# Patient Record
Sex: FEMALE | Race: WHITE | NOT HISPANIC OR LATINO | Employment: OTHER | URBAN - METROPOLITAN AREA
[De-identification: names, ages, dates, MRNs, and addresses within clinical notes are randomized per-mention and may not be internally consistent; named-entity substitution may affect disease eponyms.]

---

## 2017-05-12 ENCOUNTER — HOSPITAL ENCOUNTER (OUTPATIENT)
Dept: LAB | Facility: MEDICAL CENTER | Age: 77
End: 2017-05-12
Attending: NURSE PRACTITIONER
Payer: MEDICARE

## 2017-05-12 LAB
25(OH)D3 SERPL-MCNC: 48 NG/ML (ref 30–100)
ALBUMIN SERPL BCP-MCNC: 4.1 G/DL (ref 3.2–4.9)
ALBUMIN/GLOB SERPL: 1.2 G/DL
ALP SERPL-CCNC: 57 U/L (ref 30–99)
ALT SERPL-CCNC: 12 U/L (ref 2–50)
ANION GAP SERPL CALC-SCNC: 10 MMOL/L (ref 0–11.9)
AST SERPL-CCNC: 19 U/L (ref 12–45)
BILIRUB SERPL-MCNC: 0.6 MG/DL (ref 0.1–1.5)
BUN SERPL-MCNC: 28 MG/DL (ref 8–22)
CALCIUM SERPL-MCNC: 9.6 MG/DL (ref 8.5–10.5)
CHLORIDE SERPL-SCNC: 101 MMOL/L (ref 96–112)
CHOLEST SERPL-MCNC: 134 MG/DL (ref 100–199)
CO2 SERPL-SCNC: 28 MMOL/L (ref 20–33)
CREAT SERPL-MCNC: 1.29 MG/DL (ref 0.5–1.4)
CREAT UR-MCNC: 63.3 MG/DL
EST. AVERAGE GLUCOSE BLD GHB EST-MCNC: 146 MG/DL
GFR SERPL CREATININE-BSD FRML MDRD: 40 ML/MIN/1.73 M 2
GLOBULIN SER CALC-MCNC: 3.4 G/DL (ref 1.9–3.5)
GLUCOSE SERPL-MCNC: 107 MG/DL (ref 65–99)
HBA1C MFR BLD: 6.7 % (ref 0–5.6)
HDLC SERPL-MCNC: 62 MG/DL
LDLC SERPL CALC-MCNC: 45 MG/DL
MICROALBUMIN UR-MCNC: <0.7 MG/DL
MICROALBUMIN/CREAT UR: NORMAL MG/G (ref 0–30)
POTASSIUM SERPL-SCNC: 3.7 MMOL/L (ref 3.6–5.5)
PROT SERPL-MCNC: 7.5 G/DL (ref 6–8.2)
SODIUM SERPL-SCNC: 139 MMOL/L (ref 135–145)
T3FREE SERPL-MCNC: 3.19 PG/ML (ref 2.4–4.2)
T4 FREE SERPL-MCNC: 1.63 NG/DL (ref 0.53–1.43)
TRIGL SERPL-MCNC: 136 MG/DL (ref 0–149)
TSH SERPL DL<=0.005 MIU/L-ACNC: 1.76 UIU/ML (ref 0.3–3.7)

## 2017-05-12 PROCEDURE — 36415 COLL VENOUS BLD VENIPUNCTURE: CPT

## 2017-05-12 PROCEDURE — 82043 UR ALBUMIN QUANTITATIVE: CPT

## 2017-05-12 PROCEDURE — 84481 FREE ASSAY (FT-3): CPT

## 2017-05-12 PROCEDURE — 82570 ASSAY OF URINE CREATININE: CPT

## 2017-05-12 PROCEDURE — 80061 LIPID PANEL: CPT

## 2017-05-12 PROCEDURE — 82306 VITAMIN D 25 HYDROXY: CPT

## 2017-05-12 PROCEDURE — 84439 ASSAY OF FREE THYROXINE: CPT

## 2017-05-12 PROCEDURE — 84443 ASSAY THYROID STIM HORMONE: CPT

## 2017-05-12 PROCEDURE — 83036 HEMOGLOBIN GLYCOSYLATED A1C: CPT

## 2017-05-12 PROCEDURE — 80053 COMPREHEN METABOLIC PANEL: CPT

## 2017-06-02 ENCOUNTER — HOSPITAL ENCOUNTER (INPATIENT)
Facility: MEDICAL CENTER | Age: 77
LOS: 4 days | DRG: 871 | End: 2017-06-07
Attending: EMERGENCY MEDICINE | Admitting: INTERNAL MEDICINE
Payer: MEDICARE

## 2017-06-02 ENCOUNTER — OFFICE VISIT (OUTPATIENT)
Dept: URGENT CARE | Facility: PHYSICIAN GROUP | Age: 77
End: 2017-06-02
Payer: MEDICARE

## 2017-06-02 VITALS
HEART RATE: 110 BPM | SYSTOLIC BLOOD PRESSURE: 156 MMHG | BODY MASS INDEX: 37.77 KG/M2 | OXYGEN SATURATION: 81 % | DIASTOLIC BLOOD PRESSURE: 64 MMHG | TEMPERATURE: 101.1 F | WEIGHT: 235 LBS | HEIGHT: 66 IN | RESPIRATION RATE: 30 BRPM

## 2017-06-02 DIAGNOSIS — R35.0 URINARY FREQUENCY: ICD-10-CM

## 2017-06-02 DIAGNOSIS — N39.0 ACUTE UTI: ICD-10-CM

## 2017-06-02 DIAGNOSIS — J44.1 COPD EXACERBATION (HCC): ICD-10-CM

## 2017-06-02 DIAGNOSIS — A41.9 SEPSIS, DUE TO UNSPECIFIED ORGANISM: ICD-10-CM

## 2017-06-02 DIAGNOSIS — J44.1 ACUTE EXACERBATION OF CHRONIC OBSTRUCTIVE PULMONARY DISEASE (COPD) (HCC): ICD-10-CM

## 2017-06-02 DIAGNOSIS — J96.01 ACUTE HYPOXEMIC RESPIRATORY FAILURE (HCC): ICD-10-CM

## 2017-06-02 DIAGNOSIS — R05.8 PRODUCTIVE COUGH: ICD-10-CM

## 2017-06-02 LAB
APPEARANCE UR: NORMAL
BILIRUB UR STRIP-MCNC: NEGATIVE MG/DL
COLOR UR AUTO: YELLOW
GLUCOSE UR STRIP.AUTO-MCNC: NEGATIVE MG/DL
KETONES UR STRIP.AUTO-MCNC: NEGATIVE MG/DL
LEUKOCYTE ESTERASE UR QL STRIP.AUTO: NORMAL
NITRITE UR QL STRIP.AUTO: POSITIVE
PH UR STRIP.AUTO: 5 [PH] (ref 5–8)
PROT UR QL STRIP: NORMAL MG/DL
RBC UR QL AUTO: NORMAL
SP GR UR STRIP.AUTO: 1.02
UROBILINOGEN UR STRIP-MCNC: NEGATIVE MG/DL

## 2017-06-02 PROCEDURE — 94760 N-INVAS EAR/PLS OXIMETRY 1: CPT

## 2017-06-02 PROCEDURE — 3017F COLORECTAL CA SCREEN DOC REV: CPT | Mod: 8P | Performed by: PHYSICIAN ASSISTANT

## 2017-06-02 PROCEDURE — 99285 EMERGENCY DEPT VISIT HI MDM: CPT

## 2017-06-02 PROCEDURE — 4040F PNEUMOC VAC/ADMIN/RCVD: CPT | Mod: 8P | Performed by: PHYSICIAN ASSISTANT

## 2017-06-02 PROCEDURE — 1036F TOBACCO NON-USER: CPT | Performed by: PHYSICIAN ASSISTANT

## 2017-06-02 PROCEDURE — 81002 URINALYSIS NONAUTO W/O SCOPE: CPT | Performed by: PHYSICIAN ASSISTANT

## 2017-06-02 PROCEDURE — G8432 DEP SCR NOT DOC, RNG: HCPCS | Performed by: PHYSICIAN ASSISTANT

## 2017-06-02 PROCEDURE — 87040 BLOOD CULTURE FOR BACTERIA: CPT

## 2017-06-02 PROCEDURE — G8417 CALC BMI ABV UP PARAM F/U: HCPCS | Performed by: PHYSICIAN ASSISTANT

## 2017-06-02 PROCEDURE — 94640 AIRWAY INHALATION TREATMENT: CPT

## 2017-06-02 PROCEDURE — 93005 ELECTROCARDIOGRAM TRACING: CPT | Performed by: EMERGENCY MEDICINE

## 2017-06-02 PROCEDURE — 1101F PT FALLS ASSESS-DOCD LE1/YR: CPT | Mod: 8P | Performed by: PHYSICIAN ASSISTANT

## 2017-06-02 PROCEDURE — 700101 HCHG RX REV CODE 250: Performed by: EMERGENCY MEDICINE

## 2017-06-02 PROCEDURE — 99215 OFFICE O/P EST HI 40 MIN: CPT | Performed by: PHYSICIAN ASSISTANT

## 2017-06-02 RX ORDER — SODIUM CHLORIDE 9 MG/ML
1000 INJECTION, SOLUTION INTRAVENOUS ONCE
Status: COMPLETED | OUTPATIENT
Start: 2017-06-03 | End: 2017-06-03

## 2017-06-02 RX ORDER — METHYLPREDNISOLONE SODIUM SUCCINATE 125 MG/2ML
125 INJECTION, POWDER, LYOPHILIZED, FOR SOLUTION INTRAMUSCULAR; INTRAVENOUS ONCE
Status: COMPLETED | OUTPATIENT
Start: 2017-06-03 | End: 2017-06-03

## 2017-06-02 RX ORDER — IPRATROPIUM BROMIDE AND ALBUTEROL SULFATE 2.5; .5 MG/3ML; MG/3ML
6 SOLUTION RESPIRATORY (INHALATION)
Status: COMPLETED | OUTPATIENT
Start: 2017-06-03 | End: 2017-06-02

## 2017-06-02 RX ADMIN — IPRATROPIUM BROMIDE AND ALBUTEROL SULFATE 6 ML: .5; 3 SOLUTION RESPIRATORY (INHALATION) at 23:41

## 2017-06-02 ASSESSMENT — ENCOUNTER SYMPTOMS
VOMITING: 0
DIARRHEA: 0
SPUTUM PRODUCTION: 1
DIZZINESS: 0
COUGH: 1
NAUSEA: 0
NAUSEA: 0
SORE THROAT: 0
FEVER: 1
HEADACHES: 0
ABDOMINAL PAIN: 0
SHORTNESS OF BREATH: 0
FEVER: 1
VOMITING: 0
SPUTUM PRODUCTION: 1
ABDOMINAL PAIN: 0
CHILLS: 1
MUSCULOSKELETAL NEGATIVE: 1
COUGH: 1
SHORTNESS OF BREATH: 1

## 2017-06-02 ASSESSMENT — PAIN SCALES - GENERAL: PAINLEVEL_OUTOF10: 0

## 2017-06-02 ASSESSMENT — COPD QUESTIONNAIRES
DURING THE PAST 4 WEEKS HOW MUCH DID YOU FEEL SHORT OF BREATH: NONE/LITTLE OF THE TIME
HAVE YOU SMOKED AT LEAST 100 CIGARETTES IN YOUR ENTIRE LIFE: YES
DO YOU EVER COUGH UP ANY MUCUS OR PHLEGM?: NO/ONLY WITH OCCASIONAL COLDS OR INFECTIONS
COPD SCREENING SCORE: 5
COPD: 1

## 2017-06-02 ASSESSMENT — LIFESTYLE VARIABLES: EVER_SMOKED: YES

## 2017-06-02 NOTE — IP AVS SNAPSHOT
6/7/2017    Camille Rodriguez  [address]  [place] [address]    Dear Camille:    [facility], [facility] wants to ensure your discharge home is safe and you or your loved ones have had all of your questions answered regarding your care after you leave the hospital.    Below is a list of resources and contact information should you have any questions regarding your hospital stay, follow-up instructions, or active medical symptoms.    Questions or Concerns Regarding… Contact   Medical Questions Related to Your Discharge  (7 days a week, 8am-5pm) Contact a Nurse Care Coordinator   [phone]   Medical Questions Not Related to Your Discharge  (24 hours a day / 7 days a week)  Contact the Nurse Health Line   [phone]    Medications or Discharge Instructions Refer to your discharge packet   or contact your [facility] Primary Care Provider   [phone]   Follow-up Appointment(s) Schedule your appointment via [employer]   or contact Scheduling [phone]   Billing Review your statement via [employer]  or contact Billing [phone]   Medical Records Review your records via [employer]   or contact Medical Records [phone]     You may receive a telephone call within two days of discharge. This call is to make certain you understand your discharge instructions and have the opportunity to have any questions answered. You can also easily access your medical information, test results and upcoming appointments via the [employer] free online health management tool. You can learn more and sign up at [employer]. For assistance setting up your [employer] account, please call [phone].    Once again, we want to ensure your discharge home is safe and that you have a clear understanding of any next steps in your care. If you have any questions or concerns, please do not hesitate to contact us, we are here for you. Thank you for choosing [facility] for your healthcare needs.    Sincerely,    Your [facility] Healthcare Team

## 2017-06-02 NOTE — IP AVS SNAPSHOT
[employer] Access Code: Activation code not generated  Current [employer] Status: Active    [employer]  A secure, online tool to manage your health information     [employer]’s [employer]® is a secure, online tool that connects you to your personalized health information from the privacy of your home -- day or night - making it very easy for you to manage your healthcare. Once the activation process is completed, you can even access your medical information using the [employer] wale, which is available for free in the Apple Wale store or Google Play store.     [employer] provides the following levels of access (as shown below):   My Chart Features   [facility] Primary Care Doctor [facility]  Specialists [facility]  Urgent  Care Non-[facility]  Primary Care  Doctor   Email your healthcare team securely and privately 24/7 X X X X   Manage appointments: schedule your next appointment; view details of past/upcoming appointments X      Request prescription refills. X      View recent personal medical records, including lab and immunizations X X X X   View health record, including health history, allergies, medications X X X X   Read reports about your outpatient visits, procedures, consult and ER notes X X X X   See your discharge summary, which is a recap of your hospital and/or ER visit that includes your diagnosis, lab results, and care plan. X X       How to register for [employer]:  1. Go to  [email or web address].  2. Click on the Sign Up Now box, which takes you to the New Member Sign Up page. You will need to provide the following information:  a. Enter your [employer] Access Code exactly as it appears at the top of this page. (You will not need to use this code after you’ve completed the sign-up process. If you do not sign up before the expiration date, you must request a new code.)   b. Enter your date of birth.   c. Enter your home email address.   d. Click Submit, and follow the next screen’s instructions.  3. Create a [employer] ID. This will  be your [employer] login ID and cannot be changed, so think of one that is secure and easy to remember.  4. Create a [employer] password. You can change your password at any time.  5. Enter your Password Reset Question and Answer. This can be used at a later time if you forget your password.   6. Enter your e-mail address. This allows you to receive e-mail notifications when new information is available in [employer].  7. Click Sign Up. You can now view your health information.    For assistance activating your [employer] account, call [phone]

## 2017-06-02 NOTE — IP AVS SNAPSHOT
" <p align=\"LEFT\"><IMG SRC=\"[email or web address]\" alt=\"Image\" WIDTH=\"50%\" HEIGHT=\"200\" BORDER=\"\"></p>                   Name:Camille Rodriguez  Medical Record Number:[id number]  CSN: [id number]    YOB: 1940   Age: 76 y.o.  Sex: female  HT:1.626 m (5' 4\") WT: 114.3 kg (251 lb 15.8 oz)          Admit Date: 6/2/2017     Discharge Date:   Today's Date: 6/7/2017  Attending Doctor:  Rico Staley M.D.                  Allergies:  Zithromax          Follow-up Information     1. Follow up with Mirella Mistry M.D.. Go on 6/12/2017.    Specialty:  Family Medicine    Why:  Please arrive at 10:00am for your appointment. Thank you    Contact information    [address]  [facility] [address]  [phone]           Medication List      Take these Medications        Instructions    ADVAIR DISKUS 250-50 MCG/DOSE Aepb   Generic drug:  fluticasone-salmeterol    Inhale 1 Puff by mouth every 12 hours.   Dose:  1 Puff       albuterol 108 (90 BASE) MCG/ACT Aers inhalation aerosol    Inhale 2 Puffs by mouth every four hours as needed for Shortness of Breath.   Dose:  2 Puff       aspirin 81 MG tablet    Take 81 mg by mouth every day.   Dose:  81 mg       atenolol 50 MG Tabs   Commonly known as:  TENORMIN   Notes to Patient:  Take Blood pressure and heart rate prior to dose    Take 50 mg by mouth every day.   Dose:  50 mg       CALCIUM 1200 PO    Take  by mouth.       cyanocobalamin 500 MCG Tabs   Commonly known as:  VITAMIN B-12    Take 1,000 mcg by mouth every day.   Dose:  1000 mcg       fish oil 1000 MG Caps capsule    Take 1,000 mg by mouth every day.   Dose:  1000 mg       hydrochlorothiazide 25 MG Tabs   Commonly known as:  HYDRODIURIL    Take 25 mg by mouth every day.   Dose:  25 mg       ipratropium-albuterol 0.5-2.5 (3) MG/3ML nebulizer solution   Commonly known as:  DUONEB    3 mL by Nebulization route every four hours as needed for Shortness of Breath.   Dose:  3 mL       levofloxacin 500 MG tablet   "   Commonly known as:  LEVAQUIN    Take 1 Tab by mouth every day.   Dose:  500 mg       lisinopril 20 MG Tabs   What changed:  how much to take   Commonly known as:  PRINIVIL    Take 1 Tab by mouth every day.   Dose:  20 mg       metformin  MG Tb24   Commonly known as:  GLUCOPHAGE XR    Take 1 Tab by mouth every day.   Dose:  500 mg       MULTIVITAMIN PO    Take  by mouth.       predniSONE 10 MG Tabs   Commonly known as:  DELTASONE    Take 1 Tab by mouth every day for 12 days. Take  40 mg for 3 days then 30 mg for 3 days then 20 mg for 3 days then 10 mg for 3 days then stop.   Dose:  10 mg       promethazine-codeine 6.25-10 MG/5ML Syrp   What changed:  Another medication with the same name was removed. Continue taking this medication, and follow the directions you see here.   Commonly known as:  PHENERGAN-CODEINE    Take 5 mL by mouth 4 times a day as needed for Cough.   Dose:  5 mL       simvastatin 10 MG Tabs   Commonly known as:  ZOCOR    Take 10 mg by mouth every evening.   Dose:  10 mg       SPIRIVA HANDIHALER INH    Inhale  by mouth.       vitamin D 2000 UNIT Tabs    Take  by mouth.

## 2017-06-02 NOTE — MR AVS SNAPSHOT
"        Camille Rodriguez   2017 6:45 PM   Office Visit   MRN: [id number]    Department:  [facility]   Dept Phone:  [phone]    Description:  Female : 1940   Provider:  Ruthie Monson PA-C           Reason for Visit     Cough cough, fever, chills, crackling [id number]    UTI burning sensation and [id number]      Allergies as of 2017     No Known Allergies      Vital Signs     Blood Pressure Pulse Temperature Respirations Height Weight    156/64 mmHg 110 38.4 °C (101.1 °F) 30 1.676 m (5' 5.98\") 106.595 kg (235 lb)    Body Mass Index Oxygen Saturation Smoking Status             37.95 kg/m2 81% Former Smoker         Basic Information     Date Of Birth Sex Race Ethnicity Preferred Language    1940 Female White Non- English      Health Maintenance        Date Due Completion Dates    IMM DTaP/Tdap/Td Vaccine (1 - Tdap) 1960 ---    PAP SMEAR 1962 ---    COLONOSCOPY 1991 ---    IMM ZOSTER VACCINE 2001 ---    IMM PNEUMOCOCCAL 65+ (ADULT) LOW/MEDIUM RISK SERIES (1 of 2 - PCV13) 2006 ---    MAMMOGRAM 11/3/2016 11/3/2015, 5/15/2013, 2010, 2010, 2009, 2009, 2007, 2007    BONE DENSITY 2019, 2010            Current Immunizations     No immunizations on file.      Below and/or attached are the medications your provider expects you to take. Review all of your home medications and newly ordered medications with your provider and/or pharmacist. Follow medication instructions as directed by your provider and/or pharmacist. Please keep your medication list with you and share with your provider. Update the information when medications are discontinued, doses are changed, or new medications (including over-the-counter products) are added; and carry medication information at all times in the event of emergency situations     Allergies:  No Known Allergies          Medications  Valid as of: 2017 -  7:27 PM   " Generic Name Brand Name Tablet Size Instructions for use    Aspirin (Tab) aspirin 81 MG Take 81 mg by mouth every day.        Atenolol (Tab) TENORMIN 50 MG Take 50 mg by mouth every day.        Azithromycin (Tab) ZITHROMAX 250 MG Take  by mouth every day. 2 tabs by mouth day 1, 1 tab by mouth days 2-5        Calcium Carbonate-Vit D-Min   Take  by mouth.        Cholecalciferol (Tab) vitamin D 2000 UNIT Take  by mouth.        Doxycycline Hyclate (Tab) VIBRAMYCIN 100 MG Take 1 Tab by mouth 2 times a day.        Fluticasone-Salmeterol (AEROSOL POWDER, BREATH ACTIVATED) ADVAIR 250-50 MCG/DOSE Inhale 1 Puff by mouth every 12 hours.        Lisinopril-Hydrochlorothiazide (Tab) PRINZIDE, ZESTORETIC 20-25 MG Take 1 Tab by mouth every day.        MetFORMIN HCl (TABLET SR 24 HR) GLUCOPHAGE  MG Take 500 mg by mouth every day.        Multiple Vitamins-Minerals   Take  by mouth.        Naproxen Sodium   Take  by mouth.        Promethazine-Codeine (Syrup) PHENERGAN-CODEINE 6.25-10 MG/5ML Take 5 mL by mouth 4 times a day as needed for Cough.        Promethazine-Codeine (Syrup) PHENERGAN-CODEINE 6.25-10 MG/5ML Take 5 mL by mouth 4 times a day as needed for Cough.        Simvastatin (Tab) ZOCOR 10 MG Take 10 mg by mouth every evening.        Tiotropium Bromide Monohydrate   Inhale  by mouth.        .                 Medicines prescribed today were sent to:     [facility] #[id number] - [place] - [address]    [address] [place] [address]    Phone: [phone] Fax: [phone]    Open 24 Hours?: No      Medication refill instructions:       If your prescription bottle indicates you have medication refills left, it is not necessary to call your provider’s office. Please contact your pharmacy and they will refill your medication.    If your prescription bottle indicates you do not have any refills left, you may request refills at any time through one of the following ways: The online [employer] system (except Urgent Care), by  calling your provider’s office, or by asking your pharmacy to contact your provider’s office with a refill request. Medication refills are processed only during regular business hours and may not be available until the next business day. Your provider may request additional information or to have a follow-up visit with you prior to refilling your medication.   *Please Note: Medication refills are assigned a new Rx number when refilled electronically. Your pharmacy may indicate that no refills were authorized even though a new prescription for the same medication is available at the pharmacy. Please request the medicine by name with the pharmacy before contacting your provider for a refill.           [employer] Access Code: Activation code not generated  Current [employer] Status: Active

## 2017-06-02 NOTE — IP AVS SNAPSHOT
" Home Care Instructions                                                                                                                  Name:Camille Rodriguez  Medical Record Number:[id number]  CSN: [id number]    YOB: 1940   Age: 76 y.o.  Sex: female  HT:1.626 m (5' 4\") WT: 114.3 kg (251 lb 15.8 oz)          Admit Date: 6/2/2017     Discharge Date:   Today's Date: 6/7/2017  Attending Doctor:  Rico Staley M.D.                  Allergies:  Zithromax            Discharge Instructions       Discharge Instructions    Discharged to home by car with relative. Discharged via wheelchair, hospital escort: Yes.  Special equipment needed: Not Applicable    Be sure to schedule a follow-up appointment with your primary care doctor or any specialists as instructed.     Discharge Plan:   Diet Plan: Discussed  Activity Level: Discussed  Confirmed Follow up Appointment: Appointment Scheduled  Confirmed Symptoms Management: Discussed  Medication Reconciliation Updated: Yes  Influenza Vaccine Indication: Patient Refuses    I understand that a diet low in cholesterol, fat, and sodium is recommended for good health. Unless I have been given specific instructions below for another diet, I accept this instruction as my diet prescription.   Other diet: cardiac diabetic    Special Instructions: None    · Is patient discharged on Warfarin / Coumadin?   No     · Is patient Post Blood Transfusion?  No    Depression / Suicide Risk    As you are discharged from this [facility] facility, it is important to learn how to keep safe from harming yourself.    Recognize the warning signs:  · Abrupt changes in personality, positive or negative- including increase in energy   · Giving away possessions  · Change in eating patterns- significant weight changes-  positive or negative  · Change in sleeping patterns- unable to sleep or sleeping all the time   · Unwillingness or inability to communicate  · Depression  · Unusual sadness, discouragement " and loneliness  · Talk of wanting to die  · Neglect of personal appearance   · Rebelliousness- reckless behavior  · Withdrawal from people/activities they love  · Confusion- inability to concentrate     If you or a loved one observes any of these behaviors or has concerns about self-harm, here's what you can do:  · Talk about it- your feelings and reasons for harming yourself  · Remove any means that you might use to hurt yourself (examples: pills, rope, extension cords, firearm)  · Get professional help from the community (Mental Health, Substance Abuse, psychological counseling)  · Do not be alone:Call your Safe Contact- someone whom you trust who will be there for you.  · Call your local CRISIS HOTLINE 150-9338 or 610-150-0915  · Call your local Children's Mobile Crisis Response Team [place] (797) 860-4945 or www.Lekiosque.fr  · Call the toll free National Suicide Prevention Hotlines   · National Suicide Prevention Lifeline 137-913-PBKF (3234)  · National Hope Line Network 800-SUICIDE (985-5376)  Sepsis, Adult  Sepsis is a serious infection of your blood or tissues that affects your whole body. The infection that causes sepsis may be bacterial, viral, fungal, or parasitic. Sepsis may be life threatening. Sepsis can cause your blood pressure to drop. This may result in shock. Shock causes your central nervous system and your organs to stop working correctly.   RISK FACTORS  Sepsis can happen in anyone, but it is more likely to happen in people who have weakened immune systems.  SIGNS AND SYMPTOMS   Symptoms of sepsis can include:  Fever or low body temperature (hypothermia).  Rapid breathing (hyperventilation).  Chills.  Rapid heartbeat (tachycardia).  Confusion or light-headedness.  Trouble breathing.  Urinating much less than usual.  Cool, clammy skin or red, flushed skin.  Other problems with the heart, kidneys, or brain.  DIAGNOSIS   Your health care provider will likely do tests to look for an  infection, to see if the infection has spread to your blood, and to see how serious your condition is. Tests can include:  Blood tests, including cultures of your blood.  Cultures of other fluids from your body, such as:  Urine.  Pus from wounds.  Mucus coughed up from your lungs.  Urine tests other than cultures.  X-ray exams or other imaging tests.  TREATMENT   Treatment will begin with elimination of the source of infection. If your sepsis is likely caused by a bacterial or fungal infection, you will be given antibiotic or antifungal medicines.  You may also receive:  Oxygen.  Fluids through an IV tube.  Medicines to increase your blood pressure.  A machine to clean your blood (dialysis) if your kidneys fail.  A machine to help you breathe if your lungs fail.  SEEK IMMEDIATE MEDICAL CARE IF:  You get an infection or develop any of the signs and symptoms of sepsis after surgery or a hospitalization.     This information is not intended to replace advice given to you by your health care provider. Make sure you discuss any questions you have with your health care provider.     Document Released: 09/15/2004 Document Revised: 05/03/2016 Document Reviewed: 08/25/2014  [employer] Patient Education ©2016 [employer]      Follow-up Information     1. Follow up with Mirella Mistry M.D.. Go on 6/12/2017.    Specialty:  Family Medicine    Why:  Please arrive at 10:00am for your appointment. Thank you    Contact information    [address]  [place] [address]  [phone]           Discharge Medication Instructions:    Below are the medications your physician expects you to take upon discharge:    Review all your home medications and newly ordered medications with your doctor and/or pharmacist. Follow medication instructions as directed by your doctor and/or pharmacist.    Please keep your medication list with you and share with your physician.               Medication List      START taking these  medications        Instructions    Morning Afternoon Evening Bedtime    albuterol 108 (90 BASE) MCG/ACT Aers inhalation aerosol        Inhale 2 Puffs by mouth every four hours as needed for Shortness of Breath.   Dose:  2 Puff                        ipratropium-albuterol 0.5-2.5 (3) MG/3ML nebulizer solution   Last time this was given:  3 mL on 6/6/2017  6:40 AM   Commonly known as:  DUONEB        3 mL by Nebulization route every four hours as needed for Shortness of Breath.   Dose:  3 mL                        levofloxacin 500 MG tablet   Commonly known as:  LEVAQUIN   Next Dose Due:  Tomorrow morning        Take 1 Tab by mouth every day.   Dose:  500 mg                        predniSONE 10 MG Tabs   Last time this was given:  50 mg on 6/7/2017  8:34 AM   Commonly known as:  DELTASONE   Next Dose Due:  Tomorrow morning        Take 1 Tab by mouth every day for 12 days. Take  40 mg for 3 days then 30 mg for 3 days then 20 mg for 3 days then 10 mg for 3 days then stop.   Dose:  10 mg                          CHANGE how you take these medications        Instructions    Morning Afternoon Evening Bedtime    lisinopril 20 MG Tabs   What changed:  how much to take   Last time this was given:  20 mg on 6/7/2017  8:34 AM   Commonly known as:  PRINIVIL   Next Dose Due:  Tomorrow morning        Take 1 Tab by mouth every day.   Dose:  20 mg                        promethazine-codeine 6.25-10 MG/5ML Syrp   What changed:  Another medication with the same name was removed. Continue taking this medication, and follow the directions you see here.   Commonly known as:  PHENERGAN-CODEINE        Take 5 mL by mouth 4 times a day as needed for Cough.   Dose:  5 mL                          CONTINUE taking these medications        Instructions    Morning Afternoon Evening Bedtime    ADVAIR DISKUS 250-50 MCG/DOSE Aepb   Generic drug:  fluticasone-salmeterol   Next Dose Due:  Tomorrow morning        Inhale 1 Puff by mouth every 12 hours.      Dose:  1 Puff                        aspirin 81 MG tablet   Next Dose Due:  Tomorrow morning        Take 81 mg by mouth every day.   Dose:  81 mg                        atenolol 50 MG Tabs   Commonly known as:  TENORMIN   Next Dose Due:  Tomorrow morning   Notes to Patient:  Take Blood pressure and heart rate prior to dose        Take 50 mg by mouth every day.   Dose:  50 mg                        CALCIUM 1200 PO   Next Dose Due:  Tomorrow morning        Take  by mouth.                        cyanocobalamin 500 MCG Tabs   Commonly known as:  VITAMIN B-12   Next Dose Due:  Tomorrow morning        Take 1,000 mcg by mouth every day.   Dose:  1000 mcg                        fish oil 1000 MG Caps capsule   Next Dose Due:  Tomorrow morning        Take 1,000 mg by mouth every day.   Dose:  1000 mg                        hydrochlorothiazide 25 MG Tabs   Commonly known as:  HYDRODIURIL   Next Dose Due:  Tomorrow morning        Take 25 mg by mouth every day.   Dose:  25 mg                        metformin  MG Tb24   Commonly known as:  GLUCOPHAGE XR   Next Dose Due:  Tomorrow morning        Take 1 Tab by mouth every day.   Dose:  500 mg                        MULTIVITAMIN PO   Next Dose Due:  Tomorrow morning        Take  by mouth.                        simvastatin 10 MG Tabs   Last time this was given:  10 mg on 6/6/2017  9:12 PM   Commonly known as:  ZOCOR   Next Dose Due:  This evening        Take 10 mg by mouth every evening.   Dose:  10 mg                        SPIRIVA HANDIHALER INH   Last time this was given:  1 Cap on 6/7/2017 10:41 AM   Next Dose Due:  Tomorrow morning        Inhale  by mouth.                        vitamin D 2000 UNIT Tabs   Next Dose Due:  Tomorrow morning        Take  by mouth.                          STOP taking these medications     ALEVE PO   Notes to Patient:  STOP               azithromycin 250 MG Tabs   Commonly known as:  ZITHROMAX   Notes to Patient:  STOP                doxycycline 100 MG Tabs   Commonly known as:  VIBRAMYCIN   Notes to Patient:  STOP                    Where to Get Your Medications      Information about where to get these medications is not yet available     ! Ask your nurse or doctor about these medications    - albuterol 108 (90 BASE) MCG/ACT Aers inhalation aerosol  - ipratropium-albuterol 0.5-2.5 (3) MG/3ML nebulizer solution  - levofloxacin 500 MG tablet  - lisinopril 20 MG Tabs  - metformin  MG Tb24  - predniSONE 10 MG Tabs            Orders for after discharge     DME Nebulizer    Complete by:  As directed    Small volume nebulizer and supplies.       DME O2 New Set Up    Complete by:  As directed        REFERRAL TO HOME HEALTH    Complete by:  As directed    Home health will create and establish a plan of care             Instructions           Diet / Nutrition:    Follow any diet instructions given to you by your doctor or the dietician, including how much salt (sodium) you are allowed each day.    If you are overweight, talk to your doctor about a weight reduction plan.    Activity:    Remain physically active following your doctor's instructions about exercise and activity.    Rest often.     Any time you become even a little tired or short of breath, SIT DOWN and rest.    Worsening Symptoms:    Report any of the following signs and symptoms to the doctor's office immediately:    *Pain of jaw, arm, or neck  *Chest pain not relieved by medication                               *Dizziness or loss of consciousness  *Difficulty breathing even when at rest   *More tired than usual                                       *Bleeding drainage or swelling of surgical site  *Swelling of feet, ankles, legs or stomach                 *Fever (>100ºF)  *Pink or blood tinged sputum  *Weight gain (3lbs/day or 5lbs /week)           *Shock from internal defibrillator (if applicable)  *Palpitations or irregular heartbeats                *Cool and/or numb  extremities    Stroke Awareness    Common Risk Factors for Stroke include:    Age  Atrial Fibrillation  Carotid Artery Stenosis  Diabetes Mellitus  Excessive alcohol consumption  High blood pressure  Overweight   Physical inactivity  Smoking    Warning signs and symptoms of a stroke include:    *Sudden numbness or weakness of the face, arm or leg (especially on one side of the body).  *Sudden confusion, trouble speaking or understanding.  *Sudden trouble seeing in one or both eyes.  *Sudden trouble walking, dizziness, loss of balance or coordination.Sudden severe headache with no known cause.    It is very important to get treatment quickly when a stroke occurs. If you experience any of the above warning signs, call 911 immediately.                   Disclaimer         Quit Smoking / Tobacco Use:    I understand the use of any tobacco products increases my chance of suffering from future heart disease or stroke and could cause other illnesses which may shorten my life. Quitting the use of tobacco products is the single most important thing I can do to improve my health. For further information on smoking / tobacco cessation call a Toll Free Quit Line at 1-825.977.8079 (*National Cancer [place]) or 1-666.560.3776 (American Lung Association) or you can access the web based program at www.lungusa.org.    [place] Tobacco Users Help Line:  (404) 344-5849       Toll Free: 1-366.928.2400  Quit Tobacco Program [facility] ([phone]    Crisis Hotline:    [place] Crisis Hotline:  6-390-ZLOXMEF or 1-168.247.7384    [place] Crisis Hotline:    1-241.556.6926 or 426-949-9864    Discharge Survey:   Thank you for choosing [facility]. We hope we did everything we could to make your hospital stay a pleasant one. You may be receiving a phone survey and we would appreciate your time and participation in answering the questions. Your input is very valuable to us in our efforts to improve our service to our  patients and their families.        My signature on this form indicates that:    1. I have reviewed and understand the above information.  2. My questions regarding this information have been answered to my satisfaction.  3. I have formulated a plan with my discharge nurse to obtain my prescribed medications for home.                  Disclaimer         __________________________________                     __________       ________                       Patient Signature                                                 Date                    Time

## 2017-06-03 ENCOUNTER — RESOLUTE PROFESSIONAL BILLING HOSPITAL PROF FEE (OUTPATIENT)
Dept: HOSPITALIST | Facility: MEDICAL CENTER | Age: 77
End: 2017-06-03
Payer: MEDICARE

## 2017-06-03 ENCOUNTER — APPOINTMENT (OUTPATIENT)
Dept: RADIOLOGY | Facility: MEDICAL CENTER | Age: 77
DRG: 871 | End: 2017-06-03
Attending: EMERGENCY MEDICINE
Payer: MEDICARE

## 2017-06-03 PROBLEM — I44.7 LEFT BUNDLE BRANCH BLOCK: Status: ACTIVE | Noted: 2017-06-03

## 2017-06-03 PROBLEM — J96.01 ACUTE HYPOXEMIC RESPIRATORY FAILURE (HCC): Status: ACTIVE | Noted: 2017-06-03

## 2017-06-03 PROBLEM — R65.20 SEVERE SEPSIS(995.92): Status: RESOLVED | Noted: 2017-06-03 | Resolved: 2017-06-03

## 2017-06-03 PROBLEM — R65.20 SEVERE SEPSIS(995.92): Status: ACTIVE | Noted: 2017-06-03

## 2017-06-03 PROBLEM — I10 HTN (HYPERTENSION): Status: ACTIVE | Noted: 2017-06-03

## 2017-06-03 PROBLEM — E11.9 DM2 (DIABETES MELLITUS, TYPE 2) (HCC): Status: ACTIVE | Noted: 2017-06-03

## 2017-06-03 PROBLEM — A41.9 SEVERE SEPSIS(995.92): Status: RESOLVED | Noted: 2017-06-03 | Resolved: 2017-06-03

## 2017-06-03 PROBLEM — N18.30 CKD (CHRONIC KIDNEY DISEASE), STAGE III: Status: ACTIVE | Noted: 2017-06-03

## 2017-06-03 PROBLEM — D64.9 ANEMIA: Status: ACTIVE | Noted: 2017-06-03

## 2017-06-03 PROBLEM — A41.9 SEVERE SEPSIS(995.92): Status: ACTIVE | Noted: 2017-06-03

## 2017-06-03 PROBLEM — J44.1 COPD EXACERBATION (HCC): Status: ACTIVE | Noted: 2017-06-03

## 2017-06-03 PROBLEM — N39.0 UTI (URINARY TRACT INFECTION): Status: ACTIVE | Noted: 2017-06-03

## 2017-06-03 LAB
ALBUMIN SERPL BCP-MCNC: 4 G/DL (ref 3.2–4.9)
ALBUMIN/GLOB SERPL: 1.2 G/DL
ALP SERPL-CCNC: 54 U/L (ref 30–99)
ALT SERPL-CCNC: 11 U/L (ref 2–50)
ANION GAP SERPL CALC-SCNC: 12 MMOL/L (ref 0–11.9)
ANISOCYTOSIS BLD QL SMEAR: ABNORMAL
APPEARANCE UR: ABNORMAL
APTT PPP: 22.7 SEC (ref 24.7–36)
AST SERPL-CCNC: 21 U/L (ref 12–45)
BACTERIA #/AREA URNS HPF: ABNORMAL /HPF
BASOPHILS # BLD AUTO: 0 % (ref 0–1.8)
BASOPHILS # BLD: 0 K/UL (ref 0–0.12)
BILIRUB SERPL-MCNC: 0.6 MG/DL (ref 0.1–1.5)
BILIRUB UR QL STRIP.AUTO: NEGATIVE
BNP SERPL-MCNC: 67 PG/ML (ref 0–100)
BUN SERPL-MCNC: 28 MG/DL (ref 8–22)
CALCIUM SERPL-MCNC: 9.4 MG/DL (ref 8.5–10.5)
CHLORIDE SERPL-SCNC: 100 MMOL/L (ref 96–112)
CO2 SERPL-SCNC: 23 MMOL/L (ref 20–33)
COLOR UR: ABNORMAL
CREAT SERPL-MCNC: 1.23 MG/DL (ref 0.5–1.4)
CULTURE IF INDICATED INDCX: YES UA CULTURE
EKG IMPRESSION: NORMAL
EKG IMPRESSION: NORMAL
EOSINOPHIL # BLD AUTO: 0 K/UL (ref 0–0.51)
EOSINOPHIL NFR BLD: 0 % (ref 0–6.9)
EPI CELLS #/AREA URNS HPF: ABNORMAL /HPF
ERYTHROCYTE [DISTWIDTH] IN BLOOD BY AUTOMATED COUNT: 50.5 FL (ref 35.9–50)
FLUAV H1 2009 PAND RNA SPEC QL NAA+PROBE: NOT DETECTED
FLUAV RNA SPEC QL NAA+PROBE: NEGATIVE
FLUAV+FLUBV AG SPEC QL IA: NORMAL
FLUBV RNA SPEC QL NAA+PROBE: NEGATIVE
GFR SERPL CREATININE-BSD FRML MDRD: 43 ML/MIN/1.73 M 2
GLOBULIN SER CALC-MCNC: 3.4 G/DL (ref 1.9–3.5)
GLUCOSE BLD-MCNC: 220 MG/DL (ref 65–99)
GLUCOSE BLD-MCNC: 223 MG/DL (ref 65–99)
GLUCOSE BLD-MCNC: 245 MG/DL (ref 65–99)
GLUCOSE BLD-MCNC: 372 MG/DL (ref 65–99)
GLUCOSE SERPL-MCNC: 149 MG/DL (ref 65–99)
GLUCOSE UR STRIP.AUTO-MCNC: ABNORMAL MG/DL
HCT VFR BLD AUTO: 28.2 % (ref 37–47)
HGB BLD-MCNC: 8.9 G/DL (ref 12–16)
INR PPP: 1 (ref 0.87–1.13)
KETONES UR STRIP.AUTO-MCNC: NEGATIVE MG/DL
LACTATE BLD-SCNC: 2 MMOL/L (ref 0.5–2)
LACTATE BLD-SCNC: 2.2 MMOL/L (ref 0.5–2)
LEUKOCYTE ESTERASE UR QL STRIP.AUTO: ABNORMAL
LV EJECT FRACT  99904: 60
LV EJECT FRACT MOD 2C 99903: 47.99
LV EJECT FRACT MOD 4C 99902: 57.01
LV EJECT FRACT MOD BP 99901: 53.21
LYMPHOCYTES # BLD AUTO: 4.8 K/UL (ref 1–4.8)
LYMPHOCYTES NFR BLD: 17.5 % (ref 22–41)
MAGNESIUM SERPL-MCNC: 1.2 MG/DL (ref 1.5–2.5)
MAGNESIUM SERPL-MCNC: 1.5 MG/DL (ref 1.5–2.5)
MANUAL DIFF BLD: NORMAL
MCH RBC QN AUTO: 27.5 PG (ref 27–33)
MCHC RBC AUTO-ENTMCNC: 31.6 G/DL (ref 33.6–35)
MCV RBC AUTO: 87 FL (ref 81.4–97.8)
MICRO URNS: ABNORMAL
MICROCYTES BLD QL SMEAR: ABNORMAL
MONOCYTES # BLD AUTO: 0.96 K/UL (ref 0–0.85)
MONOCYTES NFR BLD AUTO: 3.5 % (ref 0–13.4)
MORPHOLOGY BLD-IMP: NORMAL
MUCOUS THREADS #/AREA URNS HPF: ABNORMAL /HPF
NEUTROPHILS # BLD AUTO: 21.4 K/UL (ref 2–7.15)
NEUTROPHILS NFR BLD: 78.1 % (ref 44–72)
NITRITE UR QL STRIP.AUTO: POSITIVE
NRBC # BLD AUTO: 0 K/UL
NRBC BLD AUTO-RTO: 0 /100 WBC
PH UR STRIP.AUTO: 5.5 [PH]
PHOSPHATE SERPL-MCNC: 2.9 MG/DL (ref 2.5–4.5)
PHOSPHATE SERPL-MCNC: 3 MG/DL (ref 2.5–4.5)
PLATELET # BLD AUTO: 327 K/UL (ref 164–446)
PLATELET BLD QL SMEAR: NORMAL
PMV BLD AUTO: 9.3 FL (ref 9–12.9)
POTASSIUM SERPL-SCNC: 4.4 MMOL/L (ref 3.6–5.5)
PROCALCITONIN SERPL-MCNC: 0.23 NG/ML
PROMYELOCYTES NFR BLD MANUAL: 0.9 %
PROT SERPL-MCNC: 7.4 G/DL (ref 6–8.2)
PROT UR QL STRIP: NEGATIVE MG/DL
PROTHROMBIN TIME: 13.5 SEC (ref 12–14.6)
RBC # BLD AUTO: 3.24 M/UL (ref 4.2–5.4)
RBC # URNS HPF: ABNORMAL /HPF
RBC BLD AUTO: PRESENT
RBC UR QL AUTO: ABNORMAL
SIGNIFICANT IND 70042: NORMAL
SITE SITE: NORMAL
SODIUM SERPL-SCNC: 135 MMOL/L (ref 135–145)
SOURCE SOURCE: NORMAL
SP GR UR STRIP.AUTO: 1.01
TROPONIN I SERPL-MCNC: <0.01 NG/ML (ref 0–0.04)
TROPONIN I SERPL-MCNC: <0.01 NG/ML (ref 0–0.04)
WBC # BLD AUTO: 27.4 K/UL (ref 4.8–10.8)
WBC #/AREA URNS HPF: >150 /HPF

## 2017-06-03 PROCEDURE — 85027 COMPLETE CBC AUTOMATED: CPT

## 2017-06-03 PROCEDURE — 87400 INFLUENZA A/B EACH AG IA: CPT

## 2017-06-03 PROCEDURE — 87503 INFLUENZA DNA AMP PROB ADDL: CPT

## 2017-06-03 PROCEDURE — 700105 HCHG RX REV CODE 258: Performed by: INTERNAL MEDICINE

## 2017-06-03 PROCEDURE — 87086 URINE CULTURE/COLONY COUNT: CPT

## 2017-06-03 PROCEDURE — 304562 HCHG STAT O2 MASK/CANNULA

## 2017-06-03 PROCEDURE — 82962 GLUCOSE BLOOD TEST: CPT | Mod: 91

## 2017-06-03 PROCEDURE — 96365 THER/PROPH/DIAG IV INF INIT: CPT

## 2017-06-03 PROCEDURE — 84100 ASSAY OF PHOSPHORUS: CPT

## 2017-06-03 PROCEDURE — 93306 TTE W/DOPPLER COMPLETE: CPT | Mod: 26 | Performed by: INTERNAL MEDICINE

## 2017-06-03 PROCEDURE — 81001 URINALYSIS AUTO W/SCOPE: CPT

## 2017-06-03 PROCEDURE — 71250 CT THORAX DX C-: CPT

## 2017-06-03 PROCEDURE — 700111 HCHG RX REV CODE 636 W/ 250 OVERRIDE (IP): Performed by: EMERGENCY MEDICINE

## 2017-06-03 PROCEDURE — 83605 ASSAY OF LACTIC ACID: CPT | Mod: 91

## 2017-06-03 PROCEDURE — 85610 PROTHROMBIN TIME: CPT

## 2017-06-03 PROCEDURE — 71010 DX-CHEST-PORTABLE (1 VIEW): CPT

## 2017-06-03 PROCEDURE — 700111 HCHG RX REV CODE 636 W/ 250 OVERRIDE (IP): Performed by: INTERNAL MEDICINE

## 2017-06-03 PROCEDURE — 80053 COMPREHEN METABOLIC PANEL: CPT

## 2017-06-03 PROCEDURE — 96375 TX/PRO/DX INJ NEW DRUG ADDON: CPT

## 2017-06-03 PROCEDURE — 93005 ELECTROCARDIOGRAM TRACING: CPT | Performed by: INTERNAL MEDICINE

## 2017-06-03 PROCEDURE — 700105 HCHG RX REV CODE 258: Performed by: EMERGENCY MEDICINE

## 2017-06-03 PROCEDURE — 85007 BL SMEAR W/DIFF WBC COUNT: CPT

## 2017-06-03 PROCEDURE — 87040 BLOOD CULTURE FOR BACTERIA: CPT

## 2017-06-03 PROCEDURE — 700102 HCHG RX REV CODE 250 W/ 637 OVERRIDE(OP): Performed by: INTERNAL MEDICINE

## 2017-06-03 PROCEDURE — 700101 HCHG RX REV CODE 250: Performed by: EMERGENCY MEDICINE

## 2017-06-03 PROCEDURE — 94640 AIRWAY INHALATION TREATMENT: CPT

## 2017-06-03 PROCEDURE — 84484 ASSAY OF TROPONIN QUANT: CPT | Mod: 91

## 2017-06-03 PROCEDURE — 83880 ASSAY OF NATRIURETIC PEPTIDE: CPT

## 2017-06-03 PROCEDURE — 94760 N-INVAS EAR/PLS OXIMETRY 1: CPT

## 2017-06-03 PROCEDURE — 770020 HCHG ROOM/CARE - TELE (206)

## 2017-06-03 PROCEDURE — A9270 NON-COVERED ITEM OR SERVICE: HCPCS | Performed by: INTERNAL MEDICINE

## 2017-06-03 PROCEDURE — 87502 INFLUENZA DNA AMP PROBE: CPT

## 2017-06-03 PROCEDURE — 74176 CT ABD & PELVIS W/O CONTRAST: CPT

## 2017-06-03 PROCEDURE — 93306 TTE W/DOPPLER COMPLETE: CPT

## 2017-06-03 PROCEDURE — 700101 HCHG RX REV CODE 250: Performed by: INTERNAL MEDICINE

## 2017-06-03 PROCEDURE — 93010 ELECTROCARDIOGRAM REPORT: CPT | Performed by: INTERNAL MEDICINE

## 2017-06-03 PROCEDURE — 96361 HYDRATE IV INFUSION ADD-ON: CPT

## 2017-06-03 PROCEDURE — 99223 1ST HOSP IP/OBS HIGH 75: CPT | Performed by: INTERNAL MEDICINE

## 2017-06-03 PROCEDURE — 83735 ASSAY OF MAGNESIUM: CPT

## 2017-06-03 PROCEDURE — 85730 THROMBOPLASTIN TIME PARTIAL: CPT

## 2017-06-03 PROCEDURE — 84145 PROCALCITONIN (PCT): CPT

## 2017-06-03 RX ORDER — PREDNISONE 50 MG/1
50 TABLET ORAL DAILY
Status: DISCONTINUED | OUTPATIENT
Start: 2017-06-03 | End: 2017-06-07 | Stop reason: HOSPADM

## 2017-06-03 RX ORDER — SODIUM CHLORIDE 9 MG/ML
1000 INJECTION, SOLUTION INTRAVENOUS ONCE
Status: COMPLETED | OUTPATIENT
Start: 2017-06-03 | End: 2017-06-03

## 2017-06-03 RX ORDER — AMOXICILLIN 250 MG
2 CAPSULE ORAL 2 TIMES DAILY
Status: DISCONTINUED | OUTPATIENT
Start: 2017-06-03 | End: 2017-06-07 | Stop reason: HOSPADM

## 2017-06-03 RX ORDER — DOXYCYCLINE 100 MG/1
100 TABLET ORAL EVERY 12 HOURS
Status: DISCONTINUED | OUTPATIENT
Start: 2017-06-03 | End: 2017-06-07 | Stop reason: HOSPADM

## 2017-06-03 RX ORDER — SIMVASTATIN 10 MG
10 TABLET ORAL NIGHTLY
Status: DISCONTINUED | OUTPATIENT
Start: 2017-06-03 | End: 2017-06-07 | Stop reason: HOSPADM

## 2017-06-03 RX ORDER — TIOTROPIUM BROMIDE 18 UG/1
1 CAPSULE ORAL; RESPIRATORY (INHALATION)
Status: DISCONTINUED | OUTPATIENT
Start: 2017-06-03 | End: 2017-06-06

## 2017-06-03 RX ORDER — BUDESONIDE AND FORMOTEROL FUMARATE DIHYDRATE 160; 4.5 UG/1; UG/1
2 AEROSOL RESPIRATORY (INHALATION) EVERY 12 HOURS
Status: DISCONTINUED | OUTPATIENT
Start: 2017-06-03 | End: 2017-06-03

## 2017-06-03 RX ORDER — ONDANSETRON 2 MG/ML
4 INJECTION INTRAMUSCULAR; INTRAVENOUS EVERY 4 HOURS PRN
Status: DISCONTINUED | OUTPATIENT
Start: 2017-06-03 | End: 2017-06-03

## 2017-06-03 RX ORDER — MAGNESIUM SULFATE HEPTAHYDRATE 40 MG/ML
4 INJECTION, SOLUTION INTRAVENOUS ONCE
Status: COMPLETED | OUTPATIENT
Start: 2017-06-03 | End: 2017-06-03

## 2017-06-03 RX ORDER — SODIUM CHLORIDE 9 MG/ML
INJECTION, SOLUTION INTRAVENOUS CONTINUOUS
Status: DISCONTINUED | OUTPATIENT
Start: 2017-06-03 | End: 2017-06-03

## 2017-06-03 RX ORDER — BUDESONIDE AND FORMOTEROL FUMARATE DIHYDRATE 80; 4.5 UG/1; UG/1
2 AEROSOL RESPIRATORY (INHALATION)
Status: DISCONTINUED | OUTPATIENT
Start: 2017-06-03 | End: 2017-06-03

## 2017-06-03 RX ORDER — ONDANSETRON 4 MG/1
4 TABLET, ORALLY DISINTEGRATING ORAL EVERY 4 HOURS PRN
Status: DISCONTINUED | OUTPATIENT
Start: 2017-06-03 | End: 2017-06-03

## 2017-06-03 RX ORDER — BUDESONIDE AND FORMOTEROL FUMARATE DIHYDRATE 160; 4.5 UG/1; UG/1
2 AEROSOL RESPIRATORY (INHALATION)
Status: DISCONTINUED | OUTPATIENT
Start: 2017-06-03 | End: 2017-06-06

## 2017-06-03 RX ORDER — DEXTROSE MONOHYDRATE 25 G/50ML
25 INJECTION, SOLUTION INTRAVENOUS
Status: DISCONTINUED | OUTPATIENT
Start: 2017-06-03 | End: 2017-06-07 | Stop reason: HOSPADM

## 2017-06-03 RX ORDER — AZITHROMYCIN 500 MG/1
500 INJECTION, POWDER, LYOPHILIZED, FOR SOLUTION INTRAVENOUS ONCE
Status: DISCONTINUED | OUTPATIENT
Start: 2017-06-03 | End: 2017-06-03

## 2017-06-03 RX ORDER — ACETAMINOPHEN 325 MG/1
650 TABLET ORAL EVERY 6 HOURS PRN
Status: DISCONTINUED | OUTPATIENT
Start: 2017-06-03 | End: 2017-06-07 | Stop reason: HOSPADM

## 2017-06-03 RX ORDER — HEPARIN SODIUM 5000 [USP'U]/ML
5000 INJECTION, SOLUTION INTRAVENOUS; SUBCUTANEOUS EVERY 8 HOURS
Status: DISCONTINUED | OUTPATIENT
Start: 2017-06-03 | End: 2017-06-07 | Stop reason: HOSPADM

## 2017-06-03 RX ORDER — BISACODYL 10 MG
10 SUPPOSITORY, RECTAL RECTAL
Status: DISCONTINUED | OUTPATIENT
Start: 2017-06-03 | End: 2017-06-07 | Stop reason: HOSPADM

## 2017-06-03 RX ORDER — SODIUM CHLORIDE 9 MG/ML
1200 INJECTION, SOLUTION INTRAVENOUS ONCE
Status: COMPLETED | OUTPATIENT
Start: 2017-06-03 | End: 2017-06-03

## 2017-06-03 RX ORDER — IPRATROPIUM BROMIDE AND ALBUTEROL SULFATE 2.5; .5 MG/3ML; MG/3ML
3 SOLUTION RESPIRATORY (INHALATION)
Status: DISCONTINUED | OUTPATIENT
Start: 2017-06-03 | End: 2017-06-04

## 2017-06-03 RX ORDER — SODIUM CHLORIDE 9 MG/ML
1000 INJECTION, SOLUTION INTRAVENOUS
Status: DISCONTINUED | OUTPATIENT
Start: 2017-06-03 | End: 2017-06-07 | Stop reason: HOSPADM

## 2017-06-03 RX ORDER — AMPICILLIN AND SULBACTAM 2; 1 G/1; G/1
3 INJECTION, POWDER, FOR SOLUTION INTRAMUSCULAR; INTRAVENOUS ONCE
Status: DISCONTINUED | OUTPATIENT
Start: 2017-06-03 | End: 2017-06-03

## 2017-06-03 RX ORDER — LABETALOL HYDROCHLORIDE 5 MG/ML
10 INJECTION, SOLUTION INTRAVENOUS EVERY 4 HOURS PRN
Status: DISCONTINUED | OUTPATIENT
Start: 2017-06-03 | End: 2017-06-07 | Stop reason: HOSPADM

## 2017-06-03 RX ORDER — POLYETHYLENE GLYCOL 3350 17 G/17G
1 POWDER, FOR SOLUTION ORAL
Status: DISCONTINUED | OUTPATIENT
Start: 2017-06-03 | End: 2017-06-07 | Stop reason: HOSPADM

## 2017-06-03 RX ADMIN — SODIUM CHLORIDE 1200 ML: 9 INJECTION, SOLUTION INTRAVENOUS at 05:17

## 2017-06-03 RX ADMIN — INSULIN LISPRO 2 UNITS: 100 INJECTION, SOLUTION INTRAVENOUS; SUBCUTANEOUS at 06:22

## 2017-06-03 RX ADMIN — ASPIRIN 81 MG: 81 TABLET ORAL at 09:04

## 2017-06-03 RX ADMIN — MAGNESIUM SULFATE HEPTAHYDRATE 2 G: 500 INJECTION, SOLUTION INTRAMUSCULAR; INTRAVENOUS at 12:07

## 2017-06-03 RX ADMIN — DOCUSATE SODIUM AND SENNOSIDES 2 TABLET: 8.6; 5 TABLET, FILM COATED ORAL at 09:05

## 2017-06-03 RX ADMIN — SIMVASTATIN 10 MG: 10 TABLET, FILM COATED ORAL at 05:54

## 2017-06-03 RX ADMIN — CEFTRIAXONE SODIUM 2 G: 2 INJECTION, POWDER, FOR SOLUTION INTRAMUSCULAR; INTRAVENOUS at 02:53

## 2017-06-03 RX ADMIN — SODIUM CHLORIDE 1000 ML: 9 INJECTION, SOLUTION INTRAVENOUS at 00:18

## 2017-06-03 RX ADMIN — DOXYCYCLINE 100 MG: 100 TABLET ORAL at 20:52

## 2017-06-03 RX ADMIN — SIMVASTATIN 10 MG: 10 TABLET, FILM COATED ORAL at 20:52

## 2017-06-03 RX ADMIN — INSULIN LISPRO 5 UNITS: 100 INJECTION, SOLUTION INTRAVENOUS; SUBCUTANEOUS at 17:51

## 2017-06-03 RX ADMIN — METHYLPREDNISOLONE SODIUM SUCCINATE 125 MG: 125 INJECTION, POWDER, FOR SOLUTION INTRAMUSCULAR; INTRAVENOUS at 01:03

## 2017-06-03 RX ADMIN — HEPARIN SODIUM 5000 UNITS: 5000 INJECTION, SOLUTION INTRAVENOUS; SUBCUTANEOUS at 16:18

## 2017-06-03 RX ADMIN — IPRATROPIUM BROMIDE AND ALBUTEROL SULFATE 3 ML: .5; 3 SOLUTION RESPIRATORY (INHALATION) at 06:40

## 2017-06-03 RX ADMIN — SODIUM CHLORIDE 1000 ML: 9 INJECTION, SOLUTION INTRAVENOUS at 02:00

## 2017-06-03 RX ADMIN — IPRATROPIUM BROMIDE AND ALBUTEROL SULFATE 3 ML: .5; 3 SOLUTION RESPIRATORY (INHALATION) at 15:52

## 2017-06-03 RX ADMIN — ACETAMINOPHEN 650 MG: 325 TABLET, FILM COATED ORAL at 05:54

## 2017-06-03 RX ADMIN — SODIUM CHLORIDE: 9 INJECTION, SOLUTION INTRAVENOUS at 05:55

## 2017-06-03 RX ADMIN — HEPARIN SODIUM 5000 UNITS: 5000 INJECTION, SOLUTION INTRAVENOUS; SUBCUTANEOUS at 05:54

## 2017-06-03 RX ADMIN — DOXYCYCLINE 100 MG: 100 TABLET ORAL at 09:04

## 2017-06-03 RX ADMIN — INSULIN LISPRO 2 UNITS: 100 INJECTION, SOLUTION INTRAVENOUS; SUBCUTANEOUS at 20:58

## 2017-06-03 RX ADMIN — HEPARIN SODIUM 5000 UNITS: 5000 INJECTION, SOLUTION INTRAVENOUS; SUBCUTANEOUS at 20:52

## 2017-06-03 RX ADMIN — ALBUTEROL SULFATE 5 MG: 2.5 SOLUTION RESPIRATORY (INHALATION) at 01:43

## 2017-06-03 RX ADMIN — IPRATROPIUM BROMIDE AND ALBUTEROL SULFATE 3 ML: .5; 3 SOLUTION RESPIRATORY (INHALATION) at 22:25

## 2017-06-03 RX ADMIN — MAGNESIUM SULFATE HEPTAHYDRATE 4 G: 40 INJECTION, SOLUTION INTRAVENOUS at 05:56

## 2017-06-03 RX ADMIN — IPRATROPIUM BROMIDE AND ALBUTEROL SULFATE 3 ML: .5; 3 SOLUTION RESPIRATORY (INHALATION) at 18:38

## 2017-06-03 RX ADMIN — INSULIN LISPRO 2 UNITS: 100 INJECTION, SOLUTION INTRAVENOUS; SUBCUTANEOUS at 12:58

## 2017-06-03 RX ADMIN — PREDNISONE 50 MG: 50 TABLET ORAL at 09:05

## 2017-06-03 ASSESSMENT — PAIN SCALES - GENERAL
PAINLEVEL_OUTOF10: 0
PAINLEVEL_OUTOF10: 0

## 2017-06-03 ASSESSMENT — COPD QUESTIONNAIRES
COPD SCREENING SCORE: 5
HAVE YOU SMOKED AT LEAST 100 CIGARETTES IN YOUR ENTIRE LIFE: YES
DO YOU EVER COUGH UP ANY MUCUS OR PHLEGM?: NO/ONLY WITH OCCASIONAL COLDS OR INFECTIONS
DURING THE PAST 4 WEEKS HOW MUCH DID YOU FEEL SHORT OF BREATH: NONE/LITTLE OF THE TIME

## 2017-06-03 ASSESSMENT — ENCOUNTER SYMPTOMS
MYALGIAS: 0
COUGH: 1
HEADACHES: 0
VOMITING: 0
DEPRESSION: 0
BLURRED VISION: 0
SHORTNESS OF BREATH: 0
FEVER: 0
HEARTBURN: 0
DIZZINESS: 0

## 2017-06-03 ASSESSMENT — COGNITIVE AND FUNCTIONAL STATUS - GENERAL
DAILY ACTIVITIY SCORE: 24
SUGGESTED CMS G CODE MODIFIER MOBILITY: CH
SUGGESTED CMS G CODE MODIFIER DAILY ACTIVITY: CH
MOBILITY SCORE: 24

## 2017-06-03 ASSESSMENT — LIFESTYLE VARIABLES
EVER_SMOKED: YES
ALCOHOL_USE: NO
DO YOU DRINK ALCOHOL: NO

## 2017-06-03 NOTE — H&P
PRIMARY CARE PHYSICIAN:  Mirella Mistry MD    CHIEF COMPLAINT:  Shortness of breath, cough and dysuria.    HISTORY OF PRESENTING ILLNESS:  This is a pleasant 76-year-old female who   presents to the emergency department for further evaluation of above symptoms.    Patient reports that since yesterday, she has been having a crackly feeling   in her lungs.  She thinks that there is something that needs to be coughed up.    Otherwise, she at baseline does have on and off chronic cough.  She reports   that she has indeed tried to cough up, but nothing comes up.  She is unable to   tell me if this is an acute cough or chronic.  The daughter thinks it is   acute where that the patient believes here and there has a chronic cough, but   she is more concerned about this crackly feeling in her lungs.  Otherwise, at   baseline, the patient does not use any oxygen.  This morning at her home, she   was noticed to be febrile by her daughter, they documented the fever to be   100.7 degrees Fahrenheit.  In addition, patient has been noticing worsening   dysuria and urgency.  Last night, the patient had profound urinary frequency.    She denies any hematuria, otherwise.  She denies any incontinence.  She was   taken to the urgent care in [place] with these symptoms.  There, she was also   noted to be hypoxemic.  Otherwise, the patient denies any headache, she denies   any nausea or vomiting, she denies any chest pain, she denies any abdominal   pain, diarrhea, constipation, blood in bowel movements or melena, she denies   any lower extremity swelling, palpitations, orthopnea, or paroxysmal nocturnal   dyspnea.  Her only complaint has been shortness of breath, which has improved   after initiation of oxygen here in the emergency room.    HOME MEDICATIONS:  1.  Promethazine/codeine as needed for cough.  2.  Doxycycline 100 mg twice a day.  3.  Advair Diskus twice a day.  4.  Spiriva daily.  5.  Atenolol 50 mg daily.  6.   Lisinopril/hydrochlorothiazide daily.  7.  Metformin  mg daily.  8.  Statin 10 mg daily.  9.  Naproxen sodium.  10.  Calcium carbonate/vitamin D.  11.  Multivitamin.  12.  Aspirin 81 mg daily.  13.  Azithromycin.    PAST MEDICAL HISTORY:  1.  Hyperlipidemia.  2.  Diabetes mellitus type 2.  3.  Chronic obstructive pulmonary disease.  4.  Hypertension.    FAMILY HISTORY:  Father with history of heart attack.  Mother with history of   heart problems.    SOCIAL HISTORY:  Patient is an ex-smoker.  Denies use of alcohol or drugs of   abuse.  She is currently living with her daughter.    ALLERGIES:  PATIENT HAS LISTED ALLERGIES TO ZITHROMAX CAUSING SEVERE DIARRHEA.    REVIEW OF SYSTEMS:  Detailed review of system was reviewed with the patient   and negative other than as listed in the history of presenting illness and   past medical history.    PHYSICAL EXAMINATION:  VITAL SIGNS:  On presentation, temperature of 36.7 degrees Celsius, pulse of   85, respiratory rate of 18, blood pressure of 149/85, weight of 109.5 kg, and   oxygen saturation of 95% on room air.  GENERAL:  Patient is alert and oriented x4, in no acute distress.  HEAD, EYES, AND ENT:  Head is normocephalic.  Extraocular movements are   intact.  Bilateral pupils are equal, round, and reactive to light and   accommodation.  Minimal conjunctival pallor is seen.  No scleral icterus is   seen.  NECK:  No jugular venous distention.  CARDIOVASCULAR:  Patient is noted to have a regular rate and rhythm.  S1 plus   S2.  Slight tachycardia.  No murmurs, rubs, or gallops.  RESPIRATORY:  Patient is noted to have right-sided basal crackles, which are   minimal at most.  Otherwise, she is noted to have bilateral expiratory   wheezing in mid and lower lung fields.  Most pronounced in the lower lung   fields.  ABDOMEN:  Soft, nontender, nondistended.  Bowel sounds positive and normal in   frequency.  GENITOURINARY:  Not examined.  MUSCULOSKELETAL:  No joint swelling or  tenderness on examination.  SKIN:  No rashes, erythema, or wounds.  NEUROLOGICAL:  Cranial nerves without any gross deficit.  Motor strength of   5/5 in bilateral upper and lower extremities.  No gross sensory deficits.  EXTREMITIES:  Pulses 2+ in bilateral lower extremities, +1 edema in bilateral   lower extremities.  No cyanosis.    LABORATORY STUDIES:  White blood cell count of 27.4, hemoglobin of 8.9,   platelet count of 327.  Sodium of 135, potassium of 4.4, BUN of 28, creatinine   of 1.23.  Liver function tests within normal limits.  Lactic acid of 2.2.    INR of 1.  Urinalysis obtained at [facility] is concerning for   urinary tract infection.    IMAGING STUDIES:  1.  Chest x-ray obtained today reveals no acute cardiopulmonary process.  2.  CT of the chest without contrast reveals emphysema, mild atelectasis,   aortic and coronary artery atherosclerotic plaque.  3.  CT renal protocol obtained reveals no acute abnormality.  4.  EKG obtained today and personally reviewed by me reveals the patient to be   in sinus tachycardia with a DC interval of 110 and QTC of 524.  This EKG is   concerning for underlying left bundle-branch block.  Patient does not have any   chest pain.  There are no previous EKGs to compare to.  T-wave inversions are   seen in lead V1 and lead aVF, questionable T-waves in lead II.    ASSESSMENT AND PLAN:  1.  Severe sepsis.  Patient's presentation is consistent with severe sepsis   given underlying SIRS, which include leukocytosis, tachycardia, tachypnea and   documented fevers prior to presentation.  Source of sepsis is secondary to   underlying urinary tract infection.  Patient meets criteria for severe sepsis   given lactic acid levels greater than 2.2 and underlying acute hypoxemic   respiratory failure, which is in past related to underlying sepsis.  Blood   cultures have been obtained.  Urinalysis and urine cultures have been sent   out.  At this point in time, patient will be  initiated on intravenous   ceftriaxone, which should be deescalated as clinically appropriate.  Patient   has received a 30 mL/kg intravenous crystalloid bolus in the emergency room.    Every 4-hour vital signs will be obtained and lactic acid levels will be   trended during patient's hospital course.  2.  Urinary tract infection evident from the urine dip obtained at [facility].  Urinalysis is still pending.  Urine cultures have been sent out.    Patient at this point has been initiated on ceftriaxone, which should be   deescalated as clinically appropriate.  3.  Acute hypoxemic respiratory failure related to underlying severe sepsis,   underlying chronic obstructive pulmonary disease exacerbation.  Appropriate   management for both has been initiated.  4.  Acute chronic obstructive pulmonary disease exacerbation.  Patient has   been initiated on aggressive bronchodilator regimen.  In addition, oral   doxycycline therapy has been initiated.  Also, patient will be initiated on   oral prednisone therapy.  Recommend outpatient pulmonary function testing and   pulmonology followup.  5.  Left bundle-branch block without any accompanying chest pain.  Patient   will be admitted to the telemetry unit.  There is no prior EKG available for   comparison.  We will check a troponin level once and if this is negative, no   further evaluations will be obtained, recommend outpatient cardiology   evaluation.  Given this patient's underlying edema, acute hypoxemic   respiratory failure, it would not be reasonable to proceed with an   echocardiogram during her hospital stay, which has been requested by me.  6.  Anemia, which is new in comparison to blood workup done in the September of 2016.  At this point in time given this patient's acute infectious issues,   evaluation obtained will be not accurate.  Recommend outpatient followup with   the primary care physician and having a detailed anemia evaluation.  No   evidence  of acute blood loss is evident.  7.  Chronic kidney disease, stage II.  Recommend ongoing monitoring of renal   function, avoiding nephrotoxins and dosing medications renally.  8.  Hypertension, withholding patient's baseline antihypertensive therapy   given presentation with sepsis, this should be reinitiated by the [facility]   hospitalist as clinically appropriate.  9.  Diabetes mellitus type 2, withholding oral hypoglycemic regimen,   initiating the patient on sliding scale insulin therapy, titrate to achieve   normal glycemic control.  10.  Hyperlipidemia.  Continue baseline regimen of statins.  11.  Preventive prophylaxis.  DVT preventive prophylaxis with sequential   compression device and subcutaneous heparin has been ordered.  Stress ulcer   prophylaxis not indicated.  Sliding scale insulin has been initiated given the   use of steroids.  12.  Code status.  This was personally discussed by me in detail with the   patient.  Patient does not want any form of cardiac resuscitation in the event   of cardiac arrest or any intubation even for a short period of time.    Patient's code status is do not resuscitate and do not intubate.  Patient   verbalized understanding of her code status.  This has been updated in the   electronic medical record system to reflect patient's wishes.       ____________________________________     MD MAL STEWARD / MYLA    DD:  06/03/2017 04:12:14  DT:  06/03/2017 04:50:27    D#:  [id number]  Job#:  [id number]

## 2017-06-03 NOTE — PROGRESS NOTES
Pt admitted from ER via cart.  Assumed pt care, assessment complete.  Oriented to room/controls, needs met at this time, bed alarm armed, belongings and call light in reach treaded socks on, bed in low position.   POC discussed, assisted to BR, urine sample obtained, yellow malodorous, meds as ordered, adm assessment completed ,new iv initiated after iv infiltrated. Pleasant, RT updated of wheezing, will vs for treatment as ordered, O2 at 2 lpm

## 2017-06-03 NOTE — ED NOTES
Attempted to call report to [id number] @ [phone].  ORQUIDEA Osborne currently with another pt.  Pt being transported, RN made aware.  RN given extension to call back when available for report.

## 2017-06-03 NOTE — PROGRESS NOTES
I examined the patient 6/3/2017 4:51 AM  Vital Signs:/50 mmHg  Pulse 97  Temp(Src) 36.7 °C (98 °F)  Resp 24  Wt 109.5 kg (241 lb 6.5 oz)  SpO2 91%  Cardiac examination significant for Tachycardia  Pulmonary examination significant for RLL crackles. Wheezing.   Capillary refill is brisk  Peripheral Pulse is 2+   Skin is normal

## 2017-06-03 NOTE — PROGRESS NOTES
"Subjective:      Camille Rodriguez is a 76 y.o. female who presents with Cough and UTI    Patient is accompanied by her daughter.         Cough  This is a new problem. The current episode started yesterday. The problem has been gradually worsening. The problem occurs every few minutes. The cough is productive of sputum. Associated symptoms include chills and a fever. Pertinent negatives include no chest pain, headaches or shortness of breath. She has tried nothing for the symptoms. Her past medical history is significant for COPD. There is no history of asthma or pneumonia.   UTI  Associated symptoms include chills, coughing and a fever. Pertinent negatives include no abdominal pain, chest pain, headaches, nausea or vomiting.     Patient presents to urgent care reporting SOB and productive cough starting yesterday. Today she started having a fever, highest measured 100.7 F at home. PMH includes COPD, she is currently taking advair and spiriva daily. She does not use supplemental oxygen at home and states she usually sats in the low 90s at rest. She is a former smoker, quit 10 years ago. No history of pneumonia. She is UTD on pneumococcal vaccinations. No history of DVT or PE. No estrogen use, recent travel, or lower extremity pain or swelling.     Patient is also complaining of \"urinary dribbling\" that is usually an indication of a UTI. She denies history of frequent UTIs or pyelonephritis. Denies hematuria or flank pain.     Review of Systems   Constitutional: Positive for fever and chills.   Respiratory: Positive for cough and sputum production. Negative for shortness of breath.    Cardiovascular: Negative for chest pain.   Gastrointestinal: Negative for nausea, vomiting, abdominal pain and diarrhea.   Genitourinary: Negative.    Musculoskeletal: Negative.    Neurological: Negative for dizziness and headaches.          Objective:     /64 mmHg  Pulse 110  Temp(Src) 38.4 °C (101.1 °F)  Resp 30  Ht 1.676 m (5' " "5.98\")  Wt 106.595 kg (235 lb)  BMI 37.95 kg/m2  SpO2 81%     Physical Exam   Constitutional: She is oriented to person, place, and time. She appears well-developed and well-nourished. No distress.   Febrile   HENT:   Head: Normocephalic and atraumatic.   Right Ear: Hearing, tympanic membrane, external ear and ear canal normal.   Left Ear: Hearing, tympanic membrane, external ear and ear canal normal.   Nose: Nose normal.   Mouth/Throat: Oropharynx is clear and moist. No oropharyngeal exudate.   Eyes: Conjunctivae are normal. Pupils are equal, round, and reactive to light. Right eye exhibits no discharge. Left eye exhibits no discharge.   Neck: Normal range of motion.   Cardiovascular: Normal rate, regular rhythm and normal heart sounds.  Exam reveals no friction rub.    No murmur heard.  Pulmonary/Chest: Effort normal. She has wheezes. She has rales.   Tachypneic. Expiratory crackles bilaterally.     Abdominal: Soft. Bowel sounds are normal. She exhibits no distension. There is no tenderness. There is no guarding.   No CVAT bilaterally.   Musculoskeletal: Normal range of motion.   Neurological: She is alert and oriented to person, place, and time.   Skin: Skin is warm. She is diaphoretic.   Psychiatric: She has a normal mood and affect. Her behavior is normal.   Nursing note and vitals reviewed.         PMH:  has a past medical history of Clotting disorder (CMS-HCC); Diabetes; and Chronic airway obstruction, not elsewhere classified. She also has no past medical history of Breast cancer (CMS-HCC).  MEDS:   Current outpatient prescriptions:   •  fluticasone-salmeterol (ADVAIR DISKUS) 250-50 MCG/DOSE AEPB, Inhale 1 Puff by mouth every 12 hours., Disp: , Rfl:   •  Tiotropium Bromide Monohydrate (SPIRIVA HANDIHALER INH), Inhale  by mouth., Disp: , Rfl:   •  atenolol (TENORMIN) 50 MG TABS, Take 50 mg by mouth every day., Disp: , Rfl:   •  lisinopril-hydrochlorothiazide (PRINZIDE, ZESTORETIC) 20-25 MG per tablet, Take " 1 Tab by mouth every day., Disp: , Rfl:   •  metformin SR (GLUCOPHAGE XR) 500 MG TB24, Take 500 mg by mouth every day., Disp: , Rfl:   •  simvastatin (ZOCOR) 10 MG TABS, Take 10 mg by mouth every evening., Disp: , Rfl:   •  Calcium Carbonate-Vit D-Min (CALCIUM 1200 PO), Take  by mouth., Disp: , Rfl:   •  Cholecalciferol (VITAMIN D) 2000 UNIT TABS, Take  by mouth., Disp: , Rfl:   •  Multiple Vitamin (MULTIVITAMIN PO), Take  by mouth., Disp: , Rfl:   •  aspirin 81 MG tablet, Take 81 mg by mouth every day., Disp: , Rfl:   •  promethazine-codeine (PHENERGAN-CODEINE) 6.25-10 MG/5ML SYRP, Take 5 mL by mouth 4 times a day as needed for Cough., Disp: 120 mL, Rfl: 0  •  doxycycline (VIBRAMYCIN) 100 MG TABS, Take 1 Tab by mouth 2 times a day., Disp: 20 Tab, Rfl: 0  •  promethazine-codeine (PHENERGAN-CODEINE) 6.25-10 MG/5ML SYRP, Take 5 mL by mouth 4 times a day as needed for Cough., Disp: 120 mL, Rfl: 0  •  Naproxen Sodium (ALEVE PO), Take  by mouth., Disp: , Rfl:   •  azithromycin (ZITHROMAX) 250 MG TABS, Take  by mouth every day. 2 tabs by mouth day 1, 1 tab by mouth days 2-5, Disp: 1 Each, Rfl: 0  ALLERGIES: No Known Allergies  SURGHX: History reviewed. No pertinent past surgical history.  SOCHX:  reports that she has quit smoking. She has never used smokeless tobacco. She reports that she does not drink alcohol or use illicit drugs.  FH: family history is not on file.     POCT Urinalysis:  Component Results      Component Value Ref Range & Units Status     POC Color Yellow Negative Final     POC Appearance Cloudy Negative Final     POC Leukocyte Esterase Moderate Negative Final     POC Nitrites Positive Negative Final     POC Urobiligen Negative Negative (0.2) mg/dL Final     POC Protein Trace Negative mg/dL Final     POC Urine PH 5.0 5.0 - 8.0 Final     POC Blood Small Negative Final     POC Specific Gravity 1.020 <1.005 - >1.030 Final     POC Ketones Negative Negative mg/dL Final     POC Biliruben Negative Negative  mg/dL Final     POC Glucose Negative Negative mg/dL Final         Last Resulted Time     Fri Jun 2, 2017  7:35 PM         Assessment/Plan:     1. Productive cough      2. Urinary frequency  - POCT Urinalysis --> + leuks, +blood    Patient's pulse ox was 81% upon arrival to urgent care. Saturation dropped to 76% while walking back to exam room. Patient was placed on 2 L oxygen in the clinic, and pulse ox serafin to 90-91%, but quickly dropped back down to low 80's after removal. She is febrile and tachypneic with productive cough. Advised patient I would like her to be seen in the ED for further evaluation and possible inpatient care. Her daughter agrees with this plan and convinces her to go to the hospital tonight. Called [facility] ED and spoke to Dr. Martínez about her case, he graciously accepted transfer and will be awaiting her arrival. Advised patient and her daughter they will still need to undergo triage process based on severity of case. They state understanding and leave urgent care at 7:30 pm without complication. Her daughter will transfer her via private vehicle.

## 2017-06-03 NOTE — PROGRESS NOTES
Hospital Medicine Progress Note, Adult, Complex               Author: Jossue Guzman Date & Time created: 6/3/2017  10:36 AM     Interval History:  Feels better but still has urgency/frequency of urination; breathing improved near baseline although requires O@ 4 L now (not on home O2, baseline AMBROSE 1/2 block).  Mild cough/ no chest pain.    Review of Systems:  Review of Systems   Constitutional: Negative for fever.   Eyes: Negative for blurred vision.   Respiratory: Positive for cough. Negative for shortness of breath.    Cardiovascular: Negative for chest pain.   Gastrointestinal: Negative for heartburn and vomiting.   Genitourinary: Positive for urgency and frequency.   Musculoskeletal: Negative for myalgias.   Skin: Negative for rash.   Neurological: Negative for dizziness and headaches.   Psychiatric/Behavioral: Negative for depression.       Physical Exam:  Physical Exam   Constitutional: She is oriented to person, place, and time. No distress.   HENT:   Head: Normocephalic.   Eyes: EOM are normal.   Neck: Neck supple.   Cardiovascular: Normal rate and regular rhythm.    Pulmonary/Chest: Effort normal and breath sounds normal.   Abdominal: Soft. Bowel sounds are normal. She exhibits no distension. There is no tenderness.   Musculoskeletal:   Trace edema bilat   Neurological: She is alert and oriented to person, place, and time.   Skin: Skin is warm.   Psychiatric: Her behavior is normal.       Labs:        Invalid input(s): [id number]  Recent Labs      06/03/17 0019 06/03/17   0400   TROPONINI  <0.01  <0.01   BNPBTYPENAT  67   --      Recent Labs      06/03/17   0015  06/03/17 0019 06/03/17   0400   SODIUM   --   135   --    POTASSIUM   --   4.4   --    CHLORIDE   --   100   --    CO2   --   23   --    BUN   --   28*   --    CREATININE   --   1.23   --    MAGNESIUM  1.5   --   1.2*   PHOSPHORUS  3.0   --   2.9   CALCIUM   --   9.4   --      Recent Labs      06/03/17 0019   ALTSGPT  11   ASTSGOT  21    ALKPHOSPHAT  54   TBILIRUBIN  0.6   GLUCOSE  149*     Recent Labs      17   0015  17   0019   RBC   --   3.24*   HEMOGLOBIN   --   8.9*   HEMATOCRIT   --   28.2*   PLATELETCT   --   327   PROTHROMBTM  13.5   --    APTT  22.7*   --    INR  1.00   --      Recent Labs      17   0019   WBC  27.4*   NEUTSPOLYS  78.10*   LYMPHOCYTES  17.50*   MONOCYTES  3.50   EOSINOPHILS  0.00   BASOPHILS  0.00   ASTSGOT  21   ALTSGPT  11   ALKPHOSPHAT  54   TBILIRUBIN  0.6           Hemodynamics:  Temp (24hrs), Av.4 °C (97.6 °F), Min:36.2 °C (97.2 °F), Max:36.7 °C (98 °F)  Temperature: 36.2 °C (97.2 °F)  Pulse  Av.5  Min: 85  Max: 110   Blood Pressure : 137/71 mmHg     Respiratory:    Respiration: 20, Pulse Oximetry: 90 %, O2 Daily Delivery Respiratory : Silicone Nasal Cannula     Given By:: Mouthpiece, Work Of Breathing / Effort: Mild  RUL Breath Sounds: Clear, RML Breath Sounds: Diminished, RLL Breath Sounds: Diminished, CHASTITY Breath Sounds: Clear, LLL Breath Sounds: Diminished  Fluids:    Intake/Output Summary (Last 24 hours) at 17 1036  Last data filed at 17 0700   Gross per 24 hour   Intake      0 ml   Output    200 ml   Net   -200 ml     Weight: 114.5 kg (252 lb 6.8 oz)  GI/Nutrition:  Orders Placed This Encounter   Procedures   • Diet Order     Standing Status: Standing      Number of Occurrences: 1      Standing Expiration Date:      Order Specific Question:  Diet:     Answer:  Diabetic [3]     Order Specific Question:  Diet:     Answer:  Cardiac [6]     Order Specific Question:  Diet:     Answer:  2 Gram Sodium [7]     Medical Decision Making, by Problem:  Active Hospital Problems    Diagnosis   • UTI (urinary tract infection) [N39.0]: await cx results; cont empiric abx; clinically improving   • COPD exacerbation (CMS-HCC) [J44.1] better with rx; wean O2 as tolerated   • Acute hypoxemic respiratory failure (CMS-HCC) [J96.01] as above   • Left bundle branch block [I44.7] no CP/acitve  cardiac sxs; ASA daily for now   • Anemia [D64.9] monitor H/H   • CKD (chronic kidney disease), stage III [N18.3] on IVF; f/u creat/bun   • HTN (hypertension) [I10] stable; cont rx   • DM2 (diabetes mellitus, type 2) (CMS-HCC) [E11.9] stable; cont rx       Core Measures

## 2017-06-03 NOTE — PROGRESS NOTES
Received care and report of pt, pt assisted to bathroom on 4L/NC with extension tubing, to void, 150ml clr, odorous, yellow urine noted. Pt AXOX4, VS-T-36.2, P-109 SR-ST, R-20 lung sounds dim in bases bi-lat, SaO2-93% on 4L/NC, BP-137/71, pt assisted back to bed, pt ate less than 50% of meal, call bell in reach, will continue to monitor.

## 2017-06-03 NOTE — ED NOTES
Pt ambulatory to triage c/o SOB/ productive cough x 2 days. Sent from urgent care for hypoxia, RA sat in low 80's. Pt sating 93% on 2L NC. Pt speaking in full sentences. Diagnosed with UTI today. Educated on triage process, encouraged to inform staff of any changes. Pt to senior lounge with family.

## 2017-06-03 NOTE — RESPIRATORY CARE
COPD EDUCATION by COPD CLINICAL EDUCATOR  6/3/2017  at  8:20 AM by Marbella Barron     Patient interviewed by COPD education team.  Patient unable to participate in full program.  Short intervention has been conducted.  A comprehensive packet including information about COPD, treatments, and smoking cessation given.

## 2017-06-03 NOTE — ED PROVIDER NOTES
"ED Provider Note    Scribed for Lane June M.D. by Cal Florez. 6/2/2017, 11:36 PM.    Primary care provider: Mirella Mistry M.D.  Means of arrival: Walk In  History obtained from: Patient  History limited by: None     CHIEF COMPLAINT  Chief Complaint   Patient presents with   • Shortness of Breath   • Cough     HPI  Camille Rodriguez is a 76 y.o. female who presents to the Emergency Department due to shortness of breath. Patient presented to [facility] today secondary to an onset of shortness of breath and wheezing yesterday. Her shortness of breath has worsened since the onset. She complains of productive cough associated with her shortness of breath. The patient complains of \"uncomfortableness\" during urination and urinary frequency in the last two days. She had an onset of fever today, that she measured to be as high as 101 degrees. Patient does not require home O2, but uses Spiriva and Advair inhaler.   The patient was diagnosed with UTI at Urgent Care, where she was hypoxic with O2 sats in the low 80's, and was instructed to come to this ED for further evaluation. Patient is a former smoker, quit date was 10 years ago. The patient has a past medical history of Diabetes. The patient denies any sore throat, ear pain, nausea, vomiting, diarrhea, abdominal pain, chest pain.     REVIEW OF SYSTEMS  Review of Systems   Constitutional: Positive for fever.   HENT: Negative for ear pain and sore throat.    Respiratory: Positive for cough, sputum production and shortness of breath.    Cardiovascular: Negative for chest pain.   Gastrointestinal: Negative for nausea, vomiting and abdominal pain.   All other systems reviewed and are negative.    C.    PAST MEDICAL HISTORY   has a past medical history of Clotting disorder (CMS-HCC); Diabetes; and Chronic airway obstruction, not elsewhere classified.    SURGICAL HISTORY  patient denies any surgical history    SOCIAL HISTORY  Social History   Substance Use Topics "   • Smoking status: Former Smoker   • Smokeless tobacco: Never Used      Comment: Quit 2007   • Alcohol Use: No      History   Drug Use No     FAMILY HISTORY  No history pertinent to complaint.     CURRENT MEDICATIONS  Home Medications     **Home medications have not yet been reviewed for this encounter**        ALLERGIES  Allergies   Allergen Reactions   • Zithromax [Azithromycin] Diarrhea     Pt states severe diarrhea       PHYSICAL EXAM  VITAL SIGNS: /85 mmHg  Pulse 90  Temp(Src) 36.7 °C (98 °F)  Resp 20  Wt 109.5 kg (241 lb 6.5 oz)  SpO2 93%    Constitutional: Well developed, Well nourished, no distress.   HENT: Normocephalic, Atraumatic  Eyes: Conjunctiva normal, No discharge.   Cardiovascular: tachycardic heart rate, Normal rhythm, No murmurs, equal pulses.   Pulmonary: rales in right mid lung and right base with occasional expiratory wheezes bilaterally. No respiratory distress. No rhonchi.  Chest: No chest wall tenderness or deformity.   Abdomen:Soft, No tenderness, No masses, no rebound, no guarding.   Back: No CVA tenderness.   Musculoskeletal: No major deformities noted, No tenderness.   Skin: Warm, Dry, No erythema, No rash.   Neurologic: Alert & oriented x 3, Normal motor function,  No focal deficits noted.   Psychiatric: Affect normal, Judgment normal, Mood normal.     LABS  Results for orders placed or performed during the hospital encounter of 06/02/17   CBC w/ Differential   Result Value Ref Range    WBC 27.4 (H) 4.8 - 10.8 K/uL    RBC 3.24 (L) 4.20 - 5.40 M/uL    Hemoglobin 8.9 (L) 12.0 - 16.0 g/dL    Hematocrit 28.2 (L) 37.0 - 47.0 %    MCV 87.0 81.4 - 97.8 fL    MCH 27.5 27.0 - 33.0 pg    MCHC 31.6 (L) 33.6 - 35.0 g/dL    RDW 50.5 (H) 35.9 - 50.0 fL    Platelet Count 327 164 - 446 K/uL    MPV 9.3 9.0 - 12.9 fL    Nucleated RBC 0.00 /100 WBC    NRBC (Absolute) 0.00 K/uL    Neutrophils-Polys 78.10 (H) 44.00 - 72.00 %    Lymphocytes 17.50 (L) 22.00 - 41.00 %    Monocytes 3.50 0.00 - 13.40  %    Eosinophils 0.00 0.00 - 6.90 %    Basophils 0.00 0.00 - 1.80 %    Neutrophils (Absolute) 21.40 (H) 2.00 - 7.15 K/uL    Lymphs (Absolute) 4.80 1.00 - 4.80 K/uL    Monos (Absolute) 0.96 (H) 0.00 - 0.85 K/uL    Eos (Absolute) 0.00 0.00 - 0.51 K/uL    Baso (Absolute) 0.00 0.00 - 0.12 K/uL    Anisocytosis 2+     Microcytosis 2+    Complete Metabolic Panel (CMP)   Result Value Ref Range    Sodium 135 135 - 145 mmol/L    Potassium 4.4 3.6 - 5.5 mmol/L    Chloride 100 96 - 112 mmol/L    Co2 23 20 - 33 mmol/L    Anion Gap 12.0 (H) 0.0 - 11.9    Glucose 149 (H) 65 - 99 mg/dL    Bun 28 (H) 8 - 22 mg/dL    Creatinine 1.23 0.50 - 1.40 mg/dL    Calcium 9.4 8.5 - 10.5 mg/dL    AST(SGOT) 21 12 - 45 U/L    ALT(SGPT) 11 2 - 50 U/L    Alkaline Phosphatase 54 30 - 99 U/L    Total Bilirubin 0.6 0.1 - 1.5 mg/dL    Albumin 4.0 3.2 - 4.9 g/dL    Total Protein 7.4 6.0 - 8.2 g/dL    Globulin 3.4 1.9 - 3.5 g/dL    A-G Ratio 1.2 g/dL   Troponin STAT   Result Value Ref Range    Troponin I <0.01 0.00 - 0.04 ng/mL   LACTIC ACID   Result Value Ref Range    Lactic Acid 2.2 (H) 0.5 - 2.0 mmol/L   Influenza Rapid   Result Value Ref Range    Significant Indicator NEG     Source RESP     Site RESPIRATORY     Rapid Influenza A-B       Negative for Influenza A and Influenza B antigens.  Infection due to influenza A or B cannot be ruled out  since the antigen present in the specimen may be below the  detection limit of the test. Culture confirmation of  negative samples is recommended.     Influenza By PCR, A/B/H1N1   Result Value Ref Range    Influenza virus A RNA Negative Negative    Influenza virus B RNA Negative Negative    Influenza A 2009, H1N1, PCR Not Detected Negative   Btype Natriuretic Peptide   Result Value Ref Range    B Natriuretic Peptide 67 0 - 100 pg/mL   ESTIMATED GFR   Result Value Ref Range    GFR If  51 (A) >60 mL/min/1.73 m 2    GFR If Non  43 (A) >60 mL/min/1.73 m 2   DIFFERENTIAL MANUAL   Result  Value Ref Range    Progranulocytes 0.90 %    Manual Diff Status PERFORMED    PERIPHERAL SMEAR REVIEW   Result Value Ref Range    Peripheral Smear Review see below    PLATELET ESTIMATE   Result Value Ref Range    Plt Estimation Normal    MORPHOLOGY   Result Value Ref Range    RBC Morphology Present    MAGNESIUM   Result Value Ref Range    Magnesium 1.5 1.5 - 2.5 mg/dL   PHOSPHORUS   Result Value Ref Range    Phosphorus 3.0 2.5 - 4.5 mg/dL   PROTHROMBIN TIME   Result Value Ref Range    PT 13.5 12.0 - 14.6 sec    INR 1.00 0.87 - 1.13   APTT   Result Value Ref Range    APTT 22.7 (L) 24.7 - 36.0 sec   PROCALCITONIN   Result Value Ref Range    Procalictonin 0.23 <0.25 ng/mL   LACTIC ACID   Result Value Ref Range    Lactic Acid 2.0 0.5 - 2.0 mmol/L   MAGNESIUM   Result Value Ref Range    Magnesium 1.2 (L) 1.5 - 2.5 mg/dL   PHOSPHORUS   Result Value Ref Range    Phosphorus 2.9 2.5 - 4.5 mg/dL   EKG (NOW)   Result Value Ref Range    Report       [facility] Emergency Dept.    Test Date:  2017  Pt Name:    LETICIA SINGH                 Department: ER  MRN:        [id number]                      Room:       [facility]  Gender:     F                            Technician: [id number]  :        [id number]                   Requested By:LAMAR FREGOSO  Order #:    [id number]                    Reading MD:    Measurements  Intervals                                Axis  Rate:       105                          P:          69  NM:         110                          QRS:        35  QRSD:       154                          T:          205  QT:         396  QTc:        524    Interpretive Statements  SINUS TACHYCARDIA  LEFT BUNDLE BRANCH BLOCK  BASELINE WANDER IN LEAD(S) V4  No previous ECG available for comparison        All labs reviewed by me.    EKG  12 Lead EKG interpreted by me shows a sinus tachycardia at a rate of 105. Left Bundle Branch block. Grandin normal. No ST elevations. No T wave inversions. NM interval  110.  QTc 524. Final impression: Sinus tachycardia.    RADIOLOGY  CT-CHEST (THORAX) W/O   Final Result      1.  Emphysema      2.  Mild atelectasis      3.  Aortic and coronary artery atherosclerotic plaque               CT-RENAL COLIC EVALUATION(A/P W/O)   Final Result      No acute abnormality within the abdomen or pelvis      DX-CHEST-PORTABLE (1 VIEW)   Final Result      No acute cardiopulmonary abnormality identified.      ECHOCARDIOGRAM COMP W/O CONT    (Results Pending)     The radiologist's interpretation of all radiological studies have been reviewed by me.    COURSE & MEDICAL DECISION MAKING  Pertinent Labs & Imaging studies reviewed. (See chart for details)    11:36 PM - Patient seen and examined at bedside. Patient will be treated with SoluMedrol 125 mg IV, Duoneb nebulizer. Patient resuscitated with IV fluids for respiratory. Ordered Chest X Ray, lactic acid, influenza PCR, BNP, CBC, CMP, troponin STAT, blood culture, EKG to evaluate her symptoms. The differential diagnoses include but are not limited to: dehydration, CHF, COPD exacerbation, pneumonia      1:32 AM ED lab results reviewed. Ordered treatment with Zithromax 500 mg IV, Proventil nebulizer, Unasyn 3 g IV.     1:41 AM Spoke with Dr. Bustillos, Hospitalist, about the patient's condition. Dr. Bustillos was updated of patient's lab results he is agreeable to admit patient.      1:51 AM Recheck: Patient re-evaluated at beside. Patient resting comfortably. Shortness of breath improved after breathing treatments. Patient was updated of her lab and radiology results, as well as EKG showing LBBB. Patient was advised of her likely admission to hospital under Dr. Bustillos.     Medical Decision Making: At this point, I think the patient most likely has bronchitis and COPD exacerbation as well as urinary tract infection. Patient does meet sepsis criteria and therefore was given sepsis antibiotics. Because the patient is hypoxic likely secondary to COPD exacerbation  patient was given multiple breathing treatments and will be admitted.   Disposition:  Patient will be admitted to the hospital under the care of Dr. Bustillos (Hospitalist) in guarded condition.      FINAL IMPRESSION  1. Sepsis, due to unspecified organism (CMS-[facility])    2. Acute exacerbation of chronic obstructive pulmonary disease (COPD) (CMS-[facility])    3. Acute UTI          Cal PARKER (Scribe), am scribing for, and in the presence of, Lane June M.D.    Electronically signed by: Cal Florez (Scribe), 6/2/2017    Lane PARKER M.D. personally performed the services described in this documentation, as scribed by Cal Florez in my presence, and it is both accurate and complete.    The note accurately reflects work and decisions made by me.  Lane Jnue  6/3/2017  5:04 AM

## 2017-06-04 LAB
ALBUMIN SERPL BCP-MCNC: 3.6 G/DL (ref 3.2–4.9)
ALBUMIN/GLOB SERPL: 1.1 G/DL
ALP SERPL-CCNC: 53 U/L (ref 30–99)
ALT SERPL-CCNC: 11 U/L (ref 2–50)
ANION GAP SERPL CALC-SCNC: 9 MMOL/L (ref 0–11.9)
AST SERPL-CCNC: 16 U/L (ref 12–45)
BASOPHILS # BLD AUTO: 0.1 % (ref 0–1.8)
BASOPHILS # BLD: 0.02 K/UL (ref 0–0.12)
BILIRUB SERPL-MCNC: 0.4 MG/DL (ref 0.1–1.5)
BUN SERPL-MCNC: 24 MG/DL (ref 8–22)
CALCIUM SERPL-MCNC: 8.8 MG/DL (ref 8.5–10.5)
CHLORIDE SERPL-SCNC: 103 MMOL/L (ref 96–112)
CO2 SERPL-SCNC: 24 MMOL/L (ref 20–33)
CREAT SERPL-MCNC: 1.01 MG/DL (ref 0.5–1.4)
EOSINOPHIL # BLD AUTO: 0 K/UL (ref 0–0.51)
EOSINOPHIL NFR BLD: 0 % (ref 0–6.9)
ERYTHROCYTE [DISTWIDTH] IN BLOOD BY AUTOMATED COUNT: 51.8 FL (ref 35.9–50)
GFR SERPL CREATININE-BSD FRML MDRD: 53 ML/MIN/1.73 M 2
GLOBULIN SER CALC-MCNC: 3.3 G/DL (ref 1.9–3.5)
GLUCOSE BLD-MCNC: 171 MG/DL (ref 65–99)
GLUCOSE BLD-MCNC: 188 MG/DL (ref 65–99)
GLUCOSE BLD-MCNC: 197 MG/DL (ref 65–99)
GLUCOSE BLD-MCNC: 242 MG/DL (ref 65–99)
GLUCOSE SERPL-MCNC: 179 MG/DL (ref 65–99)
HCT VFR BLD AUTO: 39.6 % (ref 37–47)
HGB BLD-MCNC: 12.6 G/DL (ref 12–16)
IMM GRANULOCYTES # BLD AUTO: 0.13 K/UL (ref 0–0.11)
IMM GRANULOCYTES NFR BLD AUTO: 0.7 % (ref 0–0.9)
LYMPHOCYTES # BLD AUTO: 0.65 K/UL (ref 1–4.8)
LYMPHOCYTES NFR BLD: 3.6 % (ref 22–41)
MAGNESIUM SERPL-MCNC: 2.6 MG/DL (ref 1.5–2.5)
MCH RBC QN AUTO: 27.4 PG (ref 27–33)
MCHC RBC AUTO-ENTMCNC: 31.5 G/DL (ref 33.6–35)
MCV RBC AUTO: 87 FL (ref 81.4–97.8)
MONOCYTES # BLD AUTO: 0.92 K/UL (ref 0–0.85)
MONOCYTES NFR BLD AUTO: 5.1 % (ref 0–13.4)
NEUTROPHILS # BLD AUTO: 16.28 K/UL (ref 2–7.15)
NEUTROPHILS NFR BLD: 90.5 % (ref 44–72)
NRBC # BLD AUTO: 0 K/UL
NRBC BLD AUTO-RTO: 0 /100 WBC
PHOSPHATE SERPL-MCNC: 3.3 MG/DL (ref 2.5–4.5)
PLATELET # BLD AUTO: 225 K/UL (ref 164–446)
PMV BLD AUTO: 9.3 FL (ref 9–12.9)
POTASSIUM SERPL-SCNC: 3.9 MMOL/L (ref 3.6–5.5)
PROT SERPL-MCNC: 6.9 G/DL (ref 6–8.2)
RBC # BLD AUTO: 4.6 M/UL (ref 4.2–5.4)
SODIUM SERPL-SCNC: 136 MMOL/L (ref 135–145)
WBC # BLD AUTO: 18 K/UL (ref 4.8–10.8)

## 2017-06-04 PROCEDURE — 82962 GLUCOSE BLOOD TEST: CPT | Mod: 91

## 2017-06-04 PROCEDURE — A9270 NON-COVERED ITEM OR SERVICE: HCPCS | Performed by: INTERNAL MEDICINE

## 2017-06-04 PROCEDURE — 99232 SBSQ HOSP IP/OBS MODERATE 35: CPT | Performed by: INTERNAL MEDICINE

## 2017-06-04 PROCEDURE — 94640 AIRWAY INHALATION TREATMENT: CPT

## 2017-06-04 PROCEDURE — 36415 COLL VENOUS BLD VENIPUNCTURE: CPT

## 2017-06-04 PROCEDURE — 80053 COMPREHEN METABOLIC PANEL: CPT

## 2017-06-04 PROCEDURE — 700101 HCHG RX REV CODE 250: Performed by: INTERNAL MEDICINE

## 2017-06-04 PROCEDURE — A9270 NON-COVERED ITEM OR SERVICE: HCPCS | Performed by: EMERGENCY MEDICINE

## 2017-06-04 PROCEDURE — 700111 HCHG RX REV CODE 636 W/ 250 OVERRIDE (IP): Performed by: INTERNAL MEDICINE

## 2017-06-04 PROCEDURE — 83735 ASSAY OF MAGNESIUM: CPT

## 2017-06-04 PROCEDURE — 700102 HCHG RX REV CODE 250 W/ 637 OVERRIDE(OP): Performed by: INTERNAL MEDICINE

## 2017-06-04 PROCEDURE — 700105 HCHG RX REV CODE 258: Performed by: INTERNAL MEDICINE

## 2017-06-04 PROCEDURE — 84100 ASSAY OF PHOSPHORUS: CPT

## 2017-06-04 PROCEDURE — 770020 HCHG ROOM/CARE - TELE (206)

## 2017-06-04 PROCEDURE — 85025 COMPLETE CBC W/AUTO DIFF WBC: CPT

## 2017-06-04 PROCEDURE — 700102 HCHG RX REV CODE 250 W/ 637 OVERRIDE(OP): Performed by: EMERGENCY MEDICINE

## 2017-06-04 PROCEDURE — 94760 N-INVAS EAR/PLS OXIMETRY 1: CPT

## 2017-06-04 RX ORDER — LISINOPRIL 20 MG/1
40 TABLET ORAL DAILY
Status: ON HOLD | COMMUNITY
End: 2017-06-07

## 2017-06-04 RX ORDER — IPRATROPIUM BROMIDE AND ALBUTEROL SULFATE 2.5; .5 MG/3ML; MG/3ML
3 SOLUTION RESPIRATORY (INHALATION)
Status: DISCONTINUED | OUTPATIENT
Start: 2017-06-04 | End: 2017-06-06

## 2017-06-04 RX ORDER — L. ACIDOPHILUS/L.BULGARICUS 100MM CELL
1 GRANULES IN PACKET (EA) ORAL
Status: DISCONTINUED | OUTPATIENT
Start: 2017-06-04 | End: 2017-06-07 | Stop reason: HOSPADM

## 2017-06-04 RX ORDER — NYSTATIN 100000 U/G
CREAM TOPICAL 2 TIMES DAILY
Status: DISCONTINUED | OUTPATIENT
Start: 2017-06-04 | End: 2017-06-07 | Stop reason: HOSPADM

## 2017-06-04 RX ORDER — HYDROCHLOROTHIAZIDE 25 MG/1
25 TABLET ORAL DAILY
COMMUNITY
End: 2019-02-11 | Stop reason: SDUPTHER

## 2017-06-04 RX ORDER — LISINOPRIL 20 MG/1
20 TABLET ORAL
Status: DISCONTINUED | OUTPATIENT
Start: 2017-06-04 | End: 2017-06-07 | Stop reason: HOSPADM

## 2017-06-04 RX ORDER — CHOLECALCIFEROL (VITAMIN D3) 125 MCG
1000 CAPSULE ORAL DAILY
COMMUNITY
End: 2017-06-08

## 2017-06-04 RX ORDER — CHLORAL HYDRATE 500 MG
1000 CAPSULE ORAL DAILY
COMMUNITY
End: 2022-01-01

## 2017-06-04 RX ADMIN — NYSTATIN: 100000 CREAM TOPICAL at 14:58

## 2017-06-04 RX ADMIN — HEPARIN SODIUM 5000 UNITS: 5000 INJECTION, SOLUTION INTRAVENOUS; SUBCUTANEOUS at 14:58

## 2017-06-04 RX ADMIN — IPRATROPIUM BROMIDE AND ALBUTEROL SULFATE 3 ML: .5; 3 SOLUTION RESPIRATORY (INHALATION) at 10:02

## 2017-06-04 RX ADMIN — TIOTROPIUM BROMIDE 1 CAPSULE: 18 CAPSULE ORAL; RESPIRATORY (INHALATION) at 07:10

## 2017-06-04 RX ADMIN — HEPARIN SODIUM 5000 UNITS: 5000 INJECTION, SOLUTION INTRAVENOUS; SUBCUTANEOUS at 05:52

## 2017-06-04 RX ADMIN — BUDESONIDE AND FORMOTEROL FUMARATE DIHYDRATE 2 PUFF: 160; 4.5 AEROSOL RESPIRATORY (INHALATION) at 20:04

## 2017-06-04 RX ADMIN — IPRATROPIUM BROMIDE AND ALBUTEROL SULFATE 3 ML: .5; 3 SOLUTION RESPIRATORY (INHALATION) at 15:46

## 2017-06-04 RX ADMIN — INSULIN LISPRO 1 UNITS: 100 INJECTION, SOLUTION INTRAVENOUS; SUBCUTANEOUS at 11:53

## 2017-06-04 RX ADMIN — IPRATROPIUM BROMIDE AND ALBUTEROL SULFATE 3 ML: .5; 3 SOLUTION RESPIRATORY (INHALATION) at 20:04

## 2017-06-04 RX ADMIN — ASPIRIN 81 MG: 81 TABLET ORAL at 07:56

## 2017-06-04 RX ADMIN — INSULIN LISPRO 1 UNITS: 100 INJECTION, SOLUTION INTRAVENOUS; SUBCUTANEOUS at 05:56

## 2017-06-04 RX ADMIN — NYSTATIN: 100000 CREAM TOPICAL at 20:57

## 2017-06-04 RX ADMIN — DOXYCYCLINE 100 MG: 100 TABLET ORAL at 07:55

## 2017-06-04 RX ADMIN — LACTOBACILLUS ACIDOPHILUS / LACTOBACILLUS BULGARICUS 1 PACKET: 100 MILLION CFU STRENGTH GRANULES at 21:13

## 2017-06-04 RX ADMIN — IPRATROPIUM BROMIDE AND ALBUTEROL SULFATE 3 ML: .5; 3 SOLUTION RESPIRATORY (INHALATION) at 07:06

## 2017-06-04 RX ADMIN — LISINOPRIL 20 MG: 20 TABLET ORAL at 16:17

## 2017-06-04 RX ADMIN — INSULIN LISPRO 2 UNITS: 100 INJECTION, SOLUTION INTRAVENOUS; SUBCUTANEOUS at 17:19

## 2017-06-04 RX ADMIN — HEPARIN SODIUM 5000 UNITS: 5000 INJECTION, SOLUTION INTRAVENOUS; SUBCUTANEOUS at 20:56

## 2017-06-04 RX ADMIN — PREDNISONE 50 MG: 50 TABLET ORAL at 07:55

## 2017-06-04 RX ADMIN — INSULIN LISPRO 1 UNITS: 100 INJECTION, SOLUTION INTRAVENOUS; SUBCUTANEOUS at 21:03

## 2017-06-04 RX ADMIN — SIMVASTATIN 10 MG: 10 TABLET, FILM COATED ORAL at 20:56

## 2017-06-04 RX ADMIN — CEFTRIAXONE SODIUM 2 G: 2 INJECTION, POWDER, FOR SOLUTION INTRAMUSCULAR; INTRAVENOUS at 07:58

## 2017-06-04 RX ADMIN — DOXYCYCLINE 100 MG: 100 TABLET ORAL at 20:56

## 2017-06-04 ASSESSMENT — COPD QUESTIONNAIRES
DURING THE PAST 4 WEEKS HOW MUCH DID YOU FEEL SHORT OF BREATH: SOME OF THE TIME
COPD SCREENING SCORE: 6
DO YOU EVER COUGH UP ANY MUCUS OR PHLEGM?: NO/ONLY WITH OCCASIONAL COLDS OR INFECTIONS
HAVE YOU SMOKED AT LEAST 100 CIGARETTES IN YOUR ENTIRE LIFE: YES

## 2017-06-04 ASSESSMENT — ENCOUNTER SYMPTOMS
BLURRED VISION: 0
HEADACHES: 0
HEARTBURN: 0
MYALGIAS: 0
FEVER: 0
DIZZINESS: 0
COUGH: 1

## 2017-06-04 ASSESSMENT — PAIN SCALES - GENERAL
PAINLEVEL_OUTOF10: 0

## 2017-06-04 NOTE — PROGRESS NOTES
Pt given I/S and baseline is 750. Encouraged to use 10/hr. O2 down to 4 liters/nasal cannula and POX 96%.

## 2017-06-04 NOTE — PROGRESS NOTES
2 RN skin check completed with RN Eddi    Ears red/blanching  Bruising Bilateral UE  Heels red/blanching, calloused

## 2017-06-04 NOTE — PROGRESS NOTES
Hospital Medicine Progress Note, Adult, Complex               Author: Jossue Guzman Date & Time created: 6/4/2017  2:02 PM     Interval History:  Feels better; breathing improved although not yet at baseline; no more urinary frequency/urgency;    Review of Systems:  Review of Systems   Constitutional: Negative for fever.   Eyes: Negative for blurred vision.   Respiratory: Positive for cough.    Cardiovascular: Negative for chest pain.   Gastrointestinal: Negative for heartburn.   Genitourinary: Negative for dysuria.   Musculoskeletal: Negative for myalgias.   Skin: Negative for rash.   Neurological: Negative for dizziness and headaches.       Physical Exam:  Physical Exam   Constitutional: She is oriented to person, place, and time. No distress.   HENT:   Head: Normocephalic.   Eyes: EOM are normal.   Neck: Neck supple.   Cardiovascular: Normal rate and regular rhythm.    Pulmonary/Chest: Effort normal and breath sounds normal.   Abdominal: Soft. Bowel sounds are normal. She exhibits no distension. There is no tenderness.   Musculoskeletal: She exhibits no edema.   Neurological: She is alert and oriented to person, place, and time.   Skin: Skin is warm.   Psychiatric: Her behavior is normal.       Labs:        Invalid input(s): [id number]  Recent Labs      06/03/17 0019 06/03/17   0400   TROPONINI  <0.01  <0.01   BNPBTYPENAT  67   --      Recent Labs      06/03/17 0015 06/03/17 0019 06/03/17   0400  06/04/17   0307   SODIUM   --   135   --   136   POTASSIUM   --   4.4   --   3.9   CHLORIDE   --   100   --   103   CO2   --   23   --   24   BUN   --   28*   --   24*   CREATININE   --   1.23   --   1.01   MAGNESIUM  1.5   --   1.2*  2.6*   PHOSPHORUS  3.0   --   2.9  3.3   CALCIUM   --   9.4   --   8.8     Recent Labs      06/03/17   0019  06/04/17   0307   ALTSGPT  11  11   ASTSGOT  21  16   ALKPHOSPHAT  54  53   TBILIRUBIN  0.6  0.4   GLUCOSE  149*  179*     Recent Labs      06/03/17 0015 06/03/17   0019   17   0307   RBC   --   3.24*  4.60   HEMOGLOBIN   --   8.9*  12.6   HEMATOCRIT   --   28.2*  39.6   PLATELETCT   --   327  225   PROTHROMBTM  13.5   --    --    APTT  22.7*   --    --    INR  1.00   --    --      Recent Labs      17   0019  17   0307   WBC  27.4*  18.0*   NEUTSPOLYS  78.10*  90.50*   LYMPHOCYTES  17.50*  3.60*   MONOCYTES  3.50  5.10   EOSINOPHILS  0.00  0.00   BASOPHILS  0.00  0.10   ASTSGOT  21  16   ALTSGPT  11  11   ALKPHOSPHAT  54  53   TBILIRUBIN  0.6  0.4           Hemodynamics:  Temp (24hrs), Av.5 °C (97.7 °F), Min:36.3 °C (97.4 °F), Max:36.7 °C (98.1 °F)  Temperature: 36.4 °C (97.5 °F)  Pulse  Av.7  Min: 85  Max: 110   Blood Pressure : 150/67 mmHg     Respiratory:    Respiration: 18, Pulse Oximetry: 94 %, O2 Daily Delivery Respiratory : Silicone Nasal Cannula     Given By:: Mouthpiece, #MDI/DPI Given: MDI/DPI x 1, Work Of Breathing / Effort: Mild  RUL Breath Sounds: Diminished, RML Breath Sounds: Diminished, RLL Breath Sounds: Diminished, CHASTITY Breath Sounds: Diminished, LLL Breath Sounds: Diminished  Fluids:    Intake/Output Summary (Last 24 hours) at 17 1402  Last data filed at 17 0400   Gross per 24 hour   Intake      0 ml   Output    600 ml   Net   -600 ml     Weight: 118.3 kg (260 lb 12.9 oz)  GI/Nutrition:  Orders Placed This Encounter   Procedures   • Diet Order     Standing Status: Standing      Number of Occurrences: 1      Standing Expiration Date:      Order Specific Question:  Diet:     Answer:  Diabetic [3]     Order Specific Question:  Diet:     Answer:  Cardiac [6]     Order Specific Question:  Diet:     Answer:  2 Gram Sodium [7]     Medical Decision Making, by Problem:  Active Hospital Problems    Diagnosis   • UTI (urinary tract infection) [N39.0]/sepsis; better with rx; await urine cx results   • COPD exacerbation (CMS-HCC) [J44.1] improving with current care; not on home o2 but still requires o2 here; wean if better   • Acute  hypoxemic respiratory failure (CMS-HCC) [J96.01]: treat as above; monitor clinically   • Left bundle branch block [I44.7]   • Anemia [D64.9] stable; f/u labs   • CKD (chronic kidney disease), stage III [N18.3] stable; f/u   • HTN (hypertension) [I10]fair control; cont rx   • DM2 (diabetes mellitus, type 2) (CMS-HCC) [E11.9] fair control; cont rx       Core Measures

## 2017-06-04 NOTE — PROGRESS NOTES
Monitor summary, HR-, SR-ST with occ PVC, L BBB, 16./.12/.42, call bell in reach will continue to monitor.

## 2017-06-04 NOTE — PROGRESS NOTES
Assumed care at 1915. Bedside report received from Day RN Anjum. Patient's chart and MAR reviewed. 12 hour chart check complete. Pt is awake in bed. Family at bedside. Patient was updated on plan of care for the night. Questions answered and concerns addressed.  Pt denies any additional needs at this time. White board updated. Call light, phone and personal belongings within reach. Bed alarm on and working appropriately. Vital signs stable

## 2017-06-04 NOTE — CARE PLAN
Problem: Venous Thromboembolism (VTW)/Deep Vein Thrombosis (DVT) Prevention:  Goal: Patient will participate in Venous Thrombosis (VTE)/Deep Vein Thrombosis (DVT)Prevention Measures  Outcome: PROGRESSING AS EXPECTED  Administered prescribed heparin SQ. Educated pt. On heparin medication.     Problem: Knowledge Deficit  Goal: Knowledge of disease process/condition, treatment plan, diagnostic tests, and medications will improve  Outcome: PROGRESSING AS EXPECTED  Pt educated regarding plan of care for the night, activity, diet, and medications. Verbalized understanding.

## 2017-06-05 ENCOUNTER — APPOINTMENT (OUTPATIENT)
Dept: RADIOLOGY | Facility: MEDICAL CENTER | Age: 77
DRG: 871 | End: 2017-06-05
Attending: INTERNAL MEDICINE
Payer: MEDICARE

## 2017-06-05 ENCOUNTER — PATIENT OUTREACH (OUTPATIENT)
Dept: HEALTH INFORMATION MANAGEMENT | Facility: OTHER | Age: 77
End: 2017-06-05

## 2017-06-05 LAB
BACTERIA UR CULT: NORMAL
DEPRECATED D DIMER PPP IA-ACNC: 505 NG/ML(D-DU)
GLUCOSE BLD-MCNC: 117 MG/DL (ref 65–99)
GLUCOSE BLD-MCNC: 187 MG/DL (ref 65–99)
GLUCOSE BLD-MCNC: 209 MG/DL (ref 65–99)
GLUCOSE BLD-MCNC: 245 MG/DL (ref 65–99)
SIGNIFICANT IND 70042: NORMAL
SITE SITE: NORMAL
SOURCE SOURCE: NORMAL

## 2017-06-05 PROCEDURE — 36415 COLL VENOUS BLD VENIPUNCTURE: CPT

## 2017-06-05 PROCEDURE — G8979 MOBILITY GOAL STATUS: HCPCS | Mod: CI

## 2017-06-05 PROCEDURE — A9567 TECHNETIUM TC-99M AEROSOL: HCPCS

## 2017-06-05 PROCEDURE — 82962 GLUCOSE BLOOD TEST: CPT | Mod: 91

## 2017-06-05 PROCEDURE — 94760 N-INVAS EAR/PLS OXIMETRY 1: CPT

## 2017-06-05 PROCEDURE — 85379 FIBRIN DEGRADATION QUANT: CPT

## 2017-06-05 PROCEDURE — 71010 DX-CHEST-LIMITED (1 VIEW): CPT

## 2017-06-05 PROCEDURE — 700102 HCHG RX REV CODE 250 W/ 637 OVERRIDE(OP): Performed by: INTERNAL MEDICINE

## 2017-06-05 PROCEDURE — 97161 PT EVAL LOW COMPLEX 20 MIN: CPT

## 2017-06-05 PROCEDURE — 700111 HCHG RX REV CODE 636 W/ 250 OVERRIDE (IP): Performed by: INTERNAL MEDICINE

## 2017-06-05 PROCEDURE — 770020 HCHG ROOM/CARE - TELE (206)

## 2017-06-05 PROCEDURE — 700105 HCHG RX REV CODE 258: Performed by: INTERNAL MEDICINE

## 2017-06-05 PROCEDURE — 700101 HCHG RX REV CODE 250: Performed by: INTERNAL MEDICINE

## 2017-06-05 PROCEDURE — G8980 MOBILITY D/C STATUS: HCPCS | Mod: CI

## 2017-06-05 PROCEDURE — 94640 AIRWAY INHALATION TREATMENT: CPT

## 2017-06-05 PROCEDURE — 99232 SBSQ HOSP IP/OBS MODERATE 35: CPT | Performed by: INTERNAL MEDICINE

## 2017-06-05 PROCEDURE — A9270 NON-COVERED ITEM OR SERVICE: HCPCS | Performed by: INTERNAL MEDICINE

## 2017-06-05 PROCEDURE — G8978 MOBILITY CURRENT STATUS: HCPCS | Mod: CI

## 2017-06-05 RX ORDER — ACETAMINOPHEN 500 MG
500 TABLET ORAL
Status: DISCONTINUED | OUTPATIENT
Start: 2017-06-05 | End: 2017-06-07 | Stop reason: HOSPADM

## 2017-06-05 RX ORDER — DIPHENHYDRAMINE HCL 25 MG
25 TABLET ORAL NIGHTLY PRN
Status: DISCONTINUED | OUTPATIENT
Start: 2017-06-05 | End: 2017-06-07 | Stop reason: HOSPADM

## 2017-06-05 RX ADMIN — NYSTATIN: 100000 CREAM TOPICAL at 21:41

## 2017-06-05 RX ADMIN — DOXYCYCLINE 100 MG: 100 TABLET ORAL at 07:43

## 2017-06-05 RX ADMIN — IPRATROPIUM BROMIDE AND ALBUTEROL SULFATE 3 ML: .5; 3 SOLUTION RESPIRATORY (INHALATION) at 06:27

## 2017-06-05 RX ADMIN — PREDNISONE 50 MG: 50 TABLET ORAL at 07:42

## 2017-06-05 RX ADMIN — ASPIRIN 81 MG: 81 TABLET ORAL at 07:43

## 2017-06-05 RX ADMIN — TIOTROPIUM BROMIDE 1 CAPSULE: 18 CAPSULE ORAL; RESPIRATORY (INHALATION) at 06:28

## 2017-06-05 RX ADMIN — BUDESONIDE AND FORMOTEROL FUMARATE DIHYDRATE 2 PUFF: 160; 4.5 AEROSOL RESPIRATORY (INHALATION) at 18:40

## 2017-06-05 RX ADMIN — DOXYCYCLINE 100 MG: 100 TABLET ORAL at 21:40

## 2017-06-05 RX ADMIN — IPRATROPIUM BROMIDE AND ALBUTEROL SULFATE 3 ML: .5; 3 SOLUTION RESPIRATORY (INHALATION) at 18:38

## 2017-06-05 RX ADMIN — INSULIN LISPRO 2 UNITS: 100 INJECTION, SOLUTION INTRAVENOUS; SUBCUTANEOUS at 21:46

## 2017-06-05 RX ADMIN — LACTOBACILLUS ACIDOPHILUS / LACTOBACILLUS BULGARICUS 1 PACKET: 100 MILLION CFU STRENGTH GRANULES at 12:24

## 2017-06-05 RX ADMIN — CEFTRIAXONE SODIUM 2 G: 2 INJECTION, POWDER, FOR SOLUTION INTRAMUSCULAR; INTRAVENOUS at 07:50

## 2017-06-05 RX ADMIN — DIPHENHYDRAMINE HCL 25 MG: 25 TABLET ORAL at 21:40

## 2017-06-05 RX ADMIN — HEPARIN SODIUM 5000 UNITS: 5000 INJECTION, SOLUTION INTRAVENOUS; SUBCUTANEOUS at 05:30

## 2017-06-05 RX ADMIN — ACETAMINOPHEN 500 MG: 500 TABLET ORAL at 21:40

## 2017-06-05 RX ADMIN — INSULIN LISPRO 2 UNITS: 100 INJECTION, SOLUTION INTRAVENOUS; SUBCUTANEOUS at 18:37

## 2017-06-05 RX ADMIN — HEPARIN SODIUM 5000 UNITS: 5000 INJECTION, SOLUTION INTRAVENOUS; SUBCUTANEOUS at 12:25

## 2017-06-05 RX ADMIN — BUDESONIDE AND FORMOTEROL FUMARATE DIHYDRATE 2 PUFF: 160; 4.5 AEROSOL RESPIRATORY (INHALATION) at 06:27

## 2017-06-05 RX ADMIN — HEPARIN SODIUM 5000 UNITS: 5000 INJECTION, SOLUTION INTRAVENOUS; SUBCUTANEOUS at 21:41

## 2017-06-05 RX ADMIN — IPRATROPIUM BROMIDE AND ALBUTEROL SULFATE 3 ML: .5; 3 SOLUTION RESPIRATORY (INHALATION) at 11:15

## 2017-06-05 RX ADMIN — INSULIN LISPRO 1 UNITS: 100 INJECTION, SOLUTION INTRAVENOUS; SUBCUTANEOUS at 12:22

## 2017-06-05 RX ADMIN — IPRATROPIUM BROMIDE AND ALBUTEROL SULFATE 3 ML: .5; 3 SOLUTION RESPIRATORY (INHALATION) at 14:50

## 2017-06-05 RX ADMIN — SIMVASTATIN 10 MG: 10 TABLET, FILM COATED ORAL at 21:40

## 2017-06-05 RX ADMIN — LISINOPRIL 20 MG: 20 TABLET ORAL at 07:43

## 2017-06-05 RX ADMIN — LACTOBACILLUS ACIDOPHILUS / LACTOBACILLUS BULGARICUS 1 PACKET: 100 MILLION CFU STRENGTH GRANULES at 17:30

## 2017-06-05 ASSESSMENT — ENCOUNTER SYMPTOMS
BLURRED VISION: 0
DEPRESSION: 0
FEVER: 0
MYALGIAS: 0
HEADACHES: 0
HEARTBURN: 0
DIZZINESS: 0
SHORTNESS OF BREATH: 1

## 2017-06-05 ASSESSMENT — GAIT ASSESSMENTS
GAIT LEVEL OF ASSIST: SUPERVISED
ASSISTIVE DEVICE: FRONT WHEEL WALKER
DISTANCE (FEET): 90

## 2017-06-05 ASSESSMENT — PAIN SCALES - GENERAL
PAINLEVEL_OUTOF10: 0
PAINLEVEL_OUTOF10: 0

## 2017-06-05 NOTE — THERAPY
"75 y/o female adm for  SOB dx: acute hypoxemic respiratory failusre, COPD exac, severe sepsis UTI, Left BBB, anemia and CKD stage 3. Pt instructed on ECT/pacing self and activities. Requires use of FWW for safe ambulation. Pt is alert and Ox3 and able to exhibit  safe pacing during ambulation with FWW. Pt  to follow thru with room ambulation- short distance with increased frequency to increase tolerance to activity. Pt and RN aware and amenable. No further acute PT services required at this time.    Physical Therapy Evaluation completed.   Bed Mobility:  Supine to Sit: Modified Independent  Transfers: Sit to Stand: Supervised  Gait: Level Of Assist: Supervised with Front-Wheel Walker       Plan of Care: Patient with no further skilled PT needs in the acute care setting at this time  Discharge Recommendations: Equipment: No Equipment Needed. Post-acute therapy Discharge to home with outpatient or home health for additional skilled therapy services.    See \"Rehab Therapy-Acute\" Patient Summary Report for complete documentation.     "

## 2017-06-05 NOTE — PROGRESS NOTES
Dr Guzman notivfied that t cannot have VQ scan without IV access. Obtained order to place EJ. ED charge nurse notified of need and she will try to have someone come start it.

## 2017-06-05 NOTE — PROGRESS NOTES
Hospital Medicine Progress Note, Adult, Complex               Author: Jossue Guzman Date & Time created: 6/5/2017  12:36 PM     Interval History:  Feels ok although breathing still not back to baseline; desat to 85% with activity on O2 (not previously on home o2); no sob at rest; no urinary sx's    Review of Systems:  Review of Systems   Constitutional: Negative for fever.   Eyes: Negative for blurred vision.   Respiratory: Positive for shortness of breath.    Cardiovascular: Negative for chest pain.   Gastrointestinal: Negative for heartburn.   Genitourinary: Negative for dysuria.   Musculoskeletal: Negative for myalgias.   Skin: Negative for rash.   Neurological: Negative for dizziness and headaches.   Psychiatric/Behavioral: Negative for depression.       Physical Exam:  Physical Exam   Constitutional: She is oriented to person, place, and time. No distress.   HENT:   Head: Normocephalic.   Eyes: EOM are normal.   Neck: Neck supple.   Cardiovascular: Regular rhythm.    Pulmonary/Chest:   Mild basilar crackles, no wheezes   Abdominal: Soft. Bowel sounds are normal. She exhibits no distension. There is no tenderness.   Musculoskeletal: She exhibits no edema.   Neurological: She is alert and oriented to person, place, and time.   Skin: Skin is warm.   Psychiatric: Her behavior is normal.       Labs:        Invalid input(s): [id number]  Recent Labs      06/03/17   0019  06/03/17   0400   TROPONINI  <0.01  <0.01   BNPBTYPENAT  67   --      Recent Labs      06/03/17   0015  06/03/17   0019  06/03/17   0400  06/04/17   0307   SODIUM   --   135   --   136   POTASSIUM   --   4.4   --   3.9   CHLORIDE   --   100   --   103   CO2   --   23   --   24   BUN   --   28*   --   24*   CREATININE   --   1.23   --   1.01   MAGNESIUM  1.5   --   1.2*  2.6*   PHOSPHORUS  3.0   --   2.9  3.3   CALCIUM   --   9.4   --   8.8     Recent Labs      06/03/17   0019  06/04/17   0307   ALTSGPT  11  11   ASTSGOT  21  16   ALKPHOSPHAT  54  53    TBILIRUBIN  0.6  0.4   GLUCOSE  149*  179*     Recent Labs      17   0015  17   0019  17   0307   RBC   --   3.24*  4.60   HEMOGLOBIN   --   8.9*  12.6   HEMATOCRIT   --   28.2*  39.6   PLATELETCT   --   327  225   PROTHROMBTM  13.5   --    --    APTT  22.7*   --    --    INR  1.00   --    --      Recent Labs      17   0019  17   0307   WBC  27.4*  18.0*   NEUTSPOLYS  78.10*  90.50*   LYMPHOCYTES  17.50*  3.60*   MONOCYTES  3.50  5.10   EOSINOPHILS  0.00  0.00   BASOPHILS  0.00  0.10   ASTSGOT  21  16   ALTSGPT  11  11   ALKPHOSPHAT  54  53   TBILIRUBIN  0.6  0.4           Hemodynamics:  Temp (24hrs), Av.6 °C (97.9 °F), Min:36.2 °C (97.1 °F), Max:36.9 °C (98.4 °F)  Temperature: 36.4 °C (97.5 °F)  Pulse  Av.1  Min: 85  Max: 121   Blood Pressure : 141/82 mmHg     Respiratory:    Respiration: 18, Pulse Oximetry: 91 %, O2 Daily Delivery Respiratory : Silicone Nasal Cannula     Given By:: Mouthpiece, #MDI/DPI Given: MDI/DPI x 2, Work Of Breathing / Effort: Mild  RUL Breath Sounds: Diminished, RML Breath Sounds: Diminished, RLL Breath Sounds: Diminished, CHASTITY Breath Sounds: Diminished, LLL Breath Sounds: Diminished  Fluids:  No intake or output data in the 24 hours ending 17 1236  Weight: 118.8 kg (261 lb 14.5 oz)  GI/Nutrition:  Orders Placed This Encounter   Procedures   • Diet Order     Standing Status: Standing      Number of Occurrences: 1      Standing Expiration Date:      Order Specific Question:  Diet:     Answer:  Diabetic [3]     Order Specific Question:  Diet:     Answer:  Cardiac [6]     Order Specific Question:  Diet:     Answer:  2 Gram Sodium [7]     Medical Decision Making, by Problem:  Active Hospital Problems    Diagnosis   • UTI (urinary tract infection) [N39.0] resolving on rx   • COPD exacerbation (CMS-HCC) [J44.1] on active rx but not improved enough; check D dimer/VQ to r/o PE; had recent chest CT without contrast which showed COPD changes   • Acute  hypoxemic respiratory failure (CMS-HCC) [J96.01] as above   • Left bundle branch block [I44.7] patient asx; cont ASA daily; outpatient card f/u   • Anemia [D64.9] stable; monitor   • CKD (chronic kidney disease), stage III [N18.3] stable; f/u labs   • HTN (hypertension) [I10] controlled; cont rx   • DM2 (diabetes mellitus, type 2) (CMS-HCC) [E11.9] adjust med for good control       Core Measures

## 2017-06-05 NOTE — PROGRESS NOTES
Attempted to ambulate pt with O2 at 4 liters nasal cannula. Pt only made it about 10 feet out of room before POX dropped to 85% -120s. Pt back to chair and slow to recover to 92%.

## 2017-06-05 NOTE — DIETARY
NUTRITION SERVICES: BMI - Pt with BMI >40 (=44.93). Weight loss counseling not appropriate in acute care setting. RECOMMEND - Referral to outpatient nutrition services for weight management after D/C.

## 2017-06-05 NOTE — CARE PLAN
Problem: Infection  Goal: Will remain free from infection  Outcome: PROGRESSING AS EXPECTED  Assessed for signs and symptoms of infection. Standard precautions implemented. Education provided to patient and visitors about hand hygiene.        Problem: Knowledge Deficit  Goal: Knowledge of disease process/condition, treatment plan, diagnostic tests, and medications will improve  Outcome: PROGRESSING AS EXPECTED  Pt educated regarding plan of care for the night, activity, diet, and medications. Verbalized understanding.

## 2017-06-05 NOTE — DISCHARGE PLANNING
Care Transition Team Assessment    Completed screening and pt and daughter believe pt will benefit from home health services post discharge. In addition, pt stated she experienced financial difficulties paying for her prescriptions while in the [facility]. Provided pt [place] [employer] prescription assistance application.     Information Source  Orientation : Oriented x 4  Information Given By: Patient  Informant's Name: Camille  Who is responsible for making decisions for patient? : Patient    Elopement Risk  Legal Hold: No  Ambulatory or Self Mobile in Wheelchair: Yes  Disoriented: No  Psychiatric Symptoms: None  History of Wandering: No  Elopement this Admit: No  Vocalizing Wanting to Leave: No  Displays Behaviors, Body Language Wanting to Leave: No-Not at Risk for Elopement  Elopement Risk: Not at Risk for Elopement    Interdisciplinary Discharge Planning  Does Admitting Nurse Feel This Could be a Complex Discharge?: No  Primary Care Physician: Fede  Lives with - Patient's Self Care Capacity: Adult Children, Child Less than 18 Years of Age (Daughter and grandchild)  Patient or legal guardian wants to designate a caregiver (see row info): No  Support Systems: Children  Housing / Facility: [address] (Ground level)  Do You Take your Prescribed Medications Regularly: Yes  Able to Return to Previous ADL's: Yes  Mobility Issues: Yes  Prior Services: None  Patient Expects to be Discharged to:: Home with home health  Assistance Needed: No  Durable Medical Equipment: Walker (as needed)    Discharge Preparedness  What is your plan after discharge?: Home health care  What are your discharge supports?: Child  Prior Functional Level: Drives Self, Independent with Activities of Daily Living, Independent with Medication Management  Difficulity with ADLs: Walking  Difficulty with ADLs Comment: Uses walker/cane as needed  Difficulity with IADLs: None    Functional Assesment  Prior Functional Level: Drives Self, Independent  with Activities of Daily Living, Independent with Medication Management    Finances  Financial Barriers to Discharge: No  Prescription Coverage: Yes ([facility] - Provided Senior Rx [facility] application)    Vision / Hearing Impairment  Vision Impairment : Yes  Right Eye Vision: Impaired, Other (Comments) (hx of cataract surgery )  Left Eye Vision: Impaired, Other (Comments) (hx of cataract surgery )  Hearing Impairment : No    Values / Beliefs / Concerns  Values / Beliefs Concerns : No    Domestic Abuse  Have you ever been the victim of abuse or violence?: No    Psychological Assessment  History of Substance Abuse: None    Discharge Risks or Barriers  Discharge risks or barriers?: No    Anticipated Discharge Information  Anticipated discharge disposition: Home  Discharge Address: [id number] [place] [address] [place] [address]  Discharge Contact Phone Number: [phone]

## 2017-06-05 NOTE — PROGRESS NOTES
Assumed care at 1915. Bedside report received from Day RN Bessy. Patient's chart and MAR reviewed. 12 hour chart check complete. Pt is awake in chair. Family at bedside. Patient was updated on plan of care for the night. Questions answered and concerns addressed.  Pt denies any additional needs at this time. White board updated. Call light, phone and personal belongings within reach. Vital signs stable

## 2017-06-06 LAB
ANION GAP SERPL CALC-SCNC: 8 MMOL/L (ref 0–11.9)
BASOPHILS # BLD AUTO: 0.3 % (ref 0–1.8)
BASOPHILS # BLD: 0.04 K/UL (ref 0–0.12)
BUN SERPL-MCNC: 27 MG/DL (ref 8–22)
CALCIUM SERPL-MCNC: 8.7 MG/DL (ref 8.5–10.5)
CHLORIDE SERPL-SCNC: 106 MMOL/L (ref 96–112)
CO2 SERPL-SCNC: 22 MMOL/L (ref 20–33)
CREAT SERPL-MCNC: 0.92 MG/DL (ref 0.5–1.4)
EOSINOPHIL # BLD AUTO: 0.01 K/UL (ref 0–0.51)
EOSINOPHIL NFR BLD: 0.1 % (ref 0–6.9)
ERYTHROCYTE [DISTWIDTH] IN BLOOD BY AUTOMATED COUNT: 54.2 FL (ref 35.9–50)
GFR SERPL CREATININE-BSD FRML MDRD: 59 ML/MIN/1.73 M 2
GLUCOSE BLD-MCNC: 107 MG/DL (ref 65–99)
GLUCOSE BLD-MCNC: 189 MG/DL (ref 65–99)
GLUCOSE BLD-MCNC: 195 MG/DL (ref 65–99)
GLUCOSE BLD-MCNC: 200 MG/DL (ref 65–99)
GLUCOSE SERPL-MCNC: 112 MG/DL (ref 65–99)
HCT VFR BLD AUTO: 42.8 % (ref 37–47)
HGB BLD-MCNC: 13.2 G/DL (ref 12–16)
IMM GRANULOCYTES # BLD AUTO: 0.15 K/UL (ref 0–0.11)
IMM GRANULOCYTES NFR BLD AUTO: 1.2 % (ref 0–0.9)
LYMPHOCYTES # BLD AUTO: 1.72 K/UL (ref 1–4.8)
LYMPHOCYTES NFR BLD: 13.6 % (ref 22–41)
MCH RBC QN AUTO: 28 PG (ref 27–33)
MCHC RBC AUTO-ENTMCNC: 30.8 G/DL (ref 33.6–35)
MCV RBC AUTO: 90.7 FL (ref 81.4–97.8)
MONOCYTES # BLD AUTO: 1.04 K/UL (ref 0–0.85)
MONOCYTES NFR BLD AUTO: 8.2 % (ref 0–13.4)
NEUTROPHILS # BLD AUTO: 9.73 K/UL (ref 2–7.15)
NEUTROPHILS NFR BLD: 76.6 % (ref 44–72)
NRBC # BLD AUTO: 0 K/UL
NRBC BLD AUTO-RTO: 0 /100 WBC
PLATELET # BLD AUTO: 217 K/UL (ref 164–446)
PMV BLD AUTO: 9 FL (ref 9–12.9)
POTASSIUM SERPL-SCNC: 4.8 MMOL/L (ref 3.6–5.5)
RBC # BLD AUTO: 4.72 M/UL (ref 4.2–5.4)
SODIUM SERPL-SCNC: 136 MMOL/L (ref 135–145)
WBC # BLD AUTO: 12.7 K/UL (ref 4.8–10.8)

## 2017-06-06 PROCEDURE — 700105 HCHG RX REV CODE 258: Performed by: INTERNAL MEDICINE

## 2017-06-06 PROCEDURE — 700111 HCHG RX REV CODE 636 W/ 250 OVERRIDE (IP): Performed by: INTERNAL MEDICINE

## 2017-06-06 PROCEDURE — 94760 N-INVAS EAR/PLS OXIMETRY 1: CPT

## 2017-06-06 PROCEDURE — 99232 SBSQ HOSP IP/OBS MODERATE 35: CPT | Performed by: INTERNAL MEDICINE

## 2017-06-06 PROCEDURE — 94640 AIRWAY INHALATION TREATMENT: CPT

## 2017-06-06 PROCEDURE — A9270 NON-COVERED ITEM OR SERVICE: HCPCS | Performed by: INTERNAL MEDICINE

## 2017-06-06 PROCEDURE — 770020 HCHG ROOM/CARE - TELE (206)

## 2017-06-06 PROCEDURE — 700101 HCHG RX REV CODE 250: Performed by: INTERNAL MEDICINE

## 2017-06-06 PROCEDURE — 80048 BASIC METABOLIC PNL TOTAL CA: CPT

## 2017-06-06 PROCEDURE — 82962 GLUCOSE BLOOD TEST: CPT

## 2017-06-06 PROCEDURE — 700102 HCHG RX REV CODE 250 W/ 637 OVERRIDE(OP): Performed by: INTERNAL MEDICINE

## 2017-06-06 PROCEDURE — 36415 COLL VENOUS BLD VENIPUNCTURE: CPT

## 2017-06-06 PROCEDURE — 85025 COMPLETE CBC W/AUTO DIFF WBC: CPT

## 2017-06-06 RX ORDER — BUDESONIDE AND FORMOTEROL FUMARATE DIHYDRATE 160; 4.5 UG/1; UG/1
2 AEROSOL RESPIRATORY (INHALATION) 2 TIMES DAILY
Status: DISCONTINUED | OUTPATIENT
Start: 2017-06-06 | End: 2017-06-07 | Stop reason: HOSPADM

## 2017-06-06 RX ORDER — ALBUTEROL SULFATE 90 UG/1
2 AEROSOL, METERED RESPIRATORY (INHALATION)
Status: DISCONTINUED | OUTPATIENT
Start: 2017-06-06 | End: 2017-06-07 | Stop reason: HOSPADM

## 2017-06-06 RX ORDER — TIOTROPIUM BROMIDE 18 UG/1
1 CAPSULE ORAL; RESPIRATORY (INHALATION) DAILY
Status: DISCONTINUED | OUTPATIENT
Start: 2017-06-07 | End: 2017-06-07 | Stop reason: HOSPADM

## 2017-06-06 RX ORDER — IPRATROPIUM BROMIDE AND ALBUTEROL SULFATE 2.5; .5 MG/3ML; MG/3ML
3 SOLUTION RESPIRATORY (INHALATION)
Status: DISCONTINUED | OUTPATIENT
Start: 2017-06-06 | End: 2017-06-07 | Stop reason: HOSPADM

## 2017-06-06 RX ADMIN — INSULIN LISPRO 1 UNITS: 100 INJECTION, SOLUTION INTRAVENOUS; SUBCUTANEOUS at 17:36

## 2017-06-06 RX ADMIN — BUDESONIDE AND FORMOTEROL FUMARATE DIHYDRATE 2 PUFF: 160; 4.5 AEROSOL RESPIRATORY (INHALATION) at 21:11

## 2017-06-06 RX ADMIN — LACTOBACILLUS ACIDOPHILUS / LACTOBACILLUS BULGARICUS 1 PACKET: 100 MILLION CFU STRENGTH GRANULES at 08:21

## 2017-06-06 RX ADMIN — HEPARIN SODIUM 5000 UNITS: 5000 INJECTION, SOLUTION INTRAVENOUS; SUBCUTANEOUS at 21:11

## 2017-06-06 RX ADMIN — DIPHENHYDRAMINE HCL 25 MG: 25 TABLET ORAL at 21:12

## 2017-06-06 RX ADMIN — HEPARIN SODIUM 5000 UNITS: 5000 INJECTION, SOLUTION INTRAVENOUS; SUBCUTANEOUS at 05:50

## 2017-06-06 RX ADMIN — LISINOPRIL 20 MG: 20 TABLET ORAL at 08:21

## 2017-06-06 RX ADMIN — ASPIRIN 81 MG: 81 TABLET ORAL at 08:21

## 2017-06-06 RX ADMIN — DOXYCYCLINE 100 MG: 100 TABLET ORAL at 21:12

## 2017-06-06 RX ADMIN — INSULIN LISPRO 1 UNITS: 100 INJECTION, SOLUTION INTRAVENOUS; SUBCUTANEOUS at 11:40

## 2017-06-06 RX ADMIN — CEFTRIAXONE SODIUM 2 G: 2 INJECTION, POWDER, FOR SOLUTION INTRAMUSCULAR; INTRAVENOUS at 08:39

## 2017-06-06 RX ADMIN — BUDESONIDE AND FORMOTEROL FUMARATE DIHYDRATE 2 PUFF: 160; 4.5 AEROSOL RESPIRATORY (INHALATION) at 06:41

## 2017-06-06 RX ADMIN — DOXYCYCLINE 100 MG: 100 TABLET ORAL at 08:21

## 2017-06-06 RX ADMIN — ACETAMINOPHEN 500 MG: 500 TABLET ORAL at 21:12

## 2017-06-06 RX ADMIN — SIMVASTATIN 10 MG: 10 TABLET, FILM COATED ORAL at 21:12

## 2017-06-06 RX ADMIN — TIOTROPIUM BROMIDE 1 CAPSULE: 18 CAPSULE ORAL; RESPIRATORY (INHALATION) at 06:41

## 2017-06-06 RX ADMIN — LACTOBACILLUS ACIDOPHILUS / LACTOBACILLUS BULGARICUS 1 PACKET: 100 MILLION CFU STRENGTH GRANULES at 17:34

## 2017-06-06 RX ADMIN — LABETALOL HYDROCHLORIDE 10 MG: 5 INJECTION, SOLUTION INTRAVENOUS at 23:54

## 2017-06-06 RX ADMIN — IPRATROPIUM BROMIDE AND ALBUTEROL SULFATE 3 ML: .5; 3 SOLUTION RESPIRATORY (INHALATION) at 06:40

## 2017-06-06 RX ADMIN — NYSTATIN: 100000 CREAM TOPICAL at 09:00

## 2017-06-06 RX ADMIN — LACTOBACILLUS ACIDOPHILUS / LACTOBACILLUS BULGARICUS 1 PACKET: 100 MILLION CFU STRENGTH GRANULES at 12:56

## 2017-06-06 RX ADMIN — HEPARIN SODIUM 5000 UNITS: 5000 INJECTION, SOLUTION INTRAVENOUS; SUBCUTANEOUS at 12:56

## 2017-06-06 RX ADMIN — PREDNISONE 50 MG: 50 TABLET ORAL at 08:21

## 2017-06-06 RX ADMIN — INSULIN LISPRO 1 UNITS: 100 INJECTION, SOLUTION INTRAVENOUS; SUBCUTANEOUS at 21:09

## 2017-06-06 ASSESSMENT — ENCOUNTER SYMPTOMS
HEADACHES: 0
CHILLS: 0
SHORTNESS OF BREATH: 1
BLURRED VISION: 0
DIZZINESS: 0
DEPRESSION: 0
HEARTBURN: 0
COUGH: 1
MYALGIAS: 0
FEVER: 0

## 2017-06-06 ASSESSMENT — PAIN SCALES - GENERAL
PAINLEVEL_OUTOF10: 0
PAINLEVEL_OUTOF10: 0

## 2017-06-06 NOTE — DISCHARGE PLANNING
Transitional Care Navigator:    Met with patient and her family at the bedside regarding choice for home health.  Pt states that she would like [facility] .  Choice form completed and faxed; SW aware.

## 2017-06-06 NOTE — PROGRESS NOTES
Hospital Medicine Progress Note, Adult, Complex               Author: Rico Staley  Date & Time created: 6/6/2017  3:09 PM     Interval History:  Pt seen and examined, afebrile, no nausea or vomiting, still SOB. No CP.     Review of Systems:  Review of Systems   Constitutional: Negative for fever and chills.   Eyes: Negative for blurred vision.   Respiratory: Positive for cough and shortness of breath.    Cardiovascular: Negative for chest pain.   Gastrointestinal: Negative for heartburn.   Genitourinary: Negative for dysuria.   Musculoskeletal: Negative for myalgias.   Skin: Negative for rash.   Neurological: Negative for dizziness and headaches.   Psychiatric/Behavioral: Negative for depression.       Physical Exam:  Physical Exam   Constitutional: She is oriented to person, place, and time. No distress.   HENT:   Head: Normocephalic.   Eyes: EOM are normal.   Neck: Neck supple.   Cardiovascular: Regular rhythm.    Pulmonary/Chest:   Mild basilar crackles, no wheezes   Abdominal: Soft. Bowel sounds are normal. She exhibits no distension. There is no tenderness.   Musculoskeletal: She exhibits no edema.   Neurological: She is alert and oriented to person, place, and time.   Skin: Skin is warm.   Psychiatric: Her behavior is normal.       Labs:        Invalid input(s): [id number]      Recent Labs      06/04/17 0307  06/06/17   0255   SODIUM  136  136   POTASSIUM  3.9  4.8   CHLORIDE  103  106   CO2  24  22   BUN  24*  27*   CREATININE  1.01  0.92   MAGNESIUM  2.6*   --    PHOSPHORUS  3.3   --    CALCIUM  8.8  8.7     Recent Labs      06/04/17   0307  06/06/17   0255   ALTSGPT  11   --    ASTSGOT  16   --    ALKPHOSPHAT  53   --    TBILIRUBIN  0.4   --    GLUCOSE  179*  112*     Recent Labs      06/04/17   0307  06/06/17   0321   RBC  4.60  4.72   HEMOGLOBIN  12.6  13.2   HEMATOCRIT  39.6  42.8   PLATELETCT  225  217     Recent Labs      06/04/17   0307  06/06/17   0321   WBC  18.0*  12.7*   NEUTSPOLYS  90.50*   76.60*   LYMPHOCYTES  3.60*  13.60*   MONOCYTES  5.10  8.20   EOSINOPHILS  0.00  0.10   BASOPHILS  0.10  0.30   ASTSGOT  16   --    ALTSGPT  11   --    ALKPHOSPHAT  53   --    TBILIRUBIN  0.4   --            Hemodynamics:  Temp (24hrs), Av.2 °C (97.2 °F), Min:35.8 °C (96.5 °F), Max:36.9 °C (98.5 °F)  Temperature: 36.9 °C (98.5 °F)  Pulse  Av.7  Min: 63  Max: 121   Blood Pressure : (!) 170/89 mmHg (RN notified)     Respiratory:    Respiration: 18, Pulse Oximetry: 91 %, O2 Daily Delivery Respiratory : Silicone Nasal Cannula     Given By:: Mouthpiece, #MDI/DPI Given: MDI/DPI x 2, Work Of Breathing / Effort: Mild  RUL Breath Sounds: Clear, RML Breath Sounds: Clear, RLL Breath Sounds: Clear;Diminished, CHASTITY Breath Sounds: Clear, LLL Breath Sounds: Clear;Diminished  Fluids:    Intake/Output Summary (Last 24 hours) at 17 1509  Last data filed at 17 2130   Gross per 24 hour   Intake    240 ml   Output      0 ml   Net    240 ml     Weight: 114.8 kg (253 lb 1.4 oz)  GI/Nutrition:  Orders Placed This Encounter   Procedures   • Diet Order     Standing Status: Standing      Number of Occurrences: 1      Standing Expiration Date:      Order Specific Question:  Diet:     Answer:  Diabetic [3]     Order Specific Question:  Diet:     Answer:  Cardiac [6]     Order Specific Question:  Diet:     Answer:  2 Gram Sodium [7]     Medical Decision Making, by Problem:  Active Hospital Problems    Diagnosis   • UTI (urinary tract infection) [N39.0]   -Cont ceftriaxone for a total of 5 days.    • COPD exacerbation (CMS-[facility]) [J44.1]   Cont RT protocol, and BD.   V/q scan low PE probability.    • Acute hypoxemic respiratory failure (CMS-[facility]) [J96.01]   -as above   • Left bundle branch block [I44.7]   -patient asx; cont ASA daily; outpatient card f/u   • Anemia [D64.9]   -H/H stable no s/s of bleeding    • CKD (chronic kidney disease), stage III [N18.3]   stable; f/u labs   • HTN (hypertension) [I10]   At goal  cont rx   •  DM2 (diabetes mellitus, type 2) (CMS-HCC) [E11.9]   -adjust med for good control       Labs reviewed, Medications reviewed and Radiology images reviewed  Murphy catheter: No Murphy            Antibiotics: Treating active infection/contamination beyond 24 hours perioperative coverage

## 2017-06-06 NOTE — DISCHARGE PLANNING
Received choice form from KATIE Shen at 1200. Notified YUNI Stewart that we will need order and F2F for HH before the referral can be sent.

## 2017-06-07 ENCOUNTER — HOME HEALTH ADMISSION (OUTPATIENT)
Dept: HOME HEALTH SERVICES | Facility: HOME HEALTHCARE | Age: 77
End: 2017-06-07
Payer: MEDICARE

## 2017-06-07 VITALS
DIASTOLIC BLOOD PRESSURE: 85 MMHG | SYSTOLIC BLOOD PRESSURE: 150 MMHG | BODY MASS INDEX: 43.02 KG/M2 | TEMPERATURE: 96.5 F | HEIGHT: 64 IN | HEART RATE: 94 BPM | WEIGHT: 251.99 LBS | OXYGEN SATURATION: 94 % | RESPIRATION RATE: 20 BRPM

## 2017-06-07 LAB
ANION GAP SERPL CALC-SCNC: 7 MMOL/L (ref 0–11.9)
BUN SERPL-MCNC: 32 MG/DL (ref 8–22)
CALCIUM SERPL-MCNC: 8.8 MG/DL (ref 8.5–10.5)
CHLORIDE SERPL-SCNC: 107 MMOL/L (ref 96–112)
CO2 SERPL-SCNC: 26 MMOL/L (ref 20–33)
CREAT SERPL-MCNC: 0.95 MG/DL (ref 0.5–1.4)
ERYTHROCYTE [DISTWIDTH] IN BLOOD BY AUTOMATED COUNT: 51.1 FL (ref 35.9–50)
GFR SERPL CREATININE-BSD FRML MDRD: 57 ML/MIN/1.73 M 2
GLUCOSE BLD-MCNC: 108 MG/DL (ref 65–99)
GLUCOSE BLD-MCNC: 240 MG/DL (ref 65–99)
GLUCOSE SERPL-MCNC: 121 MG/DL (ref 65–99)
HCT VFR BLD AUTO: 40.1 % (ref 37–47)
HGB BLD-MCNC: 12.6 G/DL (ref 12–16)
MCH RBC QN AUTO: 27.5 PG (ref 27–33)
MCHC RBC AUTO-ENTMCNC: 31.4 G/DL (ref 33.6–35)
MCV RBC AUTO: 87.6 FL (ref 81.4–97.8)
PLATELET # BLD AUTO: 230 K/UL (ref 164–446)
PMV BLD AUTO: 9 FL (ref 9–12.9)
POTASSIUM SERPL-SCNC: 4.1 MMOL/L (ref 3.6–5.5)
RBC # BLD AUTO: 4.58 M/UL (ref 4.2–5.4)
SODIUM SERPL-SCNC: 140 MMOL/L (ref 135–145)
WBC # BLD AUTO: 10.6 K/UL (ref 4.8–10.8)

## 2017-06-07 PROCEDURE — A9270 NON-COVERED ITEM OR SERVICE: HCPCS | Performed by: INTERNAL MEDICINE

## 2017-06-07 PROCEDURE — 82962 GLUCOSE BLOOD TEST: CPT

## 2017-06-07 PROCEDURE — 36415 COLL VENOUS BLD VENIPUNCTURE: CPT

## 2017-06-07 PROCEDURE — 700102 HCHG RX REV CODE 250 W/ 637 OVERRIDE(OP): Performed by: INTERNAL MEDICINE

## 2017-06-07 PROCEDURE — 99239 HOSP IP/OBS DSCHRG MGMT >30: CPT | Performed by: INTERNAL MEDICINE

## 2017-06-07 PROCEDURE — 85027 COMPLETE CBC AUTOMATED: CPT

## 2017-06-07 PROCEDURE — 700105 HCHG RX REV CODE 258: Performed by: INTERNAL MEDICINE

## 2017-06-07 PROCEDURE — 80048 BASIC METABOLIC PNL TOTAL CA: CPT

## 2017-06-07 PROCEDURE — 700111 HCHG RX REV CODE 636 W/ 250 OVERRIDE (IP): Performed by: INTERNAL MEDICINE

## 2017-06-07 RX ORDER — METFORMIN HYDROCHLORIDE 500 MG/1
500 TABLET, EXTENDED RELEASE ORAL DAILY
Qty: 30 TAB | Refills: 0 | Status: SHIPPED | OUTPATIENT
Start: 2017-06-07 | End: 2017-10-06

## 2017-06-07 RX ORDER — LEVOFLOXACIN 500 MG/1
500 TABLET, FILM COATED ORAL DAILY
Qty: 3 TAB | Refills: 0 | Status: SHIPPED | OUTPATIENT
Start: 2017-06-07 | End: 2017-10-06

## 2017-06-07 RX ORDER — ALBUTEROL SULFATE 90 UG/1
2 AEROSOL, METERED RESPIRATORY (INHALATION) EVERY 4 HOURS PRN
Qty: 8.5 G | Refills: 0 | Status: SHIPPED | OUTPATIENT
Start: 2017-06-07 | End: 2019-04-03 | Stop reason: SDUPTHER

## 2017-06-07 RX ORDER — METFORMIN HYDROCHLORIDE 500 MG/1
500 TABLET, EXTENDED RELEASE ORAL DAILY
Qty: 30 TAB | Refills: 0 | Status: SHIPPED | OUTPATIENT
Start: 2017-06-07 | End: 2017-06-07

## 2017-06-07 RX ORDER — PREDNISONE 10 MG/1
10 TABLET ORAL DAILY
Qty: 30 TAB | Refills: 0 | Status: SHIPPED | OUTPATIENT
Start: 2017-06-07 | End: 2017-06-19

## 2017-06-07 RX ORDER — IPRATROPIUM BROMIDE AND ALBUTEROL SULFATE 2.5; .5 MG/3ML; MG/3ML
3 SOLUTION RESPIRATORY (INHALATION) EVERY 4 HOURS PRN
Qty: 300 ML | Refills: 0 | Status: SHIPPED | OUTPATIENT
Start: 2017-06-07 | End: 2018-07-09 | Stop reason: SDUPTHER

## 2017-06-07 RX ORDER — LISINOPRIL 20 MG/1
20 TABLET ORAL DAILY
Qty: 30 TAB | Refills: 0 | Status: SHIPPED | OUTPATIENT
Start: 2017-06-07 | End: 2017-10-06

## 2017-06-07 RX ADMIN — PREDNISONE 50 MG: 50 TABLET ORAL at 08:34

## 2017-06-07 RX ADMIN — NYSTATIN: 100000 CREAM TOPICAL at 08:35

## 2017-06-07 RX ADMIN — BUDESONIDE AND FORMOTEROL FUMARATE DIHYDRATE 2 PUFF: 160; 4.5 AEROSOL RESPIRATORY (INHALATION) at 08:35

## 2017-06-07 RX ADMIN — HEPARIN SODIUM 5000 UNITS: 5000 INJECTION, SOLUTION INTRAVENOUS; SUBCUTANEOUS at 05:08

## 2017-06-07 RX ADMIN — LISINOPRIL 20 MG: 20 TABLET ORAL at 08:34

## 2017-06-07 RX ADMIN — TIOTROPIUM BROMIDE 1 CAPSULE: 18 CAPSULE ORAL; RESPIRATORY (INHALATION) at 10:41

## 2017-06-07 RX ADMIN — CEFTRIAXONE SODIUM 2 G: 2 INJECTION, POWDER, FOR SOLUTION INTRAMUSCULAR; INTRAVENOUS at 08:34

## 2017-06-07 RX ADMIN — LACTOBACILLUS ACIDOPHILUS / LACTOBACILLUS BULGARICUS 1 PACKET: 100 MILLION CFU STRENGTH GRANULES at 08:34

## 2017-06-07 RX ADMIN — DOXYCYCLINE 100 MG: 100 TABLET ORAL at 08:34

## 2017-06-07 RX ADMIN — LACTOBACILLUS ACIDOPHILUS / LACTOBACILLUS BULGARICUS 1 PACKET: 100 MILLION CFU STRENGTH GRANULES at 11:38

## 2017-06-07 RX ADMIN — INSULIN LISPRO 2 UNITS: 100 INJECTION, SOLUTION INTRAVENOUS; SUBCUTANEOUS at 11:40

## 2017-06-07 RX ADMIN — ASPIRIN 81 MG: 81 TABLET ORAL at 08:34

## 2017-06-07 ASSESSMENT — PAIN SCALES - GENERAL
PAINLEVEL_OUTOF10: 0
PAINLEVEL_OUTOF10: 0

## 2017-06-07 ASSESSMENT — LIFESTYLE VARIABLES: EVER_SMOKED: YES

## 2017-06-07 NOTE — DOCUMENTATION QUERY
DOCUMENTATION QUERY    PROVIDERS: Please select “Cosign w/ note”to reply to query.    To better represent the severity of illness and risk of mortality of your patient, please review the following information and exercise your independent professional judgment in responding to this query.     BMI 44.93 (>40)  is documented in the Dietary evaluation. Considering the clinical findings, risk factors, and treatment, can a diagnosis be provided to support this finding?    • Obese  • Morbidly obese  • Overweight  • Unable to determine  • Findings of no clinical significance  • Other explanation of clinical findings        The medical record reflects the following:   Clinical Findings  BMI 44.93;    Treatment  Dietary evaluation   Risk Factors  Diabetes   Location within medical record  Dietary evaluation     Thank you,   Reanna Saba RN   Clinical   Phone - [phone]

## 2017-06-07 NOTE — CARE PLAN
Problem: Communication  Goal: The ability to communicate needs accurately and effectively will improve  Outcome: PROGRESSING AS EXPECTED  POC discussed with pt. All medications explained. All questions answered.     Problem: Knowledge Deficit  Goal: Knowledge of disease process/condition, treatment plan, diagnostic tests, and medications will improve  Outcome: PROGRESSING AS EXPECTED  Pt educated regarding plan of care and medications. All questions answered.

## 2017-06-07 NOTE — FACE TO FACE
Face to Face Note  -  Durable Medical Equipment    Rico Staley M.D. - NPI: [id number]  I certify that this patient is under my care and that they have had a durable medical equipment(DME)face to face encounter by myself that meets the physician DME face-to-face encounter requirements with this patient on:    Date of encounter:   Patient:                    MRN:                       YOB: 2016  Camille Rodriguez  [id number]  1940     The encounter with the patient was in whole, or in part, for the following medical condition, which is the primary reason for durable medical equipment:  COPD    I certify that, based on my findings, the following durable medical equipment is medically necessary:  Oxygen for exertion     HOME O2 Saturation Measurements:(Values must be present for Home Oxygen orders)  Room air sat at rest: 91  Room air sat with amb: 85  With liters of O2: 2, O2 sat at rest with O2: 93  With Liters of O2: 2, O2 sat with amb with O2 : 92  Is the patient mobile?: Yes    My Clinical findings support the need for the above equipment due to:  Hypoxia    Supporting Symptoms: COPD      ------------------------------------------------------------------------------------------------------------------    Face to Face Supporting Documentation - Home Health    The encounter with this patient was in whole or in part the primary reason for home health admission.    Date of encounter:   Patient:                    MRN:                       YOB: 2016  Camille Rodriguez  [id number]  1940     Home health to see patient for:  Physical Therapy evaluation and treatment and Occupational therapy evaluation and treatment     Homebound evidenced status by:  Need the aid of supportive devices such as crutches, canes, wheelchairs or walkers. Leaving home must require a considerable and taxing effort. There must exist a normal inability to leave the home.    Community Physician to provide  follow up care: Mirella Mistry M.D.     Optional Interventions    Wound information & treatment:    Home Infusion Therapy orders:    Line/Drain/Airway:    I certify the face to face encounter for this home care referral meets the CMS requirements and the encounter/clinical assessment with the patient was, in whole, or in part, for the medical condition(s) listed above, which is the primary reason for home health care. Based on my clinical findings: the service(s) are medically necessary, support the need for home health care, and the homebound criteria are met.  I certify that this patient has had a face to face encounter by myself  Rico Staley M.D. - NPI: [id number]    *Debility, frailty and advanced age in the absence of an acute deterioration or exacerbation of a condition do not qualify a patient for home health.

## 2017-06-07 NOTE — PROGRESS NOTES
Discharge instructions given to patient at bedside, verbalizes understanding and states plans for follow-up with PCP as recommended. Individualized instruction and sepsis discharge information provided ,cardiac diabetic diet, new and home medication review, post-discharge activity level, and worsening of symptoms needing follow-up care. Telemetry monitor/IV cathlon removed. All belongings accounted for, all questions answered at this time.

## 2017-06-07 NOTE — DISCHARGE INSTRUCTIONS
Discharge Instructions    Discharged to home by car with relative. Discharged via wheelchair, hospital escort: Yes.  Special equipment needed: Not Applicable    Be sure to schedule a follow-up appointment with your primary care doctor or any specialists as instructed.     Discharge Plan:   Diet Plan: Discussed  Activity Level: Discussed  Confirmed Follow up Appointment: Appointment Scheduled  Confirmed Symptoms Management: Discussed  Medication Reconciliation Updated: Yes  Influenza Vaccine Indication: Patient Refuses    I understand that a diet low in cholesterol, fat, and sodium is recommended for good health. Unless I have been given specific instructions below for another diet, I accept this instruction as my diet prescription.   Other diet: cardiac diabetic    Special Instructions: None    · Is patient discharged on Warfarin / Coumadin?   No     · Is patient Post Blood Transfusion?  No    Depression / Suicide Risk    As you are discharged from this [facility] facility, it is important to learn how to keep safe from harming yourself.    Recognize the warning signs:  · Abrupt changes in personality, positive or negative- including increase in energy   · Giving away possessions  · Change in eating patterns- significant weight changes-  positive or negative  · Change in sleeping patterns- unable to sleep or sleeping all the time   · Unwillingness or inability to communicate  · Depression  · Unusual sadness, discouragement and loneliness  · Talk of wanting to die  · Neglect of personal appearance   · Rebelliousness- reckless behavior  · Withdrawal from people/activities they love  · Confusion- inability to concentrate     If you or a loved one observes any of these behaviors or has concerns about self-harm, here's what you can do:  · Talk about it- your feelings and reasons for harming yourself  · Remove any means that you might use to hurt yourself (examples: pills, rope, extension cords, firearm)  · Get  professional help from the community (Mental Health, Substance Abuse, psychological counseling)  · Do not be alone:Call your Safe Contact- someone whom you trust who will be there for you.  · Call your local CRISIS HOTLINE 868-4295 or 749-689-0327  · Call your local Children's Mobile Crisis Response Team [place] (955) 307-4371 or www.[employer]  · Call the toll free National Suicide Prevention Hotlines   · National Suicide Prevention Lifeline 865-067-AYTL (5247)  · [employer] Hope Line Network 800-SUICIDE (467-8072)  Sepsis, Adult  Sepsis is a serious infection of your blood or tissues that affects your whole body. The infection that causes sepsis may be bacterial, viral, fungal, or parasitic. Sepsis may be life threatening. Sepsis can cause your blood pressure to drop. This may result in shock. Shock causes your central nervous system and your organs to stop working correctly.   RISK FACTORS  Sepsis can happen in anyone, but it is more likely to happen in people who have weakened immune systems.  SIGNS AND SYMPTOMS   Symptoms of sepsis can include:  Fever or low body temperature (hypothermia).  Rapid breathing (hyperventilation).  Chills.  Rapid heartbeat (tachycardia).  Confusion or light-headedness.  Trouble breathing.  Urinating much less than usual.  Cool, clammy skin or red, flushed skin.  Other problems with the heart, kidneys, or brain.  DIAGNOSIS   Your health care provider will likely do tests to look for an infection, to see if the infection has spread to your blood, and to see how serious your condition is. Tests can include:  Blood tests, including cultures of your blood.  Cultures of other fluids from your body, such as:  Urine.  Pus from wounds.  Mucus coughed up from your lungs.  Urine tests other than cultures.  X-ray exams or other imaging tests.  TREATMENT   Treatment will begin with elimination of the source of infection. If your sepsis is likely caused by a bacterial or fungal  infection, you will be given antibiotic or antifungal medicines.  You may also receive:  Oxygen.  Fluids through an IV tube.  Medicines to increase your blood pressure.  A machine to clean your blood (dialysis) if your kidneys fail.  A machine to help you breathe if your lungs fail.  SEEK IMMEDIATE MEDICAL CARE IF:  You get an infection or develop any of the signs and symptoms of sepsis after surgery or a hospitalization.     This information is not intended to replace advice given to you by your health care provider. Make sure you discuss any questions you have with your health care provider.     Document Released: 09/15/2004 Document Revised: 05/03/2016 Document Reviewed: 08/25/2014  [employer] Patient Education ©2016 [employer]

## 2017-06-07 NOTE — DISCHARGE PLANNING
Received choice form from YUNI Stewart for DME at 1130. Received orders for HH. Referral sent to [facility] at 1137. REferral sent to [facility] at 1138.

## 2017-06-07 NOTE — DISCHARGE PLANNING
YUNI received order for Home O2, met with pt at bedside to discuss choice. Choice form completed for [facility] and faxed to [facility].  Per pt, she should also have a nebulizer. YUNI requested DME order for nebulizer from MD.

## 2017-06-08 ENCOUNTER — HOME CARE VISIT (OUTPATIENT)
Dept: HOME HEALTH SERVICES | Facility: HOME HEALTHCARE | Age: 77
End: 2017-06-08
Payer: MEDICARE

## 2017-06-08 ENCOUNTER — PATIENT OUTREACH (OUTPATIENT)
Dept: HEALTH INFORMATION MANAGEMENT | Facility: OTHER | Age: 77
End: 2017-06-08

## 2017-06-08 ENCOUNTER — HOME CARE VISIT (OUTPATIENT)
Dept: HOME HEALTH SERVICES | Facility: HOME HEALTHCARE | Age: 77
End: 2017-06-08

## 2017-06-08 VITALS
SYSTOLIC BLOOD PRESSURE: 140 MMHG | HEIGHT: 64 IN | BODY MASS INDEX: 42.85 KG/M2 | DIASTOLIC BLOOD PRESSURE: 78 MMHG | HEART RATE: 93 BPM | TEMPERATURE: 98.7 F | RESPIRATION RATE: 20 BRPM | WEIGHT: 251 LBS

## 2017-06-08 LAB
BACTERIA BLD CULT: NORMAL
BACTERIA BLD CULT: NORMAL
SIGNIFICANT IND 70042: NORMAL
SIGNIFICANT IND 70042: NORMAL
SITE SITE: NORMAL
SITE SITE: NORMAL
SOURCE SOURCE: NORMAL
SOURCE SOURCE: NORMAL

## 2017-06-08 PROCEDURE — G0162 HHC RN E&M PLAN SVS, 15 MIN: HCPCS

## 2017-06-08 PROCEDURE — 665001 SOC-HOME HEALTH

## 2017-06-08 SDOH — ECONOMIC STABILITY: HOUSING INSECURITY

## 2017-06-08 SDOH — ECONOMIC STABILITY: HOUSING INSECURITY
HOME SAFETY: E AS SOON AS POSSIBLE. PATIENT INSTRUCTED PATIENT/CAREGIVER TO MAKE ONE AS SOON AS POSSIBLE. PATIENT DOES NOT HAVE FLAMMABLE MATERIALS PRESENT IN THE HOME PRESENTING A FIRE HAZARD., INSTRUCTED PATIENT/CAREGIVER TO REMOVE THE FLAMMABLE MATERIALS AS SO

## 2017-06-08 SDOH — ECONOMIC STABILITY: HOUSING INSECURITY: UNSAFE COOKING RANGE AREA: 0

## 2017-06-08 SDOH — ECONOMIC STABILITY: HOUSING INSECURITY
HOME SAFETY: OXYGEN SAFETY RISK ASSESSMENT PERFORMED. PATIENT PT WAS GIVEN A NO SMOKING SIGN AND PROVIDED EDUCATION ABOUT WHY IT IS IMPORTANT TO PLACE ONE. PATIENT INSTRUCTED PATIENT/CAREGIVER TO GET ONE AS SOON AS POSSIBLE. INSTRUCTED PATIENT/CAREGIVER TO GET ON

## 2017-06-08 SDOH — ECONOMIC STABILITY: HOUSING INSECURITY: UNSAFE APPLIANCES: 0

## 2017-06-08 ASSESSMENT — ENCOUNTER SYMPTOMS
MENTAL STATUS CHANGE: 0
SHORTNESS OF BREATH: T

## 2017-06-08 ASSESSMENT — ACTIVITIES OF DAILY LIVING (ADL)
HOME_HEALTH_OASIS: 01
OASIS_M1830: 01

## 2017-06-08 ASSESSMENT — PATIENT HEALTH QUESTIONNAIRE - PHQ9
1. LITTLE INTEREST OR PLEASURE IN DOING THINGS: 00
2. FEELING DOWN, DEPRESSED, IRRITABLE, OR HOPELESS: 00

## 2017-06-10 ENCOUNTER — HOME CARE VISIT (OUTPATIENT)
Dept: HOME HEALTH SERVICES | Facility: HOME HEALTHCARE | Age: 77
End: 2017-06-10
Payer: MEDICARE

## 2017-06-10 PROCEDURE — G0496 LPN CARE TRAIN/EDU IN HH: HCPCS

## 2017-06-11 ENCOUNTER — HOME CARE VISIT (OUTPATIENT)
Dept: HOME HEALTH SERVICES | Facility: HOME HEALTHCARE | Age: 77
End: 2017-06-11
Payer: MEDICARE

## 2017-06-12 VITALS
TEMPERATURE: 98.1 F | RESPIRATION RATE: 16 BRPM | HEART RATE: 67 BPM | DIASTOLIC BLOOD PRESSURE: 70 MMHG | SYSTOLIC BLOOD PRESSURE: 148 MMHG

## 2017-06-12 SDOH — ECONOMIC STABILITY: HOUSING INSECURITY: UNSAFE APPLIANCES: 0

## 2017-06-12 SDOH — ECONOMIC STABILITY: HOUSING INSECURITY
HOME SAFETY: OXYGEN SAFETY RISK ASSESSMENT PERFORMED. PATIENT DOES HAVE A NO SMOKING SIGN POSTED IN THE HOME. PATIENT DOES HAVE A WORKING FIRE EXTINGUISHER PRESENT IN THE HOME. SMOKE ALARMS ARE PRESENT AND FUNCTIONAL ON EACH LEVEL OF THE HOME. PATIENT DOES HAVE A

## 2017-06-12 SDOH — ECONOMIC STABILITY: HOUSING INSECURITY: UNSAFE COOKING RANGE AREA: 0

## 2017-06-12 SDOH — ECONOMIC STABILITY: HOUSING INSECURITY
HOME SAFETY: FIRE ESCAPE PLAN DEVELOPED. PATIENT DOES HAVE FLAMMABLE MATERIALS PRESENT IN THE HOME PRESENTING A FIRE HAZARD. NO EVIDENCE FOUND OF SMOKING MATERIALS PRESENT IN THE HOME.

## 2017-06-12 ASSESSMENT — ENCOUNTER SYMPTOMS: RESPIRATORY SYMPTOMS COMMENTS: PT LUNGS CLEAR IN ALL FIELDS, NO ADVANTAGEOUS SOUND NOTED.

## 2017-06-13 ENCOUNTER — HOME CARE VISIT (OUTPATIENT)
Dept: HOME HEALTH SERVICES | Facility: HOME HEALTHCARE | Age: 77
End: 2017-06-13
Payer: MEDICARE

## 2017-06-13 VITALS
TEMPERATURE: 98.3 F | RESPIRATION RATE: 20 BRPM | HEART RATE: 68 BPM | DIASTOLIC BLOOD PRESSURE: 68 MMHG | SYSTOLIC BLOOD PRESSURE: 124 MMHG

## 2017-06-13 VITALS
SYSTOLIC BLOOD PRESSURE: 108 MMHG | HEART RATE: 60 BPM | TEMPERATURE: 96.8 F | RESPIRATION RATE: 18 BRPM | DIASTOLIC BLOOD PRESSURE: 66 MMHG

## 2017-06-13 PROCEDURE — G0299 HHS/HOSPICE OF RN EA 15 MIN: HCPCS

## 2017-06-13 PROCEDURE — G0151 HHCP-SERV OF PT,EA 15 MIN: HCPCS

## 2017-06-13 SDOH — ECONOMIC STABILITY: HOUSING INSECURITY: UNSAFE APPLIANCES: 0

## 2017-06-13 SDOH — ECONOMIC STABILITY: HOUSING INSECURITY
HOME SAFETY: HAS CHILD GATES SHE MUST REMOVE WHEN ENTERING/LEAVING ROOM AT NIGHT TIME.   EDUCATION RE O2 USE/SAFETY SINCE NEW TO O2

## 2017-06-13 SDOH — ECONOMIC STABILITY: HOUSING INSECURITY: UNSAFE COOKING RANGE AREA: 0

## 2017-06-13 ASSESSMENT — ACTIVITIES OF DAILY LIVING (ADL)
GROOMING_ASSISTANCE: 0
TOILETING_ASSISTANCE: 0
SHOPPING_ASSISTANCE: 6
MEAL_PREP_ASSISTANCE: 4
LAUNDRY_ASSISTANCE: 5
TRANSPORTATION_ASSISTANCE: 6
ORAL_CARE_ASSISTANCE: 0
HOUSEKEEPING_ASSISTANCE: 6
DRESSING_LB_ASSISTANCE: 0
DRESSING_UB_ASSISTANCE: 0
TELEPHONE_ASSISTANCE: 0
BATHING_ASSISTANCE: 4
EATING_ASSISTANCE: 0

## 2017-06-13 ASSESSMENT — ENCOUNTER SYMPTOMS
VOMITING: DENIES
NAUSEA: DENIES

## 2017-06-14 ASSESSMENT — ACTIVITIES OF DAILY LIVING (ADL): HOME_HEALTH_OASIS: 01

## 2017-06-15 ENCOUNTER — HOME CARE VISIT (OUTPATIENT)
Dept: HOME HEALTH SERVICES | Facility: HOME HEALTHCARE | Age: 77
End: 2017-06-15
Payer: MEDICARE

## 2017-06-15 ENCOUNTER — HOSPITAL ENCOUNTER (OUTPATIENT)
Facility: MEDICAL CENTER | Age: 77
End: 2017-06-15
Attending: FAMILY MEDICINE
Payer: MEDICARE

## 2017-06-15 VITALS
DIASTOLIC BLOOD PRESSURE: 68 MMHG | TEMPERATURE: 97.7 F | RESPIRATION RATE: 16 BRPM | SYSTOLIC BLOOD PRESSURE: 124 MMHG | HEART RATE: 68 BPM

## 2017-06-15 VITALS
SYSTOLIC BLOOD PRESSURE: 110 MMHG | WEIGHT: 251 LBS | HEART RATE: 64 BPM | DIASTOLIC BLOOD PRESSURE: 70 MMHG | RESPIRATION RATE: 20 BRPM | TEMPERATURE: 98 F | BODY MASS INDEX: 43.06 KG/M2

## 2017-06-15 LAB
ALBUMIN SERPL BCP-MCNC: 3.5 G/DL (ref 3.2–4.9)
ALBUMIN/GLOB SERPL: 1.2 G/DL
ALP SERPL-CCNC: 41 U/L (ref 30–99)
ALT SERPL-CCNC: 17 U/L (ref 2–50)
ANION GAP SERPL CALC-SCNC: 8 MMOL/L (ref 0–11.9)
APPEARANCE UR: CLEAR
AST SERPL-CCNC: 16 U/L (ref 12–45)
BACTERIA #/AREA URNS HPF: ABNORMAL /HPF
BASOPHILS # BLD AUTO: 0.2 % (ref 0–1.8)
BASOPHILS # BLD: 0.03 K/UL (ref 0–0.12)
BILIRUB SERPL-MCNC: 0.6 MG/DL (ref 0.1–1.5)
BILIRUB UR QL STRIP.AUTO: NEGATIVE
BUN SERPL-MCNC: 21 MG/DL (ref 8–22)
CALCIUM SERPL-MCNC: 9.5 MG/DL (ref 8.5–10.5)
CHLORIDE SERPL-SCNC: 96 MMOL/L (ref 96–112)
CO2 SERPL-SCNC: 35 MMOL/L (ref 20–33)
COLOR UR: COLORLESS
CREAT SERPL-MCNC: 1.08 MG/DL (ref 0.5–1.4)
CULTURE IF INDICATED INDCX: YES UA CULTURE
EOSINOPHIL # BLD AUTO: 0.08 K/UL (ref 0–0.51)
EOSINOPHIL NFR BLD: 0.6 % (ref 0–6.9)
EPI CELLS #/AREA URNS HPF: ABNORMAL /HPF
ERYTHROCYTE [DISTWIDTH] IN BLOOD BY AUTOMATED COUNT: 52.4 FL (ref 35.9–50)
EST. AVERAGE GLUCOSE BLD GHB EST-MCNC: 166 MG/DL
GFR SERPL CREATININE-BSD FRML MDRD: 49 ML/MIN/1.73 M 2
GLOBULIN SER CALC-MCNC: 2.9 G/DL (ref 1.9–3.5)
GLUCOSE SERPL-MCNC: 103 MG/DL (ref 65–99)
GLUCOSE UR STRIP.AUTO-MCNC: NEGATIVE MG/DL
HBA1C MFR BLD: 7.4 % (ref 0–5.6)
HCT VFR BLD AUTO: 41.9 % (ref 37–47)
HGB BLD-MCNC: 13.2 G/DL (ref 12–16)
HYALINE CASTS #/AREA URNS LPF: ABNORMAL /LPF
IMM GRANULOCYTES # BLD AUTO: 0.06 K/UL (ref 0–0.11)
IMM GRANULOCYTES NFR BLD AUTO: 0.5 % (ref 0–0.9)
KETONES UR STRIP.AUTO-MCNC: NEGATIVE MG/DL
LEUKOCYTE ESTERASE UR QL STRIP.AUTO: ABNORMAL
LYMPHOCYTES # BLD AUTO: 2.67 K/UL (ref 1–4.8)
LYMPHOCYTES NFR BLD: 21.4 % (ref 22–41)
MCH RBC QN AUTO: 27.8 PG (ref 27–33)
MCHC RBC AUTO-ENTMCNC: 31.5 G/DL (ref 33.6–35)
MCV RBC AUTO: 88.4 FL (ref 81.4–97.8)
MICRO URNS: ABNORMAL
MONOCYTES # BLD AUTO: 0.89 K/UL (ref 0–0.85)
MONOCYTES NFR BLD AUTO: 7.1 % (ref 0–13.4)
NEUTROPHILS # BLD AUTO: 8.74 K/UL (ref 2–7.15)
NEUTROPHILS NFR BLD: 70.2 % (ref 44–72)
NITRITE UR QL STRIP.AUTO: NEGATIVE
NRBC # BLD AUTO: 0 K/UL
NRBC BLD AUTO-RTO: 0 /100 WBC
PH UR STRIP.AUTO: 7 [PH]
PLATELET # BLD AUTO: 234 K/UL (ref 164–446)
PMV BLD AUTO: 9.6 FL (ref 9–12.9)
POTASSIUM SERPL-SCNC: 3.8 MMOL/L (ref 3.6–5.5)
PROT SERPL-MCNC: 6.4 G/DL (ref 6–8.2)
PROT UR QL STRIP: NEGATIVE MG/DL
RBC # BLD AUTO: 4.74 M/UL (ref 4.2–5.4)
RBC # URNS HPF: ABNORMAL /HPF
RBC UR QL AUTO: NEGATIVE
SODIUM SERPL-SCNC: 139 MMOL/L (ref 135–145)
SP GR UR STRIP.AUTO: 1.01
WBC # BLD AUTO: 12.5 K/UL (ref 4.8–10.8)
WBC #/AREA URNS HPF: ABNORMAL /HPF

## 2017-06-15 PROCEDURE — 81001 URINALYSIS AUTO W/SCOPE: CPT

## 2017-06-15 PROCEDURE — G0299 HHS/HOSPICE OF RN EA 15 MIN: HCPCS

## 2017-06-15 PROCEDURE — 85025 COMPLETE CBC W/AUTO DIFF WBC: CPT

## 2017-06-15 PROCEDURE — G0151 HHCP-SERV OF PT,EA 15 MIN: HCPCS

## 2017-06-15 PROCEDURE — 80053 COMPREHEN METABOLIC PANEL: CPT

## 2017-06-15 PROCEDURE — 87086 URINE CULTURE/COLONY COUNT: CPT

## 2017-06-15 PROCEDURE — 83036 HEMOGLOBIN GLYCOSYLATED A1C: CPT

## 2017-06-15 SDOH — ECONOMIC STABILITY: HOUSING INSECURITY: UNSAFE COOKING RANGE AREA: 0

## 2017-06-15 SDOH — ECONOMIC STABILITY: HOUSING INSECURITY: UNSAFE APPLIANCES: 0

## 2017-06-15 ASSESSMENT — ENCOUNTER SYMPTOMS
NAUSEA: DENIES
VOMITING: DENIES
MENTAL STATUS CHANGE: 0
SEVERE DYSPNEA: 1

## 2017-06-15 ASSESSMENT — ACTIVITIES OF DAILY LIVING (ADL)
GROOMING_ASSISTANCE: 0
EATING_ASSISTANCE: 0
BATHING_ASSISTANCE: 0
TOILETING_ASSISTANCE: 0
DRESSING_UB_ASSISTANCE: 0
DRESSING_LB_ASSISTANCE: 0
ORAL_CARE_ASSISTANCE: 0

## 2017-06-15 NOTE — DISCHARGE SUMMARY
Hospital Medicine Discharge Note     Admit Date:  6/2/2017       Discharge Date:   6/7/2017  LOS: 4 days     Primary Care Provider:    Mirella Mistry M.D.    Attending Physician:  Rico Staley     Discharge Diagnoses:   Active Problems:    UTI (urinary tract infection)    COPD exacerbation (CMS-HCC)    Acute hypoxemic respiratory failure (CMS-HCC)           Chronic Medical Problems:   Anemia    CKD (chronic kidney disease), stage III    HTN (hypertension)    DM2 (diabetes mellitus, type 2) (CMS-HCC)    Consultants:      None    Studies:  Wbc 10.6, Hemoglobin 12.6, Hematocrit 40.1, Platelet 230, Sodium 140, Potassium 4.1, Chloride 107, Bicarb 26, Glucose 121, BUN 32, Creatinine 0.95    Imaging/ Testing:      NM-LUNG VENT/PERF IMAGING   Final Result      Low probability for pulmonary embolus.      DX-CHEST-LIMITED (1 VIEW)   Final Result      No acute cardiopulmonary disease.      ECHOCARDIOGRAM COMP W/O CONT   Final Result      CT-CHEST (THORAX) W/O   Final Result      1.  Emphysema      2.  Mild atelectasis      3.  Aortic and coronary artery atherosclerotic plaque               CT-RENAL COLIC EVALUATION(A/P W/O)   Final Result      No acute abnormality within the abdomen or pelvis      DX-CHEST-PORTABLE (1 VIEW)   Final Result      No acute cardiopulmonary abnormality identified.            Procedures:        None    Hospital Summary (Brief Narrative):       For H and P on admission, please refer to full H and P dictated by Dr. Bustillos  In brief, Camille Rodriguez is a 76 y.o. female who was admitted 6/2/2017 after presenting to ER complaining of shortness of breath, cough and dysuria. Pt was found to have sepsis UTI and also acute respiratory failure with hypoxia and also COPD exacerbation. Pt was started on sepsis protocol, IV abx , also steroids, Inhalers and respiratory protocol. Her symptoms continued to improve, her sepsis resolved, her respiratory failure improved. Pt has remained hemodynamically stable. She will  discharged home today on steroid taper and also on Levaquin, also home O2 will be provided for pt.     Disposition:   Discharge home    Condition:  Stable    Activity:   As tolerated     Diet: Diabetic Diet    Discharge Medications:           Medication List      START taking these medications       Instructions    albuterol 108 (90 BASE) MCG/ACT Aers inhalation aerosol    Inhale 2 Puffs by mouth every four hours as needed for Shortness of Breath.   Dose:  2 Puff       ipratropium-albuterol 0.5-2.5 (3) MG/3ML nebulizer solution   Last time this was given:  3 mL on 6/6/2017  6:40 AM   Commonly known as:  DUONEB    3 mL by Nebulization route every four hours as needed for Shortness of Breath.   Dose:  3 mL       levofloxacin 500 MG tablet   Commonly known as:  LEVAQUIN   Next Dose Due:  Tomorrow morning    Take 1 Tab by mouth every day.   Dose:  500 mg       metformin  MG Tb24   Commonly known as:  GLUCOPHAGE XR    Take 1 Tab by mouth every day.   Dose:  500 mg       predniSONE 10 MG Tabs   Last time this was given:  50 mg on 6/7/2017  8:34 AM   Commonly known as:  DELTASONE   Next Dose Due:  Tomorrow morning    Take 1 Tab by mouth every day for 12 days. Take  40 mg for 3 days then 30 mg for 3 days then 20 mg for 3 days then 10 mg for 3 days then stop.   Dose:  10 mg         CHANGE how you take these medications       Instructions    lisinopril 20 MG Tabs   What changed:  how much to take   Last time this was given:  20 mg on 6/7/2017  8:34 AM   Commonly known as:  PRINIVIL   Next Dose Due:  Tomorrow morning    Take 1 Tab by mouth every day.   Dose:  20 mg       promethazine-codeine 6.25-10 MG/5ML Syrp   What changed:  Another medication with the same name was removed. Continue taking this medication, and follow the directions you see here.   Commonly known as:  PHENERGAN-CODEINE    Take 5 mL by mouth 4 times a day as needed for Cough.   Dose:  5 mL         CONTINUE taking these medications       Instructions     aspirin 81 MG tablet    Take 81 mg by mouth every day. needs to p/u at store   Dose:  81 mg       atenolol 50 MG Tabs   Commonly known as:  TENORMIN   Next Dose Due:  Tomorrow morning   Notes to Patient:  Take Blood pressure and heart rate prior to dose    Take 50 mg by mouth every day.   Dose:  50 mg       CALCIUM 1200 PO   Next Dose Due:  Tomorrow morning    Take  by mouth.       fish oil 1000 MG Caps capsule   Next Dose Due:  Tomorrow morning    Take 1,000 mg by mouth every day.   Dose:  1000 mg       hydrochlorothiazide 25 MG Tabs   Commonly known as:  HYDRODIURIL   Next Dose Due:  Tomorrow morning    Take 25 mg by mouth every day.   Dose:  25 mg       MULTIVITAMIN PO   Next Dose Due:  Tomorrow morning    Take  by mouth.       simvastatin 10 MG Tabs   Last time this was given:  10 mg on 6/6/2017  9:12 PM   Commonly known as:  ZOCOR   Next Dose Due:  This evening    Take 10 mg by mouth every evening.   Dose:  10 mg       SPIRIVA HANDIHALER INH   Last time this was given:  1 Cap on 6/7/2017 10:41 AM   Next Dose Due:  Tomorrow morning    Inhale  by mouth.       vitamin D 2000 UNIT Tabs    Take 5,000 Units by mouth every day at 6 PM.   Dose:  5000 Units         STOP taking these medications          ADVAIR DISKUS 250-50 MCG/DOSE Aepb   Generic drug:  fluticasone-salmeterol       ALEVE PO   Notes to Patient:  STOP       azithromycin 250 MG Tabs   Commonly known as:  ZITHROMAX   Notes to Patient:  STOP       doxycycline 100 MG Tabs   Commonly known as:  VIBRAMYCIN   Notes to Patient:  STOP               Follow up appointment details :      I encouraged her to call his PCP to confirm follow up after discharge.    Future Appointments  Date Time Provider Department Center   6/19/2017 7:00 AM [facility] None   6/20/2017 To Be Determined AYALA Martinez None   6/20/2017 11:00 AM ZENON Salazar None   6/20/2017 2:00 PM [facility] None   6/22/2017 11:00 AM Eva Truong [facility] None    6/23/2017 To Be Determined Erendira Orozco R.N. [facility] None   6/27/2017 To Be Determined Ebony Luna R.N. [facility] None   6/28/2017 To Be Determined Eva Truong P.T[facility] None   7/4/2017 To Be Determined Erendira Orozco R.N. [facility] None   7/5/2017 To Be Determined CHIDI SalazarT. [facility] None   7/11/2017 To Be Determined Erendira Orozco R.N. [facility] None   7/18/2017 To Be Determined Erendira Orozco, R.N. [facility] None   7/25/2017 To Be Determined Erendira Orozco R.N. [facility] None   8/1/2017 To Be Determined Erendira Orozco, R.N. [facility] None         Instructions:      The were given instructions to return to the ER if patient's condition worsens      Time Spent on Discharge:     Discharge instructions were discussed with the patient at bedside. Patient  expressed understanding and agreed to comply with all discharge instructions.    41 minutes were spent in the discharge planning and management of this  patient, including more than 50% of the time spent face to face in   Counseling.

## 2017-06-17 LAB
BACTERIA UR CULT: NORMAL
SIGNIFICANT IND 70042: NORMAL
SOURCE SOURCE: NORMAL

## 2017-06-19 ASSESSMENT — ENCOUNTER SYMPTOMS
HEMOPTYSIS: 0
WHEEZING: 1
RESPIRATORY SYMPTOMS COMMENTS: YES
DYSPNEA AT REST: 0
CHEST TIGHTNESS: 0
SHORTNESS OF BREATH: 1

## 2017-06-20 ENCOUNTER — HOME CARE VISIT (OUTPATIENT)
Dept: HOME HEALTH SERVICES | Facility: HOME HEALTHCARE | Age: 77
End: 2017-06-20
Payer: MEDICARE

## 2017-06-20 ENCOUNTER — OFFICE VISIT (OUTPATIENT)
Dept: PULMONOLOGY | Facility: HOSPICE | Age: 77
End: 2017-06-20
Payer: MEDICARE

## 2017-06-20 VITALS
HEART RATE: 72 BPM | TEMPERATURE: 97.7 F | DIASTOLIC BLOOD PRESSURE: 66 MMHG | RESPIRATION RATE: 18 BRPM | SYSTOLIC BLOOD PRESSURE: 122 MMHG

## 2017-06-20 VITALS
TEMPERATURE: 97.5 F | SYSTOLIC BLOOD PRESSURE: 112 MMHG | HEIGHT: 64 IN | RESPIRATION RATE: 16 BRPM | OXYGEN SATURATION: 97 % | BODY MASS INDEX: 41.83 KG/M2 | WEIGHT: 245 LBS | DIASTOLIC BLOOD PRESSURE: 70 MMHG | HEART RATE: 64 BPM

## 2017-06-20 DIAGNOSIS — J96.01 ACUTE HYPOXEMIC RESPIRATORY FAILURE (HCC): ICD-10-CM

## 2017-06-20 DIAGNOSIS — R09.02 HYPOXIA: ICD-10-CM

## 2017-06-20 DIAGNOSIS — J44.9 CHRONIC OBSTRUCTIVE PULMONARY DISEASE, UNSPECIFIED COPD TYPE (HCC): ICD-10-CM

## 2017-06-20 PROCEDURE — 99204 OFFICE O/P NEW MOD 45 MIN: CPT | Performed by: INTERNAL MEDICINE

## 2017-06-20 PROCEDURE — G0151 HHCP-SERV OF PT,EA 15 MIN: HCPCS

## 2017-06-20 RX ORDER — LEVOTHYROXINE SODIUM 0.12 MG/1
TABLET ORAL
COMMUNITY
Start: 2017-04-06 | End: 2019-03-06 | Stop reason: SDUPTHER

## 2017-06-20 RX ORDER — LISINOPRIL 20 MG/1
TABLET ORAL
COMMUNITY
Start: 2017-06-07 | End: 2017-10-06

## 2017-06-20 RX ORDER — LISINOPRIL 40 MG/1
TABLET ORAL
COMMUNITY
End: 2019-03-06

## 2017-06-20 RX ORDER — BLOOD-GLUCOSE METER
KIT MISCELLANEOUS
COMMUNITY
Start: 2017-06-12 | End: 2019-09-27

## 2017-06-20 RX ORDER — BLOOD-GLUCOSE METER
KIT MISCELLANEOUS
COMMUNITY
Start: 2017-06-12 | End: 2019-07-12

## 2017-06-20 ASSESSMENT — ENCOUNTER SYMPTOMS: MENTAL STATUS CHANGE: 0

## 2017-06-20 ASSESSMENT — PATIENT HEALTH QUESTIONNAIRE - PHQ9: CLINICAL INTERPRETATION OF PHQ2 SCORE: 0

## 2017-06-20 NOTE — PROGRESS NOTES
Camille Rodriguez is a 76 y.o. female here for COPD on oxygen with recent hospitalization. Patient was referred by her primary care doctor.    History of Present Illness:        This lady was hospitalized in early June 2017 with COPD exacerbation. Persistent hypoxemia resulted in a perfusion lung scan that showed COPD type pattern. 2 packs per day for 46 years has produced significant emphysema. She has not smoked for 10 years. She understands personally of breathing. Physical therapy worked with her at home, rapid desaturation on oxygen has occurred. I've asked her to boost her home oxygen to 4 L with activity and exercise. She has had flu vaccine, apparently got pneumonia shots from her primary care but we cannot verify that as yet. We are obtaining those records.    She currently needs oxygen 24 hours per day, a home oxygen test is pending and I have asked her to do this on oxygen as uncertain that she needs oxygen both nighttime and exercise with rapid desaturation documented. At the follow-up visit we will obtain PFTs, do a 6 minute walk test, assess her for pulmonary rehabilitation, try to obtain the prior pneumonia vaccine records to know if she needs anything else. She lives with her daughter who is very supportive, all of this is reviewed. Medically significant excessive weight contributes to supine hypoxemia, her BMI is 42, I explained that gentle exercise and portion control would be beneficial.    She will continue Spiriva, Watt, Ventolin, has not needed the DuoNeb nebulizer, and then follow-up here with PFTs walk test the pneumonia records and assess for pulmonary rehabilitation as well as review the overnight oximetry on oxygen.  Constitutional ROS: No unexpected change in weight, No unexplained fevers, sweats, or chills  Eyes: No change in vision or blurring or double vision  Mouth/Throat ROS: No sore throat, No recent change in voice or hoarseness  Pulmonary ROS: See present history for pertinent  positives  Cardiovascular ROS: No chest pain  Gastrointestinal ROS: No abdominal pain, No abdominal bloating or early satiety  Musculoskeletal/Extremities ROS: No clubbing, No cyanosis  Hematologic/Lymphatic ROS: No abnormal bleeding  Neurologic ROS: No weakness  Psychiatric ROS: No depression  Allergic/Immunologic: No rhinitis or urticaria     Current Outpatient Prescriptions   Medication Sig Dispense Refill   • levothyroxine (SYNTHROID) 125 MCG Tab      • Tiotropium Bromide Monohydrate (SPIRIVA RESPIMAT) 2.5 MCG/ACT Aero Soln Inhale 1 Puff by mouth every day at 6 PM.     • Fluticasone Furoate-Vilanterol (BREO ELLIPTA) 200-25 MCG/INH AEROSOL POWDER, BREATH ACTIVATED Inhale 1 Puff by mouth every day at 6 PM.     • Probiotic Product (TRUBIOTICS) Cap Take 1 Capsule by mouth every day at 6 PM.     • Cyanocobalamin (VITAMIN B12) 100 MCG Tab Take 1 Tab by mouth every day at 6 PM.     • acetaminophen (EQ ARTHRITIS PAIN) 650 MG CR tablet Take 650-1,300 mg by mouth every 6 hours as needed for Moderate Pain.     • albuterol 108 (90 BASE) MCG/ACT Aero Soln inhalation aerosol Inhale 2 Puffs by mouth every four hours as needed for Shortness of Breath. 8.5 g 0   • ipratropium-albuterol (DUONEB) 0.5-2.5 (3) MG/3ML nebulizer solution 3 mL by Nebulization route every four hours as needed for Shortness of Breath. 300 mL 0   • lisinopril (PRINIVIL) 20 MG Tab Take 1 Tab by mouth every day. 30 Tab 0   • metformin ER (GLUCOPHAGE XR) 500 MG TABLET SR 24 HR Take 1 Tab by mouth every day. 30 Tab 0   • hydrochlorothiazide (HYDRODIURIL) 25 MG Tab Take 25 mg by mouth every day.     • Omega-3 Fatty Acids (FISH OIL) 1000 MG Cap capsule Take 1,000 mg by mouth every day.     • atenolol (TENORMIN) 50 MG TABS Take 50 mg by mouth every day.     • simvastatin (ZOCOR) 10 MG TABS Take 10 mg by mouth every evening.     • Calcium Carbonate-Vit D-Min (CALCIUM 1200 PO) Take  by mouth.     • Cholecalciferol (VITAMIN D) 2000 UNIT TABS Take 5,000 Units by  "mouth every day at 6 PM.     • Multiple Vitamin (MULTIVITAMIN PO) Take  by mouth.     • aspirin 81 MG tablet Take 81 mg by mouth every day. needs to p/u at store     • Blood Glucose Monitoring Suppl (FREESTYLE LITE) Device      • FREESTYLE LITE strip      • lisinopril (PRINIVIL, ZESTRIL) 40 MG tablet      • lisinopril (PRINIVIL) 20 MG Tab      • nystatin (MYCOSTATIN) 200034 UNIT/GM Ointment Apply 100,000 Units to affected area(s) 2 times a day as needed (rash).     • non-formulary med Inhale 2 L by mouth Continuous. Oxygen 2L continuous via nasal cannula  Indications: COPD, SOB     • levofloxacin (LEVAQUIN) 500 MG tablet Take 1 Tab by mouth every day. (Patient not taking: Reported on 6/20/2017) 3 Tab 0   • promethazine-codeine (PHENERGAN-CODEINE) 6.25-10 MG/5ML SYRP Take 5 mL by mouth 4 times a day as needed for Cough. (Patient not taking: Reported on 6/8/2017) 120 mL 0   • Tiotropium Bromide Monohydrate (SPIRIVA HANDIHALER INH) Inhale  by mouth.       No current facility-administered medications for this visit.       Social History   Substance Use Topics   • Smoking status: Former Smoker     Quit date: 02/05/2007   • Smokeless tobacco: Never Used      Comment: Quit 2007   • Alcohol Use: No        Past Medical History   Diagnosis Date   • Clotting disorder (CMS-HCC)    • Diabetes    • Chronic airway obstruction, not elsewhere classified    • Chickenpox    • [place] measles    • Mumps        Past Surgical History   Procedure Laterality Date   • Pr remv 2nd cataract,corn-scler sectn         Allergies: Zithromax    Family History   Problem Relation Age of Onset   • Heart Attack Father        Physical Examination    Filed Vitals:    06/20/17 1339   Height: 1.626 m (5' 4.02\")   Weight: 111.131 kg (245 lb)   Weight % change since last entry.: 0 %   BP: 112/70   Pulse: 64   BMI (Calculated): 42.03   Resp: 16   Temp: 36.4 °C (97.5 °F)       General Appearance: alert, no distress  Skin: Skin color, texture, turgor normal. No " rashes or lesions.  Eyes: negative  Oropharynx: Lips, mucosa, and tongue normal. Teeth and gums normal. Oropharynx moist and without lesion  Lungs: positive findings: Quiet but clear  Heart: negative. RRR without murmur, gallop, or rubs.  No ectopy.  Abdomen: Abdomen soft, non-tender. BS normal. No masses,  No organomegaly  Extremities: Extremities normal. No deformities, edema, or skin discoloration  Peripheral Pulses: Normal  Neurologic: intact  On oxygen, conversant, no unusual cervical or supraclavicular adenopathy    II (soft palate, uvula, fauces visible)    Imaging: Chest x-ray done when hospitalized did not show acute process    PFTS: Ordered      Assessment and Plan  1. Chronic obstructive pulmonary disease, unspecified COPD type (CMS-HCC)  - AMB PULMONARY FUNCTION TEST/LAB; Future    2. Hypoxia  - AMB PULMONARY STRESS TESTING (6 MIN WALK); Future    3. Acute hypoxemic respiratory failure (CMS-HCC),hospitalized 6/17  - AMB PULMONARY STRESS TESTING (6 MIN WALK); Future    This lady was hospitalized in early June 2017 with COPD exacerbation. Persistent hypoxemia resulted in a perfusion lung scan that showed COPD type pattern. 2 packs per day for 46 years has produced significant emphysema. She has not smoked for 10 years. She understands personally of breathing. Physical therapy worked with her at home, rapid desaturation on oxygen has occurred. I've asked her to boost her home oxygen to 4 L with activity and exercise. She has had flu vaccine, apparently got pneumonia shots from her primary care but we cannot verify that as yet. We are obtaining those records.    She currently needs oxygen 24 hours per day, a home oxygen test is pending and I have asked her to do this on oxygen as uncertain that she needs oxygen both nighttime and exercise with rapid desaturation documented. At the follow-up visit we will obtain PFTs, do a 6 minute walk test, assess her for pulmonary rehabilitation, try to obtain the prior  pneumonia vaccine records to know if she needs anything else. She lives with her daughter who is very supportive, all of this is reviewed. Medically significant excessive weight contributes to supine hypoxemia, her BMI is 42, I explained that gentle exercise and portion control would be beneficial.    She will continue Spiriva, Watt, Ventolin, has not needed the DuoNeb nebulizer, and then follow-up here with PFTs walk test the pneumonia records and assess for pulmonary rehabilitation as well as review the overnight oximetry on oxygen.  Followup Return in about 6 weeks (around 8/1/2017) for With MD or APRN, with PFT, with 6 minute walk.     Addendum to dictation, we obtained the records from her primary care doctor, Pneumovax 23 was administered in April 2015, and Prevnar 13 was administered in 2016.      Answers for HPI/ROS submitted by the patient on 6/19/2017   What is the reason for your visit today?: respitory  Have to stop when walking or walk at a slow pace? : 10 years ago, yes  Do you cough first thing in the morning or at other times during the day?: yes  Do you have a cough on most days? If so, how long have you had this cough?: no  Bring up phlegm (mucus, sputum) in the morning or other times during the day?: no  If so, how long have you produced phlegm (Months, Years), and what is the usual color of your phlegm?: n/a  Do you cough up blood from your chest?: No  If so, how many times, and approximately how much?: n/a  Do you experience wheezing?: Yes  How often?: frequently  Do you experience any chest tightness?: No  Experience shortness of breath?: Yes  Experience shortness of breath at rest?: No  How far can you walk before becoming short of breath or need to rest? : 39 feet  Please list what causes you to become short of breath:: right now being active  Have you ever been hospitalized?: Yes  Reason, year, and hospital in which you were hospitalized:: respitory on 6/2/17 at [facility]  Have you ever needed  to be intubated or placed on a ventilator? : No  Have you ever had problems with anesthesia?: No  Have you experienced post-operative delirium?: No  Any complications with surgery?: No  What year did you receive your last Flu shot?: 2016  What year did you receive you last Pneumonia shot?: 2016  Have you had a TB skin test? If so, please list the year and result:: yes  year? positive  Have you had Allergy skin testing? If so, please list the year and result:: no  Please list all your occupations from your first job to your current job. 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

## 2017-06-20 NOTE — MR AVS SNAPSHOT
"        Camille Rodriguez   2017 2:00 PM   Office Visit   MRN: [id number]    Department:  [facility]   Dept Phone:  [phone]    Description:  Female : 1940   Provider:  Park Selby M.D.           Reason for Visit     New Patient COPD      Allergies as of 2017     Allergen Noted Reactions    Zithromax [Azithromycin] 2017   Diarrhea    Pt states severe diarrhea      You were diagnosed with     Chronic obstructive pulmonary disease, unspecified COPD type (CMS-HCC)   [2287788]       Hypoxia   [504307]       Acute hypoxemic respiratory failure (CMS-HCC)   [0618635]         Vital Signs     Blood Pressure Pulse Temperature Respirations Height Weight    112/70 mmHg 64 36.4 °C (97.5 °F) 16 1.626 m (5' 4.02\") 111.131 kg (245 lb)    Body Mass Index Oxygen Saturation Smoking Status             42.03 kg/m2 97% Former Smoker         Basic Information     Date Of Birth Sex Race Ethnicity Preferred Language    1940 Female White Non- English      Your appointments     2017 11:00 AM   PT-HH-HOME VISIT with ZENON Salazar [facility] (--)    [address]  [place] [address]   [phone]            2017 To Be Determined   SN-HH-HOME VISIT with AYALA Martinez [facility] (--)    [address]  [place] [address]   [phone]            2017 To Be Determined   SN-HH-HOME VISIT with AYALA [facility] (--)    [address]  [place] [address]   [phone]            2017 To Be Determined   PT-HH-HOME VISIT with [facility] (--)    [address]  [place] [address]   [phone]            2017 To Be Determined   SN-HH-HOME VISIT with AYALA [facility] (--)    [address]  [place] [address]   [phone]            2017 To Be Determined   ST-RD-ADAZXMBTEU DISCHARGE with Eva Truong P.T.   [facility] (--)    [address]  [place] [address]   [phone]            Jul 11, 2017 To Be Determined   SN-HH-HOME VISIT with Erendira Orozco R.N.   [facility] (--)    [address]  [place] [address]   [phone]            Jul 18, 2017 To Be Determined   SN-HH-HOME VISIT with [place] [facility] (--)    [address]  [place] [address]   [phone]            Jul 25, 2017 To Be Determined   SN-HH-HOME VISIT with Erendira Orozco R.N.   [facility] (--)    [address]  [place] [address]   [phone]            Aug 01, 2017 To Be Determined   SN-HH-HOME VISIT with Erendira Orozco R.N.   [facility] (--)    [address]  [place] [address]   [phone]            Aug 04, 2017  2:30 PM   Pulmonary Function Test with PFT-RM2   [facility] (--)    [address]  [place] [address]   [phone]            Aug 04, 2017  3:00 PM   Pulmonary Function Test with PFT-RM2   [facility] (--)    [address]  [place] [address]   [phone]            Aug 04, 2017  4:00 PM   Established Patient Pul with JEET Mendoza   [facility] (--)    [address]  [place] [address]   [phone]              Problem List              ICD-10-CM Priority Class Noted - Resolved    Left bundle branch block I44.7   6/3/2017 - Present    UTI (urinary tract infection) N39.0 High  6/3/2017 - Present    Anemia D64.9   6/3/2017 - Present    CKD (chronic kidney disease), stage III N18.3   6/3/2017 - Present    COPD exacerbation (CMS-HCC) J44.1 High  6/3/2017 - Present    Acute hypoxemic respiratory failure (CMS-HCC) J96.01 High  6/3/2017 - Present    HTN (hypertension) I10   6/3/2017 - Present    DM2 (diabetes mellitus, type 2) (CMS-HCC) E11.9   6/3/2017 - Present    Hypoxia R09.02   6/20/2017 - Present    Chronic obstructive pulmonary disease (CMS-HCC) J44.9   6/20/2017 - Present         Health Maintenance        Date Due Completion Dates    DIABETES MONOFILAMENT / LE EXAM 6/11/1941 ---    RETINAL SCREENING 12/11/1959 ---    IMM DTaP/Tdap/Td Vaccine (1 - Tdap) 12/11/1960 ---    PAP SMEAR 12/11/1962 ---    COLONOSCOPY 12/11/1991 ---    IMM ZOSTER VACCINE 12/11/2001 ---    IMM PNEUMOCOCCAL 65+ (ADULT) LOW/MEDIUM RISK SERIES (1 of 2 - PCV13) 12/11/2006 ---    MAMMOGRAM 11/3/2016 11/3/2015, 5/15/2013, 4/21/2010, 4/21/2010, 4/2/2009, 4/2/2009, 9/25/2007, 9/25/2007    A1C SCREENING 12/15/2017 6/15/2017, 5/12/2017, 10/21/2016, 4/8/2016, 3/27/2015, 8/22/2014    FASTING LIPID PROFILE 5/12/2018 5/12/2017, 10/21/2016, 4/8/2016, 9/18/2015, 8/22/2014    URINE ACR / MICROALBUMIN 5/12/2018 5/12/2017, 10/21/2016, 4/8/2016, 9/18/2015, 3/27/2015, 8/22/2014    SERUM CREATININE 6/15/2018 6/15/2017, 6/7/2017, 6/6/2017, 6/4/2017, 6/3/2017, 5/12/2017, 10/21/2016, 4/8/2016, 9/18/2015, 3/27/2015, 8/22/2014    BONE DENSITY 9/12/2019 9/12/2014, 4/21/2010            Current Immunizations     Influenza TIV (IM) 10/27/2016      Below and/or attached are the medications your provider expects you to take. Review all of your home medications and newly ordered medications with your provider and/or pharmacist. Follow medication instructions as directed by your provider and/or pharmacist. Please keep your medication list with you and share with your provider. Update the information when medications are discontinued, doses are changed, or new medications (including over-the-counter products) are added; and carry medication information at all times in the event of emergency situations     Allergies:  ZITHROMAX - Diarrhea               Medications  Valid as of: June 20, 2017 -  2:31 PM    Generic Name Brand Name Tablet Size Instructions for use    Acetaminophen (Tab CR) TYLENOL 650 MG Take 650-1,300 mg by mouth every 6 hours as needed for Moderate Pain.        Albuterol Sulfate (Aero Soln) albuterol 108 (90 BASE) MCG/ACT Inhale 2 Puffs  by mouth every four hours as needed for Shortness of Breath.        Aspirin (Tab) aspirin 81 MG Take 81 mg by mouth every day. needs to p/u at store        Atenolol (Tab) TENORMIN 50 MG Take 50 mg by mouth every day.        Blood Glucose Monitoring Suppl (Device) FREESTYLE LITE          Calcium Carbonate-Vit D-Min   Take  by mouth.        Cholecalciferol (Tab) vitamin D 2000 UNIT Take 5,000 Units by mouth every day at 6 PM.        Cyanocobalamin (Tab) Vitamin B12 100 MCG Take 1 Tab by mouth every day at 6 PM.        Fluticasone Furoate-Vilanterol (AEROSOL POWDER, BREATH ACTIVATED) Fluticasone Furoate-Vilanterol 200-25 MCG/INH Inhale 1 Puff by mouth every day at 6 PM.        Glucose Blood (Strip) FREESTYLE LITE          HydroCHLOROthiazide (Tab) HYDRODIURIL 25 MG Take 25 mg by mouth every day.        Ipratropium-Albuterol (Solution) DUONEB 0.5-2.5 (3) MG/3ML 3 mL by Nebulization route every four hours as needed for Shortness of Breath.        LevoFLOXacin (Tab) LEVAQUIN 500 MG Take 1 Tab by mouth every day.        Levothyroxine Sodium (Tab) SYNTHROID 125 MCG         Lisinopril (Tab) PRINIVIL 20 MG Take 1 Tab by mouth every day.        Lisinopril (Tab) PRINIVIL, ZESTRIL 40 MG         Lisinopril (Tab) PRINIVIL 20 MG         MetFORMIN HCl (TABLET SR 24 HR) GLUCOPHAGE  MG Take 1 Tab by mouth every day.        Multiple Vitamins-Minerals   Take  by mouth.        non-formulary med non-formulary med  Inhale 2 L by mouth Continuous. Oxygen 2L continuous via nasal cannula  Indications: COPD, SOB        Nystatin (Ointment) MYCOSTATIN 834345 UNIT/GM Apply 100,000 Units to affected area(s) 2 times a day as needed (rash).        Omega-3 Fatty Acids (Cap) fish oil 1000 MG Take 1,000 mg by mouth every day.        Probiotic Product (Cap) TRUBIOTICS  Take 1 Capsule by mouth every day at 6 PM.        Promethazine-Codeine (Syrup) PHENERGAN-CODEINE 6.25-10 MG/5ML Take 5 mL by mouth 4 times a day as needed for Cough.         Simvastatin (Tab) ZOCOR 10 MG Take 10 mg by mouth every evening.        Tiotropium Bromide Monohydrate   Inhale  by mouth.        Tiotropium Bromide Monohydrate (Aero Soln) Tiotropium Bromide Monohydrate 2.5 MCG/ACT Inhale 1 Puff by mouth every day at 6 PM.        .                 Medicines prescribed today were sent to:     [facility] #[id number] - [place] - [address]    [address] [place] [address]    Phone: [phone] Fax: [phone]    Open 24 Hours?: No      Medication refill instructions:       If your prescription bottle indicates you have medication refills left, it is not necessary to call your provider’s office. Please contact your pharmacy and they will refill your medication.    If your prescription bottle indicates you do not have any refills left, you may request refills at any time through one of the following ways: The online [employer] system (except Urgent Care), by calling your provider’s office, or by asking your pharmacy to contact your provider’s office with a refill request. Medication refills are processed only during regular business hours and may not be available until the next business day. Your provider may request additional information or to have a follow-up visit with you prior to refilling your medication.   *Please Note: Medication refills are assigned a new Rx number when refilled electronically. Your pharmacy may indicate that no refills were authorized even though a new prescription for the same medication is available at the pharmacy. Please request the medicine by name with the pharmacy before contacting your provider for a refill.        Your To Do List     Future Labs/Procedures Complete By Expires    AMB PULMONARY FUNCTION TEST/LAB  As directed 6/20/2018    AMB PULMONARY STRESS TESTING (6 MIN WALK)  As directed 6/20/2018      Instructions    This lady was hospitalized in early June 2017 with COPD exacerbation. Persistent hypoxemia resulted in a perfusion lung scan that  showed COPD type pattern. 2 packs per day for 46 years has produced significant emphysema. She has not smoked for 10 years. She understands personally of breathing. Physical therapy worked with her at home, rapid desaturation on oxygen has occurred. I've asked her to boost her home oxygen to 4 L with activity and exercise. She has had flu vaccine, apparently got pneumonia shots from her primary care but we cannot verify that as yet. We are obtaining those records.    She currently needs oxygen 24 hours per day, a home oxygen test is pending and I have asked her to do this on oxygen as uncertain that she needs oxygen both nighttime and exercise with rapid desaturation documented. At the follow-up visit we will obtain PFTs, do a 6 minute walk test, assess her for pulmonary rehabilitation, try to obtain the prior pneumonia vaccine records to know if she needs anything else. She lives with her daughter who is very supportive, all of this is reviewed. Medically significant excessive weight contributes to supine hypoxemia, her BMI is 42, I explained that gentle exercise and portion control would be beneficial.    She will continue Spiriva, Watt, Ventolin, has not needed the DuoNeb nebulizer, and then follow-up here with PFTs walk test the pneumonia records and assess for pulmonary rehabilitation as well as review the overnight oximetry on oxygen.          [employer] Access Code: Activation code not generated  Current [employer] Status: Active

## 2017-06-20 NOTE — PATIENT INSTRUCTIONS
This lady was hospitalized in early June 2017 with COPD exacerbation. Persistent hypoxemia resulted in a perfusion lung scan that showed COPD type pattern. 2 packs per day for 46 years has produced significant emphysema. She has not smoked for 10 years. She understands personally of breathing. Physical therapy worked with her at home, rapid desaturation on oxygen has occurred. I've asked her to boost her home oxygen to 4 L with activity and exercise. She has had flu vaccine, apparently got pneumonia shots from her primary care but we cannot verify that as yet. We are obtaining those records.    She currently needs oxygen 24 hours per day, a home oxygen test is pending and I have asked her to do this on oxygen as uncertain that she needs oxygen both nighttime and exercise with rapid desaturation documented. At the follow-up visit we will obtain PFTs, do a 6 minute walk test, assess her for pulmonary rehabilitation, try to obtain the prior pneumonia vaccine records to know if she needs anything else. She lives with her daughter who is very supportive, all of this is reviewed. Medically significant excessive weight contributes to supine hypoxemia, her BMI is 42, I explained that gentle exercise and portion control would be beneficial.    She will continue Spiriva, Watt, Ventolin, has not needed the DuoNeb nebulizer, and then follow-up here with PFTs walk test the pneumonia records and assess for pulmonary rehabilitation as well as review the overnight oximetry on oxygen.    Addendum to dictation, we obtained the records from her primary care doctor, Pneumovax 23 was administered in April 2015, and Prevnar 13 was administered in 2016.

## 2017-06-22 ENCOUNTER — HOME CARE VISIT (OUTPATIENT)
Dept: HOME HEALTH SERVICES | Facility: HOME HEALTHCARE | Age: 77
End: 2017-06-22
Payer: MEDICARE

## 2017-06-22 VITALS
SYSTOLIC BLOOD PRESSURE: 100 MMHG | DIASTOLIC BLOOD PRESSURE: 60 MMHG | HEART RATE: 60 BPM | TEMPERATURE: 98.7 F | RESPIRATION RATE: 16 BRPM

## 2017-06-22 PROCEDURE — G0151 HHCP-SERV OF PT,EA 15 MIN: HCPCS

## 2017-06-22 ASSESSMENT — ENCOUNTER SYMPTOMS: SEVERE DYSPNEA: 1

## 2017-06-23 ENCOUNTER — HOME CARE VISIT (OUTPATIENT)
Dept: HOME HEALTH SERVICES | Facility: HOME HEALTHCARE | Age: 77
End: 2017-06-23
Payer: MEDICARE

## 2017-06-26 ENCOUNTER — TELEPHONE (OUTPATIENT)
Dept: PULMONOLOGY | Facility: HOSPICE | Age: 77
End: 2017-06-26

## 2017-06-26 DIAGNOSIS — R06.00 DYSPNEA, UNSPECIFIED TYPE: ICD-10-CM

## 2017-06-27 ENCOUNTER — HOME CARE VISIT (OUTPATIENT)
Dept: HOME HEALTH SERVICES | Facility: HOME HEALTHCARE | Age: 77
End: 2017-06-27
Payer: MEDICARE

## 2017-06-27 VITALS
TEMPERATURE: 98.5 F | HEART RATE: 68 BPM | RESPIRATION RATE: 18 BRPM | DIASTOLIC BLOOD PRESSURE: 50 MMHG | SYSTOLIC BLOOD PRESSURE: 110 MMHG

## 2017-06-27 VITALS
BODY MASS INDEX: 42.21 KG/M2 | RESPIRATION RATE: 18 BRPM | WEIGHT: 246 LBS | TEMPERATURE: 98.4 F | HEART RATE: 64 BPM | SYSTOLIC BLOOD PRESSURE: 134 MMHG | DIASTOLIC BLOOD PRESSURE: 62 MMHG

## 2017-06-27 PROCEDURE — G0151 HHCP-SERV OF PT,EA 15 MIN: HCPCS

## 2017-06-27 PROCEDURE — G0162 HHC RN E&M PLAN SVS, 15 MIN: HCPCS

## 2017-06-27 SDOH — ECONOMIC STABILITY: HOUSING INSECURITY: UNSAFE COOKING RANGE AREA: 0

## 2017-06-27 SDOH — ECONOMIC STABILITY: HOUSING INSECURITY: UNSAFE APPLIANCES: 0

## 2017-06-27 ASSESSMENT — ACTIVITIES OF DAILY LIVING (ADL)
BATHING_ASSISTANCE: 0
DRESSING_LB_ASSISTANCE: 0
HOUSEKEEPING_ASSISTANCE: 0
GROOMING_ASSISTANCE: 0
EATING_ASSISTANCE: 0
DRESSING_UB_ASSISTANCE: 0
SHOPPING_ASSISTANCE: 6
LAUNDRY_ASSISTANCE: 0
TELEPHONE_ASSISTANCE: 0
MEAL_PREP_ASSISTANCE: 0
TRANSPORTATION_ASSISTANCE: 6
ORAL_CARE_ASSISTANCE: 0
TOILETING_ASSISTANCE: 0

## 2017-06-27 ASSESSMENT — ENCOUNTER SYMPTOMS
SEVERE DYSPNEA: 1
VOMITING: DENIES
NAUSEA: DENIES

## 2017-06-28 ENCOUNTER — TELEPHONE (OUTPATIENT)
Dept: PULMONOLOGY | Facility: HOSPICE | Age: 77
End: 2017-06-28

## 2017-06-28 NOTE — TELEPHONE ENCOUNTER
Pts daughter called regarding her mother. She is currently in the donut hole and unable to afford meds/ can we give samples

## 2017-07-05 ENCOUNTER — HOME CARE VISIT (OUTPATIENT)
Dept: HOME HEALTH SERVICES | Facility: HOME HEALTHCARE | Age: 77
End: 2017-07-05
Payer: MEDICARE

## 2017-07-05 VITALS
RESPIRATION RATE: 18 BRPM | DIASTOLIC BLOOD PRESSURE: 70 MMHG | HEART RATE: 73 BPM | SYSTOLIC BLOOD PRESSURE: 122 MMHG | TEMPERATURE: 97.3 F

## 2017-07-05 PROCEDURE — G0162 HHC RN E&M PLAN SVS, 15 MIN: HCPCS

## 2017-07-05 SDOH — ECONOMIC STABILITY: HOUSING INSECURITY: UNSAFE APPLIANCES: 0

## 2017-07-05 SDOH — ECONOMIC STABILITY: HOUSING INSECURITY: UNSAFE COOKING RANGE AREA: 0

## 2017-07-05 ASSESSMENT — ACTIVITIES OF DAILY LIVING (ADL)
TELEPHONE_ASSISTANCE: 6
BATHING_ASSISTANCE: 0
MEAL_PREP_ASSISTANCE: 6
HOUSEKEEPING_ASSISTANCE: 6
LAUNDRY_ASSISTANCE: 6
TOILETING_ASSISTANCE: 0
ORAL_CARE_ASSISTANCE: 0
TRANSPORTATION_ASSISTANCE: 6
GROOMING_ASSISTANCE: 0
SHOPPING_ASSISTANCE: 6
DRESSING_LB_ASSISTANCE: 0
EATING_ASSISTANCE: 0
DRESSING_UB_ASSISTANCE: 0

## 2017-07-05 ASSESSMENT — ENCOUNTER SYMPTOMS
NAUSEA: DENIES
VOMITING: DENIES
RESPIRATORY SYMPTOMS COMMENTS: RESPIRATIONS ARE EVEN AND UNLABORED WITH NO SOB NOTED
SEVERE DYSPNEA: 1

## 2017-07-06 ENCOUNTER — HOME CARE VISIT (OUTPATIENT)
Dept: HOME HEALTH SERVICES | Facility: HOME HEALTHCARE | Age: 77
End: 2017-07-06
Payer: MEDICARE

## 2017-07-06 VITALS
SYSTOLIC BLOOD PRESSURE: 124 MMHG | HEART RATE: 70 BPM | RESPIRATION RATE: 16 BRPM | DIASTOLIC BLOOD PRESSURE: 50 MMHG | TEMPERATURE: 97.4 F

## 2017-07-06 PROCEDURE — G0151 HHCP-SERV OF PT,EA 15 MIN: HCPCS

## 2017-07-08 ENCOUNTER — PATIENT OUTREACH (OUTPATIENT)
Dept: HEALTH INFORMATION MANAGEMENT | Facility: OTHER | Age: 77
End: 2017-07-08

## 2017-07-08 NOTE — PROGRESS NOTES
Camille Rodriguez admitted on 6/2/2017 to [facility] and discharged home on 6/7/2017 with a diagnosis of Sepsis, acute exacerbation of COPD and UTI. The patient as discharged to follow up with Dr. Mirella Mistry, Family Medicine, on 6/12/2017. The Patient kept the appointment. The patient was also discharged with nebulizer equipment and O2 through [facility] through [facility].  Home Health has continued to visit patient once a week. Camille picked up all medications the day after discharge and has transportation assistance in place.   • Dr. Mirella Mistry, Family Medicine, 6/12/2017 at 10:00am - Appointment Kept  • [facility], frequency once a week - Kept

## 2017-07-11 ENCOUNTER — HOME CARE VISIT (OUTPATIENT)
Dept: HOME HEALTH SERVICES | Facility: HOME HEALTHCARE | Age: 77
End: 2017-07-11
Payer: MEDICARE

## 2017-07-11 VITALS
SYSTOLIC BLOOD PRESSURE: 121 MMHG | RESPIRATION RATE: 18 BRPM | DIASTOLIC BLOOD PRESSURE: 85 MMHG | TEMPERATURE: 98.5 F | HEART RATE: 76 BPM

## 2017-07-11 PROCEDURE — G0299 HHS/HOSPICE OF RN EA 15 MIN: HCPCS

## 2017-07-11 SDOH — ECONOMIC STABILITY: HOUSING INSECURITY: UNSAFE APPLIANCES: 0

## 2017-07-11 SDOH — ECONOMIC STABILITY: HOUSING INSECURITY: UNSAFE COOKING RANGE AREA: 0

## 2017-07-11 ASSESSMENT — ACTIVITIES OF DAILY LIVING (ADL)
DRESSING_LB_ASSISTANCE: 0
GROOMING_ASSISTANCE: 0
TOILETING_ASSISTANCE: 0
MEAL_PREP_ASSISTANCE: 4
SHOPPING_ASSISTANCE: 6
TELEPHONE_ASSISTANCE: 0
LAUNDRY_ASSISTANCE: 4
HOUSEKEEPING_ASSISTANCE: 4
TRANSPORTATION_ASSISTANCE: 6
BATHING_ASSISTANCE: 0
EATING_ASSISTANCE: 0
DRESSING_UB_ASSISTANCE: 0
ORAL_CARE_ASSISTANCE: 0

## 2017-07-11 ASSESSMENT — ENCOUNTER SYMPTOMS
NAUSEA: DENIES
VOMITING: DENIES

## 2017-07-15 ENCOUNTER — HOME CARE VISIT (OUTPATIENT)
Dept: HOME HEALTH SERVICES | Facility: HOME HEALTHCARE | Age: 77
End: 2017-07-15
Payer: MEDICARE

## 2017-07-18 ENCOUNTER — HOME CARE VISIT (OUTPATIENT)
Dept: HOME HEALTH SERVICES | Facility: HOME HEALTHCARE | Age: 77
End: 2017-07-18
Payer: MEDICARE

## 2017-07-18 ENCOUNTER — TELEPHONE (OUTPATIENT)
Dept: PULMONOLOGY | Facility: HOSPICE | Age: 77
End: 2017-07-18

## 2017-07-18 VITALS
HEART RATE: 78 BPM | SYSTOLIC BLOOD PRESSURE: 151 MMHG | TEMPERATURE: 206.4 F | RESPIRATION RATE: 18 BRPM | DIASTOLIC BLOOD PRESSURE: 78 MMHG

## 2017-07-18 DIAGNOSIS — J44.9 CHRONIC OBSTRUCTIVE PULMONARY DISEASE, UNSPECIFIED COPD TYPE (HCC): ICD-10-CM

## 2017-07-18 PROCEDURE — G0299 HHS/HOSPICE OF RN EA 15 MIN: HCPCS

## 2017-07-18 SDOH — ECONOMIC STABILITY: HOUSING INSECURITY
HOME SAFETY: ON EACH LEVEL OF THE HOME. PATIENT DOES HAVE A FIRE ESCAPE PLAN DEVELOPED. PATIENT DOES HAVE FLAMMABLE MATERIALS PRESENT IN THE HOME PRESENTING A FIRE HAZARD. NO EVIDENCE FOUND OF SMOKING MATERIALS PRESENT IN THE HOME.

## 2017-07-18 SDOH — ECONOMIC STABILITY: HOUSING INSECURITY
HOME SAFETY: OXYGEN SAFETY RISK ASSESSMENT PERFORMED. PATIENT PT WAS GIVEN A NO SMOKING SIGN AND PROVIDED EDUCATION ABOUT WHY IT IS IMPORTANT TO PLACE ONE. PATIENT DOES HAVE A WORKING FIRE EXTINGUISHER PRESENT IN THE HOME. SMOKE ALARMS ARE PRESENT AND FUNCTIONAL

## 2017-07-18 SDOH — ECONOMIC STABILITY: HOUSING INSECURITY: UNSAFE COOKING RANGE AREA: 0

## 2017-07-18 SDOH — ECONOMIC STABILITY: HOUSING INSECURITY: UNSAFE APPLIANCES: 0

## 2017-07-18 ASSESSMENT — ACTIVITIES OF DAILY LIVING (ADL)
EATING_ASSISTANCE: 0
HOUSEKEEPING_ASSISTANCE: 4
GROOMING_ASSISTANCE: 0
MEAL_PREP_ASSISTANCE: 4
TELEPHONE_ASSISTANCE: 0
DRESSING_UB_ASSISTANCE: 0
TOILETING_ASSISTANCE: 0
TRANSPORTATION_ASSISTANCE: 6
SHOPPING_ASSISTANCE: 6
ORAL_CARE_ASSISTANCE: 0
DRESSING_LB_ASSISTANCE: 0
LAUNDRY_ASSISTANCE: 4
BATHING_ASSISTANCE: 4

## 2017-07-18 ASSESSMENT — SOCIAL DETERMINANTS OF HEALTH (SDOH): IS CONCERNED ABOUT INCOME: N

## 2017-07-18 ASSESSMENT — ENCOUNTER SYMPTOMS
NAUSEA: DENIES
VOMITING: DENIES

## 2017-07-21 ENCOUNTER — OFFICE VISIT (OUTPATIENT)
Dept: CARDIOLOGY | Facility: MEDICAL CENTER | Age: 77
End: 2017-07-21
Payer: MEDICARE

## 2017-07-21 VITALS
HEART RATE: 72 BPM | DIASTOLIC BLOOD PRESSURE: 74 MMHG | HEIGHT: 64 IN | BODY MASS INDEX: 42 KG/M2 | SYSTOLIC BLOOD PRESSURE: 132 MMHG | OXYGEN SATURATION: 94 % | WEIGHT: 246 LBS

## 2017-07-21 DIAGNOSIS — J44.9 CHRONIC OBSTRUCTIVE PULMONARY DISEASE, UNSPECIFIED COPD TYPE (HCC): ICD-10-CM

## 2017-07-21 DIAGNOSIS — J44.1 COPD EXACERBATION (HCC): ICD-10-CM

## 2017-07-21 DIAGNOSIS — I44.7 LEFT BUNDLE BRANCH BLOCK: Primary | ICD-10-CM

## 2017-07-21 DIAGNOSIS — I10 ESSENTIAL HYPERTENSION: ICD-10-CM

## 2017-07-21 PROCEDURE — 99204 OFFICE O/P NEW MOD 45 MIN: CPT | Performed by: INTERNAL MEDICINE

## 2017-07-21 NOTE — PROGRESS NOTES
Arrhythmia Clinic Note (New patient)     DOS: 7/21/2017    Referring physician: Mirella Mistry    Chief complaint/Reason for consult: LBBB    HPI:  Patient is a 76 WF with history of recent admission for UTI found to have COPD exacerbation, chronic hypoxia, and LBBB of unknown duration. She was referred to me for LBBB. Echo was done in the hospital showing no significant abnormalities. She denies chest pain or any sx of anginal equivalents. She does have some dyspnea she appropriately attributes to her COPD from 45+ pack years of smoking.    ROS (+ highlighted in red):  Constitutional: Fevers/chills/fatigue/weightloss  HEENT: Blurry vision/eye pain/sore throat/hearing loss  Respiratory: Shortness of breath/cough  Cardiovascular: Chest pain/palpitations/edema/orthopnea/syncope  GI: Nausea/vomitting/diarrhea  MSK: Arthralgias/myagias/muscle weakness  Skin: Rash/sores  Neurological: Numbness/tremors/vertigo  Endocrine: Excessive thirst/polyuria/cold intolerance/heat intolerance  Psych: Depression/anxiety    Past Medical History   Diagnosis Date   • Clotting disorder (CMS-HCC)    • Diabetes    • Chronic airway obstruction, not elsewhere classified    • Chickenpox    • Israeli measles    • Mumps        Past Surgical History   Procedure Laterality Date   • Pr remv 2nd cataract,corn-scler sectn         Social History     Social History   • Marital Status:      Spouse Name: N/A   • Number of Children: N/A   • Years of Education: N/A     Occupational History   • Not on file.     Social History Main Topics   • Smoking status: Former Smoker     Quit date: 02/05/2007   • Smokeless tobacco: Never Used      Comment: Quit 2007   • Alcohol Use: No   • Drug Use: No   • Sexual Activity: No     Other Topics Concern   • Not on file     Social History Narrative       Family History   Problem Relation Age of Onset   • Heart Attack Father        Allergies   Allergen Reactions   • Zithromax [Azithromycin] Diarrhea     Pt states severe  diarrhea       Current Outpatient Prescriptions   Medication Sig Dispense Refill   • sitagliptan-metformin (JANUMET)  MG per tablet Take 1 Tab by mouth 2 times a day, with meals.     • Blood Glucose Monitoring Suppl (FREESTYLE LITE) Device      • FREESTYLE LITE strip      • levothyroxine (SYNTHROID) 125 MCG Tab      • Tiotropium Bromide Monohydrate (SPIRIVA RESPIMAT) 2.5 MCG/ACT Aero Soln Inhale 1 Puff by mouth every day at 6 PM.     • Fluticasone Furoate-Vilanterol (BREO ELLIPTA) 200-25 MCG/INH AEROSOL POWDER, BREATH ACTIVATED Inhale 1 Puff by mouth every day at 6 PM.     • Probiotic Product (TRUBIOTICS) Cap Take 1 Capsule by mouth every day at 6 PM.     • acetaminophen (EQ ARTHRITIS PAIN) 650 MG CR tablet Take 650-1,300 mg by mouth every 6 hours as needed for Moderate Pain.     • nystatin (MYCOSTATIN) 359213 UNIT/GM Ointment Apply 100,000 Units to affected area(s) 2 times a day as needed (rash).     • non-formulary med Inhale 2 L by mouth Continuous. Oxygen 2L continuous via nasal cannula  Indications: COPD, SOB     • albuterol 108 (90 BASE) MCG/ACT Aero Soln inhalation aerosol Inhale 2 Puffs by mouth every four hours as needed for Shortness of Breath. 8.5 g 0   • ipratropium-albuterol (DUONEB) 0.5-2.5 (3) MG/3ML nebulizer solution 3 mL by Nebulization route every four hours as needed for Shortness of Breath. (Patient taking differently: 3 mL by Nebulization route 1 time daily as needed. Indications: Spasm of Lung Air Passages) 300 mL 0   • lisinopril (PRINIVIL) 20 MG Tab Take 1 Tab by mouth every day. 30 Tab 0   • hydrochlorothiazide (HYDRODIURIL) 25 MG Tab Take 25 mg by mouth every day.     • Omega-3 Fatty Acids (FISH OIL) 1000 MG Cap capsule Take 1,000 mg by mouth every day.     • atenolol (TENORMIN) 50 MG TABS Take 50 mg by mouth every day.     • simvastatin (ZOCOR) 10 MG TABS Take 10 mg by mouth every evening.     • Calcium Carbonate-Vit D-Min (CALCIUM 1200 PO) Take  by mouth.     • Cholecalciferol  "(VITAMIN D) 2000 UNIT TABS Take 5,000 Units by mouth every day at 6 PM.     • Multiple Vitamin (MULTIVITAMIN PO) Take  by mouth.     • aspirin 81 MG tablet Take 81 mg by mouth every day. needs to p/u at store     • sitagliptan-metformin (JANUMET)  MG per tablet Take 1 Tab by mouth 2 times a day, with meals. Indications: Type 2 Diabetes     • lisinopril (PRINIVIL, ZESTRIL) 40 MG tablet      • lisinopril (PRINIVIL) 20 MG Tab      • Cyanocobalamin (VITAMIN B12) 100 MCG Tab Take 1 Tab by mouth every day at 6 PM.     • levofloxacin (LEVAQUIN) 500 MG tablet Take 1 Tab by mouth every day. (Patient not taking: Reported on 6/20/2017) 3 Tab 0   • metformin ER (GLUCOPHAGE XR) 500 MG TABLET SR 24 HR Take 1 Tab by mouth every day. 30 Tab 0   • promethazine-codeine (PHENERGAN-CODEINE) 6.25-10 MG/5ML SYRP Take 5 mL by mouth 4 times a day as needed for Cough. (Patient not taking: Reported on 6/8/2017) 120 mL 0   • Tiotropium Bromide Monohydrate (SPIRIVA HANDIHALER INH) Inhale  by mouth.       No current facility-administered medications for this visit.       Physical Exam:  Filed Vitals:    07/21/17 0926   BP: 132/74   Pulse: 72   Height: 1.626 m (5' 4.02\")   Weight: 111.585 kg (246 lb)   SpO2: 94%     General appearance: NAD, conversant   Eyes: anicteric sclerae, moist conjunctivae; no lid-lag; PERRLA  HENT: Atraumatic; oropharynx clear with moist mucous membranes and no mucosal ulcerations; normal hard and soft palate  Neck: Trachea midline; FROM, supple, no thyromegaly or lymphadenopathy  Lungs: CTA, with normal respiratory effort and no intercostal retractions  CV: RRR, no MRGs, no JVD   Abdomen: Soft, non-tender; no masses or HSM  Extremities: No peripheral edema or extremity lymphadenopathy  Skin: Normal temperature, turgor and texture; no rash, ulcers or subcutaneous nodules  Psych: Appropriate affect, alert and oriented to person, place and time    Data:  Labs reviewed    Prior echo/stress reviewed:  Normal " Echo    EKG interpreted by me:  Sinus LBBB    Impression/Plan:  1. Left bundle branch block     2. COPD exacerbation (CMS-[facility])     3. Chronic obstructive pulmonary disease, unspecified COPD type (CMS-[facility])     4. Essential hypertension       -She has asymptomatic LBBB  -Normal echo with no evidence of structural heart disease  -I reassured her that this was not concerning and in the absence of symptoms of obstructive CAD merits no further w/u  -She should be on a daily ASA and statin anyways given her underlying diabetes  -She can f/u PRN    All Kinney MD

## 2017-07-21 NOTE — MR AVS SNAPSHOT
"Camille Rodriguez   2017 9:40 AM   Office Visit   MRN: [id number]    Department:  [facility]   Dept Phone:  [phone]    Description:  Female : 1940   Provider:  All Kinney M.D.           Reason for Visit     New Patient           Allergies as of 2017     Allergen Noted Reactions    Zithromax [Azithromycin] 2017   Diarrhea    Pt states severe diarrhea      Vital Signs     Blood Pressure Pulse Height Weight Body Mass Index Oxygen Saturation    132/74 mmHg 72 1.626 m (5' 4.02\") 111.585 kg (246 lb) 42.21 kg/m2 94%    Smoking Status                   Former Smoker           Basic Information     Date Of Birth Sex Race Ethnicity Preferred Language    1940 Female White Non- English      Your appointments     2017 To Be Determined   SN-HH-OASIS DISCHARGE with Erendira Orozco R.N.   [facility] (--)    [facility]  [place] [address]   [phone]            Aug 16, 2017  8:30 AM   Pulmonary Function Test with SPIROMETRY/6MW   [facility] (--)    [address]  [address]  [place] [address]   [phone]            Aug 16, 2017  9:00 AM   Pulmonary Function Test with PFT-RM3   [facility] (--)    [address]  [address]  [place] [address]   [phone]            Aug 16, 2017 10:00 AM   Established Patient Pul with JEET Mendoza   [facility] (--)    [address]  [address]  [place] [address]   [phone]              Problem List              ICD-10-CM Priority Class Noted - Resolved    Left bundle branch block I44.7   6/3/2017 - Present    UTI (urinary tract infection) N39.0 High  6/3/2017 - Present    Anemia D64.9   6/3/2017 - Present    CKD (chronic kidney disease), stage III N18.3   6/3/2017 - Present    COPD exacerbation (CMS-HCC) J44.1 High  6/3/2017 - Present    Acute hypoxemic respiratory failure (CMS-[facility]) J96.01 High  6/3/2017 - Present    HTN " (hypertension) I10   6/3/2017 - Present    DM2 (diabetes mellitus, type 2) (CMS-HCC) E11.9   6/3/2017 - Present    Hypoxia R09.02   6/20/2017 - Present    Chronic obstructive pulmonary disease (CMS-HCC) J44.9   6/20/2017 - Present      Health Maintenance        Date Due Completion Dates    DIABETES MONOFILAMENT / LE EXAM 6/11/1942 ---    RETINAL SCREENING 12/11/1959 ---    IMM DTaP/Tdap/Td Vaccine (1 - Tdap) 12/11/1960 ---    PAP SMEAR 12/11/1962 ---    COLONOSCOPY 12/11/1991 ---    MAMMOGRAM 11/3/2016 11/3/2015, 5/15/2013, 4/21/2010, 4/21/2010, 4/2/2009, 4/2/2009, 9/25/2007, 9/25/2007    IMM PNEUMOCOCCAL 65+ (ADULT) LOW/MEDIUM RISK SERIES (2 of 2 - PPSV23) 4/19/2017 4/19/2016    IMM INFLUENZA (1) 9/1/2017 10/27/2016    A1C SCREENING 12/15/2017 6/15/2017, 5/12/2017, 10/21/2016, 4/8/2016, 3/27/2015, 8/22/2014    FASTING LIPID PROFILE 5/12/2018 5/12/2017, 10/21/2016, 4/8/2016, 9/18/2015, 8/22/2014    URINE ACR / MICROALBUMIN 5/12/2018 5/12/2017, 10/21/2016, 4/8/2016, 9/18/2015, 3/27/2015, 8/22/2014    SERUM CREATININE 6/15/2018 6/15/2017, 6/7/2017, 6/6/2017, 6/4/2017, 6/3/2017, 5/12/2017, 10/21/2016, 4/8/2016, 9/18/2015, 3/27/2015, 8/22/2014    BONE DENSITY 9/12/2019 9/12/2014, 4/21/2010            Current Immunizations     13-VALENT PCV PREVNAR 4/19/2016    Influenza TIV (IM) 10/27/2016    Pneumococcal Vaccine (PCV7) Historical Data 4/7/2015    SHINGLES VACCINE 4/1/2015      Below and/or attached are the medications your provider expects you to take. Review all of your home medications and newly ordered medications with your provider and/or pharmacist. Follow medication instructions as directed by your provider and/or pharmacist. Please keep your medication list with you and share with your provider. Update the information when medications are discontinued, doses are changed, or new medications (including over-the-counter products) are added; and carry medication information at all times in the event of emergency  situations     Allergies:  ZITHROMAX - Diarrhea               Medications  Valid as of: July 21, 2017 -  9:53 AM    Generic Name Brand Name Tablet Size Instructions for use    Acetaminophen (Tab CR) TYLENOL 650 MG Take 650-1,300 mg by mouth every 6 hours as needed for Moderate Pain.        Albuterol Sulfate (Aero Soln) albuterol 108 (90 BASE) MCG/ACT Inhale 2 Puffs by mouth every four hours as needed for Shortness of Breath.        Aspirin (Tab) aspirin 81 MG Take 81 mg by mouth every day. needs to p/u at store        Atenolol (Tab) TENORMIN 50 MG Take 50 mg by mouth every day.        Blood Glucose Monitoring Suppl (Device) FREESTYLE LITE          Calcium Carbonate-Vit D-Min   Take  by mouth.        Cholecalciferol (Tab) vitamin D 2000 UNIT Take 5,000 Units by mouth every day at 6 PM.        Cyanocobalamin (Tab) Vitamin B12 100 MCG Take 1 Tab by mouth every day at 6 PM.        Fluticasone Furoate-Vilanterol (AEROSOL POWDER, BREATH ACTIVATED) Fluticasone Furoate-Vilanterol 200-25 MCG/INH Inhale 1 Puff by mouth every day at 6 PM.        Glucose Blood (Strip) FREESTYLE LITE          HydroCHLOROthiazide (Tab) HYDRODIURIL 25 MG Take 25 mg by mouth every day.        Ipratropium-Albuterol (Solution) DUONEB 0.5-2.5 (3) MG/3ML 3 mL by Nebulization route every four hours as needed for Shortness of Breath.        LevoFLOXacin (Tab) LEVAQUIN 500 MG Take 1 Tab by mouth every day.        Levothyroxine Sodium (Tab) SYNTHROID 125 MCG         Lisinopril (Tab) PRINIVIL 20 MG Take 1 Tab by mouth every day.        Lisinopril (Tab) PRINIVIL, ZESTRIL 40 MG         Lisinopril (Tab) PRINIVIL 20 MG         MetFORMIN HCl (TABLET SR 24 HR) GLUCOPHAGE  MG Take 1 Tab by mouth every day.        Multiple Vitamins-Minerals   Take  by mouth.        non-formulary med non-formulary med  Inhale 2 L by mouth Continuous. Oxygen 2L continuous via nasal cannula  Indications: COPD, SOB        Nystatin (Ointment) MYCOSTATIN 299581 UNIT/GM Apply  100,000 Units to affected area(s) 2 times a day as needed (rash).        Omega-3 Fatty Acids (Cap) fish oil 1000 MG Take 1,000 mg by mouth every day.        Probiotic Product (Cap) TRUBIOTICS  Take 1 Capsule by mouth every day at 6 PM.        Promethazine-Codeine (Syrup) PHENERGAN-CODEINE 6.25-10 MG/5ML Take 5 mL by mouth 4 times a day as needed for Cough.        Simvastatin (Tab) ZOCOR 10 MG Take 10 mg by mouth every evening.        SITagliptin-MetFORMIN HCl (Tab) JANUMET  MG Take 1 Tab by mouth 2 times a day, with meals.        SITagliptin-MetFORMIN HCl (Tab) JANUMET  MG Take 1 Tab by mouth 2 times a day, with meals. Indications: Type 2 Diabetes        Tiotropium Bromide Monohydrate   Inhale  by mouth.        Tiotropium Bromide Monohydrate (Aero Soln) Tiotropium Bromide Monohydrate 2.5 MCG/ACT Inhale 1 Puff by mouth every day at 6 PM.        .                 Medicines prescribed today were sent to:     [id number] - [place] - [address] [address]    Phone: [phone] Fax: [phone]    Open 24 Hours?: No      Medication refill instructions:       If your prescription bottle indicates you have medication refills left, it is not necessary to call your provider’s office. Please contact your pharmacy and they will refill your medication.    If your prescription bottle indicates you do not have any refills left, you may request refills at any time through one of the following ways: The online [employer] system (except Urgent Care), by calling your provider’s office, or by asking your pharmacy to contact your provider’s office with a refill request. Medication refills are processed only during regular business hours and may not be available until the next business day. Your provider may request additional information or to have a follow-up visit with you prior to refilling your medication.   *Please Note: Medication refills are assigned a new Rx number when refilled  electronically. Your pharmacy may indicate that no refills were authorized even though a new prescription for the same medication is available at the pharmacy. Please request the medicine by name with the pharmacy before contacting your provider for a refill.           [employer] Access Code: Activation code not generated  Current [employer] Status: Active

## 2017-07-25 ENCOUNTER — HOME CARE VISIT (OUTPATIENT)
Dept: HOME HEALTH SERVICES | Facility: HOME HEALTHCARE | Age: 77
End: 2017-07-25
Payer: MEDICARE

## 2017-07-25 VITALS
RESPIRATION RATE: 16 BRPM | SYSTOLIC BLOOD PRESSURE: 149 MMHG | HEART RATE: 75 BPM | DIASTOLIC BLOOD PRESSURE: 76 MMHG | TEMPERATURE: 97.5 F

## 2017-07-25 PROCEDURE — G0162 HHC RN E&M PLAN SVS, 15 MIN: HCPCS

## 2017-07-25 SDOH — ECONOMIC STABILITY: HOUSING INSECURITY: UNSAFE COOKING RANGE AREA: 0

## 2017-07-25 SDOH — ECONOMIC STABILITY: HOUSING INSECURITY: UNSAFE APPLIANCES: 0

## 2017-07-25 ASSESSMENT — ACTIVITIES OF DAILY LIVING (ADL)
OASIS_M1830: 00
HOME_HEALTH_OASIS: 00
HOME_HEALTH_OASIS: 01

## 2017-08-04 ENCOUNTER — PATIENT OUTREACH (OUTPATIENT)
Dept: HEALTH INFORMATION MANAGEMENT | Facility: OTHER | Age: 77
End: 2017-08-04

## 2017-08-04 NOTE — PROGRESS NOTES
Left VM message for patient requesting call back to discuss care coordination program. Patient was referred by [facility] upon their discharge.

## 2017-08-10 ENCOUNTER — PATIENT OUTREACH (OUTPATIENT)
Dept: HEALTH INFORMATION MANAGEMENT | Facility: OTHER | Age: 77
End: 2017-08-10

## 2017-08-10 NOTE — PROGRESS NOTES
Outreach call to introduce myself and the CC program. Patient was referred by [facility] upon patient's discharge from [facility].  Left message requesting call back.

## 2017-08-14 ENCOUNTER — PATIENT OUTREACH (OUTPATIENT)
Dept: HEALTH INFORMATION MANAGEMENT | Facility: OTHER | Age: 77
End: 2017-08-14

## 2017-08-14 NOTE — PROGRESS NOTES
Outreach call to patient upon referral from [facility]. This is the third outreach call. I left a message requesting call back. If no response by end of this week will send an information letter and stop phone calls to patient.

## 2017-08-16 ENCOUNTER — NON-PROVIDER VISIT (OUTPATIENT)
Dept: PULMONOLOGY | Facility: HOSPICE | Age: 77
End: 2017-08-16
Payer: MEDICARE

## 2017-08-16 ENCOUNTER — PATIENT OUTREACH (OUTPATIENT)
Dept: HEALTH INFORMATION MANAGEMENT | Facility: OTHER | Age: 77
End: 2017-08-16

## 2017-08-16 ENCOUNTER — OFFICE VISIT (OUTPATIENT)
Dept: PULMONOLOGY | Facility: HOSPICE | Age: 77
End: 2017-08-16
Payer: MEDICARE

## 2017-08-16 ENCOUNTER — NON-PROVIDER VISIT (OUTPATIENT)
Dept: PULMONOLOGY | Facility: HOSPICE | Age: 77
End: 2017-08-16
Attending: INTERNAL MEDICINE
Payer: MEDICARE

## 2017-08-16 VITALS
TEMPERATURE: 98.2 F | DIASTOLIC BLOOD PRESSURE: 74 MMHG | HEART RATE: 67 BPM | OXYGEN SATURATION: 95 % | SYSTOLIC BLOOD PRESSURE: 132 MMHG | HEIGHT: 64 IN

## 2017-08-16 DIAGNOSIS — J44.9 CHRONIC OBSTRUCTIVE PULMONARY DISEASE, UNSPECIFIED COPD TYPE (HCC): ICD-10-CM

## 2017-08-16 DIAGNOSIS — R06.00 DYSPNEA, UNSPECIFIED TYPE: ICD-10-CM

## 2017-08-16 PROCEDURE — 94620 AMB PULMONARY STRESS TESTING (6 MIN WALK): CPT | Performed by: INTERNAL MEDICINE

## 2017-08-16 PROCEDURE — 94729 DIFFUSING CAPACITY: CPT | Performed by: INTERNAL MEDICINE

## 2017-08-16 PROCEDURE — 99213 OFFICE O/P EST LOW 20 MIN: CPT | Performed by: NURSE PRACTITIONER

## 2017-08-16 PROCEDURE — 94726 PLETHYSMOGRAPHY LUNG VOLUMES: CPT | Performed by: INTERNAL MEDICINE

## 2017-08-16 PROCEDURE — 94060 EVALUATION OF WHEEZING: CPT | Performed by: INTERNAL MEDICINE

## 2017-08-16 ASSESSMENT — PULMONARY FUNCTION TESTS
FEV1: 1.51
FVC_PREDICTED: 2.79
FEV1_PREDICTED: 2.09
FEV1: 1.46
FEV1/FVC_PERCENT_PREDICTED: 101
FVC_PERCENT_PREDICTED: 71
FEV1/FVC_PERCENT_PREDICTED: 75
FEV1_PERCENT_PREDICTED: 69
FEV1/FVC_PERCENT_PREDICTED: 99
FVC: 2.01
FEV1_PERCENT_CHANGE: 3
FEV1_PERCENT_CHANGE: 2
FVC_PERCENT_PREDICTED: 70
FEV1/FVC_PERCENT_CHANGE: 150
FVC: 1.97
FEV1_PERCENT_PREDICTED: 72
FEV1/FVC: 75.12
FEV1/FVC: 74

## 2017-08-16 ASSESSMENT — 6 MINUTE WALK TEST (6MWT)
SITTING BLOOD PRESSURE: 132/74
BLOOD PRESSURE: LEFT ARM

## 2017-08-16 NOTE — PROGRESS NOTES
"Chief Complaint   Patient presents with   • COPD     6Min Walk/ PFT         HPI:  This is a 76 y.o. female with a history of chronic obstructive pulmonary disease.  Pulmonary function tests from 8/16/17 indicate FEV1 1.46 L, 69% predicted, FEV1 4/FVC 74%, FEF 25-75% 64% predicted, and DLCO 57% predicted. The patient was supposed to perform a 6 minute walk but was unable to do so. The patient is compliant with oxygen 3 L/m. currently she cannot reach her entire home with her concentrator as she lives in a 2 story home. She will require a portable oxygen concentrator. Patient is also on Breo 200/25 µg one inhalation daily, Spiriva 2.5 µg 2 inhalations daily, and Ventolin and DuoNeb as needed. Patient reports shortness of breath with exertion. She denies fevers, chills, sweats, and hemoptysis. We have discussed the importance of keeping oxygen saturations between 90-94 percent. We have discussed a great deal about COPD and hypoxemia and the pathophysiology and complications associated with both.     Past Medical History   Diagnosis Date   • Clotting disorder (CMS-HCC)    • Diabetes    • Chronic airway obstruction, not elsewhere classified    • Chickenpox    • Greek measles    • Mumps        Past Surgical History   Procedure Laterality Date   • Pr remv 2nd cataract,corn-scler sectn         Social History   Substance Use Topics   • Smoking status: Former Smoker     Quit date: 02/05/2007   • Smokeless tobacco: Never Used      Comment: Quit 2007   • Alcohol Use: No       ROS:   Constitutional: Denies fevers, chills, sweats, fatigue, and weight loss.  Eyes: Denies glasses.  Ears/nose/mouth/throat: Denies injury.  Cardiovascular: Denies chest pain, tightness.  Respiratory: See history of present illness.  GI: Denies heartburn, difficulty swallowing, nausea, and vomiting.  Neurological: Denies frequent headaches, dizziness, weakness.    Vitals:  Filed Vitals:    08/16/17 0913   Height: 1.626 m (5' 4.02\")   BP: 132/74 "   Pulse: 67   Temp: 36.8 °C (98.2 °F)   O2 sat % on O2: 95 %   O2 Flow Rate (L/min): 3       Allergies:  Zithromax    Medications:  Current Outpatient Prescriptions   Medication Sig Dispense Refill   • sitagliptan-metformin (JANUMET)  MG per tablet Take 1 Tab by mouth 2 times a day, with meals.     • sitagliptan-metformin (JANUMET)  MG per tablet Take 1 Tab by mouth 2 times a day, with meals. Indications: Type 2 Diabetes     • Blood Glucose Monitoring Suppl (FREESTYLE LITE) Device      • FREESTYLE LITE strip      • levothyroxine (SYNTHROID) 125 MCG Tab      • lisinopril (PRINIVIL, ZESTRIL) 40 MG tablet      • lisinopril (PRINIVIL) 20 MG Tab      • Tiotropium Bromide Monohydrate (SPIRIVA RESPIMAT) 2.5 MCG/ACT Aero Soln Inhale 1 Puff by mouth every day at 6 PM.     • Fluticasone Furoate-Vilanterol (BREO ELLIPTA) 200-25 MCG/INH AEROSOL POWDER, BREATH ACTIVATED Inhale 1 Puff by mouth every day at 6 PM.     • Probiotic Product (TRUBIOTICS) Cap Take 1 Capsule by mouth every day at 6 PM.     • Cyanocobalamin (VITAMIN B12) 100 MCG Tab Take 1 Tab by mouth every day at 6 PM.     • acetaminophen (EQ ARTHRITIS PAIN) 650 MG CR tablet Take 650-1,300 mg by mouth every 6 hours as needed for Moderate Pain.     • nystatin (MYCOSTATIN) 835245 UNIT/GM Ointment Apply 100,000 Units to affected area(s) 2 times a day as needed (rash).     • non-formulary med Inhale 2 L by mouth Continuous. Oxygen 2L continuous via nasal cannula  Indications: COPD, SOB     • albuterol 108 (90 BASE) MCG/ACT Aero Soln inhalation aerosol Inhale 2 Puffs by mouth every four hours as needed for Shortness of Breath. 8.5 g 0   • ipratropium-albuterol (DUONEB) 0.5-2.5 (3) MG/3ML nebulizer solution 3 mL by Nebulization route every four hours as needed for Shortness of Breath. (Patient taking differently: 3 mL by Nebulization route 1 time daily as needed. Indications: Spasm of Lung Air Passages) 300 mL 0   • lisinopril (PRINIVIL) 20 MG Tab Take 1 Tab by  mouth every day. 30 Tab 0   • levofloxacin (LEVAQUIN) 500 MG tablet Take 1 Tab by mouth every day. (Patient not taking: Reported on 6/20/2017) 3 Tab 0   • metformin ER (GLUCOPHAGE XR) 500 MG TABLET SR 24 HR Take 1 Tab by mouth every day. 30 Tab 0   • hydrochlorothiazide (HYDRODIURIL) 25 MG Tab Take 25 mg by mouth every day.     • Omega-3 Fatty Acids (FISH OIL) 1000 MG Cap capsule Take 1,000 mg by mouth every day.     • promethazine-codeine (PHENERGAN-CODEINE) 6.25-10 MG/5ML SYRP Take 5 mL by mouth 4 times a day as needed for Cough. (Patient not taking: Reported on 6/8/2017) 120 mL 0   • Tiotropium Bromide Monohydrate (SPIRIVA HANDIHALER INH) Inhale  by mouth.     • atenolol (TENORMIN) 50 MG TABS Take 50 mg by mouth every day.     • simvastatin (ZOCOR) 10 MG TABS Take 10 mg by mouth every evening.     • Calcium Carbonate-Vit D-Min (CALCIUM 1200 PO) Take  by mouth.     • Cholecalciferol (VITAMIN D) 2000 UNIT TABS Take 5,000 Units by mouth every day at 6 PM.     • Multiple Vitamin (MULTIVITAMIN PO) Take  by mouth.     • aspirin 81 MG tablet Take 81 mg by mouth every day. needs to p/u at store       No current facility-administered medications for this visit.       PHYSICAL EXAM:  Appearance: Well-developed, well-nourished, no acute distress.  Eyes. PERRL.  Hearing: Grossly intact.  Oropharynx: Tongue normal, posterior pharynx without erythema or exudate.  Respiratory effort: No intercostal retractions or use of accessory muscles.  Lung auscultation: No crackles, wheezing.  Heart auscultation: No murmur, gallop, or rub. Regular rate and rhythm.  Extremities: No cyanosis or edema.  Gait and Station: Normal  Orientation: Oriented to time, place, and person.    Assessment:  1. Chronic obstructive pulmonary disease, unspecified COPD type (CMS-HCC)  DME OTHER    REFERRAL TO [facility] IMPROVEMENT PROGRAMS (HIP) Services Requested:: Pulmonary Rehab; Reason for Visit:: COPD/Emphysema         Plan:  1. Continue Breo 200/25  µg one inhalation daily, Spiriva 2.5 µg 2 inhalations daily, and Ventolin and DuoNeb as needed.  2. Continue oxygen at 2-3 L/m. Keep oxygen saturation 90-94%.  3. Order for portable oxygen concentrator to preferred home care. Patient cannot reach her entire home with her home concentrator as it is now. She has a longer than 50 foot tubing.  4. Pulmonary rehabilitation.  5. Up-to-date on pneumococcal 23 and Prevnar 13. Recommend influenza vaccine this fall.  6. Patient will be due for lung cancer screening CT June 2018. This will need to be ordered.    Return in about 3 months (around 11/16/2017) for With PORTER Enriquez.

## 2017-08-16 NOTE — PATIENT INSTRUCTIONS
8/16/217        Camille Beasley  [address] [place] [address]          My name is Felipe Matthew and I am a Nurse Care Coordinator with the [facility] Care Coordination team, Population Health.       I have been trying to reach you by telephone to offer enrollment in a program provided by [facility] that is a benefit of your insurance, which could be of help to patients who have chronic medical conditions.    If you would like additional information regarding these programs that may assist you with achieving your health care goals, managing your health care challenges or connecting with community resources, please contact [facility] Care Coordination team  at [phone].          Sincerely,    Felipe Matthew, RN Care Coordinator

## 2017-08-16 NOTE — PROCEDURES
Good patient effort & cooperation.  The results of this test meet the ATS/ERS standards for acceptability and repeatability.  A bronchodilator of Ventolin HFA- 2puffs via spacer were administered.  The DLCO was uncorrected for Hgb.    The FVC is 2.01 L or 71%, FEV1 is 1.51 L or 72%, FEV1/FVC 75%. TLC 93%. Increased RV. DLCO 57%. No significant bronchodilator response.    Interpretation:  PFTs are consistent with stage II COPD.  Moderate diffusion impairment consistent with emphysema.  Lack of bronchodilator response does not preclude using inhalers when clinically indicated.

## 2017-08-16 NOTE — Clinical Note
August 16, 2017         Camille Rodriguez  [address] [place] [address]        Dear Camille:      My name is Chris Matthew and I am a Registered nurse with the [facility] Care Coordination team, Population Health.    I have been trying to reach you by telephone to offer enrollment in a program provided by [facility] that is a benefit of your insurance, which could be of help to patients who have chronic medical conditions.    If you would like additional information regarding these programs that may assist you with achieving your health care goals, managing your health care challenges or connecting with community resources, please contact the [facility] Care Coordination Team at [phone].    Sincerely,      Chris Matthew R.N.    Electronically Signed

## 2017-08-16 NOTE — MR AVS SNAPSHOT
Camille Rodriguez   2017 8:30 AM   Non-Provider Visit   MRN: [id number]    Department:  [facility]   Dept Phone:  [phone]    Description:  Female : 1940   Provider:  Venkat Carrington M.D.           Reason for Visit     Shortness of Breath           Allergies as of 2017     Allergen Noted Reactions    Zithromax [Azithromycin] 2017   Diarrhea    Pt states severe diarrhea      You were diagnosed with     Dyspnea, unspecified type   [[id number]]         Vital Signs     Smoking Status                   Former Smoker           Basic Information     Date Of Birth Sex Race Ethnicity Preferred Language    1940 Female White Non- English      Your appointments     Aug 16, 2017  9:00 AM   Pulmonary Function Test with PFT-RM2   [facility] (--)    [address]  [address]  [place] [address] [phone]            Aug 16, 2017 10:00 AM   Established Patient Pul with JEET Mendoza   [facility] (--)    [address]  [address]  [place] [address] [phone]              Problem List              ICD-10-CM Priority Class Noted - Resolved    Left bundle branch block I44.7   6/3/2017 - Present    UTI (urinary tract infection) N39.0 High  6/3/2017 - Present    Anemia D64.9   6/3/2017 - Present    CKD (chronic kidney disease), stage III N18.3   6/3/2017 - Present    COPD exacerbation (CMS-HCC) J44.1 High  6/3/2017 - Present    Acute hypoxemic respiratory failure (CMS-HCC) J96.01 High  6/3/2017 - Present    HTN (hypertension) I10   6/3/2017 - Present    DM2 (diabetes mellitus, type 2) (CMS-HCC) E11.9   6/3/2017 - Present    Hypoxia R09.02   2017 - Present    Chronic obstructive pulmonary disease (CMS-HCC) J44.9   2017 - Present      Health Maintenance        Date Due Completion Dates    DIABETES MONOFILAMENT / LE EXAM 1941 ---    RETINAL SCREENING 1959 ---    IMM DTaP/Tdap/Td Vaccine (1 - Tdap)  12/11/1960 ---    PAP SMEAR 12/11/1962 ---    COLONOSCOPY 12/11/1991 ---    MAMMOGRAM 11/3/2016 11/3/2015, 5/15/2013, 4/21/2010, 4/21/2010, 4/2/2009, 4/2/2009, 9/25/2007, 9/25/2007    IMM PNEUMOCOCCAL 65+ (ADULT) LOW/MEDIUM RISK SERIES (2 of 2 - PPSV23) 4/19/2017 4/19/2016    IMM INFLUENZA (1) 9/1/2017 10/27/2016    A1C SCREENING 12/15/2017 6/15/2017, 5/12/2017, 10/21/2016, 4/8/2016, 3/27/2015, 8/22/2014    FASTING LIPID PROFILE 5/12/2018 5/12/2017, 10/21/2016, 4/8/2016, 9/18/2015, 8/22/2014    URINE ACR / MICROALBUMIN 5/12/2018 5/12/2017, 10/21/2016, 4/8/2016, 9/18/2015, 3/27/2015, 8/22/2014    SERUM CREATININE 6/15/2018 6/15/2017, 6/7/2017, 6/6/2017, 6/4/2017, 6/3/2017, 5/12/2017, 10/21/2016, 4/8/2016, 9/18/2015, 3/27/2015, 8/22/2014    BONE DENSITY 9/12/2019 9/12/2014, 4/21/2010            Current Immunizations     13-VALENT PCV PREVNAR 4/19/2016    Influenza TIV (IM) 10/27/2016    Pneumococcal Vaccine (PCV7) Historical Data 4/7/2015    SHINGLES VACCINE 4/1/2015      Below and/or attached are the medications your provider expects you to take. Review all of your home medications and newly ordered medications with your provider and/or pharmacist. Follow medication instructions as directed by your provider and/or pharmacist. Please keep your medication list with you and share with your provider. Update the information when medications are discontinued, doses are changed, or new medications (including over-the-counter products) are added; and carry medication information at all times in the event of emergency situations     Allergies:  ZITHROMAX - Diarrhea               Medications  Valid as of: August 16, 2017 -  8:55 AM    Generic Name Brand Name Tablet Size Instructions for use    Acetaminophen (Tab CR) TYLENOL 650 MG Take 650-1,300 mg by mouth every 6 hours as needed for Moderate Pain.        Albuterol Sulfate (Aero Soln) albuterol 108 (90 BASE) MCG/ACT Inhale 2 Puffs by mouth every four hours as needed for  Shortness of Breath.        Aspirin (Tab) aspirin 81 MG Take 81 mg by mouth every day. needs to p/u at store        Atenolol (Tab) TENORMIN 50 MG Take 50 mg by mouth every day.        Blood Glucose Monitoring Suppl (Device) FREESTYLE LITE          Calcium Carbonate-Vit D-Min   Take  by mouth.        Cholecalciferol (Tab) vitamin D 2000 UNIT Take 5,000 Units by mouth every day at 6 PM.        Cyanocobalamin (Tab) Vitamin B12 100 MCG Take 1 Tab by mouth every day at 6 PM.        Fluticasone Furoate-Vilanterol (AEROSOL POWDER, BREATH ACTIVATED) Fluticasone Furoate-Vilanterol 200-25 MCG/INH Inhale 1 Puff by mouth every day at 6 PM.        Glucose Blood (Strip) FREESTYLE LITE          HydroCHLOROthiazide (Tab) HYDRODIURIL 25 MG Take 25 mg by mouth every day.        Ipratropium-Albuterol (Solution) DUONEB 0.5-2.5 (3) MG/3ML 3 mL by Nebulization route every four hours as needed for Shortness of Breath.        LevoFLOXacin (Tab) LEVAQUIN 500 MG Take 1 Tab by mouth every day.        Levothyroxine Sodium (Tab) SYNTHROID 125 MCG         Lisinopril (Tab) PRINIVIL 20 MG Take 1 Tab by mouth every day.        Lisinopril (Tab) PRINIVIL, ZESTRIL 40 MG         Lisinopril (Tab) PRINIVIL 20 MG         MetFORMIN HCl (TABLET SR 24 HR) GLUCOPHAGE  MG Take 1 Tab by mouth every day.        Multiple Vitamins-Minerals   Take  by mouth.        non-formulary med non-formulary med  Inhale 2 L by mouth Continuous. Oxygen 2L continuous via nasal cannula  Indications: COPD, SOB        Nystatin (Ointment) MYCOSTATIN 056896 UNIT/GM Apply 100,000 Units to affected area(s) 2 times a day as needed (rash).        Omega-3 Fatty Acids (Cap) fish oil 1000 MG Take 1,000 mg by mouth every day.        Probiotic Product (Cap) TRUBIOTICS  Take 1 Capsule by mouth every day at 6 PM.        Promethazine-Codeine (Syrup) PHENERGAN-CODEINE 6.25-10 MG/5ML Take 5 mL by mouth 4 times a day as needed for Cough.        Simvastatin (Tab) ZOCOR 10 MG Take 10 mg by  mouth every evening.        SITagliptin-MetFORMIN HCl (Tab) JANUMET  MG Take 1 Tab by mouth 2 times a day, with meals.        SITagliptin-MetFORMIN HCl (Tab) JANUMET  MG Take 1 Tab by mouth 2 times a day, with meals. Indications: Type 2 Diabetes        Tiotropium Bromide Monohydrate   Inhale  by mouth.        Tiotropium Bromide Monohydrate (Aero Soln) Tiotropium Bromide Monohydrate 2.5 MCG/ACT Inhale 1 Puff by mouth every day at 6 PM.        .                 Medicines prescribed today were sent to:     [id number] - [place] - [address] [place] [address]    Phone: [phone] Fax: [phone]    Open 24 Hours?: No      Medication refill instructions:       If your prescription bottle indicates you have medication refills left, it is not necessary to call your provider’s office. Please contact your pharmacy and they will refill your medication.    If your prescription bottle indicates you do not have any refills left, you may request refills at any time through one of the following ways: The online [employer] system (except Urgent Care), by calling your provider’s office, or by asking your pharmacy to contact your provider’s office with a refill request. Medication refills are processed only during regular business hours and may not be available until the next business day. Your provider may request additional information or to have a follow-up visit with you prior to refilling your medication.   *Please Note: Medication refills are assigned a new Rx number when refilled electronically. Your pharmacy may indicate that no refills were authorized even though a new prescription for the same medication is available at the pharmacy. Please request the medicine by name with the pharmacy before contacting your provider for a refill.           [employer] Access Code: Activation code not generated  Current [employer] Status: Active

## 2017-08-16 NOTE — MR AVS SNAPSHOT
Camille Rodriguez   2017 9:00 AM   Non-Provider Visit   MRN: [id number]    Department:  [facility]   Dept Phone:  [phone]    Description:  Female : 1940   Provider:  Bindu Naik M.D.           Reason for Visit     COPD           Allergies as of 2017     Allergen Noted Reactions    Zithromax [Azithromycin] 2017   Diarrhea    Pt states severe diarrhea      You were diagnosed with     Chronic obstructive pulmonary disease, unspecified COPD type (CMS-HCC)   [9136122]         Vital Signs     Smoking Status                   Former Smoker           Basic Information     Date Of Birth Sex Race Ethnicity Preferred Language    1940 Female White Non- English      Your appointments     Aug 16, 2017 10:00 AM   Established Patient Pul with JEET Mendoza   [facility] (--)    [address]  [address]  [facility] [id number] [phone]              Problem List              ICD-10-CM Priority Class Noted - Resolved    Left bundle branch block I44.7   6/3/2017 - Present    UTI (urinary tract infection) N39.0 High  6/3/2017 - Present    Anemia D64.9   6/3/2017 - Present    CKD (chronic kidney disease), stage III N18.3   6/3/2017 - Present    COPD exacerbation (CMS-HCC) J44.1 High  6/3/2017 - Present    Acute hypoxemic respiratory failure (CMS-HCC) J96.01 High  6/3/2017 - Present    HTN (hypertension) I10   6/3/2017 - Present    DM2 (diabetes mellitus, type 2) (CMS-HCC) E11.9   6/3/2017 - Present    Hypoxia R09.02   2017 - Present    Chronic obstructive pulmonary disease (CMS-HCC) J44.9   2017 - Present      Health Maintenance        Date Due Completion Dates    DIABETES MONOFILAMENT / LE EXAM 1942 ---    RETINAL SCREENING 1959 ---    IMM DTaP/Tdap/Td Vaccine (1 - Tdap) 1960 ---    PAP SMEAR 1962 ---    COLONOSCOPY 1991 ---    MAMMOGRAM 11/3/2016 11/3/2015, 5/15/2013, 2010, 2010, 2009,  4/2/2009, 9/25/2007, 9/25/2007    IMM PNEUMOCOCCAL 65+ (ADULT) LOW/MEDIUM RISK SERIES (2 of 2 - PPSV23) 4/19/2017 4/19/2016    IMM INFLUENZA (1) 9/1/2017 10/27/2016    A1C SCREENING 12/15/2017 6/15/2017, 5/12/2017, 10/21/2016, 4/8/2016, 3/27/2015, 8/22/2014    FASTING LIPID PROFILE 5/12/2018 5/12/2017, 10/21/2016, 4/8/2016, 9/18/2015, 8/22/2014    URINE ACR / MICROALBUMIN 5/12/2018 5/12/2017, 10/21/2016, 4/8/2016, 9/18/2015, 3/27/2015, 8/22/2014    SERUM CREATININE 6/15/2018 6/15/2017, 6/7/2017, 6/6/2017, 6/4/2017, 6/3/2017, 5/12/2017, 10/21/2016, 4/8/2016, 9/18/2015, 3/27/2015, 8/22/2014    BONE DENSITY 9/12/2019 9/12/2014, 4/21/2010            Current Immunizations     13-VALENT PCV PREVNAR 4/19/2016    Influenza TIV (IM) 10/27/2016    Pneumococcal Vaccine (PCV7) Historical Data 4/7/2015    SHINGLES VACCINE 4/1/2015      Below and/or attached are the medications your provider expects you to take. Review all of your home medications and newly ordered medications with your provider and/or pharmacist. Follow medication instructions as directed by your provider and/or pharmacist. Please keep your medication list with you and share with your provider. Update the information when medications are discontinued, doses are changed, or new medications (including over-the-counter products) are added; and carry medication information at all times in the event of emergency situations     Allergies:  ZITHROMAX - Diarrhea               Medications  Valid as of: August 16, 2017 -  9:50 AM    Generic Name Brand Name Tablet Size Instructions for use    Acetaminophen (Tab CR) TYLENOL 650 MG Take 650-1,300 mg by mouth every 6 hours as needed for Moderate Pain.        Albuterol Sulfate (Aero Soln) albuterol 108 (90 BASE) MCG/ACT Inhale 2 Puffs by mouth every four hours as needed for Shortness of Breath.        Aspirin (Tab) aspirin 81 MG Take 81 mg by mouth every day. needs to p/u at store        Atenolol (Tab) TENORMIN 50 MG Take 50  mg by mouth every day.        Blood Glucose Monitoring Suppl (Device) FREESTYLE LITE          Calcium Carbonate-Vit D-Min   Take  by mouth.        Cholecalciferol (Tab) vitamin D 2000 UNIT Take 5,000 Units by mouth every day at 6 PM.        Cyanocobalamin (Tab) Vitamin B12 100 MCG Take 1 Tab by mouth every day at 6 PM.        Fluticasone Furoate-Vilanterol (AEROSOL POWDER, BREATH ACTIVATED) Fluticasone Furoate-Vilanterol 200-25 MCG/INH Inhale 1 Puff by mouth every day at 6 PM.        Glucose Blood (Strip) FREESTYLE LITE          HydroCHLOROthiazide (Tab) HYDRODIURIL 25 MG Take 25 mg by mouth every day.        Ipratropium-Albuterol (Solution) DUONEB 0.5-2.5 (3) MG/3ML 3 mL by Nebulization route every four hours as needed for Shortness of Breath.        LevoFLOXacin (Tab) LEVAQUIN 500 MG Take 1 Tab by mouth every day.        Levothyroxine Sodium (Tab) SYNTHROID 125 MCG         Lisinopril (Tab) PRINIVIL 20 MG Take 1 Tab by mouth every day.        Lisinopril (Tab) PRINIVIL, ZESTRIL 40 MG         Lisinopril (Tab) PRINIVIL 20 MG         MetFORMIN HCl (TABLET SR 24 HR) GLUCOPHAGE  MG Take 1 Tab by mouth every day.        Multiple Vitamins-Minerals   Take  by mouth.        non-formulary med non-formulary med  Inhale 2 L by mouth Continuous. Oxygen 2L continuous via nasal cannula  Indications: COPD, SOB        Nystatin (Ointment) MYCOSTATIN 870591 UNIT/GM Apply 100,000 Units to affected area(s) 2 times a day as needed (rash).        Omega-3 Fatty Acids (Cap) fish oil 1000 MG Take 1,000 mg by mouth every day.        Probiotic Product (Cap) TRUBIOTICS  Take 1 Capsule by mouth every day at 6 PM.        Promethazine-Codeine (Syrup) PHENERGAN-CODEINE 6.25-10 MG/5ML Take 5 mL by mouth 4 times a day as needed for Cough.        Simvastatin (Tab) ZOCOR 10 MG Take 10 mg by mouth every evening.        SITagliptin-MetFORMIN HCl (Tab) JANUMET  MG Take 1 Tab by mouth 2 times a day, with meals.        SITagliptin-MetFORMIN HCl  (Tab) JANUMET  MG Take 1 Tab by mouth 2 times a day, with meals. Indications: Type 2 Diabetes        Tiotropium Bromide Monohydrate   Inhale  by mouth.        Tiotropium Bromide Monohydrate (Aero Soln) Tiotropium Bromide Monohydrate 2.5 MCG/ACT Inhale 1 Puff by mouth every day at 6 PM.        .                 Medicines prescribed today were sent to:     [facility] #[id number] - [place] - [address] [address]    Phone: [phone] Fax: [phone]    Open 24 Hours?: No      Medication refill instructions:       If your prescription bottle indicates you have medication refills left, it is not necessary to call your provider’s office. Please contact your pharmacy and they will refill your medication.    If your prescription bottle indicates you do not have any refills left, you may request refills at any time through one of the following ways: The online [email or web address] system (except Urgent Care), by calling your provider’s office, or by asking your pharmacy to contact your provider’s office with a refill request. Medication refills are processed only during regular business hours and may not be available until the next business day. Your provider may request additional information or to have a follow-up visit with you prior to refilling your medication.   *Please Note: Medication refills are assigned a new Rx number when refilled electronically. Your pharmacy may indicate that no refills were authorized even though a new prescription for the same medication is available at the pharmacy. Please request the medicine by name with the pharmacy before contacting your provider for a refill.           [email or web address] Access Code: Activation code not generated  Current [email or web address] Status: Active

## 2017-08-16 NOTE — MR AVS SNAPSHOT
"        Camille Rodriguez   2017 10:00 AM   Office Visit   MRN: [id number]    Department:  [facility]   Dept Phone:  [phone]    Description:  Female : 1940   Provider:  JEET Mendoza           Reason for Visit     COPD 6Min Walk/ PFT      Allergies as of 2017     Allergen Noted Reactions    Zithromax [Azithromycin] 2017   Diarrhea    Pt states severe diarrhea      You were diagnosed with     Chronic obstructive pulmonary disease, unspecified COPD type (CMS-HCC)   [5831740]         Vital Signs     Blood Pressure Pulse Temperature Height Oxygen Saturation Smoking Status    132/74 mmHg 67 36.8 °C (98.2 °F) 1.626 m (5' 4.02\") 95% Former Smoker      Basic Information     Date Of Birth Sex Race Ethnicity Preferred Language    1940 Female White Non- English      Your appointments     2017  9:40 AM   Established Patient Pul with JEET Mendoza   [facility] (--)    [facility] [address] [place] [address] [address]  [facility] [id number] [phone]              Problem List              ICD-10-CM Priority Class Noted - Resolved    Left bundle branch block I44.7   6/3/2017 - Present    UTI (urinary tract infection) N39.0 High  6/3/2017 - Present    Anemia D64.9   6/3/2017 - Present    CKD (chronic kidney disease), stage III N18.3   6/3/2017 - Present    COPD exacerbation (CMS-HCC) J44.1 High  6/3/2017 - Present    Acute hypoxemic respiratory failure (CMS-HCC) J96.01 High  6/3/2017 - Present    HTN (hypertension) I10   6/3/2017 - Present    DM2 (diabetes mellitus, type 2) (CMS-HCC) E11.9   6/3/2017 - Present    Hypoxia R09.02   2017 - Present    Chronic obstructive pulmonary disease (CMS-HCC) J44.9   2017 - Present      Health Maintenance        Date Due Completion Dates    DIABETES MONOFILAMENT / LE EXAM 1942 ---    RETINAL SCREENING 1959 ---    IMM DTaP/Tdap/Td Vaccine (1 - Tdap) 1960 ---    PAP SMEAR 1962 ---   " COLONOSCOPY 12/11/1991 ---    MAMMOGRAM 11/3/2016 11/3/2015, 5/15/2013, 4/21/2010, 4/21/2010, 4/2/2009, 4/2/2009, 9/25/2007, 9/25/2007    IMM PNEUMOCOCCAL 65+ (ADULT) LOW/MEDIUM RISK SERIES (2 of 2 - PPSV23) 4/19/2017 4/19/2016    IMM INFLUENZA (1) 9/1/2017 10/27/2016    A1C SCREENING 12/15/2017 6/15/2017, 5/12/2017, 10/21/2016, 4/8/2016, 3/27/2015, 8/22/2014    FASTING LIPID PROFILE 5/12/2018 5/12/2017, 10/21/2016, 4/8/2016, 9/18/2015, 8/22/2014    URINE ACR / MICROALBUMIN 5/12/2018 5/12/2017, 10/21/2016, 4/8/2016, 9/18/2015, 3/27/2015, 8/22/2014    SERUM CREATININE 6/15/2018 6/15/2017, 6/7/2017, 6/6/2017, 6/4/2017, 6/3/2017, 5/12/2017, 10/21/2016, 4/8/2016, 9/18/2015, 3/27/2015, 8/22/2014    BONE DENSITY 9/12/2019 9/12/2014, 4/21/2010            Current Immunizations     13-VALENT PCV PREVNAR 4/19/2016    Influenza TIV (IM) 10/27/2016    Pneumococcal Vaccine (PCV7) Historical Data 4/7/2015    SHINGLES VACCINE 4/1/2015      Below and/or attached are the medications your provider expects you to take. Review all of your home medications and newly ordered medications with your provider and/or pharmacist. Follow medication instructions as directed by your provider and/or pharmacist. Please keep your medication list with you and share with your provider. Update the information when medications are discontinued, doses are changed, or new medications (including over-the-counter products) are added; and carry medication information at all times in the event of emergency situations     Allergies:  ZITHROMAX - Diarrhea               Medications  Valid as of: August 16, 2017 - 10:37 AM    Generic Name Brand Name Tablet Size Instructions for use    Acetaminophen (Tab CR) TYLENOL 650 MG Take 650-1,300 mg by mouth every 6 hours as needed for Moderate Pain.        Albuterol Sulfate (Aero Soln) albuterol 108 (90 BASE) MCG/ACT Inhale 2 Puffs by mouth every four hours as needed for Shortness of Breath.        Aspirin (Tab) aspirin  81 MG Take 81 mg by mouth every day. needs to p/u at store        Atenolol (Tab) TENORMIN 50 MG Take 50 mg by mouth every day.        Blood Glucose Monitoring Suppl (Device) FREESTYLE LITE          Calcium Carbonate-Vit D-Min   Take  by mouth.        Cholecalciferol (Tab) vitamin D 2000 UNIT Take 5,000 Units by mouth every day at 6 PM.        Cyanocobalamin (Tab) Vitamin B12 100 MCG Take 1 Tab by mouth every day at 6 PM.        Fluticasone Furoate-Vilanterol (AEROSOL POWDER, BREATH ACTIVATED) Fluticasone Furoate-Vilanterol 200-25 MCG/INH Inhale 1 Puff by mouth every day at 6 PM.        Glucose Blood (Strip) FREESTYLE LITE          HydroCHLOROthiazide (Tab) HYDRODIURIL 25 MG Take 25 mg by mouth every day.        Ipratropium-Albuterol (Solution) DUONEB 0.5-2.5 (3) MG/3ML 3 mL by Nebulization route every four hours as needed for Shortness of Breath.        LevoFLOXacin (Tab) LEVAQUIN 500 MG Take 1 Tab by mouth every day.        Levothyroxine Sodium (Tab) SYNTHROID 125 MCG         Lisinopril (Tab) PRINIVIL 20 MG Take 1 Tab by mouth every day.        Lisinopril (Tab) PRINIVIL, ZESTRIL 40 MG         Lisinopril (Tab) PRINIVIL 20 MG         MetFORMIN HCl (TABLET SR 24 HR) GLUCOPHAGE  MG Take 1 Tab by mouth every day.        Multiple Vitamins-Minerals   Take  by mouth.        non-formulary med non-formulary med  Inhale 2 L by mouth Continuous. Oxygen 2L continuous via nasal cannula  Indications: COPD, SOB        Nystatin (Ointment) MYCOSTATIN 895497 UNIT/GM Apply 100,000 Units to affected area(s) 2 times a day as needed (rash).        Omega-3 Fatty Acids (Cap) fish oil 1000 MG Take 1,000 mg by mouth every day.        Probiotic Product (Cap) TRUBIOTICS  Take 1 Capsule by mouth every day at 6 PM.        Promethazine-Codeine (Syrup) PHENERGAN-CODEINE 6.25-10 MG/5ML Take 5 mL by mouth 4 times a day as needed for Cough.        Simvastatin (Tab) ZOCOR 10 MG Take 10 mg by mouth every evening.        SITagliptin-MetFORMIN HCl  (Tab) JANUMET  MG Take 1 Tab by mouth 2 times a day, with meals.        SITagliptin-MetFORMIN HCl (Tab) JANUMET  MG Take 1 Tab by mouth 2 times a day, with meals. Indications: Type 2 Diabetes        Tiotropium Bromide Monohydrate   Inhale  by mouth.        Tiotropium Bromide Monohydrate (Aero Soln) Tiotropium Bromide Monohydrate 2.5 MCG/ACT Inhale 1 Puff by mouth every day at 6 PM.        .                 Medicines prescribed today were sent to:     [facility] #[id number] - [place] - [address] [place] [address]    Phone: [phone] Fax: [phone]    Open 24 Hours?: No      Medication refill instructions:       If your prescription bottle indicates you have medication refills left, it is not necessary to call your provider’s office. Please contact your pharmacy and they will refill your medication.    If your prescription bottle indicates you do not have any refills left, you may request refills at any time through one of the following ways: The online [employer] system (except Urgent Care), by calling your provider’s office, or by asking your pharmacy to contact your provider’s office with a refill request. Medication refills are processed only during regular business hours and may not be available until the next business day. Your provider may request additional information or to have a follow-up visit with you prior to refilling your medication.   *Please Note: Medication refills are assigned a new Rx number when refilled electronically. Your pharmacy may indicate that no refills were authorized even though a new prescription for the same medication is available at the pharmacy. Please request the medicine by name with the pharmacy before contacting your provider for a refill.        Referral     A referral request has been sent to our patient care coordination department. Please allow 3-5 business days for us to process this request and contact you either by phone or mail. If you  do not hear from us by the 5th business day, please call us at [phone].        Instructions    1. Continue Breo 200/25 µg one inhalation daily, Spiriva 2.5 µg 2 inhalations daily, and Ventolin and DuoNeb as needed.  2. Continue oxygen at 2-3 L/m. Keep oxygen saturation 90-94%.  3. Order for portable oxygen concentrator to preferred home care.  4. Pulmonary rehabilitation.  5. Up-to-date on pneumococcal 23 and Prevnar 13. Recommend influenza vaccine this fall.               [employer] Access Code: Activation code not generated  Current [employer] Status: Active

## 2017-08-16 NOTE — PROGRESS NOTES
Left voice mail messages x3 on patient's phone requesting call back to discuss Care Coordination program.   Letter sent today offering the number to call for Care Coordination as there has been no response to my messages.

## 2017-08-16 NOTE — PATIENT INSTRUCTIONS
1. Continue Breo 200/25 µg one inhalation daily, Spiriva 2.5 µg 2 inhalations daily, and Ventolin and DuoNeb as needed.  2. Continue oxygen at 2-3 L/m. Keep oxygen saturation 90-94%.  3. Order for portable oxygen concentrator to preferred home care.  4. Pulmonary rehabilitation.  5. Up-to-date on pneumococcal 23 and Prevnar 13. Recommend influenza vaccine this fall.

## 2017-09-08 ENCOUNTER — HOSPITAL ENCOUNTER (OUTPATIENT)
Dept: LAB | Facility: MEDICAL CENTER | Age: 77
End: 2017-09-08
Attending: FAMILY MEDICINE
Payer: MEDICARE

## 2017-09-08 LAB
ALBUMIN SERPL BCP-MCNC: 4 G/DL (ref 3.2–4.9)
ALBUMIN/GLOB SERPL: 1.3 G/DL
ALP SERPL-CCNC: 57 U/L (ref 30–99)
ALT SERPL-CCNC: 15 U/L (ref 2–50)
ANION GAP SERPL CALC-SCNC: 10 MMOL/L (ref 0–11.9)
AST SERPL-CCNC: 24 U/L (ref 12–45)
BASOPHILS # BLD AUTO: 0.8 % (ref 0–1.8)
BASOPHILS # BLD: 0.07 K/UL (ref 0–0.12)
BILIRUB SERPL-MCNC: 0.4 MG/DL (ref 0.1–1.5)
BUN SERPL-MCNC: 27 MG/DL (ref 8–22)
CALCIUM SERPL-MCNC: 10 MG/DL (ref 8.5–10.5)
CHLORIDE SERPL-SCNC: 99 MMOL/L (ref 96–112)
CO2 SERPL-SCNC: 26 MMOL/L (ref 20–33)
COMMENT 1642: NORMAL
CREAT SERPL-MCNC: 0.96 MG/DL (ref 0.5–1.4)
EOSINOPHIL # BLD AUTO: 0.21 K/UL (ref 0–0.51)
EOSINOPHIL NFR BLD: 2.3 % (ref 0–6.9)
ERYTHROCYTE [DISTWIDTH] IN BLOOD BY AUTOMATED COUNT: 50.8 FL (ref 35.9–50)
GFR SERPL CREATININE-BSD FRML MDRD: 57 ML/MIN/1.73 M 2
GLOBULIN SER CALC-MCNC: 3.2 G/DL (ref 1.9–3.5)
GLUCOSE SERPL-MCNC: 131 MG/DL (ref 65–99)
HCT VFR BLD AUTO: 40.3 % (ref 37–47)
HGB BLD-MCNC: 12.4 G/DL (ref 12–16)
IMM GRANULOCYTES # BLD AUTO: 0.02 K/UL (ref 0–0.11)
IMM GRANULOCYTES NFR BLD AUTO: 0.2 % (ref 0–0.9)
LYMPHOCYTES # BLD AUTO: 2.25 K/UL (ref 1–4.8)
LYMPHOCYTES NFR BLD: 24.5 % (ref 22–41)
MCH RBC QN AUTO: 28.2 PG (ref 27–33)
MCHC RBC AUTO-ENTMCNC: 30.8 G/DL (ref 33.6–35)
MCV RBC AUTO: 91.8 FL (ref 81.4–97.8)
MONOCYTES # BLD AUTO: 0.57 K/UL (ref 0–0.85)
MONOCYTES NFR BLD AUTO: 6.2 % (ref 0–13.4)
MORPHOLOGY BLD-IMP: NORMAL
NEUTROPHILS # BLD AUTO: 6.06 K/UL (ref 2–7.15)
NEUTROPHILS NFR BLD: 66 % (ref 44–72)
NRBC # BLD AUTO: 0 K/UL
NRBC BLD AUTO-RTO: 0 /100 WBC
PLATELET # BLD AUTO: 248 K/UL (ref 164–446)
PMV BLD AUTO: 9.9 FL (ref 9–12.9)
POTASSIUM SERPL-SCNC: 3.8 MMOL/L (ref 3.6–5.5)
PROT SERPL-MCNC: 7.2 G/DL (ref 6–8.2)
RBC # BLD AUTO: 4.39 M/UL (ref 4.2–5.4)
SODIUM SERPL-SCNC: 135 MMOL/L (ref 135–145)
WBC # BLD AUTO: 9.2 K/UL (ref 4.8–10.8)

## 2017-09-08 PROCEDURE — 36415 COLL VENOUS BLD VENIPUNCTURE: CPT

## 2017-09-08 PROCEDURE — 85025 COMPLETE CBC W/AUTO DIFF WBC: CPT

## 2017-09-08 PROCEDURE — 80053 COMPREHEN METABOLIC PANEL: CPT

## 2017-10-06 ENCOUNTER — APPOINTMENT (OUTPATIENT)
Dept: RADIOLOGY | Facility: IMAGING CENTER | Age: 77
End: 2017-10-06
Attending: PHYSICIAN ASSISTANT
Payer: MEDICARE

## 2017-10-06 ENCOUNTER — OFFICE VISIT (OUTPATIENT)
Dept: URGENT CARE | Facility: PHYSICIAN GROUP | Age: 77
End: 2017-10-06
Payer: MEDICARE

## 2017-10-06 VITALS
WEIGHT: 256 LBS | SYSTOLIC BLOOD PRESSURE: 140 MMHG | OXYGEN SATURATION: 93 % | DIASTOLIC BLOOD PRESSURE: 88 MMHG | HEIGHT: 64 IN | BODY MASS INDEX: 43.71 KG/M2 | TEMPERATURE: 97.8 F | RESPIRATION RATE: 18 BRPM | HEART RATE: 92 BPM

## 2017-10-06 DIAGNOSIS — J42 CHRONIC BRONCHITIS, UNSPECIFIED CHRONIC BRONCHITIS TYPE (HCC): ICD-10-CM

## 2017-10-06 DIAGNOSIS — J44.9 CHRONIC OBSTRUCTIVE PULMONARY DISEASE, UNSPECIFIED COPD TYPE (HCC): ICD-10-CM

## 2017-10-06 DIAGNOSIS — J44.1 COPD EXACERBATION (HCC): ICD-10-CM

## 2017-10-06 PROCEDURE — 99214 OFFICE O/P EST MOD 30 MIN: CPT | Performed by: PHYSICIAN ASSISTANT

## 2017-10-06 PROCEDURE — 71020 DX-CHEST-2 VIEWS: CPT | Mod: TC | Performed by: PHYSICIAN ASSISTANT

## 2017-10-06 RX ORDER — DOXYCYCLINE HYCLATE 100 MG
100 TABLET ORAL 2 TIMES DAILY
Qty: 10 TAB | Refills: 0 | Status: SHIPPED | OUTPATIENT
Start: 2017-10-06 | End: 2018-10-05

## 2017-10-06 RX ORDER — METHYLPREDNISOLONE 4 MG/1
TABLET ORAL
Qty: 21 TAB | Refills: 0 | Status: SHIPPED | OUTPATIENT
Start: 2017-10-06 | End: 2018-07-28

## 2017-10-06 ASSESSMENT — PAIN SCALES - GENERAL: PAINLEVEL: NO PAIN

## 2017-10-06 NOTE — PROGRESS NOTES
Chief Complaint   Patient presents with   • Cough     x 1 day / congestion       HISTORY OF PRESENT ILLNESS: Patient is a 76 y.o. female who presents today for 1 day of chest congestion/cough.  She has felt that it is a wet cough but is not really getting anything up.  She has slight increased SOB from baseline.    Patient has hx of COPD and is on 3 L of oxygen and this is her baseline. She has not had to increase this since yesterday.   She had DuoNeb breathing treatment this morning with minimal relief.  Daughter felt she was wheezing a bit.   No fevers.  Patient denies chest pain or painful breathing.   No leg swelling.      Patient Active Problem List    Diagnosis Date Noted   • UTI (urinary tract infection) 06/03/2017     Priority: High   • COPD exacerbation (CMS-HCC) 06/03/2017     Priority: High   • Acute hypoxemic respiratory failure (CMS-HCC) 06/03/2017     Priority: High   • Hypoxia 06/20/2017   • Chronic obstructive pulmonary disease (CMS-HCC) 06/20/2017   • Left bundle branch block 06/03/2017   • Anemia 06/03/2017   • CKD (chronic kidney disease), stage III 06/03/2017   • HTN (hypertension) 06/03/2017   • DM2 (diabetes mellitus, type 2) (CMS-HCC) 06/03/2017       Allergies:Zithromax [azithromycin]    Current Outpatient Prescriptions Ordered in [facility]   Medication Sig Dispense Refill   • sitagliptan-metformin (JANUMET)  MG per tablet Take 1 Tab by mouth 2 times a day, with meals.     • Blood Glucose Monitoring Suppl (FREESTYLE LITE) Device      • FREESTYLE LITE strip      • levothyroxine (SYNTHROID) 125 MCG Tab      • Tiotropium Bromide Monohydrate (SPIRIVA RESPIMAT) 2.5 MCG/ACT Aero Soln Inhale 1 Puff by mouth every day at 6 PM.     • Fluticasone Furoate-Vilanterol (BREO ELLIPTA) 200-25 MCG/INH AEROSOL POWDER, BREATH ACTIVATED Inhale 1 Puff by mouth every day at 6 PM.     • Probiotic Product (TRUBIOTICS) Cap Take 1 Capsule by mouth every day at 6 PM.     • Cyanocobalamin (VITAMIN B12) 100 MCG Tab  Take 1 Tab by mouth every day at 6 PM.     • acetaminophen (EQ ARTHRITIS PAIN) 650 MG CR tablet Take 650-1,300 mg by mouth every 6 hours as needed for Moderate Pain.     • non-formulary med Inhale 2 L by mouth Continuous. Oxygen 2L continuous via nasal cannula  Indications: COPD, SOB     • albuterol 108 (90 BASE) MCG/ACT Aero Soln inhalation aerosol Inhale 2 Puffs by mouth every four hours as needed for Shortness of Breath. 8.5 g 0   • ipratropium-albuterol (DUONEB) 0.5-2.5 (3) MG/3ML nebulizer solution 3 mL by Nebulization route every four hours as needed for Shortness of Breath. (Patient taking differently: 3 mL by Nebulization route 1 time daily as needed. Indications: Spasm of Lung Air Passages) 300 mL 0   • hydrochlorothiazide (HYDRODIURIL) 25 MG Tab Take 25 mg by mouth every day.     • Omega-3 Fatty Acids (FISH OIL) 1000 MG Cap capsule Take 1,000 mg by mouth every day.     • atenolol (TENORMIN) 50 MG TABS Take 50 mg by mouth every day.     • simvastatin (ZOCOR) 10 MG TABS Take 10 mg by mouth every evening.     • Calcium Carbonate-Vit D-Min (CALCIUM 1200 PO) Take  by mouth.     • Cholecalciferol (VITAMIN D) 2000 UNIT TABS Take 5,000 Units by mouth every day at 6 PM.     • Multiple Vitamin (MULTIVITAMIN PO) Take  by mouth.     • aspirin 81 MG tablet Take 81 mg by mouth every day. needs to p/u at store     • sitagliptan-metformin (JANUMET)  MG per tablet Take 1 Tab by mouth 2 times a day, with meals. Indications: Type 2 Diabetes     • lisinopril (PRINIVIL, ZESTRIL) 40 MG tablet      • lisinopril (PRINIVIL) 20 MG Tab      • nystatin (MYCOSTATIN) 961608 UNIT/GM Ointment Apply 100,000 Units to affected area(s) 2 times a day as needed (rash).     • lisinopril (PRINIVIL) 20 MG Tab Take 1 Tab by mouth every day. 30 Tab 0   • levofloxacin (LEVAQUIN) 500 MG tablet Take 1 Tab by mouth every day. 3 Tab 0   • metformin ER (GLUCOPHAGE XR) 500 MG TABLET SR 24 HR Take 1 Tab by mouth every day. 30 Tab 0   • Tiotropium  "Bromide Monohydrate (SPIRIVA HANDIHALER INH) Inhale  by mouth.       No current Epic-ordered facility-administered medications on file.        Past Medical History:   Diagnosis Date   • Chickenpox    • Chronic airway obstruction, not elsewhere classified    • Clotting disorder (CMS-HCC)    • Diabetes    • [place] measles    • Mumps        Social History   Substance Use Topics   • Smoking status: Former Smoker     Quit date: 2007   • Smokeless tobacco: Never Used      Comment: Quit    • Alcohol use No       Family Status   Relation Status   • Father    • Mother    • Sister Alive     Family History   Problem Relation Age of Onset   • Heart Attack Father        ROS:  Review of Systems   Constitutional: Negative for fever, chills, weight loss and malaise/fatigue.   HENT: Negative for ear pain, nosebleeds, congestion, sore throat and neck pain.    Eyes: Negative for blurred vision.   Respiratory: SEE HPI  Cardiovascular: Negative for chest pain, palpitations, orthopnea and leg swelling.   Gastrointestinal: Negative for heartburn, nausea, vomiting and abdominal pain.   All other systems reviewed and are negative.       Exam:  Blood pressure 140/88, pulse 92, temperature 36.6 °C (97.8 °F), resp. rate 18, height 1.626 m (5' 4\"), weight 116.1 kg (256 lb), SpO2 93 %.  General:  Well nourished, well developed female in NAD  Eyes: PERRLA, EOM within normal limits, no conjunctival injection, no scleral icterus, visual fields and acuity grossly intact.  Ears: Normal shape and symmetry, no tenderness, no discharge. External canals are without any significant edema or erythema. Tympanic membranes are without any inflammation, no effusion. Gross auditory acuity is intact  Nose: Symmetrical, sinuses without tenderness, no discharge.   Mouth: reasonable hygiene, no erythema exudates or tonsillar enlargement.  Neck: no masses, range of motion within normal limits, no tracheal deviation. No " lymphadenopathy  Pulmonary: Normal respiratory effort, few faint expiratory wheezes bilaterally, faint bilateral crackles  Cardiovascular: regular rate and rhythm without murmurs, rubs, or gallops.  Skin: No visible rashes or lesion. Warm, pink, dry.   Extremities: no clubbing, cyanosis, or edema.  Neuro: A&O x 3. Speech normal/clear.  Normal gait.     RAD    FINDINGS:  The heart is normal in size.  No pulmonary infiltrates or consolidations are noted.  No pleural effusions are appreciated.     Impression       No active disease.   Reading Provider Reading Date   Can Núñez M.D. Oct 6, 2017         Assessment/Plan:  1. COPD exacerbation (CMS-HCC)  DX-CHEST-2 VIEWS   2. Chronic obstructive pulmonary disease, unspecified COPD type (CMS-HCC)  doxycycline (VIBRAMYCIN) 100 MG Tab    MethylPREDNISolone (MEDROL DOSEPAK) 4 MG Tablet Therapy Pack       -RAD negative as above.   -tx coverage as above due to severe COPD hx  -humidifier/steamy showers recommended for lung soothing.  Rest and fluids emphasized.   -pulm and PCP follow up  -strict ER precautions discussed in detail with family and patient who express understanding.      Supportive care, differential diagnoses, and indications for immediate follow-up discussed with patient.   Pathogenesis of diagnosis discussed including typical length and natural progression.   Instructed to return to clinic or nearest emergency department for any change in condition, further concerns, or worsening of symptoms.  Patient states understanding of the plan of care and discharge instructions.      Mayte Monsalve P.A.-C.

## 2017-11-03 ENCOUNTER — HOSPITAL ENCOUNTER (OUTPATIENT)
Facility: MEDICAL CENTER | Age: 77
End: 2017-11-03
Attending: PHYSICIAN ASSISTANT
Payer: MEDICARE

## 2017-11-03 ENCOUNTER — OFFICE VISIT (OUTPATIENT)
Dept: URGENT CARE | Facility: CLINIC | Age: 77
End: 2017-11-03
Payer: MEDICARE

## 2017-11-03 VITALS
BODY MASS INDEX: 44.56 KG/M2 | SYSTOLIC BLOOD PRESSURE: 142 MMHG | HEART RATE: 88 BPM | TEMPERATURE: 97 F | OXYGEN SATURATION: 92 % | WEIGHT: 261 LBS | RESPIRATION RATE: 20 BRPM | HEIGHT: 64 IN | DIASTOLIC BLOOD PRESSURE: 88 MMHG

## 2017-11-03 DIAGNOSIS — N30.00 ACUTE CYSTITIS WITHOUT HEMATURIA: ICD-10-CM

## 2017-11-03 LAB
APPEARANCE UR: NORMAL
BILIRUB UR STRIP-MCNC: NORMAL MG/DL
COLOR UR AUTO: YELLOW
GLUCOSE UR STRIP.AUTO-MCNC: NORMAL MG/DL
KETONES UR STRIP.AUTO-MCNC: NORMAL MG/DL
LEUKOCYTE ESTERASE UR QL STRIP.AUTO: NORMAL
NITRITE UR QL STRIP.AUTO: NORMAL
PH UR STRIP.AUTO: 6 [PH] (ref 5–8)
PROT UR QL STRIP: NORMAL MG/DL
RBC UR QL AUTO: NORMAL
SP GR UR STRIP.AUTO: 1.01
UROBILINOGEN UR STRIP-MCNC: NORMAL MG/DL

## 2017-11-03 PROCEDURE — 99214 OFFICE O/P EST MOD 30 MIN: CPT | Performed by: PHYSICIAN ASSISTANT

## 2017-11-03 PROCEDURE — 87077 CULTURE AEROBIC IDENTIFY: CPT

## 2017-11-03 PROCEDURE — 87186 SC STD MICRODIL/AGAR DIL: CPT

## 2017-11-03 PROCEDURE — 81002 URINALYSIS NONAUTO W/O SCOPE: CPT | Performed by: PHYSICIAN ASSISTANT

## 2017-11-03 PROCEDURE — 87086 URINE CULTURE/COLONY COUNT: CPT

## 2017-11-03 RX ORDER — SULFAMETHOXAZOLE AND TRIMETHOPRIM 800; 160 MG/1; MG/1
1 TABLET ORAL EVERY 12 HOURS
Qty: 10 TAB | Refills: 0 | Status: SHIPPED | OUTPATIENT
Start: 2017-11-03 | End: 2017-11-08

## 2017-11-03 ASSESSMENT — ENCOUNTER SYMPTOMS
FEVER: 0
BACK PAIN: 0
SHORTNESS OF BREATH: 0
CHILLS: 1
FOCAL WEAKNESS: 0
ABDOMINAL PAIN: 0
FLANK PAIN: 0
COUGH: 0
DIZZINESS: 0
PALPITATIONS: 0
NAUSEA: 0
VOMITING: 0
SENSORY CHANGE: 0
TINGLING: 0

## 2017-11-04 DIAGNOSIS — N30.00 ACUTE CYSTITIS WITHOUT HEMATURIA: ICD-10-CM

## 2017-11-04 NOTE — PATIENT INSTRUCTIONS
Urinary Tract Infection  A urinary tract infection (UTI) can occur any place along the urinary tract. The tract includes the kidneys, ureters, bladder, and urethra. A type of germ called bacteria often causes a UTI. UTIs are often helped with antibiotic medicine.   HOME CARE   · If given, take antibiotics as told by your doctor. Finish them even if you start to feel better.  · Drink enough fluids to keep your pee (urine) clear or pale yellow.  · Avoid tea, drinks with caffeine, and bubbly (carbonated) drinks.  · Pee often. Avoid holding your pee in for a long time.  · Pee before and after having sex (intercourse).  · Wipe from front to back after you poop (bowel movement) if you are a woman. Use each tissue only once.  GET HELP RIGHT AWAY IF:   · You have back pain.  · You have lower belly (abdominal) pain.  · You have chills.  · You feel sick to your stomach (nauseous).  · You throw up (vomit).  · Your burning or discomfort with peeing does not go away.  · You have a fever.  · Your symptoms are not better in 3 days.  MAKE SURE YOU:   · Understand these instructions.  · Will watch your condition.  · Will get help right away if you are not doing well or get worse.     This information is not intended to replace advice given to you by your health care provider. Make sure you discuss any questions you have with your health care provider.     Document Released: 06/05/2009 Document Revised: 01/08/2016 Document Reviewed: 07/18/2013  [employer] Interactive Patient Education ©2016 [employer]

## 2017-11-04 NOTE — PROGRESS NOTES
"Subjective:      Camille Rodriguez is a 76 y.o. female who presents with Dysuria (since yesterday)            Dysuria    This is a new problem. The current episode started yesterday. The problem occurs intermittently. The problem has been unchanged. The quality of the pain is described as shooting. The pain is mild. There has been no fever. History of pyelonephritis: history of urosepsis. Associated symptoms include chills, frequency and urgency. Pertinent negatives include no flank pain, hematuria, nausea or vomiting. She has tried increased fluids for the symptoms. The treatment provided mild relief. There is no history of kidney stones. recent hx of urosepsis     Past Medical History:   Diagnosis Date   • Chickenpox    • Chronic airway obstruction, not elsewhere classified    • Clotting disorder (CMS-HCC)    • Diabetes    • Citizen of [place] measles    • Mumps      Past Surgical History:   Procedure Laterality Date   • PB REMV 2ND CATARACT,CORN-SCLER SECTN         Family History   Problem Relation Age of Onset   • Heart Attack Father        Allergies   Allergen Reactions   • Zithromax [Azithromycin] Diarrhea     Pt states severe diarrhea       Medications, Allergies, and current problem list reviewed today in Epic      Review of Systems   Constitutional: Positive for chills and malaise/fatigue. Negative for fever.   Respiratory: Negative for cough and shortness of breath.    Cardiovascular: Negative for chest pain, palpitations and leg swelling.   Gastrointestinal: Negative for abdominal pain, nausea and vomiting.   Genitourinary: Positive for dysuria, frequency and urgency. Negative for flank pain and hematuria.   Musculoskeletal: Negative for back pain.   Neurological: Negative for dizziness, tingling, sensory change and focal weakness.     All other systems reviewed and are negative.        Objective:     /88   Pulse 88   Temp 36.1 °C (97 °F)   Resp 20   Ht 1.626 m (5' 4\")   Wt 118.4 kg (261 lb)   SpO2 92%   " BMI 44.80 kg/m²      Physical Exam   Constitutional: She is oriented to person, place, and time. She appears well-developed and well-nourished. No distress.   Eyes: Conjunctivae are normal. No scleral icterus.   Cardiovascular: Normal rate, regular rhythm and normal heart sounds.  Exam reveals no gallop and no friction rub.    No murmur heard.  Pulmonary/Chest: Effort normal. No respiratory distress.   Abdominal: Soft. Bowel sounds are normal. She exhibits no distension and no mass. There is no tenderness. There is no rigidity, no rebound, no guarding and no CVA tenderness.   Neurological: She is alert and oriented to person, place, and time. No cranial nerve deficit.   Skin: Skin is warm and dry. No rash noted.   Psychiatric: She has a normal mood and affect. Her behavior is normal. Judgment and thought content normal.         UA: positive for leuks and blood     Assessment/Plan:     1. Acute cystitis without hematuria    - POCT Urinalysis  - Urine Culture; Future  - sulfamethoxazole-trimethoprim (BACTRIM DS) 800-160 MG tablet; Take 1 Tab by mouth every 12 hours for 5 days.  Dispense: 10 Tab; Refill: 0    Push fluids.  Will culture urine and change tx plan accordingly.     Differential diagnoses, Supportive care, and indications for immediate follow-up discussed with patient.   Instructed to return to clinic or nearest emergency department for any change in condition, further concerns, or worsening of symptoms.    The patient demonstrated a good understanding and agreed with the treatment plan.    Mayte Branch P.A.-C.

## 2017-11-06 LAB
BACTERIA UR CULT: ABNORMAL
BACTERIA UR CULT: ABNORMAL
SIGNIFICANT IND 70042: ABNORMAL
SOURCE SOURCE: ABNORMAL

## 2017-11-23 ENCOUNTER — HOSPITAL ENCOUNTER (OUTPATIENT)
Facility: MEDICAL CENTER | Age: 77
End: 2017-11-23
Attending: NURSE PRACTITIONER
Payer: MEDICARE

## 2017-11-23 ENCOUNTER — OFFICE VISIT (OUTPATIENT)
Dept: URGENT CARE | Facility: PHYSICIAN GROUP | Age: 77
End: 2017-11-23
Payer: MEDICARE

## 2017-11-23 VITALS
WEIGHT: 255 LBS | TEMPERATURE: 98.7 F | RESPIRATION RATE: 18 BRPM | HEART RATE: 88 BPM | DIASTOLIC BLOOD PRESSURE: 82 MMHG | OXYGEN SATURATION: 94 % | HEIGHT: 64 IN | BODY MASS INDEX: 43.54 KG/M2 | SYSTOLIC BLOOD PRESSURE: 138 MMHG

## 2017-11-23 DIAGNOSIS — R30.0 DYSURIA: ICD-10-CM

## 2017-11-23 DIAGNOSIS — Z87.440 HISTORY OF RECURRENT UTI (URINARY TRACT INFECTION): ICD-10-CM

## 2017-11-23 LAB
APPEARANCE UR: NORMAL
BILIRUB UR STRIP-MCNC: NORMAL MG/DL
COLOR UR AUTO: YELLOW
GLUCOSE UR STRIP.AUTO-MCNC: NORMAL MG/DL
KETONES UR STRIP.AUTO-MCNC: NORMAL MG/DL
LEUKOCYTE ESTERASE UR QL STRIP.AUTO: NORMAL
NITRITE UR QL STRIP.AUTO: NORMAL
PH UR STRIP.AUTO: 6 [PH] (ref 5–8)
PROT UR QL STRIP: 100 MG/DL
RBC UR QL AUTO: NORMAL
SP GR UR STRIP.AUTO: 1.02
UROBILINOGEN UR STRIP-MCNC: NORMAL MG/DL

## 2017-11-23 PROCEDURE — 87086 URINE CULTURE/COLONY COUNT: CPT

## 2017-11-23 PROCEDURE — 87077 CULTURE AEROBIC IDENTIFY: CPT

## 2017-11-23 PROCEDURE — 99214 OFFICE O/P EST MOD 30 MIN: CPT | Performed by: NURSE PRACTITIONER

## 2017-11-23 PROCEDURE — 87186 SC STD MICRODIL/AGAR DIL: CPT

## 2017-11-23 PROCEDURE — 81002 URINALYSIS NONAUTO W/O SCOPE: CPT | Performed by: NURSE PRACTITIONER

## 2017-11-23 RX ORDER — SULFAMETHOXAZOLE AND TRIMETHOPRIM 800; 160 MG/1; MG/1
1 TABLET ORAL 2 TIMES DAILY
Qty: 4 TAB | Refills: 0 | Status: SHIPPED | OUTPATIENT
Start: 2017-11-23 | End: 2017-11-25

## 2017-11-23 RX ORDER — SULFAMETHOXAZOLE AND TRIMETHOPRIM 800; 160 MG/1; MG/1
1 TABLET ORAL EVERY 12 HOURS
Qty: 6 TAB | Refills: 0 | Status: SHIPPED | OUTPATIENT
Start: 2017-11-23 | End: 2017-11-23 | Stop reason: SDUPTHER

## 2017-11-23 ASSESSMENT — ENCOUNTER SYMPTOMS
FEVER: 0
SHORTNESS OF BREATH: 0
FLANK PAIN: 0
MYALGIAS: 0
CHILLS: 0
VOMITING: 0
DIZZINESS: 0
NAUSEA: 0
SORE THROAT: 0
EYE PAIN: 0

## 2017-11-23 NOTE — PATIENT INSTRUCTIONS
uti    Urinary Tract Infection  Urinary tract infections (UTIs) can develop anywhere along your urinary tract. Your urinary tract is your body's drainage system for removing wastes and extra water. Your urinary tract includes two kidneys, two ureters, a bladder, and a urethra. Your kidneys are a pair of bean-shaped organs. Each kidney is about the size of your fist. They are located below your ribs, one on each side of your spine.  CAUSES  Infections are caused by microbes, which are microscopic organisms, including fungi, viruses, and bacteria. These organisms are so small that they can only be seen through a microscope. Bacteria are the microbes that most commonly cause UTIs.  SYMPTOMS   Symptoms of UTIs may vary by age and gender of the patient and by the location of the infection. Symptoms in young women typically include a frequent and intense urge to urinate and a painful, burning feeling in the bladder or urethra during urination. Older women and men are more likely to be tired, shaky, and weak and have muscle aches and abdominal pain. A fever may mean the infection is in your kidneys. Other symptoms of a kidney infection include pain in your back or sides below the ribs, nausea, and vomiting.  DIAGNOSIS  To diagnose a UTI, your caregiver will ask you about your symptoms. Your caregiver also will ask to provide a urine sample. The urine sample will be tested for bacteria and white blood cells. White blood cells are made by your body to help fight infection.  TREATMENT   Typically, UTIs can be treated with medication. Because most UTIs are caused by a bacterial infection, they usually can be treated with the use of antibiotics. The choice of antibiotic and length of treatment depend on your symptoms and the type of bacteria causing your infection.  HOME CARE INSTRUCTIONS  · If you were prescribed antibiotics, take them exactly as your caregiver instructs you. Finish the medication even if you feel better after  you have only taken some of the medication.  · Drink enough water and fluids to keep your urine clear or pale yellow.  · Avoid caffeine, tea, and carbonated beverages. They tend to irritate your bladder.  · Empty your bladder often. Avoid holding urine for long periods of time.  · Empty your bladder before and after sexual intercourse.  · After a bowel movement, women should cleanse from front to back. Use each tissue only once.  SEEK MEDICAL CARE IF:   · You have back pain.  · You develop a fever.  · Your symptoms do not begin to resolve within 3 days.  SEEK IMMEDIATE MEDICAL CARE IF:   · You have severe back pain or lower abdominal pain.  · You develop chills.  · You have nausea or vomiting.  · You have continued burning or discomfort with urination.  MAKE SURE YOU:   · Understand these instructions.  · Will watch your condition.  · Will get help right away if you are not doing well or get worse.     This information is not intended to replace advice given to you by your health care provider. Make sure you discuss any questions you have with your health care provider.     Document Released: 09/27/2006 Document Revised: 01/08/2016 Document Reviewed: 01/25/2013  [employer] Interactive Patient Education ©2016 [employer]

## 2017-11-23 NOTE — PROGRESS NOTES
"Subjective:   Camille Araujo a 76 y.o. female who presents for Dysuria (Dx with UTI earlier this month, again has urge and frequency )        Dysuria    This is a new problem. The current episode started yesterday. The problem occurs every urination. The problem has been unchanged. The quality of the pain is described as aching and burning. The pain is at a severity of 4/10. The pain is moderate. There has been no fever. She is not sexually active. There is no history of pyelonephritis. Associated symptoms include frequency, hesitancy and urgency. Pertinent negatives include no chills, discharge, flank pain, hematuria, nausea, possible pregnancy or vomiting. Treatments tried: azo. The treatment provided mild relief. Her past medical history is significant for recurrent UTIs.     Review of Systems   Constitutional: Negative for chills and fever.   HENT: Negative for sore throat.    Eyes: Negative for pain.   Respiratory: Negative for shortness of breath.    Cardiovascular: Negative for chest pain.   Gastrointestinal: Negative for nausea and vomiting.   Genitourinary: Positive for dysuria, frequency, hesitancy and urgency. Negative for flank pain and hematuria.   Musculoskeletal: Negative for myalgias.   Skin: Negative for rash.   Neurological: Negative for dizziness.     Allergies   Allergen Reactions   • Zithromax [Azithromycin] Diarrhea     Pt states severe diarrhea      Objective:   /82   Pulse 88   Temp 37.1 °C (98.7 °F)   Resp 18   Ht 1.626 m (5' 4\")   Wt 115.7 kg (255 lb)   SpO2 94%   BMI 43.77 kg/m²   Physical Exam   Constitutional: She is oriented to person, place, and time. She appears well-developed and well-nourished. No distress.   HENT:   Head: Normocephalic and atraumatic.   Eyes: Conjunctivae and EOM are normal. Pupils are equal, round, and reactive to light.   Cardiovascular: Normal rate and regular rhythm.    No murmur heard.  Pulmonary/Chest: Effort normal and breath sounds normal. No " respiratory distress.   Abdominal: Soft. She exhibits no distension. There is no tenderness. There is no CVA tenderness.   Neurological: She is alert and oriented to person, place, and time. She has normal reflexes. No sensory deficit.   Skin: Skin is warm and dry.   Psychiatric: She has a normal mood and affect.         Assessment/Plan:   Assessment    1. Dysuria  - POCT Urinalysis  - Urine Culture; Future  - sulfamethoxazole-trimethoprim (BACTRIM DS) 800-160 MG tablet; Take 1 Tab by mouth every 12 hours for 3 days.  Dispense: 6 Tab; Refill: 0    2. History of recurrent UTI (urinary tract infection)  - POCT Urinalysis  - Urine Culture; Future  - sulfamethoxazole-trimethoprim (BACTRIM DS) 800-160 MG tablet; Take 1 Tab by mouth every 12 hours for 3 days.  Dispense: 6 Tab; Refill: 0     Patient in for possible UTI. Urinalysis indicating UTI we'll send for culture. Patient was treated one month ago for UTI with culture showing Escherichia coli. Patient educated to ensure she is wiping properly. Patient states she wears at depends for incontinence. Patient advised to change her pad or frequently to reduce exposure to possible infection.    Patient given precautionary s/sx that mandate immediate follow up and evaluation in the ED. Advised of risks of not doing so.    DDX, Supportive care, and indications for immediate follow-up discussed with patient.    Instructed to return to clinic or nearest emergency department if we are not available for any change in condition, further concerns, or worsening of symptoms.    The patient demonstrated a good understanding and agreed with the treatment plan.

## 2017-11-24 DIAGNOSIS — R30.0 DYSURIA: ICD-10-CM

## 2017-11-24 DIAGNOSIS — Z87.440 HISTORY OF RECURRENT UTI (URINARY TRACT INFECTION): ICD-10-CM

## 2017-11-26 LAB
BACTERIA UR CULT: ABNORMAL
SIGNIFICANT IND 70042: ABNORMAL
SOURCE SOURCE: ABNORMAL

## 2017-12-01 ENCOUNTER — OFFICE VISIT (OUTPATIENT)
Dept: PULMONOLOGY | Facility: HOSPICE | Age: 77
End: 2017-12-01
Payer: MEDICARE

## 2017-12-01 VITALS
RESPIRATION RATE: 16 BRPM | HEIGHT: 64 IN | HEART RATE: 82 BPM | SYSTOLIC BLOOD PRESSURE: 102 MMHG | DIASTOLIC BLOOD PRESSURE: 70 MMHG | TEMPERATURE: 97.7 F | BODY MASS INDEX: 43.54 KG/M2 | OXYGEN SATURATION: 95 % | WEIGHT: 255 LBS

## 2017-12-01 DIAGNOSIS — J44.1 COPD EXACERBATION (HCC): ICD-10-CM

## 2017-12-01 PROCEDURE — 99213 OFFICE O/P EST LOW 20 MIN: CPT | Performed by: NURSE PRACTITIONER

## 2017-12-01 RX ORDER — BUDESONIDE AND FORMOTEROL FUMARATE DIHYDRATE 160; 4.5 UG/1; UG/1
2 AEROSOL RESPIRATORY (INHALATION) 2 TIMES DAILY
COMMUNITY
End: 2018-11-27

## 2017-12-01 NOTE — PATIENT INSTRUCTIONS
1. Continue Breo 200/25 µg one inhalation daily, Spiriva 2.5 µg 2 inhalations daily, and Ventolin and DuoNeb as needed.  2. CT scan June 2018, ordered, for lung cancer screening.  3. Patient is up-to-date on influenza and pneumonia vaccines.  4. Continue oxygen at 3 L/m.  5. Pulmonary rehabilitation.

## 2017-12-01 NOTE — PROGRESS NOTES
"Chief Complaint   Patient presents with   • COPD         HPI:  This is a 76 y.o. female with a history of chronic obstructive pulmonary disease. Pulmonary function tests from 8/16/17 indicate FEV1 1.46 L, 69% predicted, FEV1 4/FVC 74%, FEF 25-75% 64% predicted, and DLCO 57% predicted. Patient isCompliant with Symbicort 160/4.5 µg 2 inhalations twice daily or Breo 200/25 µg one inhalation daily, Spiriva 2.5 µg 2 inhalations daily, and Ventolin and DuoNeb as needed.The patient is compliant with oxygen 3 L/m. the patient is been obtaining samples of Symbicort and Spiriva from her primary care. I have explained that we would be happy to provide samples if they are available as well. In general she is doing okay. She gets winded with exertion. She denies fevers, chills, sweats, hemoptysis, and wheezing. She never heard anything about pulmonary rehabilitation so I will reorder this.    CT dated 6/3/17 indicates signs of emphysema, mild atelectasis, and CAD. The patient will be due for colon cancer screening June 2018.    Past Medical History:   Diagnosis Date   • Chickenpox    • Chronic airway obstruction, not elsewhere classified    • Clotting disorder (CMS-HCC)    • Diabetes    • Yor[place] measles    • Mumps        Past Surgical History:   Procedure Laterality Date   • PB REMV 2ND CATARACT,CORN-SCLER SECTN         Social History   Substance Use Topics   • Smoking status: Former Smoker     Quit date: 2/5/2007   • Smokeless tobacco: Never Used      Comment: Quit 2007   • Alcohol use No       ROS:   Constitutional: Denies fevers, chills, sweats, fatigue, and weight loss.  Eyes: Denies glasses.  Ears/nose/mouth/throat: Denies injury.  Cardiovascular: Denies chest pain, tightness.  Respiratory: See history of present illness.  GI: Denies heartburn, difficulty swallowing, nausea, and vomiting.  Neurological: Denies frequent headaches, dizziness, weakness.    Vitals:  Vitals:    12/01/17 0808   Height: 1.626 m (5' 4\")   Weight: " 115.7 kg (255 lb)   Weight % change since last entry.: 0 %   BP: 102/70   Pulse: 82   BMI (Calculated): 43.77   Resp: 16   Temp: 36.5 °C (97.7 °F)   O2 sat % on O2: 95 %   O2 Flow Rate (L/min): 3       Allergies:  Zithromax [azithromycin]    Medications:  Current Outpatient Prescriptions   Medication Sig Dispense Refill   • budesonide-formoterol (SYMBICORT) 160-4.5 MCG/ACT Aerosol Inhale 2 Puffs by mouth 2 Times a Day.     • Tiotropium Bromide Monohydrate (SPIRIVA RESPIMAT) 2.5 MCG/ACT Aero Soln Inhale 1 Puff by mouth every day at 6 PM.     • albuterol 108 (90 BASE) MCG/ACT Aero Soln inhalation aerosol Inhale 2 Puffs by mouth every four hours as needed for Shortness of Breath. 8.5 g 0   • doxycycline (VIBRAMYCIN) 100 MG Tab Take 1 Tab by mouth 2 times a day. 10 Tab 0   • MethylPREDNISolone (MEDROL DOSEPAK) 4 MG Tablet Therapy Pack As directed on the packaging label. 21 Tab 0   • sitagliptan-metformin (JANUMET)  MG per tablet Take 1 Tab by mouth 2 times a day, with meals.     • Blood Glucose Monitoring Suppl (FREESTYLE LITE) Device      • FREESTYLE LITE strip      • levothyroxine (SYNTHROID) 125 MCG Tab      • lisinopril (PRINIVIL, ZESTRIL) 40 MG tablet      • Fluticasone Furoate-Vilanterol (BREO ELLIPTA) 200-25 MCG/INH AEROSOL POWDER, BREATH ACTIVATED Inhale 1 Puff by mouth every day at 6 PM.     • Probiotic Product (TRUBIOTICS) Cap Take 1 Capsule by mouth every day at 6 PM.     • Cyanocobalamin (VITAMIN B12) 100 MCG Tab Take 1 Tab by mouth every day at 6 PM.     • acetaminophen (EQ ARTHRITIS PAIN) 650 MG CR tablet Take 650-1,300 mg by mouth every 6 hours as needed for Moderate Pain.     • non-formulary med Inhale 2 L by mouth Continuous. Oxygen 2L continuous via nasal cannula  Indications: COPD, SOB     • ipratropium-albuterol (DUONEB) 0.5-2.5 (3) MG/3ML nebulizer solution 3 mL by Nebulization route every four hours as needed for Shortness of Breath. (Patient taking differently: 3 mL by Nebulization route 1  time daily as needed. Indications: Spasm of Lung Air Passages) 300 mL 0   • hydrochlorothiazide (HYDRODIURIL) 25 MG Tab Take 25 mg by mouth every day.     • Omega-3 Fatty Acids (FISH OIL) 1000 MG Cap capsule Take 1,000 mg by mouth every day.     • atenolol (TENORMIN) 50 MG TABS Take 50 mg by mouth every day.     • simvastatin (ZOCOR) 10 MG TABS Take 10 mg by mouth every evening.     • Calcium Carbonate-Vit D-Min (CALCIUM 1200 PO) Take  by mouth.     • Cholecalciferol (VITAMIN D) 2000 UNIT TABS Take 5,000 Units by mouth every day at 6 PM.     • Multiple Vitamin (MULTIVITAMIN PO) Take  by mouth.     • aspirin 81 MG tablet Take 81 mg by mouth every day. needs to p/u at store       No current facility-administered medications for this visit.        PHYSICAL EXAM:  Appearance: Well-developed, well-nourished, no acute distress.  Eyes. PERRL.  Hearing: Grossly intact.  Oropharynx: Tongue normal, posterior pharynx without erythema or exudate.  Respiratory effort: No intercostal retractions or use of accessory muscles.  Lung auscultation: No crackles, wheezing.  Heart auscultation: No murmur, gallop, or rub. Regular rate and rhythm.  Extremities: No cyanosis or edema.  Gait and Station: Normal  Orientation: Oriented to time, place, and person.    Assessment:  1. COPD exacerbation (CMS-HCC)  CT-CHEST (THORAX) W/O    REFERRAL TO [facility] (HIP) Services Requested: Pulmonary Rehab; Reason for Visit: COPD/Emphysema         Plan:  1. Continue Symbicort 160/4.5 µg 2 inhalations twice daily or Breo 200/25 µg one inhalation daily, Spiriva 2.5 µg 2 inhalations daily, and Ventolin and DuoNeb as needed.  2. CT scan June 2018, ordered, for lung cancer screening. This will be her last lung cancer screening CT.  3. Patient is up-to-date on influenza and pneumonia vaccines.  4. Continue oxygen at 3 L/m.  5. Pulmonary rehabilitation.    Return in about 1 month (around 1/1/2018) for After CT/MRI.

## 2018-01-12 ENCOUNTER — HOSPITAL ENCOUNTER (OUTPATIENT)
Dept: LAB | Facility: MEDICAL CENTER | Age: 78
End: 2018-01-12
Attending: NURSE PRACTITIONER
Payer: MEDICARE

## 2018-01-12 LAB
25(OH)D3 SERPL-MCNC: 35 NG/ML (ref 30–100)
ALBUMIN SERPL BCP-MCNC: 4 G/DL (ref 3.2–4.9)
ALBUMIN/GLOB SERPL: 1.3 G/DL
ALP SERPL-CCNC: 57 U/L (ref 30–99)
ALT SERPL-CCNC: 15 U/L (ref 2–50)
ANION GAP SERPL CALC-SCNC: 10 MMOL/L (ref 0–11.9)
AST SERPL-CCNC: 20 U/L (ref 12–45)
BILIRUB SERPL-MCNC: 0.5 MG/DL (ref 0.1–1.5)
BUN SERPL-MCNC: 22 MG/DL (ref 8–22)
CALCIUM SERPL-MCNC: 9.8 MG/DL (ref 8.5–10.5)
CHLORIDE SERPL-SCNC: 98 MMOL/L (ref 96–112)
CHOLEST SERPL-MCNC: 136 MG/DL (ref 100–199)
CO2 SERPL-SCNC: 29 MMOL/L (ref 20–33)
CREAT SERPL-MCNC: 1.04 MG/DL (ref 0.5–1.4)
CREAT UR-MCNC: 124.4 MG/DL
EST. AVERAGE GLUCOSE BLD GHB EST-MCNC: 174 MG/DL
GLOBULIN SER CALC-MCNC: 3.1 G/DL (ref 1.9–3.5)
GLUCOSE SERPL-MCNC: 178 MG/DL (ref 65–99)
HBA1C MFR BLD: 7.7 % (ref 0–5.6)
HDLC SERPL-MCNC: 77 MG/DL
LDLC SERPL CALC-MCNC: 36 MG/DL
MICROALBUMIN UR-MCNC: 0.9 MG/DL
MICROALBUMIN/CREAT UR: 7 MG/G (ref 0–30)
POTASSIUM SERPL-SCNC: 3.4 MMOL/L (ref 3.6–5.5)
PROT SERPL-MCNC: 7.1 G/DL (ref 6–8.2)
SODIUM SERPL-SCNC: 137 MMOL/L (ref 135–145)
T3FREE SERPL-MCNC: 3.41 PG/ML (ref 2.4–4.2)
T4 FREE SERPL-MCNC: 1.48 NG/DL (ref 0.53–1.43)
TRIGL SERPL-MCNC: 115 MG/DL (ref 0–149)
TSH SERPL DL<=0.005 MIU/L-ACNC: 2.84 UIU/ML (ref 0.38–5.33)

## 2018-01-12 PROCEDURE — 82306 VITAMIN D 25 HYDROXY: CPT

## 2018-01-12 PROCEDURE — 80061 LIPID PANEL: CPT

## 2018-01-12 PROCEDURE — 84439 ASSAY OF FREE THYROXINE: CPT

## 2018-01-12 PROCEDURE — 83036 HEMOGLOBIN GLYCOSYLATED A1C: CPT

## 2018-01-12 PROCEDURE — 82043 UR ALBUMIN QUANTITATIVE: CPT

## 2018-01-12 PROCEDURE — 36415 COLL VENOUS BLD VENIPUNCTURE: CPT

## 2018-01-12 PROCEDURE — 82570 ASSAY OF URINE CREATININE: CPT

## 2018-01-12 PROCEDURE — 84481 FREE ASSAY (FT-3): CPT

## 2018-01-12 PROCEDURE — 84443 ASSAY THYROID STIM HORMONE: CPT

## 2018-01-12 PROCEDURE — 80053 COMPREHEN METABOLIC PANEL: CPT

## 2018-02-04 ENCOUNTER — OFFICE VISIT (OUTPATIENT)
Dept: URGENT CARE | Facility: PHYSICIAN GROUP | Age: 78
End: 2018-02-04
Payer: MEDICARE

## 2018-02-04 ENCOUNTER — HOSPITAL ENCOUNTER (OUTPATIENT)
Facility: MEDICAL CENTER | Age: 78
End: 2018-02-04
Attending: NURSE PRACTITIONER
Payer: MEDICARE

## 2018-02-04 VITALS
RESPIRATION RATE: 20 BRPM | WEIGHT: 255 LBS | HEIGHT: 64 IN | HEART RATE: 76 BPM | BODY MASS INDEX: 43.54 KG/M2 | OXYGEN SATURATION: 90 % | TEMPERATURE: 98 F | DIASTOLIC BLOOD PRESSURE: 90 MMHG | SYSTOLIC BLOOD PRESSURE: 130 MMHG

## 2018-02-04 DIAGNOSIS — R35.0 URINARY FREQUENCY: ICD-10-CM

## 2018-02-04 DIAGNOSIS — R39.15 URINARY URGENCY: ICD-10-CM

## 2018-02-04 DIAGNOSIS — R31.9 URINARY TRACT INFECTION WITH HEMATURIA, SITE UNSPECIFIED: ICD-10-CM

## 2018-02-04 DIAGNOSIS — N39.0 URINARY TRACT INFECTION WITH HEMATURIA, SITE UNSPECIFIED: ICD-10-CM

## 2018-02-04 LAB
APPEARANCE UR: ABNORMAL
BILIRUB UR STRIP-MCNC: NEGATIVE MG/DL
COLOR UR AUTO: YELLOW
GLUCOSE UR STRIP.AUTO-MCNC: NEGATIVE MG/DL
KETONES UR STRIP.AUTO-MCNC: NEGATIVE MG/DL
LEUKOCYTE ESTERASE UR QL STRIP.AUTO: ABNORMAL
NITRITE UR QL STRIP.AUTO: POSITIVE
PH UR STRIP.AUTO: 6 [PH] (ref 5–8)
PROT UR QL STRIP: ABNORMAL MG/DL
RBC UR QL AUTO: ABNORMAL
SP GR UR STRIP.AUTO: 1
UROBILINOGEN UR STRIP-MCNC: NEGATIVE MG/DL

## 2018-02-04 PROCEDURE — 81002 URINALYSIS NONAUTO W/O SCOPE: CPT | Performed by: NURSE PRACTITIONER

## 2018-02-04 PROCEDURE — 99214 OFFICE O/P EST MOD 30 MIN: CPT | Performed by: NURSE PRACTITIONER

## 2018-02-04 PROCEDURE — 99000 SPECIMEN HANDLING OFFICE-LAB: CPT | Performed by: NURSE PRACTITIONER

## 2018-02-04 PROCEDURE — 87086 URINE CULTURE/COLONY COUNT: CPT

## 2018-02-04 PROCEDURE — 87186 SC STD MICRODIL/AGAR DIL: CPT

## 2018-02-04 PROCEDURE — 87077 CULTURE AEROBIC IDENTIFY: CPT

## 2018-02-04 RX ORDER — LISINOPRIL 20 MG/1
TABLET ORAL
COMMUNITY
Start: 2018-01-24 | End: 2019-03-06 | Stop reason: SDUPTHER

## 2018-02-04 RX ORDER — CEFUROXIME AXETIL 250 MG/1
250 TABLET ORAL 2 TIMES DAILY
Qty: 14 TAB | Refills: 0 | Status: SHIPPED | OUTPATIENT
Start: 2018-02-04 | End: 2018-02-11

## 2018-02-04 ASSESSMENT — ENCOUNTER SYMPTOMS
SHORTNESS OF BREATH: 0
WEAKNESS: 0
CONSTIPATION: 0
ORTHOPNEA: 0
BACK PAIN: 0
FEVER: 0
PALPITATIONS: 0
ABDOMINAL PAIN: 0
NAUSEA: 0
DIZZINESS: 0
DIARRHEA: 0
CHILLS: 0
VOMITING: 0
MYALGIAS: 0
HEADACHES: 0
FLANK PAIN: 0

## 2018-02-04 NOTE — PROGRESS NOTES
Subjective:      Camille Rodriguez is a 77 y.o. female who presents with Urinary Frequency (x5 days. Pt complains of urinary frequency, tingling sensation during urination, fatigue.)            HPI  Camille is here for urinary frequency, urgency x 1 week. Daughter present. H/o diabetes mellitus. Last UTI 3 months ago. Blood sugar this mornin. Denies dysuria, fever, n/v, abdominal pain, back pain. Experiences incontinence and wears a urine pad.    PMH:  has a past medical history of Chickenpox; Chronic airway obstruction, not elsewhere classified; Clotting disorder (CMS-HCC); Diabetes; [place] measles; and Mumps. She also has no past medical history of Breast cancer (CMS-HCC).  MEDS:   Current Outpatient Prescriptions:   •  lisinopril (PRINIVIL) 20 MG Tab, , Disp: , Rfl:   •  cefUROXime (CEFTIN) 250 MG Tab, Take 1 Tab by mouth 2 times a day for 7 days., Disp: 14 Tab, Rfl: 0  •  budesonide-formoterol (SYMBICORT) 160-4.5 MCG/ACT Aerosol, Inhale 2 Puffs by mouth 2 Times a Day., Disp: , Rfl:   •  sitagliptan-metformin (JANUMET)  MG per tablet, Take 1 Tab by mouth 2 times a day, with meals., Disp: , Rfl:   •  levothyroxine (SYNTHROID) 125 MCG Tab, , Disp: , Rfl:   •  Tiotropium Bromide Monohydrate (SPIRIVA RESPIMAT) 2.5 MCG/ACT Aero Soln, Inhale 1 Puff by mouth every day at 6 PM., Disp: , Rfl:   •  Fluticasone Furoate-Vilanterol (BREO ELLIPTA) 200-25 MCG/INH AEROSOL POWDER, BREATH ACTIVATED, Inhale 1 Puff by mouth every day at 6 PM., Disp: , Rfl:   •  Probiotic Product (TRUBIOTICS) Cap, Take 1 Capsule by mouth every day at 6 PM., Disp: , Rfl:   •  Cyanocobalamin (VITAMIN B12) 100 MCG Tab, Take 1 Tab by mouth every day at 6 PM., Disp: , Rfl:   •  acetaminophen (EQ ARTHRITIS PAIN) 650 MG CR tablet, Take 650-1,300 mg by mouth every 6 hours as needed for Moderate Pain., Disp: , Rfl:   •  albuterol 108 (90 BASE) MCG/ACT Aero Soln inhalation aerosol, Inhale 2 Puffs by mouth every four hours as needed for Shortness of  Breath., Disp: 8.5 g, Rfl: 0  •  ipratropium-albuterol (DUONEB) 0.5-2.5 (3) MG/3ML nebulizer solution, 3 mL by Nebulization route every four hours as needed for Shortness of Breath. (Patient taking differently: 3 mL by Nebulization route 1 time daily as needed. Indications: Spasm of Lung Air Passages), Disp: 300 mL, Rfl: 0  •  hydrochlorothiazide (HYDRODIURIL) 25 MG Tab, Take 25 mg by mouth every day., Disp: , Rfl:   •  Omega-3 Fatty Acids (FISH OIL) 1000 MG Cap capsule, Take 1,000 mg by mouth every day., Disp: , Rfl:   •  atenolol (TENORMIN) 50 MG TABS, Take 50 mg by mouth every day., Disp: , Rfl:   •  simvastatin (ZOCOR) 10 MG TABS, Take 10 mg by mouth every evening., Disp: , Rfl:   •  Calcium Carbonate-Vit D-Min (CALCIUM 1200 PO), Take  by mouth., Disp: , Rfl:   •  Cholecalciferol (VITAMIN D) 2000 UNIT TABS, Take 5,000 Units by mouth every day at 6 PM., Disp: , Rfl:   •  Multiple Vitamin (MULTIVITAMIN PO), Take  by mouth., Disp: , Rfl:   •  aspirin 81 MG tablet, Take 81 mg by mouth every day. needs to p/u at store, Disp: , Rfl:   •  doxycycline (VIBRAMYCIN) 100 MG Tab, Take 1 Tab by mouth 2 times a day., Disp: 10 Tab, Rfl: 0  •  MethylPREDNISolone (MEDROL DOSEPAK) 4 MG Tablet Therapy Pack, As directed on the packaging label., Disp: 21 Tab, Rfl: 0  •  Blood Glucose Monitoring Suppl (FREESTYLE LITE) Device, , Disp: , Rfl:   •  FREESTYLE LITE strip, , Disp: , Rfl:   •  lisinopril (PRINIVIL, ZESTRIL) 40 MG tablet, , Disp: , Rfl:   •  non-formulary med, Inhale 2 L by mouth Continuous. Oxygen 2L continuous via nasal cannula  Indications: COPD, SOB, Disp: , Rfl:   ALLERGIES:   Allergies   Allergen Reactions   • Zithromax [Azithromycin] Diarrhea     Pt states severe diarrhea     SURGHX:   Past Surgical History:   Procedure Laterality Date   • PB REMV 2ND CATARACT,CORN-SCLER SECTN       SOCHX:  reports that she quit smoking about 11 years ago. She has never used smokeless tobacco. She reports that she does not drink  "alcohol or use drugs.  FH: Family history was reviewed, no pertinent findings to report    Review of Systems   Constitutional: Negative for chills, fever and malaise/fatigue.   Respiratory: Negative for shortness of breath.    Cardiovascular: Negative for chest pain, palpitations and orthopnea.   Gastrointestinal: Negative for abdominal pain, constipation, diarrhea, nausea and vomiting.   Genitourinary: Positive for frequency and urgency. Negative for dysuria, flank pain and hematuria.   Musculoskeletal: Negative for back pain and myalgias.   Neurological: Negative for dizziness, weakness and headaches.   All other systems reviewed and are negative.         Objective:     /90   Pulse 76   Temp 36.7 °C (98 °F)   Resp 20   Ht 1.626 m (5' 4\")   Wt 115.7 kg (255 lb)   SpO2 90%   Breastfeeding? No   BMI 43.77 kg/m²      Physical Exam   Constitutional: She is oriented to person, place, and time. Vital signs are normal. She appears well-developed and well-nourished. She is active and cooperative.  Non-toxic appearance. She does not have a sickly appearance. She does not appear ill. No distress.   HENT:   Head: Normocephalic.   Eyes: Conjunctivae and EOM are normal. Pupils are equal, round, and reactive to light.   Neck: Normal range of motion. Neck supple.   Cardiovascular: Normal rate and regular rhythm.    Pulmonary/Chest: Effort normal and breath sounds normal.   Abdominal: Soft. Bowel sounds are normal. She exhibits no distension. There is no tenderness. There is no rigidity, no rebound, no guarding and no CVA tenderness.   Musculoskeletal: Normal range of motion.   Lymphadenopathy:     She has no cervical adenopathy.   Neurological: She is alert and oriented to person, place, and time.   Skin: Skin is warm. She is not diaphoretic.   Vitals reviewed.    POC Color Yellow  Negative Final   POC Appearance Cloudy  Negative Final   POC Leukocyte Esterase Mod  Negative Final   POC Nitrites Positive  Negative " Final   POC Urobiligen Negative  Negative (0.2) mg/dL Final   POC Protein Trace  Negative mg/dL Final   POC Urine PH 6.0  5.0 - 8.0 Final   POC Blood Trace Non-Hem  Negative Final   POC Specific Gravity 1.005  <1.005 - >1.030 Final   POC Ketones Negative  Negative mg/dL Final   POC Biliruben Negative  Negative mg/dL Final   POC Glucose Negative  Negative mg/dL Final                 Assessment/Plan:   1. Urinary frequency    - Urine Culture; Future    2. Urinary urgency    - Urine Culture; Future    3. Urinary tract infection with hematuria, site unspecified    - cefUROXime (CEFTIN) 250 MG Tab; Take 1 Tab by mouth 2 times a day for 7 days.  Dispense: 14 Tab; Refill: 0  - Urine Culture; Future  - POCT Urinalysis    Increase water intake  Urinate more frequently and empty bladder completely  Practice good toileting hygiene after bowel movements and sexual intercourse, refrain from sexual intercourse until infection cleared  Monitor for back/flank pain, difficulty urinating, blood in urine- need re-evaluation    Call patient/leave message ok

## 2018-02-05 DIAGNOSIS — R35.0 URINARY FREQUENCY: ICD-10-CM

## 2018-02-05 DIAGNOSIS — N39.0 URINARY TRACT INFECTION WITH HEMATURIA, SITE UNSPECIFIED: ICD-10-CM

## 2018-02-05 DIAGNOSIS — R31.9 URINARY TRACT INFECTION WITH HEMATURIA, SITE UNSPECIFIED: ICD-10-CM

## 2018-02-05 DIAGNOSIS — R39.15 URINARY URGENCY: ICD-10-CM

## 2018-02-07 ENCOUNTER — TELEPHONE (OUTPATIENT)
Dept: URGENT CARE | Facility: PHYSICIAN GROUP | Age: 78
End: 2018-02-07

## 2018-02-07 DIAGNOSIS — N39.0 URINARY TRACT INFECTION WITH HEMATURIA, SITE UNSPECIFIED: ICD-10-CM

## 2018-02-07 DIAGNOSIS — R31.9 URINARY TRACT INFECTION WITH HEMATURIA, SITE UNSPECIFIED: ICD-10-CM

## 2018-02-07 LAB
BACTERIA UR CULT: ABNORMAL
BACTERIA UR CULT: ABNORMAL
SIGNIFICANT IND 70042: ABNORMAL
SITE SITE: ABNORMAL
SOURCE SOURCE: ABNORMAL

## 2018-02-07 RX ORDER — NITROFURANTOIN 25; 75 MG/1; MG/1
100 CAPSULE ORAL 2 TIMES DAILY
Qty: 14 CAP | Refills: 0 | Status: SHIPPED | OUTPATIENT
Start: 2018-02-07 | End: 2018-10-05

## 2018-02-07 NOTE — TELEPHONE ENCOUNTER
Called and left message on home number (ok per patient) regarding change in antibiotic due to urine culture findings. Stop Ceftin, begin Macrobid, follow up with rechecking urine at end of antibiotic. Macrobid sent to [facility].

## 2018-03-09 ENCOUNTER — HOSPITAL ENCOUNTER (OUTPATIENT)
Facility: MEDICAL CENTER | Age: 78
End: 2018-03-09
Attending: PHYSICIAN ASSISTANT
Payer: MEDICARE

## 2018-03-09 ENCOUNTER — OFFICE VISIT (OUTPATIENT)
Dept: URGENT CARE | Facility: PHYSICIAN GROUP | Age: 78
End: 2018-03-09
Payer: MEDICARE

## 2018-03-09 VITALS
BODY MASS INDEX: 43.54 KG/M2 | WEIGHT: 255 LBS | OXYGEN SATURATION: 91 % | TEMPERATURE: 98.6 F | HEART RATE: 72 BPM | HEIGHT: 64 IN | DIASTOLIC BLOOD PRESSURE: 70 MMHG | SYSTOLIC BLOOD PRESSURE: 104 MMHG

## 2018-03-09 DIAGNOSIS — N30.00 ACUTE CYSTITIS WITHOUT HEMATURIA: ICD-10-CM

## 2018-03-09 DIAGNOSIS — N18.30 CKD (CHRONIC KIDNEY DISEASE), STAGE III (HCC): ICD-10-CM

## 2018-03-09 LAB
APPEARANCE UR: NORMAL
BILIRUB UR STRIP-MCNC: NORMAL MG/DL
COLOR UR AUTO: NORMAL
GLUCOSE UR STRIP.AUTO-MCNC: NORMAL MG/DL
KETONES UR STRIP.AUTO-MCNC: NORMAL MG/DL
LEUKOCYTE ESTERASE UR QL STRIP.AUTO: NORMAL
NITRITE UR QL STRIP.AUTO: NORMAL
PH UR STRIP.AUTO: 6.5 [PH] (ref 5–8)
PROT UR QL STRIP: NORMAL MG/DL
RBC UR QL AUTO: NORMAL
SP GR UR STRIP.AUTO: 1.01
UROBILINOGEN UR STRIP-MCNC: NORMAL MG/DL

## 2018-03-09 PROCEDURE — 87077 CULTURE AEROBIC IDENTIFY: CPT

## 2018-03-09 PROCEDURE — 87186 SC STD MICRODIL/AGAR DIL: CPT

## 2018-03-09 PROCEDURE — 87086 URINE CULTURE/COLONY COUNT: CPT

## 2018-03-09 PROCEDURE — 81002 URINALYSIS NONAUTO W/O SCOPE: CPT | Performed by: PHYSICIAN ASSISTANT

## 2018-03-09 PROCEDURE — 99214 OFFICE O/P EST MOD 30 MIN: CPT | Performed by: PHYSICIAN ASSISTANT

## 2018-03-09 RX ORDER — CIPROFLOXACIN 250 MG/1
250 TABLET, FILM COATED ORAL 2 TIMES DAILY
Qty: 6 TAB | Refills: 0 | Status: SHIPPED | OUTPATIENT
Start: 2018-03-09 | End: 2018-03-12

## 2018-03-09 RX ORDER — CEFDINIR 300 MG/1
300 CAPSULE ORAL EVERY 12 HOURS
Qty: 10 CAP | Refills: 0 | Status: SHIPPED | OUTPATIENT
Start: 2018-03-09 | End: 2018-03-09

## 2018-03-09 ASSESSMENT — ENCOUNTER SYMPTOMS
NAUSEA: 0
ABDOMINAL PAIN: 0
VOMITING: 0
CHILLS: 0
COUGH: 0
FEVER: 0
MYALGIAS: 0
FLANK PAIN: 0
DIARRHEA: 0

## 2018-03-09 ASSESSMENT — PAIN SCALES - GENERAL: PAINLEVEL: NO PAIN

## 2018-03-09 NOTE — PROGRESS NOTES
"Subjective:   Camille Rodriguez is a 77 y.o. female who presents for UTI (worse at night, polyuria. hx of utis, never usually has painful, denies no flank pain. x 4 days )        HPI patient reports history of multiple recurrent urinary tract infections. Most recent episode last month. After initiating treatment last month she was contacted later with the culture results to change her antibiotic. The patient reports that when she gets urinary tract infection she typically does not have dysuria however she will experience frequency. She began to experience increased urinary frequency and urgency over the past 4 days. She denies dysuria. Denies hematuria, fever, back pain, nausea, vomiting, change in bowel patterns.  Review of Systems   Constitutional: Negative for chills, fever and malaise/fatigue.   Respiratory: Negative for cough.    Cardiovascular: Negative for chest pain.   Gastrointestinal: Negative for abdominal pain, diarrhea, nausea and vomiting.   Genitourinary: Positive for frequency and urgency. Negative for dysuria, flank pain and hematuria.   Musculoskeletal: Negative for myalgias.   Skin: Negative for rash.   All other systems reviewed and are negative.    Allergies   Allergen Reactions   • Zithromax [Azithromycin] Diarrhea     Pt states severe diarrhea      Objective:   /70   Pulse 72   Temp 37 °C (98.6 °F)   Ht 1.626 m (5' 4\")   Wt 115.7 kg (255 lb)   SpO2 91%   Breastfeeding? No   BMI 43.77 kg/m²   Physical Exam   Constitutional: She is oriented to person, place, and time. She appears well-developed and well-nourished.   HENT:   Head: Normocephalic and atraumatic.   Neck: Normal range of motion. Neck supple.   Cardiovascular: Normal rate, regular rhythm and normal heart sounds.    Pulmonary/Chest: Effort normal and breath sounds normal.   Abdominal: Soft. Bowel sounds are normal. There is no tenderness. There is no rigidity and no CVA tenderness.   Musculoskeletal: Normal range of motion. "   Lymphadenopathy:     She has no cervical adenopathy.   Neurological: She is alert and oriented to person, place, and time.   Skin: Skin is warm and dry. No rash noted.   Psychiatric: She has a normal mood and affect. Judgment normal.         Assessment/Plan:   Assessment    1. Acute cystitis without hematuria  - POCT Urinalysis  - Urine Culture; Future  - ciprofloxacin (CIPRO) 250 MG Tab; Take 1 Tab by mouth 2 times a day for 3 days.  Dispense: 6 Tab; Refill: 0  Given her recurrent urinary tract infections as well as her most recent sensitivities, we will go ahead and initiate treatment with Cipro 250 mg twice a day for 3 days. The patient is encouraged to follow up with her PCP regarding her recurrent urinary tract infections. Strict ER precautions provided.      Differential diagnosis, natural history, supportive care, and indications for immediate follow-up discussed.

## 2018-03-10 DIAGNOSIS — N30.00 ACUTE CYSTITIS WITHOUT HEMATURIA: ICD-10-CM

## 2018-04-13 ENCOUNTER — PATIENT OUTREACH (OUTPATIENT)
Dept: HEALTH INFORMATION MANAGEMENT | Facility: OTHER | Age: 78
End: 2018-04-13

## 2018-04-13 ENCOUNTER — APPOINTMENT (OUTPATIENT)
Dept: RADIOLOGY | Facility: MEDICAL CENTER | Age: 78
End: 2018-04-13
Attending: NURSE PRACTITIONER
Payer: MEDICARE

## 2018-04-13 NOTE — PROGRESS NOTES
Attempt #:Final  [facility] Verified: yes  Verify PCP: yes    Communication Preference Obtained: yes     Annual Wellness Visit Scheduling  1. Scheduling Status:Not scheduled. Pt stated that she already completed the [place] and the Monofilament exam with Dr. Mistry.(pt sees Dr. Mistry in [place])       Care Gap Scheduling (Attempt to Schedule EACH Overdue Care Gap!)  Health Maintenance Due   Topic Date Due   • Annual Wellness Visit  1940   • DIABETES MONOFILAMENT / LE EXAM  06/11/1941   • RETINAL SCREENING  12/11/1959 / Requested records   • IMM DTaP/Tdap/Td Vaccine (1 - Tdap) 12/11/1960   • PAP SMEAR  12/11/1962   • COLONOSCOPY  12/11/1991   • MAMMOGRAM  11/03/2016 Already scheduled   • IMM PNEUMOCOCCAL 65+ (ADULT) LOW/MEDIUM RISK SERIES (2 of 2 - PPSV23) 04/19/2017       MyChart Activation: already active

## 2018-04-27 ENCOUNTER — HOSPITAL ENCOUNTER (OUTPATIENT)
Dept: RADIOLOGY | Facility: MEDICAL CENTER | Age: 78
End: 2018-04-27
Attending: NURSE PRACTITIONER
Payer: MEDICARE

## 2018-04-27 DIAGNOSIS — M85.9 DISORDER OF BONE DENSITY AND STRUCTURE, UNSPECIFIED: ICD-10-CM

## 2018-04-27 DIAGNOSIS — Z12.31 SCREENING MAMMOGRAM, ENCOUNTER FOR: ICD-10-CM

## 2018-04-27 PROCEDURE — 77067 SCR MAMMO BI INCL CAD: CPT

## 2018-04-27 PROCEDURE — 77080 DXA BONE DENSITY AXIAL: CPT

## 2018-05-18 ENCOUNTER — HOSPITAL ENCOUNTER (OUTPATIENT)
Dept: RADIOLOGY | Facility: MEDICAL CENTER | Age: 78
End: 2018-05-18
Attending: NURSE PRACTITIONER
Payer: MEDICARE

## 2018-05-18 DIAGNOSIS — J44.1 COPD EXACERBATION (HCC): ICD-10-CM

## 2018-05-18 PROCEDURE — 71250 CT THORAX DX C-: CPT

## 2018-06-01 ENCOUNTER — OFFICE VISIT (OUTPATIENT)
Dept: PULMONOLOGY | Facility: HOSPICE | Age: 78
End: 2018-06-01
Payer: MEDICARE

## 2018-06-01 VITALS
SYSTOLIC BLOOD PRESSURE: 113 MMHG | TEMPERATURE: 98.1 F | HEART RATE: 71 BPM | WEIGHT: 258 LBS | RESPIRATION RATE: 16 BRPM | BODY MASS INDEX: 44.05 KG/M2 | HEIGHT: 64 IN | OXYGEN SATURATION: 92 % | DIASTOLIC BLOOD PRESSURE: 63 MMHG

## 2018-06-01 DIAGNOSIS — Z87.891 FORMER SMOKER: ICD-10-CM

## 2018-06-01 DIAGNOSIS — J43.9 PULMONARY EMPHYSEMA, UNSPECIFIED EMPHYSEMA TYPE (HCC): ICD-10-CM

## 2018-06-01 DIAGNOSIS — Z99.81 OXYGEN DEPENDENT: ICD-10-CM

## 2018-06-01 DIAGNOSIS — J96.11 CHRONIC RESPIRATORY FAILURE WITH HYPOXIA (HCC): ICD-10-CM

## 2018-06-01 DIAGNOSIS — I10 ESSENTIAL HYPERTENSION: ICD-10-CM

## 2018-06-01 PROBLEM — J44.1 COPD EXACERBATION (HCC): Status: RESOLVED | Noted: 2017-06-03 | Resolved: 2018-06-01

## 2018-06-01 PROBLEM — J96.01 ACUTE HYPOXEMIC RESPIRATORY FAILURE (HCC): Status: RESOLVED | Noted: 2017-06-03 | Resolved: 2018-06-01

## 2018-06-01 PROCEDURE — 99214 OFFICE O/P EST MOD 30 MIN: CPT | Performed by: NURSE PRACTITIONER

## 2018-06-01 RX ORDER — PREDNISONE 10 MG/1
TABLET ORAL
Qty: 18 TAB | Refills: 0 | Status: SHIPPED | OUTPATIENT
Start: 2018-06-01 | End: 2018-07-28

## 2018-06-01 NOTE — PROGRESS NOTES
Chief Complaint   Patient presents with   • Results     CT       HPI:  Camille Rodriguez is a 77 y.o. year old female here today for follow-up on Stage 2 COPD and CT scan results. Last OV was 12/1/17 with Stephanie BUENROSTRO. Patient has a 46 year history of smoking 1.5-2 packs daily and quit in 2007. PFT 8/16/2017 indicated FEV1 1.46 L or 69%, FEV1/FVC ratio 74%, and DLCO 57% predicted. Patient is compliant with ICS/LABA - Dulera, Symbicort or Breo if in need of samples, and Spiriva Respimat 2.5mcg 2puff QD and ABEBA/nebulizer prn.  She is compliant with 3L O2 24/7. She notes stable dyspnea on exertion and no significant cough, phlegm, wheezing or chest tightness. BMI 44. Patient uses walker. She denies any major changes in health since last OV. She has an emergency abx on hand but no steroid. She is planning on a [place] Cruise in the near future and in need of RX for her O2.  She has not attended pulmonary rehab because prior PFT did not qualify her.     CT scan 6/3/2017 indicated emphysema, mild atelectasis and CAD. Repeat CT scan 5/18/2018 shows changes consistent with emphysema, no suspicious lung nodules, evidence of previous granulomatous disease and CAD. Patient will complete 2 years of monitoring in 6/2019.    ROS: As per HPI and otherwise negative if not stated.    Past Medical History:   Diagnosis Date   • Chickenpox    • Chronic airway obstruction, not elsewhere classified    • Clotting disorder (HCC)    • Diabetes    • Danish measles    • Mumps        Past Surgical History:   Procedure Laterality Date   • PB REMV 2ND CATARACT,CORN-SCLER SECTN         Family History   Problem Relation Age of Onset   • Heart Attack Father        Social History     Social History   • Marital status:      Spouse name: N/A   • Number of children: N/A   • Years of education: N/A     Occupational History   • Not on file.     Social History Main Topics   • Smoking status: Former Smoker     Packs/day: 1.50     Years: 46.00  "    Types: Cigarettes     Quit date: 2/5/2007   • Smokeless tobacco: Never Used      Comment: Quit 2007   • Alcohol use No   • Drug use: No   • Sexual activity: No     Other Topics Concern   • Not on file     Social History Narrative   • No narrative on file       Allergies as of 06/01/2018 - Reviewed 06/01/2018   Allergen Reaction Noted   • Zithromax [azithromycin] Diarrhea 06/03/2017        @Vital signs for this encounter:  Vitals:    06/01/18 0933   Height: 1.626 m (5' 4\")   Weight: 117 kg (258 lb)   Weight % change since last entry.: 0 %   BP: 113/63   Pulse: 71   BMI (Calculated): 44.29   Resp: 16   Temp: 36.7 °C (98.1 °F)   O2 sat % on O2: 92 %       Current medications as of today   Current Outpatient Prescriptions   Medication Sig Dispense Refill   • predniSONE (DELTASONE) 10 MG Tab Take 30mg x 3 days, then take 20mg x 3 days, then take 10mg x 3 days, with food, then discontinue. 18 Tab 0   • lisinopril (PRINIVIL) 20 MG Tab      • budesonide-formoterol (SYMBICORT) 160-4.5 MCG/ACT Aerosol Inhale 2 Puffs by mouth 2 Times a Day.     • sitagliptan-metformin (JANUMET)  MG per tablet Take 1 Tab by mouth 2 times a day, with meals.     • Blood Glucose Monitoring Suppl (FREESTYLE LITE) Device      • FREESTYLE LITE strip      • levothyroxine (SYNTHROID) 125 MCG Tab      • Tiotropium Bromide Monohydrate (SPIRIVA RESPIMAT) 2.5 MCG/ACT Aero Soln Inhale 1 Puff by mouth every day at 6 PM.     • Fluticasone Furoate-Vilanterol (BREO ELLIPTA) 200-25 MCG/INH AEROSOL POWDER, BREATH ACTIVATED Inhale 1 Puff by mouth every day at 6 PM.     • Probiotic Product (TRUBIOTICS) Cap Take 1 Capsule by mouth every day at 6 PM.     • acetaminophen (EQ ARTHRITIS PAIN) 650 MG CR tablet Take 650-1,300 mg by mouth every 6 hours as needed for Moderate Pain.     • non-formulary med Inhale 2 L by mouth Continuous. Oxygen 2L continuous via nasal cannula  Indications: COPD, SOB     • albuterol 108 (90 BASE) MCG/ACT Aero Soln inhalation aerosol " Inhale 2 Puffs by mouth every four hours as needed for Shortness of Breath. 8.5 g 0   • ipratropium-albuterol (DUONEB) 0.5-2.5 (3) MG/3ML nebulizer solution 3 mL by Nebulization route every four hours as needed for Shortness of Breath. (Patient taking differently: 3 mL by Nebulization route 1 time daily as needed. Indications: Spasm of Lung Air Passages) 300 mL 0   • hydrochlorothiazide (HYDRODIURIL) 25 MG Tab Take 25 mg by mouth every day.     • Omega-3 Fatty Acids (FISH OIL) 1000 MG Cap capsule Take 1,000 mg by mouth every day.     • atenolol (TENORMIN) 50 MG TABS Take 50 mg by mouth every day.     • simvastatin (ZOCOR) 10 MG TABS Take 10 mg by mouth every evening.     • Calcium Carbonate-Vit D-Min (CALCIUM 1200 PO) Take  by mouth.     • Cholecalciferol (VITAMIN D) 2000 UNIT TABS Take 5,000 Units by mouth every day at 6 PM.     • Multiple Vitamin (MULTIVITAMIN PO) Take  by mouth.     • aspirin 81 MG tablet Take 81 mg by mouth every day. needs to p/u at store     • nitrofurantoin monohydr macro (MACROBID) 100 MG Cap Take 1 Cap by mouth 2 times a day. 14 Cap 0   • doxycycline (VIBRAMYCIN) 100 MG Tab Take 1 Tab by mouth 2 times a day. 10 Tab 0   • MethylPREDNISolone (MEDROL DOSEPAK) 4 MG Tablet Therapy Pack As directed on the packaging label. 21 Tab 0   • lisinopril (PRINIVIL, ZESTRIL) 40 MG tablet      • Cyanocobalamin (VITAMIN B12) 100 MCG Tab Take 1 Tab by mouth every day at 6 PM.       No current facility-administered medications for this visit.          Physical Exam:   Gen:           Alert and oriented, No apparent distress. Mood and affect appropriate, normal interaction with examiner.  Eyes:          PERRL, EOM intact, sclere white, conjunctive moist.  Ears:          Not examined.  Hearing:     Grossly intact.  Nose:          Normal, no lesions or deformities.  Dentition:    Good dentition.  Oropharynx:   Tongue normal, posterior pharynx without erythema or exudate.  Mallampati Classification: 3  Neck:         Supple, trachea midline, no masses.  Respiratory Effort: No intercostal retractions or use of accessory muscles.   Lung Auscultation:      Clear to auscultation bilaterally but diminished t/o; no rales, rhonchi or wheezing.  CV:            Regular rate and rhythm. No murmurs, rubs or gallops.  Abd:           Not examined.   Lymphadenopathy: Not examined.  Gait and Station: Uses walker.  Digits and Nails: No clubbing, cyanosis, petechiae, or nodes.   Cranial Nerves: II-XII grossly intact.  Skin:        No rashes, lesions or ulcers noted.               Ext:           No cyanosis but BLE trace pedal edema.      Assessment:  1. Pulmonary emphysema, unspecified emphysema type (HCC)  CT-CHEST (THORAX) W/O    DME O2 NEW SET UP   2. Chronic respiratory failure with hypoxia (HCC)  CT-CHEST (THORAX) W/O    DME O2 NEW SET UP   3. BMI 40.0-44.9, adult (HCC)     4. Former smoker  CT-CHEST (THORAX) W/O   5. Essential hypertension     6. Oxygen dependent  DME O2 NEW SET UP       Immunizations:    Flu:9/2017  Pneumovax 23:2015  Prevnar 13:2016    Plan:  1. Continue inhaler regimen.  2. RX provided for O2 needs for travel.  3. CT scan of chest w/o contrast due 6/2019 to complete 2 years of monitoring.  4. Discussed respiratory hygiene.  5. Encouraged weight loss.  6. Follow up in 6 months, sooner if needed.

## 2018-07-09 DIAGNOSIS — J44.9 CHRONIC OBSTRUCTIVE PULMONARY DISEASE, UNSPECIFIED COPD TYPE (HCC): ICD-10-CM

## 2018-07-09 RX ORDER — IPRATROPIUM BROMIDE AND ALBUTEROL SULFATE 2.5; .5 MG/3ML; MG/3ML
3 SOLUTION RESPIRATORY (INHALATION) EVERY 4 HOURS PRN
Qty: 120 VIAL | Refills: 5 | Status: ON HOLD | OUTPATIENT
Start: 2018-07-09 | End: 2020-12-06

## 2018-07-09 NOTE — TELEPHONE ENCOUNTER
Have we ever prescribed this med? Yes.  If yes, what date? 6/7/2017 by Dr Staley    Last OV: 6/1/2018    Next OV: 11/30/2018    DX: COPD    Medications: Duoneb

## 2018-07-21 ENCOUNTER — OFFICE VISIT (OUTPATIENT)
Dept: URGENT CARE | Facility: PHYSICIAN GROUP | Age: 78
End: 2018-07-21
Payer: MEDICARE

## 2018-07-21 VITALS
HEIGHT: 64 IN | TEMPERATURE: 99.1 F | BODY MASS INDEX: 42.41 KG/M2 | HEART RATE: 79 BPM | DIASTOLIC BLOOD PRESSURE: 80 MMHG | WEIGHT: 248.4 LBS | OXYGEN SATURATION: 94 % | SYSTOLIC BLOOD PRESSURE: 112 MMHG

## 2018-07-21 DIAGNOSIS — B96.89 ACUTE BACTERIAL SINUSITIS: ICD-10-CM

## 2018-07-21 DIAGNOSIS — J01.90 ACUTE BACTERIAL SINUSITIS: ICD-10-CM

## 2018-07-21 PROCEDURE — 99214 OFFICE O/P EST MOD 30 MIN: CPT | Performed by: NURSE PRACTITIONER

## 2018-07-21 RX ORDER — AMOXICILLIN AND CLAVULANATE POTASSIUM 875; 125 MG/1; MG/1
1 TABLET, FILM COATED ORAL 2 TIMES DAILY
Qty: 14 TAB | Refills: 0 | Status: SHIPPED | OUTPATIENT
Start: 2018-07-21 | End: 2018-07-28

## 2018-07-21 ASSESSMENT — ENCOUNTER SYMPTOMS
HEADACHES: 0
FEVER: 0
SHORTNESS OF BREATH: 0
COUGH: 1

## 2018-07-21 ASSESSMENT — COPD QUESTIONNAIRES: COPD: 1

## 2018-07-21 NOTE — PROGRESS NOTES
Subjective:      Camille Rodriguez is a 77 y.o. female who presents with Cough (chest congestion, wet cough, minor wheezing, x2 weeks )            Cough   This is a new problem. Episode onset: pt reports onset of wet congested cough 2 weeks ago. She admits to a constant post nasal drip with associated throat tightness. She denies any increased need with her home oxygen. The problem has been gradually worsening. The cough is non-productive (wet sounding cough). Associated symptoms include ear congestion and postnasal drip. Pertinent negatives include no fever, headaches or shortness of breath. She has tried OTC cough suppressant and rest (States she used an OTC daily non drowsy antihistamine for one week w/o improvement) for the symptoms. The treatment provided no relief. Her past medical history is significant for COPD. There is no history of asthma or pneumonia.       Review of Systems   Constitutional: Negative for fever.   HENT: Positive for congestion and postnasal drip.    Respiratory: Positive for cough. Negative for shortness of breath.    Neurological: Negative for headaches.   All other systems reviewed and are negative.    Past Medical History:   Diagnosis Date   • Chickenpox    • Chronic airway obstruction, not elsewhere classified    • Clotting disorder (HCC)    • Diabetes    • [place] measles    • Mumps       Past Surgical History:   Procedure Laterality Date   • PB REMV 2ND CATARACT,CORN-SCLER SECTN        Social History     Social History   • Marital status:      Spouse name: N/A   • Number of children: N/A   • Years of education: N/A     Occupational History   • Not on file.     Social History Main Topics   • Smoking status: Former Smoker     Packs/day: 1.50     Years: 46.00     Types: Cigarettes     Quit date: 2/5/2007   • Smokeless tobacco: Never Used      Comment: Quit 2007   • Alcohol use No   • Drug use: No   • Sexual activity: No     Other Topics Concern   • Not on file     Social  "History Narrative   • No narrative on file          Objective:     /80   Pulse 79   Temp 37.3 °C (99.1 °F)   Ht 1.626 m (5' 4\")   Wt 112.7 kg (248 lb 6.4 oz)   SpO2 94%   BMI 42.64 kg/m²      Physical Exam   Constitutional: She is oriented to person, place, and time. Vital signs are normal. She appears well-developed and well-nourished.   HENT:   Head: Normocephalic and atraumatic.   Right Ear: Tympanic membrane and external ear normal.   Left Ear: Tympanic membrane and external ear normal.   Nose: Mucosal edema and rhinorrhea present.   Mouth/Throat: Posterior oropharyngeal erythema (+ PND) present.   Cloudy fluid present behind bilateral TMs   Eyes: EOM are normal. Pupils are equal, round, and reactive to light.   Neck: Normal range of motion.   Cardiovascular: Normal rate and regular rhythm.    Pulmonary/Chest: Effort normal and breath sounds normal.   Musculoskeletal: Normal range of motion.   Neurological: She is alert and oriented to person, place, and time.   Skin: Skin is warm and dry. Capillary refill takes less than 2 seconds.   Psychiatric: She has a normal mood and affect. Her speech is normal and behavior is normal. Thought content normal.   Vitals reviewed.              Assessment/Plan:     1. Acute bacterial sinusitis  - amoxicillin-clavulanate (AUGMENTIN) 875-125 MG Tab; Take 1 Tab by mouth 2 times a day for 7 days.  Dispense: 14 Tab; Refill: 0    Continue OTC cough syrup as directed  Advised to use OTC mucinex with decongestant  Increase water intake  Advised to contact me on MyChart if not tolerating ABX  Supportive care, differential diagnoses, and indications for immediate follow-up discussed with patient.    Pathogenesis of diagnosis discussed including typical length and natural progression.      Instructed to return to  or nearest emergency department if symptoms fail to improve, for any change in condition, further concerns, or new concerning symptoms.  Patient states " understanding of the plan of care and discharge instructions.

## 2018-07-28 ENCOUNTER — APPOINTMENT (OUTPATIENT)
Dept: RADIOLOGY | Facility: IMAGING CENTER | Age: 78
End: 2018-07-28
Attending: PHYSICIAN ASSISTANT
Payer: MEDICARE

## 2018-07-28 ENCOUNTER — OFFICE VISIT (OUTPATIENT)
Dept: URGENT CARE | Facility: PHYSICIAN GROUP | Age: 78
End: 2018-07-28
Payer: MEDICARE

## 2018-07-28 VITALS
HEIGHT: 64 IN | WEIGHT: 260 LBS | BODY MASS INDEX: 44.39 KG/M2 | DIASTOLIC BLOOD PRESSURE: 70 MMHG | OXYGEN SATURATION: 95 % | TEMPERATURE: 99.1 F | RESPIRATION RATE: 16 BRPM | SYSTOLIC BLOOD PRESSURE: 110 MMHG | HEART RATE: 69 BPM

## 2018-07-28 DIAGNOSIS — J98.01 BRONCHOSPASM: ICD-10-CM

## 2018-07-28 DIAGNOSIS — J44.1 COPD EXACERBATION (HCC): ICD-10-CM

## 2018-07-28 PROCEDURE — 71046 X-RAY EXAM CHEST 2 VIEWS: CPT | Mod: TC | Performed by: PHYSICIAN ASSISTANT

## 2018-07-28 PROCEDURE — 99214 OFFICE O/P EST MOD 30 MIN: CPT | Performed by: PHYSICIAN ASSISTANT

## 2018-07-28 RX ORDER — METHYLPREDNISOLONE 4 MG/1
TABLET ORAL
Qty: 1 KIT | Refills: 0 | Status: SHIPPED | OUTPATIENT
Start: 2018-07-28 | End: 2018-11-30

## 2018-07-28 ASSESSMENT — ENCOUNTER SYMPTOMS
PALPITATIONS: 0
CHILLS: 0
SHORTNESS OF BREATH: 1
FEVER: 0
SPUTUM PRODUCTION: 0
SORE THROAT: 0
COUGH: 1
HEMOPTYSIS: 0
WHEEZING: 0

## 2018-07-28 ASSESSMENT — COPD QUESTIONNAIRES: COPD: 1

## 2018-07-28 NOTE — PROGRESS NOTES
Subjective:      Camille Rodriguez is a 77 y.o. female who presents with Cough (feels like a cough with fluid, cough wont go away, x1 week)            Cough   This is a new problem. The current episode started 1 to 4 weeks ago. The problem has been unchanged. The problem occurs constantly. The cough is non-productive. Associated symptoms include shortness of breath. Pertinent negatives include no chest pain, chills, ear pain, fever, hemoptysis, sore throat or wheezing. The symptoms are aggravated by lying down. She has tried steroid inhaler and leukotriene antagonists (antibiotics) for the symptoms. The treatment provided no relief. Her past medical history is significant for COPD and emphysema.       Review of Systems   Constitutional: Negative for chills, fever and malaise/fatigue.   HENT: Negative for congestion, ear pain and sore throat.    Respiratory: Positive for cough and shortness of breath. Negative for hemoptysis, sputum production and wheezing.    Cardiovascular: Negative for chest pain and palpitations.   All other systems reviewed and are negative.    PMH:  has a past medical history of Chickenpox; Chronic airway obstruction, not elsewhere classified; Clotting disorder (HCC); Diabetes; [place] measles; and Mumps. She also has no past medical history of Breast cancer ([facility]).  MEDS:   Current Outpatient Prescriptions:   •  MethylPREDNISolone (MEDROL DOSEPAK) 4 MG Tablet Therapy Pack, Take as directed on package., Disp: 1 Kit, Rfl: 0  •  amoxicillin-clavulanate (AUGMENTIN) 875-125 MG Tab, Take 1 Tab by mouth 2 times a day for 7 days., Disp: 14 Tab, Rfl: 0  •  ipratropium-albuterol (DUONEB) 0.5-2.5 (3) MG/3ML nebulizer solution, 3 mL by Nebulization route every four hours as needed for Shortness of Breath., Disp: 120 Vial, Rfl: 5  •  nitrofurantoin monohydr macro (MACROBID) 100 MG Cap, Take 1 Cap by mouth 2 times a day., Disp: 14 Cap, Rfl: 0  •  lisinopril (PRINIVIL) 20 MG Tab, , Disp: , Rfl:   •   budesonide-formoterol (SYMBICORT) 160-4.5 MCG/ACT Aerosol, Inhale 2 Puffs by mouth 2 Times a Day., Disp: , Rfl:   •  doxycycline (VIBRAMYCIN) 100 MG Tab, Take 1 Tab by mouth 2 times a day., Disp: 10 Tab, Rfl: 0  •  sitagliptan-metformin (JANUMET)  MG per tablet, Take 1 Tab by mouth 2 times a day, with meals., Disp: , Rfl:   •  Blood Glucose Monitoring Suppl (FREESTYLE LITE) Device, , Disp: , Rfl:   •  FREESTYLE LITE strip, , Disp: , Rfl:   •  levothyroxine (SYNTHROID) 125 MCG Tab, , Disp: , Rfl:   •  lisinopril (PRINIVIL, ZESTRIL) 40 MG tablet, , Disp: , Rfl:   •  Tiotropium Bromide Monohydrate (SPIRIVA RESPIMAT) 2.5 MCG/ACT Aero Soln, Inhale 1 Puff by mouth every day at 6 PM., Disp: , Rfl:   •  Fluticasone Furoate-Vilanterol (BREO ELLIPTA) 200-25 MCG/INH AEROSOL POWDER, BREATH ACTIVATED, Inhale 1 Puff by mouth every day at 6 PM., Disp: , Rfl:   •  Probiotic Product (TRUBIOTICS) Cap, Take 1 Capsule by mouth every day at 6 PM., Disp: , Rfl:   •  Cyanocobalamin (VITAMIN B12) 100 MCG Tab, Take 1 Tab by mouth every day at 6 PM., Disp: , Rfl:   •  acetaminophen (EQ ARTHRITIS PAIN) 650 MG CR tablet, Take 650-1,300 mg by mouth every 6 hours as needed for Moderate Pain., Disp: , Rfl:   •  non-formulary med, Inhale 2 L by mouth Continuous. Oxygen 2L continuous via nasal cannula  Indications: COPD, SOB, Disp: , Rfl:   •  albuterol 108 (90 BASE) MCG/ACT Aero Soln inhalation aerosol, Inhale 2 Puffs by mouth every four hours as needed for Shortness of Breath., Disp: 8.5 g, Rfl: 0  •  hydrochlorothiazide (HYDRODIURIL) 25 MG Tab, Take 25 mg by mouth every day., Disp: , Rfl:   •  Omega-3 Fatty Acids (FISH OIL) 1000 MG Cap capsule, Take 1,000 mg by mouth every day., Disp: , Rfl:   •  atenolol (TENORMIN) 50 MG TABS, Take 50 mg by mouth every day., Disp: , Rfl:   •  simvastatin (ZOCOR) 10 MG TABS, Take 10 mg by mouth every evening., Disp: , Rfl:   •  Calcium Carbonate-Vit D-Min (CALCIUM 1200 PO), Take  by mouth., Disp: , Rfl:  "  •  Cholecalciferol (VITAMIN D) 2000 UNIT TABS, Take 5,000 Units by mouth every day at 6 PM., Disp: , Rfl:   •  Multiple Vitamin (MULTIVITAMIN PO), Take  by mouth., Disp: , Rfl:   •  aspirin 81 MG tablet, Take 81 mg by mouth every day. needs to p/u at store, Disp: , Rfl:   ALLERGIES:   Allergies   Allergen Reactions   • Zithromax [Azithromycin] Diarrhea     Pt states severe diarrhea     SURGHX:   Past Surgical History:   Procedure Laterality Date   • PB REMV 2ND CATARACT,CORN-SCLER SECTN       SOCHX:  reports that she quit smoking about 11 years ago. Her smoking use included Cigarettes. She has a 69.00 pack-year smoking history. She has never used smokeless tobacco. She reports that she does not drink alcohol or use drugs.  FH: Family history was reviewed, no pertinent findings to report  Medications, Allergies, and current problem list reviewed today in Epic       Objective:     /70   Pulse 69   Temp 37.3 °C (99.1 °F)   Resp 16   Ht 1.626 m (5' 4\")   Wt 117.9 kg (260 lb)   SpO2 95%   BMI 44.63 kg/m²      Physical Exam   Constitutional: She is oriented to person, place, and time. She appears well-developed and well-nourished. She is active.  Non-toxic appearance. She does not have a sickly appearance. She does not appear ill. No distress. She is not intubated.   HENT:   Head: Normocephalic and atraumatic.   Right Ear: Hearing, tympanic membrane, external ear and ear canal normal.   Left Ear: Hearing, tympanic membrane, external ear and ear canal normal.   Nose: Nose normal.   Mouth/Throat: Uvula is midline, oropharynx is clear and moist and mucous membranes are normal.   Eyes: Conjunctivae, EOM and lids are normal.   Neck: Normal range of motion and full passive range of motion without pain. Neck supple.   Cardiovascular: Regular rhythm, S1 normal, S2 normal and normal heart sounds.  Exam reveals no gallop and no friction rub.    No murmur heard.  Pulmonary/Chest: Effort normal. No accessory muscle " usage. No apnea, no tachypnea and no bradypnea. She is not intubated. No respiratory distress. She has no decreased breath sounds. She has wheezes. She has no rhonchi. She has no rales. She exhibits no tenderness.   Musculoskeletal: Normal range of motion.   Neurological: She is alert and oriented to person, place, and time.   Skin: Skin is warm and dry.   Psychiatric: She has a normal mood and affect. Her speech is normal and behavior is normal. Judgment and thought content normal.   Vitals reviewed.         7/28/2018 11:10 AM    HISTORY/REASON FOR EXAM:  Productive cough    TECHNIQUE/EXAM DESCRIPTION:  PA and lateral views of the chest.    COMPARISON:  10/6/2017    FINDINGS:    The cardiomediastinal silhouette is stable. Atherosclerotic calcification is seen.  No focal consolidation, pleural effusion or pneumothorax is identified. Costophrenic angles are clear. There is mild biapical pleural thickening/scarring. Lungs are   hyperinflated. Degenerative changes are seen in the spine.       Impression       Emphysematous changes are again noted.    Atherosclerotic plaque.          Assessment/Plan:   Patient is a 77-year-old female who presents with cough for 4 weeks. She is seen for this problems days ago and was prescribed antibiotics. She states that antibiotics. With her other symptoms but her cough is still present. She has a history of emphysema and COPD. Vitals normal. Wheezes are heard in the right upper lobe. No acute findings on chest x-ray.    1. COPD exacerbation (HCC)  DX-CHEST-2 VIEWS    MethylPREDNISolone (MEDROL DOSEPAK) 4 MG Tablet Therapy Pack       Differential diagnosis, natural history, supportive care discussed. Follow-up with primary care provider within 7-10 days, emergency room precautions discussed.  Patient and/or family appears understanding of information.  Handout and review of patients diagnosis and treatment was discussed extensively.

## 2018-07-28 NOTE — PATIENT INSTRUCTIONS
Bronchospasm, Adult  A bronchospasm is a spasm or tightening of the airways going into the lungs. During a bronchospasm breathing becomes more difficult because the airways get smaller. When this happens there can be coughing, a whistling sound when breathing (wheezing), and difficulty breathing. Bronchospasm is often associated with asthma, but not all patients who experience a bronchospasm have asthma.  What are the causes?  A bronchospasm is caused by inflammation or irritation of the airways. The inflammation or irritation may be triggered by:  · Allergies (such as to animals, pollen, food, or mold). Allergens that cause bronchospasm may cause wheezing immediately after exposure or many hours later.  · Infection. Viral infections are believed to be the most common cause of bronchospasm.  · Exercise.  · Irritants (such as pollution, cigarette smoke, strong odors, aerosol sprays, and paint fumes).  · Weather changes. Winds increase molds and pollens in the air. Rain refreshes the air by washing irritants out. Cold air may cause inflammation.  · Stress and emotional upset.  What are the signs or symptoms?  · Wheezing.  · Excessive nighttime coughing.  · Frequent or severe coughing with a simple cold.  · Chest tightness.  · Shortness of breath.  How is this diagnosed?  Bronchospasm is usually diagnosed through a history and physical exam. Tests, such as chest X-rays, are sometimes done to look for other conditions.  How is this treated?  · Inhaled medicines can be given to open up your airways and help you breathe. The medicines can be given using either an inhaler or a nebulizer machine.  · Corticosteroid medicines may be given for severe bronchospasm, usually when it is associated with asthma.  Follow these instructions at home:  · Always have a plan prepared for seeking medical care. Know when to call your health care provider and local emergency services (911 in the [place] Know where you can access local  emergency care.  · Only take medicines as directed by your health care provider.  · If you were prescribed an inhaler or nebulizer machine, ask your health care provider to explain how to use it correctly. Always use a spacer with your inhaler if you were given one.  · It is necessary to remain calm during an attack. Try to relax and breathe more slowly.  · Control your home environment in the following ways:  ¨ Change your heating and air conditioning filter at least once a month.  ¨ Limit your use of fireplaces and wood stoves.  ¨ Do not smoke and do not allow smoking in your home.  ¨ Avoid exposure to perfumes and fragrances.  ¨ Get rid of pests (such as roaches and mice) and their droppings.  ¨ Throw away plants if you see mold on them.  ¨ Keep your house clean and dust free.  ¨ Replace carpet with wood, tile, or vinyl josé antonio. Carpet can trap dander and dust.  ¨ Use allergy-proof pillows, mattress covers, and box spring covers.  ¨ Wash bed sheets and blankets every week in hot water and dry them in a dryer.  ¨ Use blankets that are made of polyester or cotton.  ¨ Wash hands frequently.  Contact a health care provider if:  · You have muscle aches.  · You have chest pain.  · The sputum changes from clear or white to yellow, green, gray, or bloody.  · The sputum you cough up gets thicker.  · There are problems that may be related to the medicine you are given, such as a rash, itching, swelling, or trouble breathing.  Get help right away if:  · You have worsening wheezing and coughing even after taking your prescribed medicines.  · You have increased difficulty breathing.  · You develop severe chest pain.  This information is not intended to replace advice given to you by your health care provider. Make sure you discuss any questions you have with your health care provider.  Document Released: 12/20/2004 Document Revised: 05/25/2017 Document Reviewed: 06/09/2014  [employer] Interactive Patient Education © 2017  [employer]

## 2018-10-04 ENCOUNTER — TELEPHONE (OUTPATIENT)
Dept: MEDICAL GROUP | Facility: PHYSICIAN GROUP | Age: 78
End: 2018-10-04

## 2018-10-04 NOTE — TELEPHONE ENCOUNTER
Future Appointments       Provider Department Center    10/5/2018 9:45 AM Ruthie Escobedo P.A.-C. [facility]    11/30/2018 10:30 AM RODDY Edmonds [facility]         NEW PATIENT VISIT PRE-VISIT PLANNING    1.  [facility] Patient is checked in Patient Demographics? YES    2.  Immunizations were updated in [facility] using WebIZ?: Yes       •  Web Iz Recommendations: FLU, TD and ZOSTAVAX (Shingles)    3.  Is this appointment scheduled as a Hospital Follow-Up? No    4.  Patient is due for the following Health Maintenance Topics:   Health Maintenance Due   Topic Date Due   • Annual Wellness Visit  1940   • DIABETES MONOFILAMENT / LE EXAM  06/11/1942   • RETINAL SCREENING  12/11/1959   • IMM HEP B VACCINE (1 of 3 - Risk 3-dose series) 12/11/1960   • IMM DTaP/Tdap/Td Vaccine (1 - Tdap) 12/11/1960   • PAP SMEAR  12/11/1962   • COLONOSCOPY  12/11/1991   • IMM ZOSTER VACCINES (2 of 3) 06/18/2015   • IMM PNEUMOCOCCAL  (2 of 2 - PPSV23) 04/19/2017   • A1C SCREENING  07/12/2018   • PFT SCREENING-FEV1 AND FEV/FVC RATIO / SPIROMETRY SHOULD BE PERFORMED ANNUALLY  08/16/2018   • IMM INFLUENZA (1) 09/01/2018       5.  Reviewed/Updated the following with patient:   •   Preferred Pharmacy? NO       •   Preferred Lab? NO       •   Preferred Communication? NO       •   Allergies? NO       •   Medications? NO       •   Social History? NO       •   Family History (document living status of immediate family members and if + hx of cancer, diabetes, hypertension, hyperlipidemia, heart attack, stroke) NO    6.  Updated Care Team?       •   DME Company (gait device, O2, CPAP, etc.) NO       •   Other Specialists (eye doctor, derm, GYN, cardiology, endo, etc): NO    7.  MDX printed for Provider? NO complaint during visit 03/20/18    8.  Patient was informed to arrive 15 min prior to their   scheduled appointment and bring in their medication bottles.

## 2018-10-05 ENCOUNTER — OFFICE VISIT (OUTPATIENT)
Dept: MEDICAL GROUP | Facility: PHYSICIAN GROUP | Age: 78
End: 2018-10-05
Payer: MEDICARE

## 2018-10-05 VITALS
WEIGHT: 247 LBS | HEART RATE: 76 BPM | OXYGEN SATURATION: 96 % | DIASTOLIC BLOOD PRESSURE: 70 MMHG | TEMPERATURE: 97.6 F | BODY MASS INDEX: 42.17 KG/M2 | SYSTOLIC BLOOD PRESSURE: 114 MMHG | HEIGHT: 64 IN

## 2018-10-05 DIAGNOSIS — J43.9 PULMONARY EMPHYSEMA, UNSPECIFIED EMPHYSEMA TYPE (HCC): ICD-10-CM

## 2018-10-05 DIAGNOSIS — I10 ESSENTIAL HYPERTENSION: ICD-10-CM

## 2018-10-05 DIAGNOSIS — J96.11 CHRONIC RESPIRATORY FAILURE WITH HYPOXIA (HCC): ICD-10-CM

## 2018-10-05 DIAGNOSIS — E11.22 TYPE 2 DIABETES MELLITUS WITH STAGE 3 CHRONIC KIDNEY DISEASE, WITHOUT LONG-TERM CURRENT USE OF INSULIN (HCC): ICD-10-CM

## 2018-10-05 DIAGNOSIS — N18.30 TYPE 2 DIABETES MELLITUS WITH STAGE 3 CHRONIC KIDNEY DISEASE, WITHOUT LONG-TERM CURRENT USE OF INSULIN (HCC): ICD-10-CM

## 2018-10-05 DIAGNOSIS — I44.7 LEFT BUNDLE BRANCH BLOCK: ICD-10-CM

## 2018-10-05 DIAGNOSIS — Z23 NEED FOR VACCINATION: ICD-10-CM

## 2018-10-05 DIAGNOSIS — E03.9 HYPOTHYROIDISM, UNSPECIFIED TYPE: ICD-10-CM

## 2018-10-05 PROBLEM — D64.9 ANEMIA: Status: RESOLVED | Noted: 2017-06-03 | Resolved: 2018-10-05

## 2018-10-05 PROBLEM — N39.0 UTI (URINARY TRACT INFECTION): Status: RESOLVED | Noted: 2017-06-03 | Resolved: 2018-10-05

## 2018-10-05 LAB
HBA1C MFR BLD: 7.1 % (ref ?–5.8)
INT CON NEG: NEGATIVE
INT CON POS: POSITIVE

## 2018-10-05 PROCEDURE — 90662 IIV NO PRSV INCREASED AG IM: CPT | Performed by: PHYSICIAN ASSISTANT

## 2018-10-05 PROCEDURE — G0008 ADMIN INFLUENZA VIRUS VAC: HCPCS | Performed by: PHYSICIAN ASSISTANT

## 2018-10-05 PROCEDURE — 83036 HEMOGLOBIN GLYCOSYLATED A1C: CPT | Performed by: PHYSICIAN ASSISTANT

## 2018-10-05 PROCEDURE — 99214 OFFICE O/P EST MOD 30 MIN: CPT | Mod: 25 | Performed by: PHYSICIAN ASSISTANT

## 2018-10-05 ASSESSMENT — PATIENT HEALTH QUESTIONNAIRE - PHQ9: CLINICAL INTERPRETATION OF PHQ2 SCORE: 0

## 2018-10-05 ASSESSMENT — PAIN SCALES - GENERAL: PAINLEVEL: NO PAIN

## 2018-10-05 NOTE — PROGRESS NOTES
Subjective:   Camille Rodriguez is a 77 y.o. female here today for follow up on diabetes and other chronic conditions. Is a new patient to me and is also establishing care today.    Previous PCP: Mirella Mistry MD    HPI:     Patient presents to the office today to establish care with me. She has type 2 diabetes currently treated with Janumet  twice a day. She states that her fasting blood sugars have been running fine, typically in the low 100s when she checks them fasting in the morning. Her last A1c was in January and was 7.7 at that time. She has chronic kidney disease not currently followed by nephrology. She states that her kidney function has been very stable. She has hypertension treated with lisinopril 40 mg daily and atenolol 50 mg daily. Does not currently check home readings. Has hypothyroidism treated with levothyroxine 125 mcg daily. Last TSH in 1/2018. She also follows with pulmonology here regularly for COPD and chronic respiratory failure. She is on 24 7 oxygen, 3 L. She is on several inhalers including Spiriva, Breo Ellipta and PRN albuterol. She feels her breathing has been relatively stable, slight increase in shortness of breath lately from congestion in her chest. Has had some nasal congestion and increased cough as well. She believes that she's been dealing with allergies. Has not yet tried taking any allergy medication. She does state that she has some antibiotics at home to use on an as-needed basis from her pulmonologist. She has not taken anything yet. Patient denies any acute concerns today.      Current medicines (including changes today)  Current Outpatient Prescriptions   Medication Sig Dispense Refill   • ipratropium-albuterol (DUONEB) 0.5-2.5 (3) MG/3ML nebulizer solution 3 mL by Nebulization route every four hours as needed for Shortness of Breath. 120 Vial 5   • lisinopril (PRINIVIL) 20 MG Tab      • sitagliptan-metformin (JANUMET)  MG per tablet Take 1 Tab by mouth 2  times a day, with meals.     • FREESTYLE LITE strip      • levothyroxine (SYNTHROID) 125 MCG Tab      • Tiotropium Bromide Monohydrate (SPIRIVA RESPIMAT) 2.5 MCG/ACT Aero Soln Inhale 1 Puff by mouth every day at 6 PM.     • Probiotic Product (TRUBIOTICS) Cap Take 1 Capsule by mouth every day at 6 PM.     • Cyanocobalamin (VITAMIN B12) 100 MCG Tab Take 1 Tab by mouth every day at 6 PM.     • acetaminophen (EQ ARTHRITIS PAIN) 650 MG CR tablet Take 650-1,300 mg by mouth every 6 hours as needed for Moderate Pain.     • albuterol 108 (90 BASE) MCG/ACT Aero Soln inhalation aerosol Inhale 2 Puffs by mouth every four hours as needed for Shortness of Breath. 8.5 g 0   • hydrochlorothiazide (HYDRODIURIL) 25 MG Tab Take 25 mg by mouth every day.     • Omega-3 Fatty Acids (FISH OIL) 1000 MG Cap capsule Take 1,000 mg by mouth every day.     • simvastatin (ZOCOR) 10 MG TABS Take 10 mg by mouth every evening.     • Calcium Carbonate-Vit D-Min (CALCIUM 1200 PO) Take  by mouth.     • Cholecalciferol (VITAMIN D) 2000 UNIT TABS Take 5,000 Units by mouth every day at 6 PM.     • Multiple Vitamin (MULTIVITAMIN PO) Take  by mouth.     • aspirin 81 MG tablet Take 81 mg by mouth every day. needs to p/u at store     • MethylPREDNISolone (MEDROL DOSEPAK) 4 MG Tablet Therapy Pack Take as directed on package. 1 Kit 0   • budesonide-formoterol (SYMBICORT) 160-4.5 MCG/ACT Aerosol Inhale 2 Puffs by mouth 2 Times a Day.     • Blood Glucose Monitoring Suppl (FREESTYLE LITE) Device      • lisinopril (PRINIVIL, ZESTRIL) 40 MG tablet      • Fluticasone Furoate-Vilanterol (BREO ELLIPTA) 200-25 MCG/INH AEROSOL POWDER, BREATH ACTIVATED Inhale 1 Puff by mouth every day at 6 PM.     • non-formulary med Inhale 2 L by mouth Continuous. Oxygen 2L continuous via nasal cannula  Indications: COPD, SOB     • atenolol (TENORMIN) 50 MG TABS Take 50 mg by mouth every day.       No current facility-administered medications for this visit.      She  has a past medical  "history of Chickenpox; Chronic airway obstruction, not elsewhere classified; Clotting disorder ([facility]); Diabetes; Malian measles; and Mumps. She also has no past medical history of Breast cancer ([facility]).    ROS  Pulmonary ROS: Positive for chronic dyspnea on exertion - slightly worse lately   Cardiovascular ROS: No chest pain  Neurologic ROS: Positive for intermittent tingling in feet - states only when she reclines her legs       Objective:     Blood pressure 114/70, pulse 76, temperature 36.4 °C (97.6 °F), temperature source Temporal, height 1.626 m (5' 4\"), weight 112 kg (247 lb), SpO2 96 %, not currently breastfeeding. Body mass index is 42.4 kg/m².     Physical Exam:  Constitutional: Alert, obese, no distress.  Skin: Warm, dry, good turgor, no rashes in visible areas.  Eye: Pupils are equal and round, conjunctiva clear, lids normal.  ENMT: External ear canals show mild cerumen. R TM not visualized d/t cerumen. L TM is gray and without injection or bulging. Nasal turbinates appear mildly inflamed. No rhinorrhea visualized. Dried blood noted in right nostril. Lips without lesions, moist mucus membranes. No pharyngeal erythema.  Neck: No masses. No submandibular or cervical lymphadenopathy.  Respiratory: Unlabored respiratory effort, SpO2 96% on 3 L O2 via nasal cannula. Lungs clear to auscultation, no wheezes, no rhonchi. Decreased air movement throughout.  Cardiovascular: Normal S1, S2, no murmur.      Assessment and Plan:   The following treatment plan was discussed    1. Type 2 diabetes mellitus with stage 3 chronic kidney disease, without long-term current use of insulin ([facility])  Chronic issue, well-controlled. A1c in January was 7.7, but A1c on re-check today in the office came back improved at 7.1. Recommend continuing current management with Janumet. Per review, GFR has actually shown some improvement in the last few years. Now in the high 50s. Will continue to monitor at least annually. Patient counseled to " avoid OTC anti-inflammatories including Ibuprofen and naproxen.    2. Essential hypertension  Chronic issue, well controlled on current medication regimen. Continue lisinopril and atenolol at current doses.    3. Pulmonary emphysema, unspecified emphysema type (HCC)  Chronic issue, relatively stable on current regimen with the exception of last few days. Has had increase in nasal congestion/chest congestion. Lungs clear on exam. Patient attributing to allergies. Patient advised to take OTC antihistamine to see if that helps. If symptoms continue to progress, should let me know and we can consider starting antibiotics. Has script from pulmonologist already. Otherwise, continue current management.    4. Chronic respiratory failure with hypoxia (HCC)  Chronic issue secondary to above. Well-controlled on 24/7 O2. Pulse ox today is 96%. Continue current management including regular pulmonology follow-up.    5. Hypothyroidism, unspecified type  Chronic issue, well-controlled on current dose of levothyroxine. Last TSH done in 1/2018 was normal at 2.840. Continue current management.    6. Left bundle branch block  Chronic finding on EKG. Asymptomatic.     7. Need for vaccination  - Influenza Vaccine, High Dose (65+ Only)      Followup: Return in about 6 months (around 4/5/2019).    Ruthie Escobedo P.A.-C.

## 2018-10-05 NOTE — LETTER
[facility]  Ruthie Escobedo P.A.-C.  [address]  [place] [address]  Fax: [phone]   Authorization for Release/Disclosure of   Protected Health Information   Name: LETICIA SINGH : 1940 SSN: [id number]   Address: [facility] [address] [place] [address] Phone:    [phone] (home)    I authorize the entity listed below to release/disclose the PHI below to:   [facility]/Ruthie Escobedo P.A.-C. and Ruthie Escobedo P.A.-C.   Provider or Entity Name:  Dr. Mistry   Address: [address] [place] [address], Zip: [place] [address]   Phone:  [phone]    Fax:     Reason for request: continuity of care   Information to be released:    [  ] LAST COLONOSCOPY,  including any PATH REPORT and follow-up  [  ] LAST FIT/COLOGUARD RESULT [  ] LAST DEXA  [  ] LAST MAMMOGRAM  [  ] LAST PAP  [  ] LAST LABS [  ] RETINA EXAM REPORT  [  ] IMMUNIZATION RECORDS  [ x ] Release all info      [  ] Check here and initial the line next to each item to release ALL health information INCLUDING  _____ Care and treatment for drug and / or alcohol abuse  _____ HIV testing, infection status, or AIDS  _____ Genetic Testing    DATES OF SERVICE OR TIME PERIOD TO BE DISCLOSED: _____________  I understand and acknowledge that:  * This Authorization may be revoked at any time by you in writing, except if your health information has already been used or disclosed.  * Your health information that will be used or disclosed as a result of you signing this authorization could be re-disclosed by the recipient. If this occurs, your re-disclosed health information may no longer be protected by State or Federal laws.  * You may refuse to sign this Authorization. Your refusal will not affect your ability to obtain treatment.  * This Authorization becomes effective upon signing and will  on (date) __________.      If no date is indicated, this Authorization will  one (1) year from the signature date.     Name: Camille Moreno Michael    Signature:   Date:     10/5/2018       PLEASE FAX REQUESTED RECORDS BACK TO: [phone]

## 2018-10-18 DIAGNOSIS — H91.90 HEARING LOSS, UNSPECIFIED HEARING LOSS TYPE, UNSPECIFIED LATERALITY: ICD-10-CM

## 2018-10-20 ENCOUNTER — OFFICE VISIT (OUTPATIENT)
Dept: URGENT CARE | Facility: PHYSICIAN GROUP | Age: 78
End: 2018-10-20
Payer: MEDICARE

## 2018-10-20 VITALS
HEIGHT: 64 IN | RESPIRATION RATE: 16 BRPM | WEIGHT: 248 LBS | BODY MASS INDEX: 42.34 KG/M2 | OXYGEN SATURATION: 93 % | SYSTOLIC BLOOD PRESSURE: 126 MMHG | DIASTOLIC BLOOD PRESSURE: 74 MMHG | TEMPERATURE: 98.6 F | HEART RATE: 90 BPM

## 2018-10-20 DIAGNOSIS — J98.01 BRONCHOSPASM: ICD-10-CM

## 2018-10-20 DIAGNOSIS — J32.9 BACTERIAL SINUSITIS: ICD-10-CM

## 2018-10-20 DIAGNOSIS — B96.89 BACTERIAL SINUSITIS: ICD-10-CM

## 2018-10-20 DIAGNOSIS — J44.9 CHRONIC OBSTRUCTIVE PULMONARY DISEASE, UNSPECIFIED COPD TYPE (HCC): ICD-10-CM

## 2018-10-20 PROCEDURE — 99214 OFFICE O/P EST MOD 30 MIN: CPT | Performed by: NURSE PRACTITIONER

## 2018-10-20 RX ORDER — METHYLPREDNISOLONE 4 MG/1
4 TABLET ORAL DAILY
Qty: 1 KIT | Refills: 0 | Status: SHIPPED | OUTPATIENT
Start: 2018-10-20 | End: 2018-11-30

## 2018-10-20 RX ORDER — DOXYCYCLINE HYCLATE 100 MG
100 TABLET ORAL 2 TIMES DAILY
Qty: 14 TAB | Refills: 0 | Status: SHIPPED | OUTPATIENT
Start: 2018-10-20 | End: 2018-10-27

## 2018-10-20 RX ORDER — BENZONATATE 100 MG/1
100 CAPSULE ORAL 3 TIMES DAILY PRN
Qty: 60 CAP | Refills: 0 | Status: SHIPPED | OUTPATIENT
Start: 2018-10-20 | End: 2018-11-30

## 2018-10-20 ASSESSMENT — ENCOUNTER SYMPTOMS
SHORTNESS OF BREATH: 0
FEVER: 0
COUGH: 1
HEADACHES: 1
SINUS PAIN: 1
SPUTUM PRODUCTION: 1

## 2018-10-20 ASSESSMENT — COPD QUESTIONNAIRES: COPD: 1

## 2018-10-20 NOTE — PROGRESS NOTES
Subjective:      Camille Rodriguez is a 77 y.o. female who presents with Cough (congestion x 3 weeks )            Cough   This is a new problem. Episode onset: pt reports onset of cough with congestion for 3 weeks. She admits the sinus congestion has waxed and wained for 3 weeks. But now her symptoms are not improving. She denies any recent fever. She is on home oxygen at 3L, is not requiring more O2 at this time. The problem has been unchanged. The cough is productive of sputum. Associated symptoms include ear congestion, headaches and nasal congestion. Pertinent negatives include no fever or shortness of breath. Risk factors for lung disease include smoking/tobacco exposure. She has tried a beta-agonist inhaler and rest (albuterol nebulizer when needed) for the symptoms. The treatment provided no relief. Her past medical history is significant for COPD and pneumonia.       Review of Systems   Constitutional: Negative for fever.   HENT: Positive for congestion and sinus pain.    Respiratory: Positive for cough and sputum production. Negative for shortness of breath.    Neurological: Positive for headaches.   All other systems reviewed and are negative.    Past Medical History:   Diagnosis Date   • Chickenpox    • Chronic airway obstruction, not elsewhere classified    • Clotting disorder (HCC)    • Diabetes    • Haitian measles    • Mumps       Past Surgical History:   Procedure Laterality Date   • PB REMV 2ND CATARACT,CORN-SCLER SECTN        Social History     Social History   • Marital status:      Spouse name: N/A   • Number of children: N/A   • Years of education: N/A     Occupational History   • Not on file.     Social History Main Topics   • Smoking status: Former Smoker     Packs/day: 1.50     Years: 46.00     Types: Cigarettes     Quit date: 2/5/2007   • Smokeless tobacco: Never Used      Comment: Quit 2007   • Alcohol use No   • Drug use: No   • Sexual activity: No     Other Topics Concern   •  "Not on file     Social History Narrative   • No narrative on file          Objective:     /74   Pulse 90   Temp 37 °C (98.6 °F) (Temporal)   Resp 16   Ht 1.626 m (5' 4\")   Wt 112.5 kg (248 lb)   SpO2 93% Comment: on 3 liters  BMI 42.57 kg/m²      Physical Exam   Constitutional: She is oriented to person, place, and time. Vital signs are normal. She appears well-developed and well-nourished.   HENT:   Head: Normocephalic and atraumatic.   Right Ear: Tympanic membrane and external ear normal.   Left Ear: Tympanic membrane and external ear normal.   Nose: Mucosal edema, rhinorrhea and sinus tenderness present. Right sinus exhibits maxillary sinus tenderness. Left sinus exhibits maxillary sinus tenderness.   Eyes: Pupils are equal, round, and reactive to light. EOM are normal.   Neck: Normal range of motion.   Cardiovascular: Normal rate and regular rhythm.    Pulmonary/Chest: Effort normal and breath sounds normal.   Bronchospastic cough   Musculoskeletal: Normal range of motion.   Neurological: She is alert and oriented to person, place, and time.   Skin: Skin is warm and dry. Capillary refill takes less than 2 seconds.   Psychiatric: She has a normal mood and affect. Her speech is normal and behavior is normal. Thought content normal.   Vitals reviewed.              Assessment/Plan:     1. Bacterial sinusitis  - doxycycline (VIBRAMYCIN) 100 MG Tab; Take 1 Tab by mouth 2 times a day for 7 days.  Dispense: 14 Tab; Refill: 0    2. Chronic obstructive pulmonary disease, unspecified COPD type (HCC)  - MethylPREDNISolone (MEDROL DOSEPAK) 4 MG Tablet Therapy Pack; Take 1 Tab by mouth every day.  Dispense: 1 Kit; Refill: 0    3. Bronchospasm  - MethylPREDNISolone (MEDROL DOSEPAK) 4 MG Tablet Therapy Pack; Take 1 Tab by mouth every day.  Dispense: 1 Kit; Refill: 0  - benzonatate (TESSALON) 100 MG Cap; Take 1 Cap by mouth 3 times a day as needed for Cough.  Dispense: 60 Cap; Refill: 0    VSS, O2 sats adequate with " 3L oxygen  Increase water intake  OTC decongestant as directed  Sleep with HOB elevated to improve cough at night  Strict ER precautions for new development of fever, increasing O2 need or new onset of SOB  Supportive care, differential diagnoses, and indications for immediate follow-up discussed with patient.    Pathogenesis of diagnosis discussed including typical length and natural progression.      Instructed to return to  or nearest emergency department if symptoms fail to improve, for any change in condition, further concerns, or new concerning symptoms.  Patient states understanding of the plan of care and discharge instructions.

## 2018-11-20 DIAGNOSIS — J44.9 CHRONIC OBSTRUCTIVE PULMONARY DISEASE, UNSPECIFIED COPD TYPE (HCC): ICD-10-CM

## 2018-11-20 NOTE — TELEPHONE ENCOUNTER
Was the patient seen in the last year in this department? Yes    Does patient have an active prescription for medications requested? Yes    Received Request Via: Patient     Pt would like to know If she can have a refill on dulera as well. I do not see this on her medication list. Please advise

## 2018-11-22 NOTE — TELEPHONE ENCOUNTER
Refill done. Please advise patient that she had told me she was on Breo Ellipta at last appointment. Can you please clarify if her pulmonologist switched her to Dulera? Should only take one or the other, not both.  Ruthie Escobedo P.A.-C.

## 2018-11-27 ENCOUNTER — TELEPHONE (OUTPATIENT)
Dept: MEDICAL GROUP | Facility: PHYSICIAN GROUP | Age: 78
End: 2018-11-27

## 2018-11-27 NOTE — TELEPHONE ENCOUNTER
I have refilled. Also noticed that Symbicort is on her list. Should not take Dulera with Symbicort.  Ruthie Escobedo P.A.-C.

## 2018-11-27 NOTE — TELEPHONE ENCOUNTER
1. Caller Name: [facility]                                         Call Back Number: N/A       Patient approves a detailed voicemail message: no    · Prior Auth paperwork received from [facility]      · All appropriate fields completed by Medical Assistant: yes - ON COVER MY MEDS     · Paperwork AWAITNG FOR APRROVAL

## 2018-11-27 NOTE — TELEPHONE ENCOUNTER
Phone Number Called: [phone] (home)     Message:pt states she is currently on 200-5 mcg    Left Message for patient to call back: no

## 2018-11-30 ENCOUNTER — OFFICE VISIT (OUTPATIENT)
Dept: PULMONOLOGY | Facility: HOSPICE | Age: 78
End: 2018-11-30
Payer: MEDICARE

## 2018-11-30 VITALS
HEIGHT: 64 IN | TEMPERATURE: 97.3 F | RESPIRATION RATE: 16 BRPM | WEIGHT: 264 LBS | BODY MASS INDEX: 45.07 KG/M2 | DIASTOLIC BLOOD PRESSURE: 90 MMHG | HEART RATE: 92 BPM | OXYGEN SATURATION: 78 % | SYSTOLIC BLOOD PRESSURE: 142 MMHG

## 2018-11-30 DIAGNOSIS — J96.11 CHRONIC RESPIRATORY FAILURE WITH HYPOXIA (HCC): Chronic | ICD-10-CM

## 2018-11-30 DIAGNOSIS — Z87.891 FORMER SMOKER: ICD-10-CM

## 2018-11-30 DIAGNOSIS — I10 ESSENTIAL HYPERTENSION: ICD-10-CM

## 2018-11-30 DIAGNOSIS — J44.9 CHRONIC OBSTRUCTIVE PULMONARY DISEASE, UNSPECIFIED COPD TYPE (HCC): Chronic | ICD-10-CM

## 2018-11-30 PROCEDURE — 99214 OFFICE O/P EST MOD 30 MIN: CPT | Performed by: NURSE PRACTITIONER

## 2018-11-30 RX ORDER — DOXYCYCLINE HYCLATE 100 MG/1
100 CAPSULE ORAL 2 TIMES DAILY
Qty: 20 CAP | Refills: 0 | Status: SHIPPED | OUTPATIENT
Start: 2018-11-30 | End: 2019-03-06

## 2018-11-30 RX ORDER — PREDNISONE 10 MG/1
TABLET ORAL
Qty: 18 TAB | Refills: 0 | Status: SHIPPED | OUTPATIENT
Start: 2018-11-30 | End: 2019-03-06

## 2018-11-30 NOTE — PROGRESS NOTES
Chief Complaint   Patient presents with   • COPD       HPI:  Camille Rodriguez is a 77 y.o. year old female here today for follow-up on Stage 2 COPD. She presents with her daughter in law.    Patient has a 46 year history of smoking 1.5-2 packs daily and quit in 2007. PFT 8/16/2017 indicated FEV1 1.46 L or 69%, FEV1/FVC ratio 74%, and DLCO 57% predicted. Patient is compliant with ICS/LABA - Dulera, Symbicort or Breo if in need of samples, and Spiriva Respimat 2.5mcg 2puff QD and ABEBA/nebulizer prn.  She has had difficulty staying on inhalers due to cost. Seh has found most benefit with Dulera. She is currently on Dulera 100mcg. She is compliant with 3L O2 24/7 but required 4L pulsed to maintain saturations in office. Today she notes acute symptoms starting 2 weeks ago with increased shortness of breath, hoarse voice, cough with throat clearing, wheezing but no significant chest tightness or sinus issues.  She has been more short of breath on exertion.  She was recently treated for a sinus infection with doxycycline with resolution of symptoms. She has not attended pulmonary rehab because prior PFT did not qualify her.  BMI 45.     Patient is a former smoker, and at high risk for lung nodules.  CT scan was obtained.  CT scan 6/3/2017 indicated emphysema, mild atelectasis and CAD. Repeat CT scan 5/18/2018 shows changes consistent with emphysema, no suspicious lung nodules, evidence of previous granulomatous disease and CAD. Patient will complete 2 years of monitoring in 6/2019.      ROS: As per HPI and otherwise negative if not stated.    Past Medical History:   Diagnosis Date   • Chickenpox    • Chronic airway obstruction, not elsewhere classified    • Clotting disorder (HCC)    • Diabetes    • Sudanese measles    • Mumps        Past Surgical History:   Procedure Laterality Date   • PB REMV 2ND CATARACT,CORN-SCLER SECTN         Family History   Problem Relation Age of Onset   • Heart Attack Father        Social  "History     Social History   • Marital status:      Spouse name: N/A   • Number of children: N/A   • Years of education: N/A     Occupational History   • Not on file.     Social History Main Topics   • Smoking status: Former Smoker     Packs/day: 1.50     Years: 46.00     Types: Cigarettes     Quit date: 2/5/2007   • Smokeless tobacco: Never Used      Comment: Quit 2007   • Alcohol use No   • Drug use: No   • Sexual activity: No     Other Topics Concern   • Not on file     Social History Narrative   • No narrative on file       Allergies as of 11/30/2018 - Reviewed 11/30/2018   Allergen Reaction Noted   • Zithromax [azithromycin] Diarrhea 06/03/2017        @Vital signs for this encounter:  Vitals:    11/30/18 1006   Height: 1.626 m (5' 4\")   Weight: 119.7 kg (264 lb)   Weight % change since last entry.: 0 %   BP: 142/90   Pulse: 92   BMI (Calculated): 45.32   Resp: 16   Temp: 36.3 °C (97.3 °F)   TempSrc: Temporal   O2 sat % on O2: (!) 78 %   O2 Flow Rate (L/min): 3       Current medications as of today   Current Outpatient Prescriptions   Medication Sig Dispense Refill   • doxycycline (VIBRAMYCIN) 100 MG Cap Take 1 Cap by mouth 2 times a day. Take until gone. 20 Cap 0   • predniSONE (DELTASONE) 10 MG Tab Take 30mg x 3 days, then take 20mg x 3 days, then take 10mg x 3 days, with food, then discontinue. 18 Tab 0   • Mometasone Furo-Formoterol Fum (DULERA) 200-5 MCG/ACT Aerosol Inhale 2 Puffs by mouth 2 Times a Day. Use spacer. Rinse mouth after use. 2 Inhaler 0   • Mometasone Furo-Formoterol Fum (DULERA) 200-5 MCG/ACT Aerosol Inhale 1 Puff by mouth 2 Times a Day. 1 Inhaler 5   • sitagliptan-metformin (JANUMET)  MG per tablet Take 1 Tab by mouth 2 times a day, with meals. 180 Tab 1   • ipratropium-albuterol (DUONEB) 0.5-2.5 (3) MG/3ML nebulizer solution 3 mL by Nebulization route every four hours as needed for Shortness of Breath. 120 Vial 5   • lisinopril (PRINIVIL) 20 MG Tab      • Blood Glucose " Monitoring Suppl (FREESTYLE LITE) Device      • FREESTYLE LITE strip      • levothyroxine (SYNTHROID) 125 MCG Tab      • lisinopril (PRINIVIL, ZESTRIL) 40 MG tablet      • Tiotropium Bromide Monohydrate (SPIRIVA RESPIMAT) 2.5 MCG/ACT Aero Soln Inhale 1 Puff by mouth every day at 6 PM.     • Probiotic Product (TRUBIOTICS) Cap Take 1 Capsule by mouth every day at 6 PM.     • Cyanocobalamin (VITAMIN B12) 100 MCG Tab Take 1 Tab by mouth every day at 6 PM.     • acetaminophen (EQ ARTHRITIS PAIN) 650 MG CR tablet Take 650-1,300 mg by mouth every 6 hours as needed for Moderate Pain.     • non-formulary med Inhale 2 L by mouth Continuous. Oxygen 2L continuous via nasal cannula  Indications: COPD, SOB     • albuterol 108 (90 BASE) MCG/ACT Aero Soln inhalation aerosol Inhale 2 Puffs by mouth every four hours as needed for Shortness of Breath. 8.5 g 0   • hydrochlorothiazide (HYDRODIURIL) 25 MG Tab Take 25 mg by mouth every day.     • Omega-3 Fatty Acids (FISH OIL) 1000 MG Cap capsule Take 1,000 mg by mouth every day.     • atenolol (TENORMIN) 50 MG TABS Take 50 mg by mouth every day.     • simvastatin (ZOCOR) 10 MG TABS Take 10 mg by mouth every evening.     • Calcium Carbonate-Vit D-Min (CALCIUM 1200 PO) Take  by mouth.     • Cholecalciferol (VITAMIN D) 2000 UNIT TABS Take 5,000 Units by mouth every day at 6 PM.     • Multiple Vitamin (MULTIVITAMIN PO) Take  by mouth.     • aspirin 81 MG tablet Take 81 mg by mouth every day. needs to p/u at store       No current facility-administered medications for this visit.          Physical Exam:   Gen:           Alert and oriented, No apparent distress. Mood and affect appropriate, normal interaction with examiner.  Eyes:          PERRL, EOM intact, sclere white, conjunctive moist.  Ears:          Not examined.   Hearing:     Grossly intact.  Nose:          Normal, no lesions or deformities.  Dentition:    Good dentition.  Oropharynx:   Tongue normal, posterior pharynx without  erythema or exudate.  Mallampati Classification: 2/3  Neck:        Supple, trachea midline, no masses.  Respiratory Effort: No intercostal retractions or use of accessory muscles.   Lung Auscultation:      Clear to auscultation bilaterally but diminished t/o; no rales, rhonchi. Trace expiratory wheeze.  CV:            Regular rate and rhythm. No murmurs, rubs or gallops.  Abd:           Not examined.   Lymphadenopathy: Not examined.  Gait and Station: Walker.  Digits and Nails: No clubbing, cyanosis, petechiae, or nodes.   Cranial Nerves: II-XII grossly intact.  Skin:        No rashes, lesions or ulcers noted.               Ext:           No cyanosis or edema.      Assessment:  1. Chronic obstructive pulmonary disease, unspecified COPD type ([facility])  PULMONARY FUNCTION TESTS -Test requested: Complete Pulmonary Function Test; Include MIPS/MEPS? No   2. Chronic respiratory failure with hypoxia ([facility])     3. Former smoker     4. BMI 45.0-49.9, adult ([facility])     5. Essential hypertension         Immunizations:    Flu:10/2018  Pneumovax 23:2015  Prevnar 13:2016    Plan:  1.  Rx for doxycycline with prednisone taper now for acute symptoms.  2.  Continue oxygen but titrate appropriately.  Educated patient on the need to increase her oxygen demand when exerting herself such as walking/showering.  She has a pulse oximeter at home and will monitor this closely.  3.  Discussed respiratory hygiene.  4.  Encouraged weight loss.  5.  CT scan of chest without contrast due June 2019 to complete 2 years of monitoring.  Due to recent smoking cessation may benefit from annual CT scan until 2022.  6.  Follow-up in 6 months with CT scan of chest and PFT, sooner if needed.  Advised patient to call office if her symptoms do not improve or report to urgent care.    Please note that this dictation was created using voice recognition software. I have made every reasonable attempt to correct obvious errors, but it is possible there are errors of  grammar and possibly content that I did not discover before finalizing the note.

## 2018-12-04 ENCOUNTER — TELEPHONE (OUTPATIENT)
Dept: PULMONOLOGY | Facility: HOSPICE | Age: 78
End: 2018-12-04

## 2018-12-04 NOTE — TELEPHONE ENCOUNTER
MEDICATION PRIOR AUTHORIZATION NEEDED:    1. Name of Medication: Dulera 200/5 mcg    2. Requested By (Name of Pharmacy): [facility]     3. Is insurance on file current? yes    4. What is the name & phone number of the 3rd party payor? [employer] [phone]

## 2018-12-19 ENCOUNTER — TELEPHONE (OUTPATIENT)
Dept: MEDICAL GROUP | Facility: PHYSICIAN GROUP | Age: 78
End: 2018-12-19

## 2018-12-19 DIAGNOSIS — N18.30 TYPE 2 DIABETES MELLITUS WITH STAGE 3 CHRONIC KIDNEY DISEASE, WITHOUT LONG-TERM CURRENT USE OF INSULIN (HCC): ICD-10-CM

## 2018-12-19 DIAGNOSIS — E11.22 TYPE 2 DIABETES MELLITUS WITH STAGE 3 CHRONIC KIDNEY DISEASE, WITHOUT LONG-TERM CURRENT USE OF INSULIN (HCC): ICD-10-CM

## 2018-12-19 NOTE — TELEPHONE ENCOUNTER
Phone Number Called: [phone]    Message: Called insurance for prior auth   [phone],  PA wasn't completed at the time. They suggested we contact primary insurance for PA. Submitted prior auth through covermymeds    Left Message for patient to call back: yes

## 2018-12-20 NOTE — TELEPHONE ENCOUNTER
1. Caller Name: Camille                                         Call Back Number: [phone] (home)       Patient approves a detailed voicemail message: no    Pt would like to know if you can send her a sample rx to the [facility] care pharmacy for  Janument . We are awating for Pior auth. Please advise.

## 2018-12-21 ENCOUNTER — TELEPHONE (OUTPATIENT)
Dept: MEDICAL GROUP | Facility: PHYSICIAN GROUP | Age: 78
End: 2018-12-21

## 2018-12-21 NOTE — TELEPHONE ENCOUNTER
Pt states PA was supposed to go through her secondary prescription coverage as they are the ones who denied coverage. Pt states Prior auth is not needed for SCP. I had Pt bring in her secondary prescription coverage for [facility] so we can get a PA started, however I submitted a PA through covermymeds and they responded saying they do not process PA's. Alexandra also called [facility] and they said the same. I called and lvm informing Pt.

## 2018-12-21 NOTE — TELEPHONE ENCOUNTER
Please inform patient that [facility] not carry the  mg tablets of Janumet, but they do have the 100-1000 mg extended-release tablets, so I will order those for her. She should take 1 tablet every morning. They have 12 tablets left.  Ruthie Escobedo P.A.-C.

## 2018-12-23 ENCOUNTER — OFFICE VISIT (OUTPATIENT)
Dept: URGENT CARE | Facility: PHYSICIAN GROUP | Age: 78
End: 2018-12-23
Payer: MEDICARE

## 2018-12-23 VITALS
SYSTOLIC BLOOD PRESSURE: 122 MMHG | WEIGHT: 262 LBS | DIASTOLIC BLOOD PRESSURE: 84 MMHG | TEMPERATURE: 97.8 F | OXYGEN SATURATION: 94 % | BODY MASS INDEX: 44.73 KG/M2 | HEIGHT: 64 IN | HEART RATE: 70 BPM | RESPIRATION RATE: 18 BRPM

## 2018-12-23 DIAGNOSIS — J06.9 UPPER RESPIRATORY TRACT INFECTION, UNSPECIFIED TYPE: Primary | ICD-10-CM

## 2018-12-23 DIAGNOSIS — R06.2 WHEEZING: ICD-10-CM

## 2018-12-23 DIAGNOSIS — Z87.09 HISTORY OF COPD: ICD-10-CM

## 2018-12-23 PROCEDURE — 99214 OFFICE O/P EST MOD 30 MIN: CPT | Performed by: NURSE PRACTITIONER

## 2018-12-23 RX ORDER — DOXYCYCLINE HYCLATE 100 MG
100 TABLET ORAL 2 TIMES DAILY
Qty: 20 TAB | Refills: 0 | Status: SHIPPED | OUTPATIENT
Start: 2018-12-23 | End: 2019-01-02

## 2018-12-23 RX ORDER — METHYLPREDNISOLONE 4 MG/1
TABLET ORAL
Qty: 1 KIT | Refills: 0 | Status: SHIPPED | OUTPATIENT
Start: 2018-12-23 | End: 2019-03-06

## 2018-12-23 ASSESSMENT — COPD QUESTIONNAIRES: COPD: 1

## 2018-12-23 ASSESSMENT — ENCOUNTER SYMPTOMS
RHINORRHEA: 0
PSYCHIATRIC NEGATIVE: 1
SORE THROAT: 0
EYES NEGATIVE: 1
SHORTNESS OF BREATH: 0
FEVER: 0
GASTROINTESTINAL NEGATIVE: 1
CONSTITUTIONAL NEGATIVE: 1
DIZZINESS: 0
NEUROLOGICAL NEGATIVE: 1
CARDIOVASCULAR NEGATIVE: 1
WHEEZING: 1
TINGLING: 0
COUGH: 1
SENSORY CHANGE: 0
BLURRED VISION: 0
MUSCULOSKELETAL NEGATIVE: 1
FOCAL WEAKNESS: 0

## 2018-12-23 NOTE — PATIENT INSTRUCTIONS
"Upper Respiratory Infection, Adult  Most upper respiratory infections (URIs) are a viral infection of the air passages leading to the lungs. A URI affects the nose, throat, and upper air passages. The most common type of URI is nasopharyngitis and is typically referred to as \"the common cold.\"  URIs run their course and usually go away on their own. Most of the time, a URI does not require medical attention, but sometimes a bacterial infection in the upper airways can follow a viral infection. This is called a secondary infection. Sinus and middle ear infections are common types of secondary upper respiratory infections.  Bacterial pneumonia can also complicate a URI. A URI can worsen asthma and chronic obstructive pulmonary disease (COPD). Sometimes, these complications can require emergency medical care and may be life threatening.  What are the causes?  Almost all URIs are caused by viruses. A virus is a type of germ and can spread from one person to another.  What increases the risk?  You may be at risk for a URI if:  · You smoke.  · You have chronic heart or lung disease.  · You have a weakened defense (immune) system.  · You are very young or very old.  · You have nasal allergies or asthma.  · You work in crowded or poorly ventilated areas.  · You work in health care facilities or schools.  What are the signs or symptoms?  Symptoms typically develop 2-3 days after you come in contact with a cold virus. Most viral URIs last 7-10 days. However, viral URIs from the influenza virus (flu virus) can last 14-18 days and are typically more severe. Symptoms may include:  · Runny or stuffy (congested) nose.  · Sneezing.  · Cough.  · Sore throat.  · Headache.  · Fatigue.  · Fever.  · Loss of appetite.  · Pain in your forehead, behind your eyes, and over your cheekbones (sinus pain).  · Muscle aches.  How is this diagnosed?  Your health care provider may diagnose a URI by:  · Physical exam.  · Tests to check that your " symptoms are not due to another condition such as:  ¨ Strep throat.  ¨ Sinusitis.  ¨ Pneumonia.  ¨ Asthma.  How is this treated?  A URI goes away on its own with time. It cannot be cured with medicines, but medicines may be prescribed or recommended to relieve symptoms. Medicines may help:  · Reduce your fever.  · Reduce your cough.  · Relieve nasal congestion.  Follow these instructions at home:  · Take medicines only as directed by your health care provider.  · Gargle warm saltwater or take cough drops to comfort your throat as directed by your health care provider.  · Use a warm mist humidifier or inhale steam from a shower to increase air moisture. This may make it easier to breathe.  · Drink enough fluid to keep your urine clear or pale yellow.  · Eat soups and other clear broths and maintain good nutrition.  · Rest as needed.  · Return to work when your temperature has returned to normal or as your health care provider advises. You may need to stay home longer to avoid infecting others. You can also use a face mask and careful hand washing to prevent spread of the virus.  · Increase the usage of your inhaler if you have asthma.  · Do not use any tobacco products, including cigarettes, chewing tobacco, or electronic cigarettes. If you need help quitting, ask your health care provider.  How is this prevented?  The best way to protect yourself from getting a cold is to practice good hygiene.  · Avoid oral or hand contact with people with cold symptoms.  · Wash your hands often if contact occurs.  There is no clear evidence that vitamin C, vitamin E, echinacea, or exercise reduces the chance of developing a cold. However, it is always recommended to get plenty of rest, exercise, and practice good nutrition.  Contact a health care provider if:  · You are getting worse rather than better.  · Your symptoms are not controlled by medicine.  · You have chills.  · You have worsening shortness of breath.  · You have brown  or red mucus.  · You have yellow or brown nasal discharge.  · You have pain in your face, especially when you bend forward.  · You have a fever.  · You have swollen neck glands.  · You have pain while swallowing.  · You have white areas in the back of your throat.  Get help right away if:  · You have severe or persistent:  ¨ Headache.  ¨ Ear pain.  ¨ Sinus pain.  ¨ Chest pain.  · You have chronic lung disease and any of the following:  ¨ Wheezing.  ¨ Prolonged cough.  ¨ Coughing up blood.  ¨ A change in your usual mucus.  · You have a stiff neck.  · You have changes in your:  ¨ Vision.  ¨ Hearing.  ¨ Thinking.  ¨ Mood.  This information is not intended to replace advice given to you by your health care provider. Make sure you discuss any questions you have with your health care provider.  Document Released: 06/13/2002 Document Revised: 08/20/2017 Document Reviewed: 03/25/2015  [employer] Interactive Patient Education © 2017 [employer]

## 2018-12-23 NOTE — PROGRESS NOTES
Subjective:     Camille Rodriguez is a 78 y.o. female who presents for Cough (w/ wheezing x 1 week)       Cough   This is a new problem. Episode onset: 8 days. The problem has been unchanged. The cough is non-productive. Associated symptoms include wheezing. Pertinent negatives include no chest pain, fever, nasal congestion, rhinorrhea, sore throat or shortness of breath. She has tried OTC cough suppressant and a beta-agonist inhaler for the symptoms. Her past medical history is significant for COPD.   Pt has had her flu shot this season. Using O2 concentrator.    PMH:  has a past medical history of Chickenpox; Chronic airway obstruction, not elsewhere classified; Clotting disorder (HCC); Diabetes; Syriac measles; and Mumps. She also has no past medical history of Breast cancer ([facility]).    MEDS:   Current Outpatient Prescriptions:   •  doxycycline (VIBRAMYCIN) 100 MG Tab, Take 1 Tab by mouth 2 times a day for 10 days., Disp: 20 Tab, Rfl: 0  •  MethylPREDNISolone (MEDROL DOSEPAK) 4 MG Tablet Therapy Pack, Use as directed on package, Disp: 1 Kit, Rfl: 0  •  SitaGLIPtin-MetFORMIN HCl (JANUMET XR) 100-1000 MG TABLET SR 24 HR, Take 1 Tab by mouth every day. Sample use., Disp: 12 Tab, Rfl: 0  •  doxycycline (VIBRAMYCIN) 100 MG Cap, Take 1 Cap by mouth 2 times a day. Take until gone., Disp: 20 Cap, Rfl: 0  •  predniSONE (DELTASONE) 10 MG Tab, Take 30mg x 3 days, then take 20mg x 3 days, then take 10mg x 3 days, with food, then discontinue., Disp: 18 Tab, Rfl: 0  •  Mometasone Furo-Formoterol Fum (DULERA) 200-5 MCG/ACT Aerosol, Inhale 2 Puffs by mouth 2 Times a Day. Use spacer. Rinse mouth after use., Disp: 2 Inhaler, Rfl: 0  •  Mometasone Furo-Formoterol Fum (DULERA) 200-5 MCG/ACT Aerosol, Inhale 1 Puff by mouth 2 Times a Day., Disp: 1 Inhaler, Rfl: 5  •  sitagliptan-metformin (JANUMET)  MG per tablet, Take 1 Tab by mouth 2 times a day, with meals., Disp: 180 Tab, Rfl: 1  •  ipratropium-albuterol (DUONEB) 0.5-2.5  (3) MG/3ML nebulizer solution, 3 mL by Nebulization route every four hours as needed for Shortness of Breath., Disp: 120 Vial, Rfl: 5  •  lisinopril (PRINIVIL) 20 MG Tab, , Disp: , Rfl:   •  Blood Glucose Monitoring Suppl (FREESTYLE LITE) Device, , Disp: , Rfl:   •  FREESTYLE LITE strip, , Disp: , Rfl:   •  levothyroxine (SYNTHROID) 125 MCG Tab, , Disp: , Rfl:   •  lisinopril (PRINIVIL, ZESTRIL) 40 MG tablet, , Disp: , Rfl:   •  Tiotropium Bromide Monohydrate (SPIRIVA RESPIMAT) 2.5 MCG/ACT Aero Soln, Inhale 1 Puff by mouth every day at 6 PM., Disp: , Rfl:   •  Probiotic Product (TRUBIOTICS) Cap, Take 1 Capsule by mouth every day at 6 PM., Disp: , Rfl:   •  Cyanocobalamin (VITAMIN B12) 100 MCG Tab, Take 1 Tab by mouth every day at 6 PM., Disp: , Rfl:   •  acetaminophen (EQ ARTHRITIS PAIN) 650 MG CR tablet, Take 650-1,300 mg by mouth every 6 hours as needed for Moderate Pain., Disp: , Rfl:   •  non-formulary med, Inhale 2 L by mouth Continuous. Oxygen 2L continuous via nasal cannula  Indications: COPD, SOB, Disp: , Rfl:   •  albuterol 108 (90 BASE) MCG/ACT Aero Soln inhalation aerosol, Inhale 2 Puffs by mouth every four hours as needed for Shortness of Breath., Disp: 8.5 g, Rfl: 0  •  hydrochlorothiazide (HYDRODIURIL) 25 MG Tab, Take 25 mg by mouth every day., Disp: , Rfl:   •  Omega-3 Fatty Acids (FISH OIL) 1000 MG Cap capsule, Take 1,000 mg by mouth every day., Disp: , Rfl:   •  atenolol (TENORMIN) 50 MG TABS, Take 50 mg by mouth every day., Disp: , Rfl:   •  simvastatin (ZOCOR) 10 MG TABS, Take 10 mg by mouth every evening., Disp: , Rfl:   •  Calcium Carbonate-Vit D-Min (CALCIUM 1200 PO), Take  by mouth., Disp: , Rfl:   •  Cholecalciferol (VITAMIN D) 2000 UNIT TABS, Take 5,000 Units by mouth every day at 6 PM., Disp: , Rfl:   •  Multiple Vitamin (MULTIVITAMIN PO), Take  by mouth., Disp: , Rfl:   •  aspirin 81 MG tablet, Take 81 mg by mouth every day. needs to p/u at store, Disp: , Rfl:     ALLERGIES:   Allergies  "  Allergen Reactions   • Zithromax [Azithromycin] Diarrhea     Pt states severe diarrhea     SURGHX:   Past Surgical History:   Procedure Laterality Date   • PB REMV 2ND CATARACT,CORN-SCLER SECTN       SOCHX:  reports that she quit smoking about 11 years ago. Her smoking use included Cigarettes. She has a 69.00 pack-year smoking history. She has never used smokeless tobacco. She reports that she does not drink alcohol or use drugs.    FH: Reviewed with patient, not pertinent to this visit.     Review of Systems   Constitutional: Negative.  Negative for fever and malaise/fatigue.   HENT: Negative.  Negative for rhinorrhea and sore throat.    Eyes: Negative.  Negative for blurred vision.   Respiratory: Positive for cough and wheezing. Negative for shortness of breath.    Cardiovascular: Negative.  Negative for chest pain.   Gastrointestinal: Negative.    Genitourinary: Negative.    Musculoskeletal: Negative.    Skin: Negative.    Neurological: Negative.  Negative for dizziness, tingling, sensory change and focal weakness.   Psychiatric/Behavioral: Negative.    All other systems reviewed and are negative.    Objective:     /84 (BP Location: Right arm, Patient Position: Sitting)   Pulse 70   Temp 36.6 °C (97.8 °F) (Temporal)   Resp 18   Ht 1.626 m (5' 4\")   Wt 118.8 kg (262 lb)   SpO2 94%   BMI 44.97 kg/m²     Physical Exam   Constitutional: She is oriented to person, place, and time. She appears well-developed and well-nourished. No distress.   HENT:   Right Ear: External ear normal.   Left Ear: External ear normal.   Eyes: EOM are normal.   Neck: Normal range of motion.   Cardiovascular: Normal rate, regular rhythm, normal heart sounds and intact distal pulses.    Pulmonary/Chest: Effort normal and breath sounds normal.   Faint wheezes in upper lobes   Abdominal: Bowel sounds are normal.   Musculoskeletal: Normal range of motion. She exhibits no deformity.   Neurological: She is alert and oriented to " person, place, and time. She has normal strength. No sensory deficit.   Skin: Skin is warm and dry. Capillary refill takes less than 2 seconds.   Psychiatric: She has a normal mood and affect.   Vitals reviewed.       Assessment/Plan:     1. Upper respiratory tract infection, unspecified type  - doxycycline (VIBRAMYCIN) 100 MG Tab; Take 1 Tab by mouth 2 times a day for 10 days.  Dispense: 20 Tab; Refill: 0    2. History of COPD    3. Wheezing  - MethylPREDNISolone (MEDROL DOSEPAK) 4 MG Tablet Therapy Pack; Use as directed on package  Dispense: 1 Kit; Refill: 0    Rx sent electronically. Discussed supportive measures including increasing fluids and rest as well as OTC symptom management including acetaminophen and/or ibuprofen PRN pain and/or fever. Pt still has albuterol inhaler PRN SOB/wheezing.    Patient advised to: Return for 1) Symptoms that change or worsen, or go to the ER, 2) Follow up with primary care in 7-10 days.    Differential diagnosis, natural history, supportive care, and indications for immediate follow-up discussed. All questions answered. Patient agrees with the plan of care.

## 2019-01-23 NOTE — TELEPHONE ENCOUNTER
DOCUMENTATION OF PRIOR AUTH STATUS    1. Medication name and dose: Dulera 200/5 mcg    2. Name and Phone # of Prescription coverage company: [facility] [phone]    3. Date Prior Auth was submitted: 12/14/2018    4. What information was given to obtain insurance decision: Clinical notes    5. Prior Auth letter Approved or Denied: Approved, no P/A is required    6. Pharmacy notified: Yes    7. Patient notified: Yes

## 2019-02-03 ENCOUNTER — PATIENT MESSAGE (OUTPATIENT)
Dept: PULMONOLOGY | Facility: HOSPICE | Age: 79
End: 2019-02-03

## 2019-02-04 NOTE — TELEPHONE ENCOUNTER
From: Camille Rodriguez  To: RODDY Edmonds  Sent: 2/3/2019 11:45 AM PST  Subject: Non-Urgent Medical Question    I was wondering if I am supposed to have a cat scan before my next appointment? If so, I must have misplaced the paperwork for it. Could you let me know either way please. Thanks, Camille [email or web address]

## 2019-02-11 NOTE — TELEPHONE ENCOUNTER
Was the patient seen in the last year in this department? Yes    Does patient have an active prescription for medications requested? No     Received Request Via: Pharmacy      Pt met protocol?: Yes, last ov 12/23/18  BP Readings from Last 1 Encounters:   12/23/18 122/84

## 2019-02-12 RX ORDER — HYDROCHLOROTHIAZIDE 25 MG/1
25 TABLET ORAL DAILY
Qty: 30 TAB | Refills: 0 | Status: SHIPPED | OUTPATIENT
Start: 2019-02-12 | End: 2019-03-06 | Stop reason: SDUPTHER

## 2019-02-13 NOTE — TELEPHONE ENCOUNTER
Will give 1 month of refills. Patient is overdue for screening lab work. Please remind her to have done as these will need to be completed for any further refills.  Ruthie Escobedo P.A.-C.

## 2019-03-04 ENCOUNTER — HOSPITAL ENCOUNTER (OUTPATIENT)
Dept: LAB | Facility: MEDICAL CENTER | Age: 79
End: 2019-03-04
Attending: PHYSICIAN ASSISTANT
Payer: MEDICARE

## 2019-03-04 DIAGNOSIS — I10 ESSENTIAL HYPERTENSION: ICD-10-CM

## 2019-03-04 DIAGNOSIS — E11.22 TYPE 2 DIABETES MELLITUS WITH STAGE 3 CHRONIC KIDNEY DISEASE, WITHOUT LONG-TERM CURRENT USE OF INSULIN (HCC): ICD-10-CM

## 2019-03-04 DIAGNOSIS — E03.9 HYPOTHYROIDISM, UNSPECIFIED TYPE: ICD-10-CM

## 2019-03-04 DIAGNOSIS — N18.30 TYPE 2 DIABETES MELLITUS WITH STAGE 3 CHRONIC KIDNEY DISEASE, WITHOUT LONG-TERM CURRENT USE OF INSULIN (HCC): ICD-10-CM

## 2019-03-04 LAB
ALBUMIN SERPL BCP-MCNC: 4.2 G/DL (ref 3.2–4.9)
ALBUMIN/GLOB SERPL: 1.2 G/DL
ALP SERPL-CCNC: 54 U/L (ref 30–99)
ALT SERPL-CCNC: 14 U/L (ref 2–50)
ANION GAP SERPL CALC-SCNC: 9 MMOL/L (ref 0–11.9)
AST SERPL-CCNC: 17 U/L (ref 12–45)
BILIRUB SERPL-MCNC: 0.4 MG/DL (ref 0.1–1.5)
BUN SERPL-MCNC: 20 MG/DL (ref 8–22)
CALCIUM SERPL-MCNC: 10.5 MG/DL (ref 8.5–10.5)
CHLORIDE SERPL-SCNC: 97 MMOL/L (ref 96–112)
CHOLEST SERPL-MCNC: 129 MG/DL (ref 100–199)
CO2 SERPL-SCNC: 29 MMOL/L (ref 20–33)
CREAT SERPL-MCNC: 1.09 MG/DL (ref 0.5–1.4)
CREAT UR-MCNC: 100.3 MG/DL
FASTING STATUS PATIENT QL REPORTED: NORMAL
GLOBULIN SER CALC-MCNC: 3.4 G/DL (ref 1.9–3.5)
GLUCOSE SERPL-MCNC: 167 MG/DL (ref 65–99)
HDLC SERPL-MCNC: 66 MG/DL
LDLC SERPL CALC-MCNC: 31 MG/DL
MICROALBUMIN UR-MCNC: <0.7 MG/DL
MICROALBUMIN/CREAT UR: NORMAL MG/G (ref 0–30)
POTASSIUM SERPL-SCNC: 3.7 MMOL/L (ref 3.6–5.5)
PROT SERPL-MCNC: 7.6 G/DL (ref 6–8.2)
SODIUM SERPL-SCNC: 135 MMOL/L (ref 135–145)
TRIGL SERPL-MCNC: 159 MG/DL (ref 0–149)
TSH SERPL DL<=0.005 MIU/L-ACNC: 2.23 UIU/ML (ref 0.38–5.33)

## 2019-03-04 PROCEDURE — 82043 UR ALBUMIN QUANTITATIVE: CPT

## 2019-03-04 PROCEDURE — 36415 COLL VENOUS BLD VENIPUNCTURE: CPT

## 2019-03-04 PROCEDURE — 80053 COMPREHEN METABOLIC PANEL: CPT

## 2019-03-04 PROCEDURE — 82570 ASSAY OF URINE CREATININE: CPT

## 2019-03-04 PROCEDURE — 80061 LIPID PANEL: CPT

## 2019-03-04 PROCEDURE — 84443 ASSAY THYROID STIM HORMONE: CPT

## 2019-03-05 ENCOUNTER — TELEPHONE (OUTPATIENT)
Dept: MEDICAL GROUP | Facility: PHYSICIAN GROUP | Age: 79
End: 2019-03-05

## 2019-03-05 NOTE — TELEPHONE ENCOUNTER
Future Appointments       Provider Department Center    3/6/2019 9:45 AM Ruthie Escobedo P.A.-C. [facility]    7/12/2019 10:00 AM [facility]     7/12/2019 11:00 AM RODDY Edmonds [facility]         ESTABLISHED PATIENT PRE-VISIT PLANNING     Patient was NOT contacted to complete PVP.     Note: Patient will not be contacted if there is no indication to call.     1.  Reviewed notes from the last few office visits within the medical group: Yes    2.  If any orders were placed at last visit or intended to be done for this visit (i.e. 6 mos follow-up), do we have Results/Consult Notes?        •  Labs - Labs ordered, completed on 03/04/18 and results are in chart.       •  Imaging - Imaging was not ordered at last office visit.       •  Referrals - No referrals were ordered at last office visit.    3. Is this appointment scheduled as a Hospital Follow-Up? No    4.  Immunizations were updated in [facility] using WebIZ?: Yes       •  Web Iz Recommendations: TD and SHINGRIX (Shingles)    5.  Patient is due for the following Health Maintenance Topics:   Health Maintenance Due   Topic Date Due   • Annual Wellness Visit  1940   • DIABETES MONOFILAMENT / LE EXAM  06/11/1941   • RETINAL SCREENING  12/11/1959   • IMM HEP B VACCINE (1 of 3 - Risk 3-dose series) 12/11/1960   • IMM ZOSTER VACCINES (2 of 3) 06/18/2015   • PFT SCREENING  08/16/2018       6. Orders for overdue Health Maintenance topics pended in Pre-Charting? YES    7.  AHA (MDX) form printed for Provider? YES    8.  Patient was informed to arrive 15 min prior to their scheduled appointment and bring in their medication bottles.

## 2019-03-06 ENCOUNTER — OFFICE VISIT (OUTPATIENT)
Dept: MEDICAL GROUP | Facility: PHYSICIAN GROUP | Age: 79
End: 2019-03-06
Payer: MEDICARE

## 2019-03-06 VITALS
OXYGEN SATURATION: 93 % | DIASTOLIC BLOOD PRESSURE: 70 MMHG | HEIGHT: 64 IN | HEART RATE: 70 BPM | WEIGHT: 260 LBS | SYSTOLIC BLOOD PRESSURE: 110 MMHG | TEMPERATURE: 97.3 F | BODY MASS INDEX: 44.39 KG/M2

## 2019-03-06 DIAGNOSIS — E11.22 TYPE 2 DIABETES MELLITUS WITH STAGE 3 CHRONIC KIDNEY DISEASE, WITHOUT LONG-TERM CURRENT USE OF INSULIN (HCC): ICD-10-CM

## 2019-03-06 DIAGNOSIS — E66.01 MORBID OBESITY WITH BMI OF 40.0-44.9, ADULT (HCC): ICD-10-CM

## 2019-03-06 DIAGNOSIS — N18.30 CKD (CHRONIC KIDNEY DISEASE), STAGE III (HCC): ICD-10-CM

## 2019-03-06 DIAGNOSIS — E03.9 HYPOTHYROIDISM, UNSPECIFIED TYPE: ICD-10-CM

## 2019-03-06 DIAGNOSIS — N18.30 TYPE 2 DIABETES MELLITUS WITH STAGE 3 CHRONIC KIDNEY DISEASE, WITHOUT LONG-TERM CURRENT USE OF INSULIN (HCC): ICD-10-CM

## 2019-03-06 DIAGNOSIS — J44.9 CHRONIC OBSTRUCTIVE PULMONARY DISEASE, UNSPECIFIED COPD TYPE (HCC): Chronic | ICD-10-CM

## 2019-03-06 DIAGNOSIS — Z23 NEED FOR VACCINATION: ICD-10-CM

## 2019-03-06 DIAGNOSIS — I10 ESSENTIAL HYPERTENSION: ICD-10-CM

## 2019-03-06 DIAGNOSIS — J96.11 CHRONIC RESPIRATORY FAILURE WITH HYPOXIA (HCC): Chronic | ICD-10-CM

## 2019-03-06 PROCEDURE — 99214 OFFICE O/P EST MOD 30 MIN: CPT | Performed by: PHYSICIAN ASSISTANT

## 2019-03-06 PROCEDURE — 8041 PR SCP AHA: Performed by: PHYSICIAN ASSISTANT

## 2019-03-06 RX ORDER — SIMVASTATIN 10 MG
10 TABLET ORAL EVERY EVENING
Qty: 90 TAB | Refills: 1 | Status: SHIPPED | OUTPATIENT
Start: 2019-03-06 | End: 2019-07-05 | Stop reason: SDUPTHER

## 2019-03-06 RX ORDER — HYDROCHLOROTHIAZIDE 25 MG/1
25 TABLET ORAL DAILY
Qty: 90 TAB | Refills: 1 | Status: SHIPPED | OUTPATIENT
Start: 2019-03-06 | End: 2019-09-27 | Stop reason: SDUPTHER

## 2019-03-06 RX ORDER — ATENOLOL 50 MG/1
50 TABLET ORAL DAILY
Qty: 90 TAB | Refills: 1 | Status: SHIPPED | OUTPATIENT
Start: 2019-03-06 | End: 2019-09-27 | Stop reason: SDUPTHER

## 2019-03-06 RX ORDER — LEVOTHYROXINE SODIUM 0.12 MG/1
125 TABLET ORAL
Qty: 90 TAB | Refills: 1 | Status: SHIPPED | OUTPATIENT
Start: 2019-03-06 | End: 2019-09-27 | Stop reason: SDUPTHER

## 2019-03-06 RX ORDER — LISINOPRIL 20 MG/1
20 TABLET ORAL DAILY
Qty: 90 TAB | Refills: 1 | Status: SHIPPED | OUTPATIENT
Start: 2019-03-06 | End: 2019-09-27 | Stop reason: SDUPTHER

## 2019-03-06 ASSESSMENT — PATIENT HEALTH QUESTIONNAIRE - PHQ9: CLINICAL INTERPRETATION OF PHQ2 SCORE: 0

## 2019-03-06 NOTE — PROGRESS NOTES
Subjective:     Camille Rodriguez is a 78 y.o. female here today for follow-up on diabetes and other chronic conditions and Annual Health Assessment. Is an established patient of mine.    HPI:    Patient presents to the office today for routine follow-up. I last saw her in 10/2018. She had recent screening lab work done on 3/4/19. She endorses no current concerns.    Current medical problems include the following:    - DM2. Currently treated with Janumet  mg BID. Fasting blood sugars have been running in low 100s, sometimes even lower.    -CKD, stage 3. Ongoing for years and likely secondary to chronic diabetes. GFR remains stable--49 on recent check.    -HTN. Currently treated with lisinopril 20 mg daily, HCTZ 25 mg daily, and atenolol 50 mg daily. No home readings.     -hypothyroidism. On levothyroxine 125 mcg daily. Had TSH re-checked with recent labs which came back normal.    -COPD/chronic respiratory failure. Follows with [facility] pulmonology. She remains on Spiriva, Breo Ellipta and PRN albuterol. She is on chronic 24/7 oxygen supplementation at 3 L. She states she will be having updated PFTs done in the near future.      Health Maintenance Summary                Annual Wellness Visit Overdue 1940     DIABETES MONOFILAMENT / LE EXAM Overdue 6/11/1942     RETINAL SCREENING Overdue 12/11/1959     IMM HEP B VACCINE Overdue 12/11/1960     IMM ZOSTER VACCINES Overdue 5/27/2015      Done 4/1/2015 Imm Admin: Zoster Vaccine Live (ZVL) (Zostavax)    PFT SCREENING-FEV1 AND FEV/FVC RATIO / SPIROMETRY SHOULD BE PERFORMED ANNUALLY Overdue 8/16/2018      Done 8/16/2017 AMB PULMONARY FUNCTION TEST/LAB    A1C SCREENING Next Due 4/5/2019      Done 10/5/2018 POCT A1C     Patient has more history with this topic...    MAMMOGRAM Next Due 4/27/2019      Done 4/27/2018 MA-MAMMO SCREENING BILAT W/TOMOSYNTHESIS W/CAD     Patient has more history with this topic...    FASTING LIPID PROFILE Next Due 3/4/2020      Done  3/4/2019 LIPID PROFILE      Patient has more history with this topic...    URINE ACR / MICROALBUMIN Next Due 3/4/2020      Done 3/4/2019 MICROALBUMIN CREAT RATIO URINE     Patient has more history with this topic...    SERUM CREATININE Next Due 3/4/2020      Done 3/4/2019 COMP METABOLIC PANEL      Patient has more history with this topic...    BONE DENSITY Next Due 4/27/2023      Done 4/27/2018 DS-BONE DENSITY STUDY (DEXA)     Patient has more history with this topic...    IMM DTaP/Tdap/Td Vaccine Next Due 9/14/2027      Done 9/14/2017 Imm Admin: Tdap Vaccine           Annual Health Assessment Questions:     1.  Are you currently engaging in any exercise or physical activity? No    2.  How would you describe your mood or emotional well-being today? good    3.  Have you had any falls in the last year? No    4.  Have you noticed any problems with your balance or had difficulty walking? Yes Pt uses walker for assistance    5.  In the last six months have you experienced any leakage of urine? Yes    6. DPA/Advanced Directive: Patient has Advanced Directive, but it is not on file. Instructed to bring in a copy to scan into their chart.      Current medicines (including changes today)  Current Outpatient Prescriptions   Medication Sig Dispense Refill   • sitagliptan-metformin (JANUMET)  MG per tablet Take 1 Tab by mouth 2 times a day, with meals. 180 Tab 1   • simvastatin (ZOCOR) 10 MG Tab Take 1 Tab by mouth every evening. 90 Tab 1   • lisinopril (PRINIVIL) 20 MG Tab Take 1 Tab by mouth every day. 90 Tab 1   • levothyroxine (SYNTHROID) 125 MCG Tab Take 1 Tab by mouth Every morning on an empty stomach. 90 Tab 1   • hydroCHLOROthiazide (HYDRODIURIL) 25 MG Tab Take 1 Tab by mouth every day. 90 Tab 1   • atenolol (TENORMIN) 50 MG Tab Take 1 Tab by mouth every day. 90 Tab 1   • NON SPECIFIED 0.5 mL by Intramuscular route Once for 1 dose. 1 Each 1   • Blood Glucose Monitoring Suppl (FREESTYLE LITE) Device      •  FREESTYLE LITE strip      • Tiotropium Bromide Monohydrate (SPIRIVA RESPIMAT) 2.5 MCG/ACT Aero Soln Inhale 1 Puff by mouth every day at 6 PM.     • Probiotic Product (TRUBIOTICS) Cap Take 1 Capsule by mouth every day at 6 PM.     • Cyanocobalamin (VITAMIN B12) 100 MCG Tab Take 1 Tab by mouth every day at 6 PM.     • acetaminophen (EQ ARTHRITIS PAIN) 650 MG CR tablet Take 650-1,300 mg by mouth every 6 hours as needed for Moderate Pain.     • Omega-3 Fatty Acids (FISH OIL) 1000 MG Cap capsule Take 1,000 mg by mouth every day.     • Calcium Carbonate-Vit D-Min (CALCIUM 1200 PO) Take  by mouth.     • Cholecalciferol (VITAMIN D) 2000 UNIT TABS Take 5,000 Units by mouth every day at 6 PM.     • Multiple Vitamin (MULTIVITAMIN PO) Take  by mouth.     • aspirin 81 MG tablet Take 81 mg by mouth every day. needs to p/u at store     • ipratropium-albuterol (DUONEB) 0.5-2.5 (3) MG/3ML nebulizer solution 3 mL by Nebulization route every four hours as needed for Shortness of Breath. 120 Vial 5   • non-formulary med Inhale 2 L by mouth Continuous. Oxygen 2L continuous via nasal cannula  Indications: COPD, SOB     • albuterol 108 (90 BASE) MCG/ACT Aero Soln inhalation aerosol Inhale 2 Puffs by mouth every four hours as needed for Shortness of Breath. (Patient not taking: Reported on 3/6/2019) 8.5 g 0     No current facility-administered medications for this visit.        She  has a past medical history of Chickenpox; Chronic airway obstruction, not elsewhere classified; Clotting disorder (HCC); Diabetes; Cuban measles; and Mumps. She also has no past medical history of Breast cancer (HCC).    Zithromax [azithromycin]    She  reports that she quit smoking about 12 years ago. Her smoking use included Cigarettes. She has a 69.00 pack-year smoking history. She has never used smokeless tobacco. She reports that she does not drink alcohol or use drugs.  Counseling given: Not Answered      ROS  No chest pain, +shortness of breath  "(chronic), no abdominal pain, stool changes       Objective:     Physical Exam:  Blood pressure 110/70, pulse 70, temperature 36.3 °C (97.3 °F), height 1.626 m (5' 4\"), weight 117.9 kg (260 lb), SpO2 93 %, not currently breastfeeding. Body mass index is 44.63 kg/m².     Constitutional: Alert, morbidly obese but otherwise well-appearing, very pleasant, no distress.  Skin: Warm, dry, good turgor, no rashes in visible areas.  Eye: Equal, round and reactive, conjunctiva clear, lids normal.  ENMT: Lips without lesions, moist mucus membranes.  Neck: Trachea midline, no masses, no thyromegaly. No submandibular or cervical lymphadenopathy.  Respiratory: Unlabored respiratory effort, on O2 via nasal cannula, lungs clear to auscultation, no wheezes, no rhonchi.  Cardiovascular: Normal S1, S2, no murmur.  Psych: Alert and oriented x3, normal affect and mood.      Assessment and Plan:     1. Type 2 diabetes mellitus with stage 3 chronic kidney disease, without long-term current use of insulin (HCC)  Chronic issue, well-controlled for age. Goal A1c < 7.5 and she was 7.1 on last check. Monofilament exam was completed today. Recommend continuing Janumet at current dose. On reduced metformin dose due to renal impairment. Continue simvastatin, lisinopril as well. Will be due for repeat A1c in 1 month which I have ordered.  - Diabetic Monofilament LE Exam  - sitagliptan-metformin (JANUMET)  MG per tablet; Take 1 Tab by mouth 2 times a day, with meals.  Dispense: 180 Tab; Refill: 1  - simvastatin (ZOCOR) 10 MG Tab; Take 1 Tab by mouth every evening.  Dispense: 90 Tab; Refill: 1  - lisinopril (PRINIVIL) 20 MG Tab; Take 1 Tab by mouth every day.  Dispense: 90 Tab; Refill: 1  - HEMOGLOBIN A1C; Future    2. CKD (chronic kidney disease), stage III (HCC)  Chronic issue, stable per review. Should continue to avoid NSAIDs. Will continue to monitor renal function.    3. Essential hypertension  Chronic issue, well-controlled with current " medications. BP today in the office is 110/70. Continue lisinopril, HCTZ, and atenolol.  - lisinopril (PRINIVIL) 20 MG Tab; Take 1 Tab by mouth every day.  Dispense: 90 Tab; Refill: 1  - hydroCHLOROthiazide (HYDRODIURIL) 25 MG Tab; Take 1 Tab by mouth every day.  Dispense: 90 Tab; Refill: 1  - atenolol (TENORMIN) 50 MG Tab; Take 1 Tab by mouth every day.  Dispense: 90 Tab; Refill: 1    4. Hypothyroidism, unspecified type  Chronic issue, well-controlled with levothyroxine 125 mcg daily. Most recent TSH normal. Continue current management. Repeat TSH again in 1 year.  - levothyroxine (SYNTHROID) 125 MCG Tab; Take 1 Tab by mouth Every morning on an empty stomach.  Dispense: 90 Tab; Refill: 1    5. Chronic obstructive pulmonary disease, unspecified COPD type (HCC)  Chronic issue, stable, doing well on current inhalers and supplemental O2. Continue Spiriva, Breo Ellipta, PRN albuterol. Should continue to follow with pulmonology.    6. Chronic respiratory failure with hypoxia (HCC)  Chronic issue secondary to COPD, stable. Doing well on current management as per above which she will continue.    7. Morbid obesity with BMI of 40.0-44.9, adult (HCC)  Chronic issue, stable. Leads fairly sedentary lifestyle due to chronic conditions. Discussed importance of even modest physical activity. She will continue to work on her diet as well.    8. Need for vaccination  - NON SPECIFIED; 0.5 mL by Intramuscular route Once for 1 dose.  Dispense: 1 Each; Refill: 1    Had discussion with patient regarding recommended health maintenance items. She is due for monofilament exam which was done today. She is due for retinal exam which she plans on having done shortly as she is due for her routine eye exam. She is due for hep B and Zoster vaccines. Declines hep B at present (wants to check records to see if she previously completed), but would like script for Zoster vaccine as we are currently out in-clinic. This was provided. She is due for  PFTs which were already ordered by her pulmonologist.    Discussion today about general wellness and lifestyle habits:    · Engage in regular physical activity and social activities.  · Prevent falls and reduce trip hazards; using ambulatory aides, hearing and vision testing if appropriate.  · Steps to improve urinary incontinence.  · Advanced care planning.    Follow-Up: Return in about 6 months (around 9/6/2019).     Ruthie Escobedo P.A.-C.           PLEASE NOTE: This dictation was created using voice recognition software. I have made every reasonable attempt to correct obvious errors, but I expect that there are errors of grammar and possibly content that I did not discover before finalizing the note.

## 2019-03-18 DIAGNOSIS — E11.22 TYPE 2 DIABETES MELLITUS WITH STAGE 3 CHRONIC KIDNEY DISEASE, WITHOUT LONG-TERM CURRENT USE OF INSULIN (HCC): ICD-10-CM

## 2019-03-18 DIAGNOSIS — N18.30 TYPE 2 DIABETES MELLITUS WITH STAGE 3 CHRONIC KIDNEY DISEASE, WITHOUT LONG-TERM CURRENT USE OF INSULIN (HCC): ICD-10-CM

## 2019-03-22 NOTE — TELEPHONE ENCOUNTER
Was the patient seen in the last year in this department? Yes    Does patient have an active prescription for medications requested? No     Received Request Via: Pharmacy      Pt met protocol?: Yes, ov 3/6/19

## 2019-04-03 DIAGNOSIS — J96.11 CHRONIC RESPIRATORY FAILURE WITH HYPOXIA (HCC): Chronic | ICD-10-CM

## 2019-04-03 DIAGNOSIS — J44.9 CHRONIC OBSTRUCTIVE PULMONARY DISEASE, UNSPECIFIED COPD TYPE (HCC): Chronic | ICD-10-CM

## 2019-04-03 RX ORDER — ALBUTEROL SULFATE 90 UG/1
2 AEROSOL, METERED RESPIRATORY (INHALATION) EVERY 4 HOURS PRN
Qty: 8.5 G | Refills: 1 | Status: SHIPPED | OUTPATIENT
Start: 2019-04-03 | End: 2020-02-07 | Stop reason: SDUPTHER

## 2019-04-03 NOTE — TELEPHONE ENCOUNTER
Patient states Dulera was prescribed by previous pcp Dr. Mistry. Dulera 200, 1 puff qd. She was unsure why it was not included in her med profile.

## 2019-04-03 NOTE — TELEPHONE ENCOUNTER
I was going to pend the Dulera for refill, but I don't see it on patient's profile, per patient request.     Was the patient seen in the last year in this department? Yes    Does patient have an active prescription for medications requested? No     Received Request Via: Patient

## 2019-04-03 NOTE — TELEPHONE ENCOUNTER
----- Message from Camille Rodriguez sent at 4/3/2019  9:21 AM PDT -----  Regarding: Prescription Question  Contact: [phone]  Ruthie and/or Alexandra   I need for your office to call in two prescription for me.  I need Dulera 200/mcg/5 and a ProAir inhaler .  Would you please send me a message after you have called them in to the pharmacy.  Thank you in advance for this matter.      Nalini Rodriguez

## 2019-04-22 DIAGNOSIS — J44.9 CHRONIC OBSTRUCTIVE PULMONARY DISEASE, UNSPECIFIED COPD TYPE (HCC): Chronic | ICD-10-CM

## 2019-05-20 ENCOUNTER — TELEPHONE (OUTPATIENT)
Dept: MEDICAL GROUP | Facility: PHYSICIAN GROUP | Age: 79
End: 2019-05-20

## 2019-05-20 NOTE — TELEPHONE ENCOUNTER
MEDICATION PRIOR AUTHORIZATION NEEDED:    1. Name of Medication: Dulera    2. Requested By (Name of Pharmacy): Patient requested      3. Is insurance on file current? yes    4. What is the name & phone number of the 3rd party payor? [employer]      FINAL PRIOR AUTHORIZATION STATUS:    1.  Name of Medication & Dose: Dulera     2. Prior Auth Status: Response received No PA required. Document scanned into media. Pt was informed.

## 2019-05-31 ENCOUNTER — TELEPHONE (OUTPATIENT)
Dept: MEDICAL GROUP | Facility: PHYSICIAN GROUP | Age: 79
End: 2019-05-31

## 2019-05-31 ENCOUNTER — HOSPITAL ENCOUNTER (OUTPATIENT)
Dept: RADIOLOGY | Facility: MEDICAL CENTER | Age: 79
End: 2019-05-31
Attending: NURSE PRACTITIONER
Payer: MEDICARE

## 2019-05-31 DIAGNOSIS — J96.11 CHRONIC RESPIRATORY FAILURE WITH HYPOXIA (HCC): Chronic | ICD-10-CM

## 2019-05-31 DIAGNOSIS — J43.9 PULMONARY EMPHYSEMA, UNSPECIFIED EMPHYSEMA TYPE (HCC): ICD-10-CM

## 2019-05-31 DIAGNOSIS — J96.11 CHRONIC RESPIRATORY FAILURE WITH HYPOXIA (HCC): ICD-10-CM

## 2019-05-31 DIAGNOSIS — J44.9 CHRONIC OBSTRUCTIVE PULMONARY DISEASE, UNSPECIFIED COPD TYPE (HCC): Chronic | ICD-10-CM

## 2019-05-31 DIAGNOSIS — Z87.891 FORMER SMOKER: ICD-10-CM

## 2019-05-31 PROCEDURE — 71250 CT THORAX DX C-: CPT

## 2019-05-31 NOTE — TELEPHONE ENCOUNTER
MEDICATION PRIOR AUTHORIZATION NEEDED:    1. Name of Medication:  Mometasone Furo-Formoterol Fum (DULERA) 200-5 MCG/ACT Aerosol    2. Requested By (Name of Pharmacy): [facility]      3. Is insurance on file current? Yes    4. What is the name & phone number of the 3rd party payor? [employer] [facility]

## 2019-05-31 NOTE — TELEPHONE ENCOUNTER
Pt requesting new script for Dulera be sent to [facility]      Was the patient seen in the last year in this department? Yes    Does patient have an active prescription for medications requested? No     Received Request Via: Patient

## 2019-05-31 NOTE — TELEPHONE ENCOUNTER
MEDICATION PRIOR AUTHORIZATION NEEDED:    1. Name of Medication: sitagliptan-metformin (JANUMET)  MG per tablet    2. Requested By (Name of Pharmacy): [facility]      3. Is insurance on file current? Yes    4. What is the name & phone number of the 3rd party payor? [employer] [facility]

## 2019-06-06 ENCOUNTER — TELEPHONE (OUTPATIENT)
Dept: MEDICAL GROUP | Facility: PHYSICIAN GROUP | Age: 79
End: 2019-06-06

## 2019-06-06 DIAGNOSIS — E11.22 TYPE 2 DIABETES MELLITUS WITH STAGE 3 CHRONIC KIDNEY DISEASE, WITHOUT LONG-TERM CURRENT USE OF INSULIN (HCC): ICD-10-CM

## 2019-06-06 DIAGNOSIS — N18.30 TYPE 2 DIABETES MELLITUS WITH STAGE 3 CHRONIC KIDNEY DISEASE, WITHOUT LONG-TERM CURRENT USE OF INSULIN (HCC): ICD-10-CM

## 2019-06-06 NOTE — TELEPHONE ENCOUNTER
Fax from [facility] stating that patients insurance is requiring 100 days supply in order to cover Janumet. Please update and resend. Thank you.

## 2019-06-13 ENCOUNTER — TELEPHONE (OUTPATIENT)
Dept: MEDICAL GROUP | Facility: PHYSICIAN GROUP | Age: 79
End: 2019-06-13

## 2019-06-13 DIAGNOSIS — E11.22 TYPE 2 DIABETES MELLITUS WITH STAGE 3 CHRONIC KIDNEY DISEASE, WITHOUT LONG-TERM CURRENT USE OF INSULIN (HCC): ICD-10-CM

## 2019-06-13 DIAGNOSIS — N18.30 TYPE 2 DIABETES MELLITUS WITH STAGE 3 CHRONIC KIDNEY DISEASE, WITHOUT LONG-TERM CURRENT USE OF INSULIN (HCC): ICD-10-CM

## 2019-06-13 NOTE — TELEPHONE ENCOUNTER
1. Caller Name: Camille Rodriguez                                           Call Back Number: [phone] (home)         Patient approves a detailed voicemail message: N\A    Pt copay for Janumet is too high. Pt requesting alternative medication that will be cheaper.

## 2019-06-14 NOTE — TELEPHONE ENCOUNTER
I have switched her to regular metformin which will be cheaper. Her blood sugars will likely go up given that one of her medications is being eliminated, so she should schedule a follow up with me in about a month to discuss other medication options.  Ruhtie Escobedo P.A.-C.

## 2019-06-17 NOTE — TELEPHONE ENCOUNTER
Phone Number Called: [phone] (home)     Call outcome: spoke to patient regarding message below    Message: Pt was able to get a 30 day supply of Janumet. She would like to continue with the Janumet since it was approved through her insurance.

## 2019-07-03 DIAGNOSIS — J96.11 CHRONIC RESPIRATORY FAILURE WITH HYPOXIA (HCC): Chronic | ICD-10-CM

## 2019-07-03 DIAGNOSIS — J44.9 CHRONIC OBSTRUCTIVE PULMONARY DISEASE, UNSPECIFIED COPD TYPE (HCC): Chronic | ICD-10-CM

## 2019-07-05 ENCOUNTER — TELEPHONE (OUTPATIENT)
Dept: MEDICAL GROUP | Facility: PHYSICIAN GROUP | Age: 79
End: 2019-07-05

## 2019-07-05 DIAGNOSIS — N18.30 TYPE 2 DIABETES MELLITUS WITH STAGE 3 CHRONIC KIDNEY DISEASE, WITHOUT LONG-TERM CURRENT USE OF INSULIN (HCC): ICD-10-CM

## 2019-07-05 DIAGNOSIS — E11.22 TYPE 2 DIABETES MELLITUS WITH STAGE 3 CHRONIC KIDNEY DISEASE, WITHOUT LONG-TERM CURRENT USE OF INSULIN (HCC): ICD-10-CM

## 2019-07-05 RX ORDER — SIMVASTATIN 10 MG
10 TABLET ORAL EVERY EVENING
Qty: 100 TAB | Refills: 1 | Status: SHIPPED | OUTPATIENT
Start: 2019-07-05 | End: 2020-02-07 | Stop reason: SDUPTHER

## 2019-07-05 NOTE — TELEPHONE ENCOUNTER
Was the patient seen in the last year in this department? Yes    Does patient have an active prescription for medications requested? Yes    Received Request Via: Pharmacy      Pt met protocol?: Yes pt last ov 3/19 needs this resent for 100day supply   Lab Results  Component Value Date/Time   CHOLSTRLTOT 129 03/04/2019 0803   TRIGLYCERIDE 159 (H) 03/04/2019 0803   HDL 66 03/04/2019 0803   LDL 31 03/04/2019 0803

## 2019-07-05 NOTE — TELEPHONE ENCOUNTER
MEDICATION PRIOR AUTHORIZATION NEEDED:     1. Name of Medication: Dulera      2. Requested By (Name of Pharmacy):[facility]      3. Is insurance on file current? Yes      4. What is the name & phone number of the 3rd party payor? Medicare part D

## 2019-07-05 NOTE — TELEPHONE ENCOUNTER
[facility] stated they don't process PA for this specific coverage plan and referred me to [facility] for pt services. Pt will be given information.     [employer] [facility]    [phone]

## 2019-07-10 NOTE — PROGRESS NOTES
Last OV 11/30/18  CT and PFT results    Ct chest 5/31/19:  1.  Emphysematous changes of the upper lobes.  2.  No evidence of new lung nodules  3.  Atherosclerotic changes of the abdominal aorta with evidence of diffuse coronary artery calcifications.  4.  Old granulomatous disease noted in several pretracheal lymph nodes and right hilum.    Stage 2 COPD. She presents with her daughter in law.     Patient has a 46 year history of smoking 1.5-2 packs daily and quit in 2007. PFT 8/16/2017 indicated FEV1 1.46 L or 69%, FEV1/FVC ratio 74%, and DLCO 57% predicted. Patient is compliant with ICS/LABA - Dulera, Symbicort or Breo if in need of samples, and Spiriva Respimat 2.5mcg 2puff QD and ABEBA/nebulizer prn.  She has had difficulty staying on inhalers due to cost. Seh has found most benefit with Dulera. She is currently on Dulera 100mcg. She is compliant with 3L O2 24/7 but required 4L pulsed to maintain saturations in office. Today she notes acute symptoms starting 2 weeks ago with increased shortness of breath, hoarse voice, cough with throat clearing, wheezing but no significant chest tightness or sinus issues.  She has been more short of breath on exertion.  She was recently treated for a sinus infection with doxycycline with resolution of symptoms. She has not attended pulmonary rehab because prior PFT did not qualify her.  BMI 45.     Patient is a former smoker, and at high risk for lung nodules.  CT scan was obtained.  CT scan 6/3/2017 indicated emphysema, mild atelectasis and CAD. Repeat CT scan 5/18/2018 shows changes consistent with emphysema, no suspicious lung nodules, evidence of previous granulomatous disease and CAD. Patient will complete 2 years of monitoring in 6/2019.    Assessment:  1. Chronic obstructive pulmonary disease, unspecified COPD type (HCC)  PULMONARY FUNCTION TESTS -Test requested: Complete Pulmonary Function Test; Include MIPS/MEPS? No   2. Chronic respiratory failure with hypoxia (HCC)       3. Former smoker      4. BMI 45.0-49.9, adult (HCC)      5. Essential hypertension            Immunizations:     Flu:10/2018  Pneumovax 23:2015  Prevnar 13:2016     Plan:  1.  Rx for doxycycline with prednisone taper now for acute symptoms.  2.  Continue oxygen but titrate appropriately.  Educated patient on the need to increase her oxygen demand when exerting herself such as walking/showering.  She has a pulse oximeter at home and will monitor this closely.  3.  Discussed respiratory hygiene.  4.  Encouraged weight loss.  5.  CT scan of chest without contrast due June 2019 to complete 2 years of monitoring.  Due to recent smoking cessation may benefit from annual CT scan until 2022.  6.  Follow-up in 6 months with CT scan of chest and PFT, sooner if needed.  Advised patient to call office if her symptoms do not improve or report to urgent care.

## 2019-07-12 ENCOUNTER — OFFICE VISIT (OUTPATIENT)
Dept: PULMONOLOGY | Facility: HOSPICE | Age: 79
End: 2019-07-12
Payer: MEDICARE

## 2019-07-12 ENCOUNTER — TELEPHONE (OUTPATIENT)
Dept: PULMONOLOGY | Facility: HOSPICE | Age: 79
End: 2019-07-12

## 2019-07-12 ENCOUNTER — NON-PROVIDER VISIT (OUTPATIENT)
Dept: PULMONOLOGY | Facility: HOSPICE | Age: 79
End: 2019-07-12
Attending: NURSE PRACTITIONER
Payer: MEDICARE

## 2019-07-12 VITALS
BODY MASS INDEX: 43.36 KG/M2 | DIASTOLIC BLOOD PRESSURE: 62 MMHG | RESPIRATION RATE: 16 BRPM | WEIGHT: 254 LBS | HEIGHT: 64 IN | OXYGEN SATURATION: 93 % | HEART RATE: 99 BPM | SYSTOLIC BLOOD PRESSURE: 102 MMHG

## 2019-07-12 DIAGNOSIS — I10 ESSENTIAL HYPERTENSION: ICD-10-CM

## 2019-07-12 DIAGNOSIS — J44.9 CHRONIC OBSTRUCTIVE PULMONARY DISEASE, UNSPECIFIED COPD TYPE (HCC): Chronic | ICD-10-CM

## 2019-07-12 DIAGNOSIS — J96.11 CHRONIC RESPIRATORY FAILURE WITH HYPOXIA (HCC): Chronic | ICD-10-CM

## 2019-07-12 DIAGNOSIS — Z87.891 FORMER SMOKER: ICD-10-CM

## 2019-07-12 DIAGNOSIS — E66.01 MORBID OBESITY WITH BMI OF 40.0-44.9, ADULT (HCC): ICD-10-CM

## 2019-07-12 PROCEDURE — 94726 PLETHYSMOGRAPHY LUNG VOLUMES: CPT | Performed by: INTERNAL MEDICINE

## 2019-07-12 PROCEDURE — 99214 OFFICE O/P EST MOD 30 MIN: CPT | Mod: 25 | Performed by: NURSE PRACTITIONER

## 2019-07-12 PROCEDURE — 94729 DIFFUSING CAPACITY: CPT | Performed by: INTERNAL MEDICINE

## 2019-07-12 PROCEDURE — 94060 EVALUATION OF WHEEZING: CPT | Performed by: INTERNAL MEDICINE

## 2019-07-12 RX ORDER — METHYLPREDNISOLONE 4 MG/1
TABLET ORAL
Qty: 21 TAB | Refills: 0 | Status: ON HOLD | OUTPATIENT
Start: 2019-07-12 | End: 2020-12-06

## 2019-07-12 RX ORDER — DOXYCYCLINE HYCLATE 100 MG/1
100 CAPSULE ORAL 2 TIMES DAILY
Qty: 20 CAP | Refills: 0 | Status: ON HOLD | OUTPATIENT
Start: 2019-07-12 | End: 2020-12-06

## 2019-07-12 ASSESSMENT — PULMONARY FUNCTION TESTS
FEV1/FVC_PERCENT_PREDICTED: 94
FVC_PERCENT_PREDICTED: 74
FEV1_PERCENT_PREDICTED: 69
FEV1_PREDICTED: 2.02
FEV1/FVC_PERCENT_PREDICTED: 75
FVC_PREDICTED: 2.71
FEV1/FVC_PERCENT_LLN: 62
FEV1_LLN: 1.69
FEV1/FVC: 70
FEV1/FVC_PERCENT_PREDICTED: 93
FVC: 2.01
FEV1/FVC: 70
FEV1: 1.4
FEV1/FVC_PREDICTED: 74
FVC_LLN: 2.26

## 2019-07-12 NOTE — PROGRESS NOTES
Chief Complaint   Patient presents with   • COPD     CT / PFT Results        HPI:  Camille Rodriguez is a 78 y.o. year old female here today for follow-up on Stage 2 COPD. CT and PFT results  She presents with her daughter in law.    Last OV 11/30/18    Patient has a 46 year history of smoking 1.5-2 packs daily and quit in 2007. PFT 8/16/2017 indicated FEV1 1.46 L or 69%, FEV1/FVC ratio 74%, and DLCO 57% predicted.   PFT 7/12/2019 indicates postbronchodilator readings due to taking Spiriva at 7:30 AM and albuterol this morning.  FEV1 to 1.4 L or 69%, FEV1/FVC ratio 70, %, TLC 89%, DLCO 45% predicted.   Patient is compliant with ICS/LABA - Dulera, Symbicort or Breo if in need of samples, and Spiriva Respimat 2.5mcg 2puff QD and ABEBA/nebulizer prn.  She has had difficulty staying on inhalers due to cost. She has found most benefit with Dulera. She is currently on Dulera 100mcg.   She is compliant with 3L O2 24/7 but required 4L pulsed to maintain saturations in office. She will continue to benefit from oxygen therapy.   BMI 43 - she is down 10lbs since last OV.     Patient is a former smoker, and at high risk for lung nodules.  CT scan was obtained.  CT scan 6/3/2017 indicated emphysema, mild atelectasis and CAD. Repeat CT scan 5/18/2018 shows changes consistent with emphysema, no suspicious lung nodules, evidence of previous granulomatous disease and CAD.   CT chest 5/31/19:  1.  Emphysematous changes of the upper lobes.  2.  No evidence of new lung nodules  3.  Atherosclerotic changes of the abdominal aorta with evidence of diffuse coronary artery calcifications.  4.  Old granulomatous disease noted in several pretracheal lymph nodes and right hilum.  Reviewed findings with patient. She has now completed 2 years of monitoring; no further monitoring required.    Today she notes breathing to be stable but she is had some recent sinus congestion due to allergies and dryness.  She is also having issues having  "her inhalers approved through her insurance/prescription plan her PCP who is been originally filling her meds with not completed prior Auth.  She was also not using her Dulera appropriately.  We will attempt switching her to Trelegy 1 puff daily due to more benefit and preferred by insurance.  She is amenable to this.  She has not required her nebulizer or rescue inhaler on a regular basis.  She notes a very minimal dry cough at times but no significant chest tightness or wheezing.  Shortness of breath is stable.  She is attempting to lose weight.        ROS: As per HPI and otherwise negative if not stated.    Past Medical History:   Diagnosis Date   • Chickenpox    • Chronic airway obstruction, not elsewhere classified    • Clotting disorder (HCC)    • Diabetes    • Serbian measles    • Mumps        Past Surgical History:   Procedure Laterality Date   • PB REMV 2ND CATARACT,CORN-SCLER SECTN         Family History   Problem Relation Age of Onset   • Heart Attack Father        Social History     Social History   • Marital status:      Spouse name: N/A   • Number of children: N/A   • Years of education: N/A     Occupational History   • Not on file.     Social History Main Topics   • Smoking status: Former Smoker     Packs/day: 1.50     Years: 46.00     Types: Cigarettes     Quit date: 2/5/2007   • Smokeless tobacco: Never Used      Comment: Quit 2007   • Alcohol use No   • Drug use: No   • Sexual activity: No     Other Topics Concern   • Not on file     Social History Narrative   • No narrative on file       Allergies as of 07/12/2019 - Reviewed 07/12/2019   Allergen Reaction Noted   • Zithromax [azithromycin] Diarrhea 06/03/2017        @Vital signs for this encounter:  Vitals:    07/12/19 1006   Height: 1.626 m (5' 4\")   Weight: 115.2 kg (254 lb)   Weight % change since last entry.: 0 %   BP: 102/62   Pulse: 99   BMI (Calculated): 43.6   Resp: 16   O2 sat % on O2: 93 %   O2 Flow Rate (L/min): 4       Current " medications as of today   Current Outpatient Prescriptions   Medication Sig Dispense Refill   • sitagliptan-metformin (JANUMET)  MG per tablet Take 1 Tab by mouth 2 times a day, with meals.     • simvastatin (ZOCOR) 10 MG Tab Take 1 Tab by mouth every evening. 100 Tab 1   • Mometasone Furo-Formoterol Fum (DULERA) 200-5 MCG/ACT Aerosol Inhale 1 Puff by mouth every day. 3 Inhaler 0   • Tiotropium Bromide Monohydrate (SPIRIVA RESPIMAT) 2.5 MCG/ACT Aero Soln Inhale 2 Puffs by mouth every day at 6 PM. 3 Inhaler 3   • Blood Glucose Test Strips Test strips order: Test strips for Abbott Freestyle Lite meter. Sig: use every morning before breakfast and prn ssx high or low sugar 100 Strip 3   • lisinopril (PRINIVIL) 20 MG Tab Take 1 Tab by mouth every day. 90 Tab 1   • levothyroxine (SYNTHROID) 125 MCG Tab Take 1 Tab by mouth Every morning on an empty stomach. 90 Tab 1   • hydroCHLOROthiazide (HYDRODIURIL) 25 MG Tab Take 1 Tab by mouth every day. 90 Tab 1   • atenolol (TENORMIN) 50 MG Tab Take 1 Tab by mouth every day. 90 Tab 1   • Blood Glucose Monitoring Suppl (FREESTYLE LITE) Device      • Probiotic Product (TRUBIOTICS) Cap Take 1 Capsule by mouth every day at 6 PM.     • Omega-3 Fatty Acids (FISH OIL) 1000 MG Cap capsule Take 1,000 mg by mouth every day.     • Calcium Carbonate-Vit D-Min (CALCIUM 1200 PO) Take  by mouth.     • Cholecalciferol (VITAMIN D) 2000 UNIT TABS Take 5,000 Units by mouth every day at 6 PM.     • Multiple Vitamin (MULTIVITAMIN PO) Take  by mouth.     • aspirin 81 MG tablet Take 81 mg by mouth every day. needs to p/u at store     • albuterol 108 (90 Base) MCG/ACT Aero Soln inhalation aerosol Inhale 2 Puffs by mouth every four hours as needed for Shortness of Breath. 8.5 g 1   • ipratropium-albuterol (DUONEB) 0.5-2.5 (3) MG/3ML nebulizer solution 3 mL by Nebulization route every four hours as needed for Shortness of Breath. 120 Vial 5   • acetaminophen (EQ ARTHRITIS PAIN) 650 MG CR tablet Take  650-1,300 mg by mouth every 6 hours as needed for Moderate Pain.       No current facility-administered medications for this visit.          Physical Exam:   Gen:           Alert and oriented, No apparent distress. Mood and affect appropriate, normal interaction with examiner.  Eyes:          PERRL, EOM intact, sclere white, conjunctive moist.  Ears:          Not examined.   Hearing:     Grossly intact.  Nose:          Normal, no lesions or deformities.  Dentition:    Good dentition.  Oropharynx:   Tongue normal, posterior pharynx without erythema or exudate.  Mallampati Classification: 3  Neck:        Supple, trachea midline, no masses.  Respiratory Effort: No intercostal retractions or use of accessory muscles.   Lung Auscultation:      Diminished t/o; no rales, rhonchi or wheezing.  CV:            Regular rate and rhythm. No murmurs, rubs or gallops.  Abd:           Not examined.   Lymphadenopathy: Not examined.  Gait and Station: Uses walker.  Digits and Nails: No clubbing, cyanosis, petechiae, or nodes.   Cranial Nerves: II-XII grossly intact.  Skin:        No rashes, lesions or ulcers noted.               Ext:           No cyanosis or edema.      Assessment:  1. Chronic obstructive pulmonary disease, unspecified COPD type ([facility])     2. Chronic respiratory failure with hypoxia ([facility])     3. Essential hypertension     4. Former smoker     5. Morbid obesity with BMI of 40.0-44.9, adult ([facility])         Immunizations:    Flu:10/2018  Pneumovax 23:2015  Prevnar 13:2016    Plan:  1.  We will attempt optimizing her COPD with a trial of Trelegy 1 puff once daily.  Prescription with coupon provided to patient.  We will hold off on prior authorization for Dulera or Spiriva if she finds this more beneficial.  2.  Continue oxygen therapy 24/7.  3.  Patient is completed 2 years of monitoring with CT scan of chest with no pulmonary nodules noted.  No further imaging at this time which is a change in symptoms.  4.  PFT next  office visit.  5.  Encouraged weight loss with dietary changes and walking.  6.  Follow-up in 6 months with PFT to check symptoms, sooner if needed.    Please note that this dictation was created using voice recognition software. I have made every reasonable attempt to correct obvious errors, but it is possible there are errors of grammar and possibly content that I did not discover before finalizing the note.

## 2019-07-12 NOTE — PROCEDURES
Good patient effort & cooperation.  The results of this test meet the ATS/ERS standards for acceptability and repeatability.  Predicted equations for Spirometry are N-Cruz II, ITS for lung volumes, and [facility] for DLCO.  The DLCO was uncorrected for Hgb.   DLCO was performed during dilation period.no post shayne    1. Baseline spirometry demonstrates a mild to moderate moderate reduction in FEV1 and FVC. FEV1/FVC ratio is low normal at 70%.  FEV1 is 1.40 L which is 69% of predicted.    2.  A bronchodilator was not administered    3. Lung volumes demonstrate a total lung capacity of 89% of predicted.    4. Gas exchange as estimated by DLCO is severely reduced at 45% of predicted.    5. Airway resistance is increased.      Impression:    This study demonstrates the presence of moderate obstructive lung disease.  DLCO is reduced out of proportion to the reduction in FEV1 and total lung capacity.  Differential diagnosis includes emphysema, interstitial lung disease, congestive heart failure, and pulmonary vascular disease.  Clinical correlation is necessary.

## 2019-07-12 NOTE — TELEPHONE ENCOUNTER
MEDICATION PRIOR AUTHORIZATION NEEDED:    1. Name of Medication: Spiriva Respimat 2.5 mcg    2. Requested By (Name of Pharmacy): [facility]     3. Is insurance on file current? yes    4. What is the name & phone number of the 3rd party payor? [facility] [phone]

## 2019-07-12 NOTE — TELEPHONE ENCOUNTER
MEDICATION PRIOR AUTHORIZATION NEEDED:    1. Name of Medication: Dulera 200-5 mcg    2. Requested By (Name of Pharmacy): [facility]     3. Is insurance on file current? yes    4. What is the name & phone number of the 3rd party payor? [facility] [phone]

## 2019-07-19 NOTE — TELEPHONE ENCOUNTER
Called and left the patient a  mssg asking if she has new drug coverage.  [employer] states that she doesn't have ins through them anymore.

## 2019-08-16 NOTE — TELEPHONE ENCOUNTER
DOCUMENTATION OF PRIOR AUTH STATUS    1. Medication name and dose: Spiriva Respimat 2.5 mcg    2. Name and Phone # of Prescription coverage company: [facility] [phone]    3. Date Prior Auth was submitted: 7/26/2019    4. What information was given to obtain insurance decision: Clinical notes    5. Prior Auth letter Approved or Denied: Approved, no P/A required.    6. Pharmacy notified: Yes    7. Patient notified: Yes

## 2019-08-16 NOTE — TELEPHONE ENCOUNTER
DOCUMENTATION OF PRIOR AUTH STATUS    1. Medication name and dose: Dulera 200-5 mcg    2. Name and Phone # of Prescription coverage company: [facility] [phone]    3. Date Prior Auth was submitted: 7/26/2019    4. What information was given to obtain insurance decision: Clinical notes    5. Prior Auth letter Approved or Denied: Approved, no prior auth required    6. Pharmacy notified: Yes    7. Patient notified: Yes

## 2019-09-26 ENCOUNTER — TELEPHONE (OUTPATIENT)
Dept: MEDICAL GROUP | Facility: PHYSICIAN GROUP | Age: 79
End: 2019-09-26

## 2019-09-26 NOTE — TELEPHONE ENCOUNTER
Future Appointments       Provider Department Center    9/27/2019 1:25 PM Ruthie Escobedo P.A.-C.; [facility]  [facility]    1/16/2020 9:45 AM [id number] [facility]     1/16/2020 10:45 AM RODDY Edmonds [facility]         ANNUAL WELLNESS VISIT PRE-VISIT PLANNING WITH OUTREACH    1.  If any orders were placed at last visit, do we have Results/Consult Notes?        •  Labs - Labs ordered, NOT completed. Patient advised to complete prior to next appointment.  Note: If patient appointment is for lab review and patient did not complete labs, check with provider if OK to reschedule patient until labs completed.       •  Imaging - Imaging ordered, completed and results are in chart.       •  Referrals - No referrals were ordered at last office visit.    2.  Immunizations were updated in Epic using WebIZ?:No WebIZ record       •  WebIZ Recommendations: FLU, HEPATITIS B and SHINGRIX (Shingles)       •  Is patient due for Tdap? NO       •  Is patient due for Shingrx? YES. Patient was not notified of copay/out of pocket cost.    3.  Patient has the following Care Path diagnoses on Problem List:  NONE    4.  Orders for overdue Health Maintenance topics pended in Pre-Charting? YES

## 2019-09-27 ENCOUNTER — OFFICE VISIT (OUTPATIENT)
Dept: MEDICAL GROUP | Facility: PHYSICIAN GROUP | Age: 79
End: 2019-09-27
Payer: MEDICARE

## 2019-09-27 VITALS
DIASTOLIC BLOOD PRESSURE: 74 MMHG | OXYGEN SATURATION: 92 % | WEIGHT: 258 LBS | BODY MASS INDEX: 44.05 KG/M2 | TEMPERATURE: 97.8 F | SYSTOLIC BLOOD PRESSURE: 112 MMHG | HEIGHT: 64 IN | HEART RATE: 87 BPM

## 2019-09-27 DIAGNOSIS — N18.30 CKD (CHRONIC KIDNEY DISEASE), STAGE III (HCC): ICD-10-CM

## 2019-09-27 DIAGNOSIS — Z23 NEED FOR VACCINATION: ICD-10-CM

## 2019-09-27 DIAGNOSIS — Z12.39 BREAST CANCER SCREENING: ICD-10-CM

## 2019-09-27 DIAGNOSIS — E11.22 TYPE 2 DIABETES MELLITUS WITH STAGE 3 CHRONIC KIDNEY DISEASE, WITHOUT LONG-TERM CURRENT USE OF INSULIN (HCC): ICD-10-CM

## 2019-09-27 DIAGNOSIS — N18.30 TYPE 2 DIABETES MELLITUS WITH STAGE 3 CHRONIC KIDNEY DISEASE, WITHOUT LONG-TERM CURRENT USE OF INSULIN (HCC): ICD-10-CM

## 2019-09-27 DIAGNOSIS — I44.7 LEFT BUNDLE BRANCH BLOCK: ICD-10-CM

## 2019-09-27 DIAGNOSIS — Z00.00 MEDICARE ANNUAL WELLNESS VISIT, INITIAL: ICD-10-CM

## 2019-09-27 DIAGNOSIS — J44.9 CHRONIC OBSTRUCTIVE PULMONARY DISEASE, UNSPECIFIED COPD TYPE (HCC): Chronic | ICD-10-CM

## 2019-09-27 DIAGNOSIS — Z87.891 FORMER SMOKER: ICD-10-CM

## 2019-09-27 DIAGNOSIS — I10 ESSENTIAL HYPERTENSION: ICD-10-CM

## 2019-09-27 DIAGNOSIS — J96.11 CHRONIC RESPIRATORY FAILURE WITH HYPOXIA (HCC): Chronic | ICD-10-CM

## 2019-09-27 DIAGNOSIS — E03.9 HYPOTHYROIDISM, UNSPECIFIED TYPE: ICD-10-CM

## 2019-09-27 DIAGNOSIS — E66.01 MORBID OBESITY WITH BMI OF 40.0-44.9, ADULT (HCC): ICD-10-CM

## 2019-09-27 PROBLEM — R09.02 HYPOXIA: Status: RESOLVED | Noted: 2017-06-20 | Resolved: 2019-09-27

## 2019-09-27 PROCEDURE — G0008 ADMIN INFLUENZA VIRUS VAC: HCPCS | Performed by: PHYSICIAN ASSISTANT

## 2019-09-27 PROCEDURE — 90662 IIV NO PRSV INCREASED AG IM: CPT | Performed by: PHYSICIAN ASSISTANT

## 2019-09-27 PROCEDURE — 83036 HEMOGLOBIN GLYCOSYLATED A1C: CPT | Performed by: PHYSICIAN ASSISTANT

## 2019-09-27 PROCEDURE — G0439 PPPS, SUBSEQ VISIT: HCPCS | Performed by: PHYSICIAN ASSISTANT

## 2019-09-27 RX ORDER — LANCETS 30 GAUGE
EACH MISCELLANEOUS
Qty: 100 EACH | Refills: 3 | Status: SHIPPED | OUTPATIENT
Start: 2019-09-27 | End: 2021-01-01

## 2019-09-27 RX ORDER — ATENOLOL 50 MG/1
50 TABLET ORAL DAILY
Qty: 100 TAB | Refills: 1 | Status: SHIPPED | OUTPATIENT
Start: 2019-09-27 | End: 2020-02-07 | Stop reason: SDUPTHER

## 2019-09-27 RX ORDER — LEVOTHYROXINE SODIUM 0.12 MG/1
125 TABLET ORAL
Qty: 100 TAB | Refills: 1 | Status: SHIPPED | OUTPATIENT
Start: 2019-09-27 | End: 2020-02-07 | Stop reason: SDUPTHER

## 2019-09-27 RX ORDER — LISINOPRIL 20 MG/1
20 TABLET ORAL DAILY
Qty: 100 TAB | Refills: 1 | Status: SHIPPED | OUTPATIENT
Start: 2019-09-27 | End: 2020-01-07 | Stop reason: SDUPTHER

## 2019-09-27 RX ORDER — HYDROCHLOROTHIAZIDE 25 MG/1
25 TABLET ORAL DAILY
Qty: 100 TAB | Refills: 1 | Status: SHIPPED | OUTPATIENT
Start: 2019-09-27 | End: 2020-02-07 | Stop reason: SDUPTHER

## 2019-09-27 ASSESSMENT — PATIENT HEALTH QUESTIONNAIRE - PHQ9: CLINICAL INTERPRETATION OF PHQ2 SCORE: 0

## 2019-09-27 ASSESSMENT — ACTIVITIES OF DAILY LIVING (ADL): BATHING_REQUIRES_ASSISTANCE: 0

## 2019-09-27 ASSESSMENT — ENCOUNTER SYMPTOMS: GENERAL WELL-BEING: GOOD

## 2019-09-27 NOTE — ASSESSMENT & PLAN NOTE
Chronic issue, well-controlled for age with current regimen which includes Janumet  mg BID. A1c today is 7.5. Continue current management.

## 2019-09-27 NOTE — ASSESSMENT & PLAN NOTE
Chronic issue, well-controlled with Trelegy Ellipta. No longer on Dulera/Spiriva. Also on supplemental O2. Follows with pulmonology regularly. Continue current management.

## 2019-09-27 NOTE — ASSESSMENT & PLAN NOTE
Chronic issue, well-controlled with levothyroxine 125 mcg daily. Last TSH in 3/2019 was normal. Continue current management.

## 2019-09-27 NOTE — ASSESSMENT & PLAN NOTE
Chronic issue, stable per review. Most recent GFR in 3/2019 was 49. Will continue to monitor. Repeat renal function panel ordered.

## 2019-09-27 NOTE — ASSESSMENT & PLAN NOTE
Chronic issue, well-controlled with lisinopril 20 mg daily, HCTZ 25 mg daily, and atenolol 50 mg daily. BP today in the office is 112/74. Continue current management.

## 2019-09-27 NOTE — PROGRESS NOTES
Chief Complaint   Patient presents with   • Annual Wellness Visit         HPI:  Nalini is a 78 y.o. here for Medicare Annual Wellness Visit. Is an established patient of mine.        Patient Active Problem List    Diagnosis Date Noted   • Morbid obesity with BMI of 40.0-44.9, adult ([facility]) 03/06/2019   • Hypothyroidism 10/05/2018   • Chronic respiratory failure with hypoxia ([facility]) 06/01/2018   • Former smoker 06/01/2018   • Chronic obstructive pulmonary disease ([facility]) 06/20/2017   • Left bundle branch block 06/03/2017   • CKD (chronic kidney disease), stage III ([facility]) 06/03/2017   • HTN (hypertension) 06/03/2017   • DM2 (diabetes mellitus, type 2) ([facility]) 06/03/2017       Current Outpatient Medications   Medication Sig Dispense Refill   • Blood Glucose Monitoring Suppl Device Meter: Dispense Device of Insurance Preference. Sig. Use as directed for blood sugar monitoring. #1. NR. 1 Device 0   • Blood Glucose Test Strips Test strips order: Test strips for insurance-preferred meter. Sig: use every morning before breakfast and prn ssx high or low sugar 100 Strip 3   • Lancets Lancets order: Lancets for insurance-preferred meter. Sig: use every morning before breakfast and prn ssx high or low sugar. 100 Each 3   • NON SPECIFIED 0.5 mL by Intramuscular route Once for 1 dose. 1 Each 1   • sitagliptan-metformin (JANUMET)  MG per tablet Take 1 Tab by mouth 2 times a day, with meals. 200 Tab 1   • lisinopril (PRINIVIL) 20 MG Tab Take 1 Tab by mouth every day. 100 Tab 1   • levothyroxine (SYNTHROID) 125 MCG Tab Take 1 Tab by mouth Every morning on an empty stomach. 100 Tab 1   • hydroCHLOROthiazide (HYDRODIURIL) 25 MG Tab Take 1 Tab by mouth every day. 100 Tab 1   • atenolol (TENORMIN) 50 MG Tab Take 1 Tab by mouth every day. 100 Tab 1   • Fluticasone-Umeclidin-Vilant (TRELEGY ELLIPTA) 100-62.5-25 MCG/INH AEROSOL POWDER, BREATH ACTIVATED Inhale 1 Puff by mouth every day. 1 Each 11   • methylPREDNISolone (MEDROL DOSEPAK) 4 MG  Tablet Therapy Pack Take as directed. 21 Tab 0   • doxycycline (VIBRAMYCIN) 100 MG Cap Take 1 Cap by mouth 2 times a day. Take until gone. 20 Cap 0   • simvastatin (ZOCOR) 10 MG Tab Take 1 Tab by mouth every evening. 100 Tab 1   • albuterol 108 (90 Base) MCG/ACT Aero Soln inhalation aerosol Inhale 2 Puffs by mouth every four hours as needed for Shortness of Breath. 8.5 g 1   • ipratropium-albuterol (DUONEB) 0.5-2.5 (3) MG/3ML nebulizer solution 3 mL by Nebulization route every four hours as needed for Shortness of Breath. 120 Vial 5   • Probiotic Product (TRUBIOTICS) Cap Take 1 Capsule by mouth every day at 6 PM.     • acetaminophen (EQ ARTHRITIS PAIN) 650 MG CR tablet Take 650-1,300 mg by mouth every 6 hours as needed for Moderate Pain.     • Calcium Carbonate-Vit D-Min (CALCIUM 1200 PO) Take  by mouth.     • Cholecalciferol (VITAMIN D) 2000 UNIT TABS Take 5,000 Units by mouth every day at 6 PM.     • Multiple Vitamin (MULTIVITAMIN PO) Take  by mouth.     • aspirin 81 MG tablet Take 81 mg by mouth every day. needs to p/u at store     • Omega-3 Fatty Acids (FISH OIL) 1000 MG Cap capsule Take 1,000 mg by mouth every day.       No current facility-administered medications for this visit.         Patient is taking medications as noted in medication list.  Current supplements as per medication list.     Allergies: Zithromax [azithromycin]    Current social contact/activities: Visit with family and friends      Is patient current with immunizations? No, due for FLU, HEPATITIS B and SHINGRIX (Shingles). Patient is interested in receiving FLU today.    She  reports that she quit smoking about 12 years ago. Her smoking use included cigarettes. She has a 69.00 pack-year smoking history. She has never used smokeless tobacco. She reports that she does not drink alcohol or use drugs.  Counseling given: Not Answered  Comment: Quit 2007        DPA/Advanced directive: Patient has Advanced Directive on file.     ROS:    Gait: Uses  a walker, Scooter   Ostomy: No   Other tubes: No   Amputations: No   Chronic oxygen use Yes 3-4L  Last eye exam within the past year    Wears hearing aids: Yes   : Reports urinary leakage during the last 6 months that has not interfered at all with their daily activities or sleep.    Screening:    DIABETES    Has patient ever had diabetes education? Yes, and is NOT interested in more at this time.     COPD    1.  Is patient under the care of a pulmonologist? Yes, CareTeam was updated.  2.  Has patient ever completed a PFT or spirometry? Yes, on 07/12/2019.  3.  Is patient on oxygen or CPAP? Yes, CareTeam was updated.  4.  Has patient ever had instruction on inhaler technique by health care professional? Yes  5.  Is patient interested in a referral to respiratory therapy for more information on COPD, inhaler technique, and/or information on establishing an action plan?  No, patient is NOT interested.            Depression Screening    Little interest or pleasure in doing things?  0 - not at all  Feeling down, depressed, or hopeless? 0 - not at all  Patient Health Questionnaire Score: 0    If depressive symptoms identified deferred to follow up visit unless specifically addressed in assessment and plan.    Interpretation of PHQ-9 Total Score   Score Severity   1-4 No Depression   5-9 Mild Depression   10-14 Moderate Depression   15-19 Moderately Severe Depression   20-27 Severe Depression    Screening for Cognitive Impairment    Three Minute Recall (village, kitchen, baby)  3/3 Village kitchen baby   [place]en clock face with all 12 numbers and set the hands to show 10 past 10.  Yes 10:10 5/5  If cognitive concerns identified, deferred for follow up unless specifically addressed in assessment and plan.    Fall Risk Assessment    Has the patient had two or more falls in the last year or any fall with injury in the last year?  No  If fall risk identified, deferred for follow up unless specifically addressed in assessment  and plan.    Safety Assessment    Throw rugs on floor.  No  Handrails on all stairs.  Yes  Good lighting in all hallways.  Yes  Difficulty hearing.  Yes  Patient counseled about all safety risks that were identified.    Functional Assessment ADLs    Are there any barriers preventing you from cooking for yourself or meeting nutritional needs?  No.    Are there any barriers preventing you from driving safely or obtaining transportation?  No.    Are there any barriers preventing you from using a telephone or calling for help?  No.    Are there any barriers preventing you from shopping?  No.    Are there any barriers preventing you from taking care of your own finances?  No.    Are there any barriers preventing you from managing your medications?  No.    Are there any barriers preventing you from showering, bathing or dressing yourself?  No.    Are you currently engaging in any exercise or physical activity?  No.     What is your perception of your health?  Good.    Health Maintenance Summary                Annual Wellness Visit Overdue 1940     IMM HEP B VACCINE Overdue 12/11/1960     IMM ZOSTER VACCINES Overdue 5/27/2015      Done 4/1/2015 Imm Admin: Zoster Vaccine Live (ZVL) (Zostavax)    A1C SCREENING Overdue 4/5/2019      Done 10/5/2018 POCT A1C     Patient has more history with this topic...    MAMMOGRAM Overdue 4/27/2019      Done 4/27/2018 MA-MAMMO SCREENING BILAT W/TOMOSYNTHESIS W/CAD     Patient has more history with this topic...    IMM INFLUENZA Overdue 9/1/2019      Done 10/5/2018 Imm Admin: Influenza Vaccine Adult HD     Patient has more history with this topic...    FASTING LIPID PROFILE Next Due 3/4/2020      Done 3/4/2019 LIPID PROFILE     Patient has more history with this topic...    URINE ACR / MICROALBUMIN Next Due 3/4/2020      Done 3/4/2019 MICROALBUMIN CREAT RATIO URINE     Patient has more history with this topic...    SERUM CREATININE Next Due 3/4/2020      Done 3/4/2019 COMP METABOLIC  PANEL     Patient has more history with this topic...    DIABETES MONOFILAMENT / LE EXAM Next Due 3/6/2020      Done 3/6/2019 AMB DIABETIC MONOFILAMENT LOWER EXTREMITY EXAM    IMM PNEUMOCOCCAL VACCINE: 65+ Years Next Due 2020      Done 2016 Imm Admin: Pneumococcal Conjugate Vaccine (Prevnar/PCV-13)     Patient has more history with this topic...    RETINAL SCREENING Next Due 2020      Done 2019 REFERRAL FOR RETINAL SCREENING EXAM    PFT SCREENING-FEV1 AND FEV/FVC RATIO / SPIROMETRY SHOULD BE PERFORMED ANNUALLY Next Due 2020      Done 2019 PULMONARY FUNCTION TESTS    BONE DENSITY Next Due 2023      Done 2018 DS-BONE DENSITY STUDY (DEXA)     Patient has more history with this topic...    IMM DTaP/Tdap/Td Vaccine Next Due 2027      Done 2017 Imm Admin: Tdap Vaccine          Patient Care Team:  Ruthie Escobedo P.A.-C. as PCP - General (Physician Assistant)  Evonne Diane as Patient Advocate - [facility] as Home Health Provider  [facility]  Mirella Mistry M.D. (Family Medicine)  RODDY Edmonds as Consulting Physician (Pulmonary Medicine)    Social History     Tobacco Use   • Smoking status: Former Smoker     Packs/day: 1.50     Years: 46.00     Pack years: 69.00     Types: Cigarettes     Last attempt to quit: 2007     Years since quittin.6   • Smokeless tobacco: Never Used   • Tobacco comment: Quit    Substance Use Topics   • Alcohol use: No   • Drug use: No     Family History   Problem Relation Age of Onset   • Heart Attack Father      She  has a past medical history of Chickenpox, Chronic airway obstruction, not elsewhere classified, Clotting disorder (HCC), Diabetes, [place] measles, and Mumps. She also has no past medical history of Breast cancer (HCC).   Past Surgical History:   Procedure Laterality Date   • PB REMV 2ND CATARACT,CORN-SCLER SECTN             Exam:     /74 (BP Location: Right arm, Patient Position:  "Sitting, BP Cuff Size: Adult)   Pulse 87   Temp 36.6 °C (97.8 °F)   Ht 1.626 m (5' 4\")   Wt 117 kg (258 lb)   SpO2 92%  Body mass index is 44.29 kg/m².    Hearing good - wears bilateral hearing aids  Alert, oriented in no acute distress.  Eye contact is good, speech goal directed, affect calm  Normal S1/S2, RRR  Lungs CTA bilat    Assessment and Plan. The following treatment and monitoring plan is recommended:      DM2 (diabetes mellitus, type 2) (CMS-HCC)  Chronic issue, well-controlled for age with current regimen which includes Janumet  mg BID. A1c today is 7.5. Continue current management.    CKD (chronic kidney disease), stage III  Chronic issue, stable per review. Most recent GFR in 3/2019 was 49. Will continue to monitor. Repeat renal function panel ordered.    HTN (hypertension)  Chronic issue, well-controlled with lisinopril 20 mg daily, HCTZ 25 mg daily, and atenolol 50 mg daily. BP today in the office is 112/74. Continue current management.    Chronic obstructive pulmonary disease (HCC)  Chronic issue, well-controlled with Trelegy Ellipta. No longer on Dulera/Spiriva. Also on supplemental O2. Follows with pulmonology regularly. Continue current management.    Chronic respiratory failure with hypoxia (HCC)  Chronic issue secondary to COPD, well-controlled with supplemental O2, 3-4 L at all times. Also follows with pulmonology. Continue current management.    Hypothyroidism  Chronic issue, well-controlled with levothyroxine 125 mcg daily. Last TSH in 3/2019 was normal. Continue current management.    Former smoker  Quit smoking 12 years ago.    Morbid obesity with BMI of 40.0-44.9, adult (HCC)  Chronic issue, stable.     Left bundle branch block  Established problem noted on previous EKG. Likely stable.        Services suggested: No services needed at this time  Health Care Screening recommendations as per orders if indicated.  Referrals offered: PT/OT/Nutrition counseling/Behavioral " Health/Smoking cessation as per orders if indicated.    Discussion today about general wellness and lifestyle habits:    · Prevent falls and reduce trip hazards; Cautioned about securing or removing rugs.  · Have a working fire alarm and carbon monoxide detector;   · Engage in regular physical activity and social activities       Follow-up: Return in about 5 months (around 2/27/2020).     Ruthie Escobedo P.A.-C.

## 2019-09-27 NOTE — ASSESSMENT & PLAN NOTE
Chronic issue secondary to COPD, well-controlled with supplemental O2, 3-4 L at all times. Also follows with pulmonology. Continue current management.

## 2019-10-01 LAB
HBA1C MFR BLD: 7.5 % (ref 0–5.6)
INT CON NEG: NEGATIVE
INT CON POS: POSITIVE

## 2020-01-07 DIAGNOSIS — E11.22 TYPE 2 DIABETES MELLITUS WITH STAGE 3 CHRONIC KIDNEY DISEASE, WITHOUT LONG-TERM CURRENT USE OF INSULIN (HCC): ICD-10-CM

## 2020-01-07 DIAGNOSIS — N18.30 TYPE 2 DIABETES MELLITUS WITH STAGE 3 CHRONIC KIDNEY DISEASE, WITHOUT LONG-TERM CURRENT USE OF INSULIN (HCC): ICD-10-CM

## 2020-01-07 DIAGNOSIS — I10 ESSENTIAL HYPERTENSION: ICD-10-CM

## 2020-01-07 RX ORDER — LISINOPRIL 20 MG/1
20 TABLET ORAL DAILY
Qty: 100 TAB | Refills: 0 | Status: SHIPPED | OUTPATIENT
Start: 2020-01-07 | End: 2020-02-07 | Stop reason: SDUPTHER

## 2020-01-07 NOTE — TELEPHONE ENCOUNTER
Was the patient seen in the last year in this department? Yes    Does patient have an active prescription for medications requested? No     Received Request Via: Pharmacy    Pt met protocol?: Yes     Last OV 09/27/2019    BP Readings from Last 1 Encounters:   09/27/19 112/74     Lab Results   Component Value Date/Time    HBA1C 7.5 (A) 09/27/2019 01:45 PM      Lab Results   Component Value Date/Time    CHOLSTRLTOT 129 03/04/2019 0803    TRIGLYCERIDE 159 (H) 03/04/2019 0803    HDL 66 03/04/2019 0803    LDL 31 03/04/2019 0803

## 2020-02-06 ENCOUNTER — TELEPHONE (OUTPATIENT)
Dept: MEDICAL GROUP | Facility: PHYSICIAN GROUP | Age: 80
End: 2020-02-06

## 2020-02-07 ENCOUNTER — OFFICE VISIT (OUTPATIENT)
Dept: MEDICAL GROUP | Facility: PHYSICIAN GROUP | Age: 80
End: 2020-02-07
Payer: MEDICARE

## 2020-02-07 VITALS
OXYGEN SATURATION: 89 % | WEIGHT: 249 LBS | TEMPERATURE: 97.5 F | BODY MASS INDEX: 42.51 KG/M2 | SYSTOLIC BLOOD PRESSURE: 122 MMHG | DIASTOLIC BLOOD PRESSURE: 74 MMHG | HEIGHT: 64 IN | HEART RATE: 79 BPM

## 2020-02-07 DIAGNOSIS — E11.22 TYPE 2 DIABETES MELLITUS WITH STAGE 3 CHRONIC KIDNEY DISEASE, WITHOUT LONG-TERM CURRENT USE OF INSULIN (HCC): ICD-10-CM

## 2020-02-07 DIAGNOSIS — J96.11 CHRONIC RESPIRATORY FAILURE WITH HYPOXIA (HCC): Chronic | ICD-10-CM

## 2020-02-07 DIAGNOSIS — I10 ESSENTIAL HYPERTENSION: ICD-10-CM

## 2020-02-07 DIAGNOSIS — E03.9 HYPOTHYROIDISM, UNSPECIFIED TYPE: ICD-10-CM

## 2020-02-07 DIAGNOSIS — J44.9 CHRONIC OBSTRUCTIVE PULMONARY DISEASE, UNSPECIFIED COPD TYPE (HCC): Chronic | ICD-10-CM

## 2020-02-07 DIAGNOSIS — N18.30 CKD (CHRONIC KIDNEY DISEASE), STAGE III: ICD-10-CM

## 2020-02-07 DIAGNOSIS — N18.30 TYPE 2 DIABETES MELLITUS WITH STAGE 3 CHRONIC KIDNEY DISEASE, WITHOUT LONG-TERM CURRENT USE OF INSULIN (HCC): ICD-10-CM

## 2020-02-07 DIAGNOSIS — E66.01 MORBID OBESITY WITH BMI OF 40.0-44.9, ADULT (HCC): ICD-10-CM

## 2020-02-07 DIAGNOSIS — Z23 NEED FOR VACCINATION: ICD-10-CM

## 2020-02-07 PROCEDURE — 90746 HEPB VACCINE 3 DOSE ADULT IM: CPT | Performed by: PHYSICIAN ASSISTANT

## 2020-02-07 PROCEDURE — 90472 IMMUNIZATION ADMIN EACH ADD: CPT | Performed by: PHYSICIAN ASSISTANT

## 2020-02-07 PROCEDURE — 90750 HZV VACC RECOMBINANT IM: CPT | Performed by: PHYSICIAN ASSISTANT

## 2020-02-07 PROCEDURE — 8041 PR SCP AHA: Performed by: PHYSICIAN ASSISTANT

## 2020-02-07 PROCEDURE — 99214 OFFICE O/P EST MOD 30 MIN: CPT | Mod: 25 | Performed by: PHYSICIAN ASSISTANT

## 2020-02-07 PROCEDURE — G0010 ADMIN HEPATITIS B VACCINE: HCPCS | Performed by: PHYSICIAN ASSISTANT

## 2020-02-07 RX ORDER — ATENOLOL 50 MG/1
50 TABLET ORAL DAILY
Qty: 100 TAB | Refills: 1 | Status: SHIPPED | OUTPATIENT
Start: 2020-02-07 | End: 2020-07-31 | Stop reason: SDUPTHER

## 2020-02-07 RX ORDER — LISINOPRIL 20 MG/1
20 TABLET ORAL DAILY
Qty: 100 TAB | Refills: 1 | Status: SHIPPED | OUTPATIENT
Start: 2020-02-07 | End: 2020-07-31 | Stop reason: SDUPTHER

## 2020-02-07 RX ORDER — SIMVASTATIN 10 MG
10 TABLET ORAL EVERY EVENING
Qty: 100 TAB | Refills: 1 | Status: SHIPPED | OUTPATIENT
Start: 2020-02-07 | End: 2020-07-31 | Stop reason: SDUPTHER

## 2020-02-07 RX ORDER — HYDROCHLOROTHIAZIDE 25 MG/1
25 TABLET ORAL DAILY
Qty: 100 TAB | Refills: 1 | Status: SHIPPED | OUTPATIENT
Start: 2020-02-07 | End: 2020-07-31 | Stop reason: SDUPTHER

## 2020-02-07 RX ORDER — ALBUTEROL SULFATE 90 UG/1
2 AEROSOL, METERED RESPIRATORY (INHALATION) EVERY 4 HOURS PRN
Qty: 8.5 G | Refills: 1 | Status: SHIPPED | OUTPATIENT
Start: 2020-02-07 | End: 2021-01-01 | Stop reason: SDUPTHER

## 2020-02-07 RX ORDER — LEVOTHYROXINE SODIUM 0.12 MG/1
125 TABLET ORAL
Qty: 100 TAB | Refills: 1 | Status: SHIPPED | OUTPATIENT
Start: 2020-02-07 | End: 2020-07-31 | Stop reason: SDUPTHER

## 2020-02-07 RX ORDER — LANCETS
1 EACH MISCELLANEOUS 2 TIMES DAILY
COMMUNITY
End: 2020-12-03

## 2020-02-07 RX ORDER — PROMETHAZINE HYDROCHLORIDE AND CODEINE PHOSPHATE 6.25; 1 MG/5ML; MG/5ML
SYRUP ORAL
COMMUNITY
End: 2020-12-03

## 2020-02-07 RX ORDER — BLOOD-GLUCOSE METER
KIT MISCELLANEOUS
COMMUNITY
End: 2020-12-03

## 2020-02-07 ASSESSMENT — PATIENT HEALTH QUESTIONNAIRE - PHQ9: CLINICAL INTERPRETATION OF PHQ2 SCORE: 0

## 2020-02-07 NOTE — NON-PROVIDER
"Nalini Rodriguez is a 79 y.o. female here for a non-provider visit for:   HEPATITIS B 1 of 3    Reason for immunization: Overdue/Provider Recommended  Immunization records indicate need for vaccine: Yes, confirmed with Epic  Minimum interval has been met for this vaccine: Yes  ABN completed: Not Indicated    Order and dose verified by: ach  VIS Dated  08/15/2019 was given to patient: Yes  All IAC Questionnaire questions were answered \"No.\"    Patient tolerated injection and no adverse effects were observed or reported: Yes    Pt scheduled for next dose in series: Not Indicated    Nalini Rodriguez is a 79 y.o. female here for a non-provider visit for:   SHINGRIX (Shingles)    Reason for immunization: Overdue/Provider Recommended  Immunization records indicate need for vaccine: Yes, confirmed with Epic  Minimum interval has been met for this vaccine: Yes  ABN completed: Not Indicated    Order and dose verified by: [facility]  VIS Dated  02/12/2018 was given to patient: Yes  All IAC Questionnaire questions were answered \"No.\"    Patient tolerated injection and no adverse effects were observed or reported: Yes    Pt scheduled for next dose in series: Not Indicated    "

## 2020-02-07 NOTE — PROGRESS NOTES
Subjective:   Camille Rodriguez is a 79 y.o. female here today for follow-up on diabetes and other medical problems, [facility]. Is an established patient of mine.    HPI:    Patient presents to the office today for routine follow-up on chronic medical conditions.  Last visit with me was in 9/2019. She denies any new concerns today. Is needing refills of all of her medications.    She continues on Janumet  mg twice daily for her type 2 diabetes.  A1c at last appointment in September was 7.5. Checks home BS readings periodically and states they usually run in low 100s.    She continues on lisinopril 20 mg daily, hydrochlorothiazide 25 mg daily, and atenolol 50 mg daily for hypertension.  Blood pressure today in the office is 122/74.  She is up-to-date on screening lab work which was last done in 3/2019.    Has stage III CKD which is monitored at least annually.  Last EGFR in 3/2019 was 49.  She avoids NSAID use. If she needs OTC pain reliever, takes Tylenol.    Continues on levothyroxine 125 mcg daily for hypothyroidism.  Last TSH in 3/2019 was normal.    Patient follows regularly with pulmonology for COPD and chronic respiratory failure.  She is on 24/7 supplemental oxygen.  Is now on Trelegy Ellipta inhaler.      Current medicines (including changes today)  Current Outpatient Medications   Medication Sig Dispense Refill   • sitagliptan-metformin (JANUMET)  MG per tablet Take 1 Tab by mouth 2 times a day, with meals. 200 Tab 1   • simvastatin (ZOCOR) 10 MG Tab Take 1 Tab by mouth every evening. 100 Tab 1   • lisinopril (PRINIVIL) 20 MG Tab Take 1 Tab by mouth every day. 100 Tab 1   • levothyroxine (SYNTHROID) 125 MCG Tab Take 1 Tab by mouth Every morning on an empty stomach. 100 Tab 1   • hydroCHLOROthiazide (HYDRODIURIL) 25 MG Tab Take 1 Tab by mouth every day. 100 Tab 1   • atenolol (TENORMIN) 50 MG Tab Take 1 Tab by mouth every day. 100 Tab 1   • albuterol 108 (90 Base) MCG/ACT Aero Soln inhalation  aerosol Inhale 2 Puffs by mouth every four hours as needed for Shortness of Breath. 8.5 g 1   • Blood Glucose Monitoring Suppl Device Meter: Dispense Device of Insurance Preference. Sig. Use as directed for blood sugar monitoring. #1. NR. 1 Device 0   • Blood Glucose Test Strips Test strips order: Test strips for insurance-preferred meter. Sig: use every morning before breakfast and prn ssx high or low sugar 100 Strip 3   • Lancets Lancets order: Lancets for insurance-preferred meter. Sig: use every morning before breakfast and prn ssx high or low sugar. 100 Each 3   • Fluticasone-Umeclidin-Vilant (TRELEGY ELLIPTA) 100-62.5-25 MCG/INH AEROSOL POWDER, BREATH ACTIVATED Inhale 1 Puff by mouth every day. 1 Each 11   • methylPREDNISolone (MEDROL DOSEPAK) 4 MG Tablet Therapy Pack Take as directed. 21 Tab 0   • doxycycline (VIBRAMYCIN) 100 MG Cap Take 1 Cap by mouth 2 times a day. Take until gone. 20 Cap 0   • ipratropium-albuterol (DUONEB) 0.5-2.5 (3) MG/3ML nebulizer solution 3 mL by Nebulization route every four hours as needed for Shortness of Breath. 120 Vial 5   • Probiotic Product (TRUBIOTICS) Cap Take 1 Capsule by mouth every day at 6 PM.     • acetaminophen (EQ ARTHRITIS PAIN) 650 MG CR tablet Take 650-1,300 mg by mouth every 6 hours as needed for Moderate Pain.     • Omega-3 Fatty Acids (FISH OIL) 1000 MG Cap capsule Take 1,000 mg by mouth every day.     • Calcium Carbonate-Vit D-Min (CALCIUM 1200 PO) Take  by mouth.     • Cholecalciferol (VITAMIN D) 2000 UNIT TABS Take 5,000 Units by mouth every day at 6 PM.     • Multiple Vitamin (MULTIVITAMIN PO) Take  by mouth.     • aspirin 81 MG tablet Take 81 mg by mouth every day. needs to p/u at store       No current facility-administered medications for this visit.      She  has a past medical history of Chickenpox, Chronic airway obstruction, not elsewhere classified, Clotting disorder (HCC), Diabetes, Mong[place] measles, and Mumps. She also has no past medical history of  "Breast cancer (HCC).    ROS  As per HPI.       Objective:     /74 (BP Location: Left arm, Patient Position: Sitting, BP Cuff Size: Adult)   Pulse 79   Temp 36.4 °C (97.5 °F) (Tympanic)   Ht 1.626 m (5' 4\")   Wt 112.9 kg (249 lb)   SpO2 89%  Body mass index is 42.74 kg/m².     Physical Exam:  Constitutional: Alert, no distress.  Skin: No rashes in visible areas.  Eye: Pupils are equal and round, conjunctiva clear, lids normal.  ENMT: Lips without lesions, moist mucus membranes.  Neck: Normal-appearing.  Respiratory: Unlabored respiratory effort, +O2 use. Lungs clear to auscultation, no wheezes, no rhonchi.  Cardiovascular: Normal S1, S2, no murmur.      Assessment and Plan:   The following treatment plan was discussed    1. Type 2 diabetes mellitus with stage 3 chronic kidney disease, without long-term current use of insulin ([facility])  Established problem, well-controlled for age with A1c of 7.5 on last check.  She will be due for repeat A1c and other screening lab work next month which I have ordered for her.  In the meantime, continue current management with Janumet.  - HEMOGLOBIN A1C; Future  - Comp Metabolic Panel; Future  - Lipid Profile; Future  - MICROALBUMIN CREAT RATIO URINE; Future  - sitagliptan-metformin (JANUMET)  MG per tablet; Take 1 Tab by mouth 2 times a day, with meals.  Dispense: 200 Tab; Refill: 1  - simvastatin (ZOCOR) 10 MG Tab; Take 1 Tab by mouth every evening.  Dispense: 100 Tab; Refill: 1  - lisinopril (PRINIVIL) 20 MG Tab; Take 1 Tab by mouth every day.  Dispense: 100 Tab; Refill: 1    2. Essential hypertension  Established problem, well-controlled with lisinopril, hydrochlorothiazide, and atenolol.  Blood pressure remains at goal.  Continue current doses.  - Comp Metabolic Panel; Future  - lisinopril (PRINIVIL) 20 MG Tab; Take 1 Tab by mouth every day.  Dispense: 100 Tab; Refill: 1  - hydroCHLOROthiazide (HYDRODIURIL) 25 MG Tab; Take 1 Tab by mouth every day.  Dispense: 100 " Tab; Refill: 1  - atenolol (TENORMIN) 50 MG Tab; Take 1 Tab by mouth every day.  Dispense: 100 Tab; Refill: 1    3. CKD (chronic kidney disease), stage III (HCC)  Established problem, stable per review.  We will continue to avoid anti-inflammatories.  Renal function to be checked next month with screening lab work.  - Comp Metabolic Panel; Future    4. Hypothyroidism, unspecified type  Established problem, well-controlled on current dose of levothyroxine.  Will be due for TSH next month.  In the meantime, continue current management.  - TSH; Future  - levothyroxine (SYNTHROID) 125 MCG Tab; Take 1 Tab by mouth Every morning on an empty stomach.  Dispense: 100 Tab; Refill: 1    5. Chronic obstructive pulmonary disease, unspecified COPD type (HCC)  Established problem, reasonably well controlled with Trelegy Ellipta.  She will continue to follow with pulmonology regularly and is scheduled for PFTs before her next appointment in April.  - albuterol 108 (90 Base) MCG/ACT Aero Soln inhalation aerosol; Inhale 2 Puffs by mouth every four hours as needed for Shortness of Breath.  Dispense: 8.5 g; Refill: 1    6. Chronic respiratory failure with hypoxia (HCC)  Established problem secondary to COPD.  Well-controlled with supplemental oxygen.  We will continue to follow with pulmonology.    7. Morbid obesity with BMI of 40.0-44.9, adult (HCC)  Established problem, uncontrolled but improving. Per review, has lost about 10 lbs since last visit. Will continue to monitor.    8. Need for vaccination  - Shingles Vaccine (Shingrix)  - Hepatitis B Vaccine Adult IM      Followup: Return in about 6 months (around 8/7/2020).    Ruthie Escobedo P.A.-C.

## 2020-02-07 NOTE — NON-PROVIDER
Annual Health Assessment Questions:    1.  Are you currently engaging in any exercise or physical activity? Yes    2.  How would you describe your mood or emotional well-being today? good    3.  Have you had any falls in the last year? No    4.  Have you noticed any problems with your balance or had difficulty walking? Yes    5.  In the last six months have you experienced any leakage of urine? Yes    6. DPA/Advanced Directive: Patient has Durable Power of  on file.

## 2020-02-07 NOTE — TELEPHONE ENCOUNTER
ESTABLISHED PATIENT PRE-VISIT PLANNING     Patient was NOT contacted to complete PVP.    1.  Reviewed notes from the last few office visits within the medical group: Yes    2.  If any orders were placed at last visit or intended to be done for this visit (i.e. 6 mos follow-up), do we have Results/Consult Notes?        •  Labs - Labs ordered, NOT completed. Patient advised to complete prior to next appointment.       •  Imaging - Imaging ordered, NOT completed. Patient advised to complete prior to next appointment.       •  Referrals - No referrals were ordered at last office visit.    3. Is this appointment scheduled as a Hospital Follow-Up? No    4.  Immunizations were updated in [employer] using WebIZ?: Yes       •  Web Iz Recommendations: TD, VARICELLA (Chicken Pox)  and SHINGRIX (Shingles)    5.  Patient is due for the following Health Maintenance Topics:   Health Maintenance Due   Topic Date Due   • IMM HEP B VACCINE (1 of 3 - Risk 3-dose series) 12[birth year]11/1960   • IMM ZOSTER VACCINES (2 of 3) 06/18/2015   • MAMMOGRAM  04/27/2019   • FASTING LIPID PROFILE  03/04/2020   • URINE ACR / MICROALBUMIN  03/04/2020   • SERUM CREATININE  03/04/2020   • DIABETES MONOFILAMENT / LE EXAM  03/06/2020     6. Orders for overdue Health Maintenance topics pended in Pre-Charting? NO    7.  AHA (MDX) form printed for Provider? YES    8.  Patient was NOT informed to arrive 15 min prior to their scheduled appointment and bring in their medication bottles.

## 2020-02-07 NOTE — NON-PROVIDER
Annual Health Assessment Questions:    1.  Are you currently engaging in any exercise or physical activity? {YES / NO:66228}    2.  How would you describe your mood or emotional well-being today? {ahamood:41093}    3.  Have you had any falls in the last year? {YES / NO:97580}    4.  Have you noticed any problems with your balance or had difficulty walking? {YES / NO:21362}    5.  In the last six months have you experienced any leakage of urine? {YES / NO:21362}    6. DPA/Advanced Directive: {[place] ADVANCED DIRECTIVE:10403}

## 2020-04-30 NOTE — PROGRESS NOTES
Monitor Summary:  SR/ST   .12/.12/.42     Pt and dad calling on speaker phone, they are requesting a referral for pt for:    This is Meghna's dad. She does not have access to the message system. She would like to talk to mental health. Just general stuff not si / drugs just feeling down and out. Does she need a referral and is there any one in [place] you would recommend     I asked pt herself about what's going on, she said she is feeling stressed, anxiety since high school, was seeing a therapist about 2 years ago through [facility] and it helped and she stopped going.

## 2020-05-11 ENCOUNTER — PATIENT MESSAGE (OUTPATIENT)
Dept: MEDICAL GROUP | Facility: PHYSICIAN GROUP | Age: 80
End: 2020-05-11

## 2020-05-11 RX ORDER — CIPROFLOXACIN 250 MG/1
250 TABLET, FILM COATED ORAL 2 TIMES DAILY
Qty: 6 TAB | Refills: 0 | Status: SHIPPED | OUTPATIENT
Start: 2020-05-11 | End: 2020-05-14

## 2020-07-21 NOTE — TELEPHONE ENCOUNTER
Have we ever prescribed this med? Yes.  If yes, what date? 07/12/2019    Last OV: 07/12/2019 with Ashley BUENROSTRO  Follow-up in 6 months with PFT to check symptoms, sooner if needed    Next OV: No Pending appt.    DX:    Medications:   Requested Prescriptions     Pending Prescriptions Disp Refills   • TRELEGY ELLIPTA 100-62.5-25 MCG/INH AEROSOL POWDER, BREATH ACTIVATED [Pharmacy Med Name: TRELEGY ELLIPTA 100-62.5-25]  10     Sig: INHALE ONE PUFF BY MOUTH DAILY

## 2020-07-30 ENCOUNTER — PATIENT MESSAGE (OUTPATIENT)
Dept: MEDICAL GROUP | Facility: PHYSICIAN GROUP | Age: 80
End: 2020-07-30

## 2020-07-30 DIAGNOSIS — E11.22 TYPE 2 DIABETES MELLITUS WITH STAGE 3 CHRONIC KIDNEY DISEASE, WITHOUT LONG-TERM CURRENT USE OF INSULIN (HCC): ICD-10-CM

## 2020-07-30 DIAGNOSIS — E03.9 HYPOTHYROIDISM, UNSPECIFIED TYPE: ICD-10-CM

## 2020-07-30 DIAGNOSIS — N18.30 TYPE 2 DIABETES MELLITUS WITH STAGE 3 CHRONIC KIDNEY DISEASE, WITHOUT LONG-TERM CURRENT USE OF INSULIN (HCC): ICD-10-CM

## 2020-07-30 DIAGNOSIS — I10 ESSENTIAL HYPERTENSION: ICD-10-CM

## 2020-07-31 RX ORDER — LEVOTHYROXINE SODIUM 0.12 MG/1
125 TABLET ORAL
Qty: 100 TAB | Refills: 0 | Status: SHIPPED | OUTPATIENT
Start: 2020-07-31 | End: 2020-11-09

## 2020-07-31 RX ORDER — SITAGLIPTIN AND METFORMIN HYDROCHLORIDE 500; 50 MG/1; MG/1
1 TABLET, FILM COATED ORAL 2 TIMES DAILY WITH MEALS
Qty: 200 TAB | Refills: 0 | Status: SHIPPED | OUTPATIENT
Start: 2020-07-31 | End: 2022-01-01

## 2020-07-31 RX ORDER — SIMVASTATIN 10 MG
10 TABLET ORAL EVERY EVENING
Qty: 100 TAB | Refills: 0 | Status: SHIPPED | OUTPATIENT
Start: 2020-07-31 | End: 2020-12-23 | Stop reason: SDUPTHER

## 2020-07-31 RX ORDER — ATENOLOL 50 MG/1
50 TABLET ORAL DAILY
Qty: 100 TAB | Refills: 0 | Status: SHIPPED | OUTPATIENT
Start: 2020-07-31 | End: 2020-12-23 | Stop reason: SDUPTHER

## 2020-07-31 RX ORDER — LISINOPRIL 20 MG/1
20 TABLET ORAL DAILY
Qty: 100 TAB | Refills: 0 | Status: SHIPPED | OUTPATIENT
Start: 2020-07-31 | End: 2020-12-23 | Stop reason: SDUPTHER

## 2020-07-31 RX ORDER — HYDROCHLOROTHIAZIDE 25 MG/1
25 TABLET ORAL DAILY
Qty: 100 TAB | Refills: 0 | Status: SHIPPED | OUTPATIENT
Start: 2020-07-31 | End: 2020-11-09

## 2020-08-18 ENCOUNTER — TELEPHONE (OUTPATIENT)
Dept: MEDICAL GROUP | Facility: PHYSICIAN GROUP | Age: 80
End: 2020-08-18

## 2020-09-03 ENCOUNTER — PATIENT OUTREACH (OUTPATIENT)
Dept: HEALTH INFORMATION MANAGEMENT | Facility: OTHER | Age: 80
End: 2020-09-03

## 2020-09-03 NOTE — PROGRESS NOTES
Called member to schedule AHA/AWV appointment and introduce SCPPA program. No answer LM with call back number [phone]

## 2020-10-27 NOTE — TELEPHONE ENCOUNTER
Have we ever prescribed this med? Yes.  If yes, what date? 09/17/20    Last OV: 07/12/19 with Ashley BUENROSTRO    Next OV: No Pending appt.     DX:    Medications:   Requested Prescriptions     Pending Prescriptions Disp Refills   • TRELEGY ELLIPTA 100-62.5-25 MCG/INH AEROSOL POWDER, BREATH ACTIVATED [Pharmacy Med Name: TRELEGY ELLIPTA 100-62.5-25]  0     Sig: INHALE ONE PUFF BY MOUTH DAILY

## 2020-10-28 ENCOUNTER — PATIENT MESSAGE (OUTPATIENT)
Dept: PULMONOLOGY | Facility: HOSPICE | Age: 80
End: 2020-10-28

## 2020-10-28 NOTE — TELEPHONE ENCOUNTER
From: Camille Rodriguez  To: RODDY Edmonds  Sent: 10/28/2020 10:54 AM PDT  Subject: Prescription Question    I called in a prescription for my Trilegy inhaler and have not heard anything from the pharmacy. Could you check and be sure that it is going to be filled. I only have 3 days left on the inhaler I have. Thank you in advance for handling this matter. Camille Sims) Michael [email or web address]

## 2020-11-06 DIAGNOSIS — E03.9 HYPOTHYROIDISM, UNSPECIFIED TYPE: ICD-10-CM

## 2020-11-06 DIAGNOSIS — I10 ESSENTIAL HYPERTENSION: ICD-10-CM

## 2020-11-09 RX ORDER — LEVOTHYROXINE SODIUM 0.12 MG/1
TABLET ORAL
Qty: 90 TAB | Refills: 0 | Status: SHIPPED | OUTPATIENT
Start: 2020-11-09 | End: 2022-01-01

## 2020-11-09 RX ORDER — HYDROCHLOROTHIAZIDE 25 MG/1
TABLET ORAL
Qty: 90 TAB | Refills: 0 | Status: ON HOLD
Start: 2020-11-09 | End: 2020-12-06

## 2020-11-10 ENCOUNTER — PATIENT MESSAGE (OUTPATIENT)
Dept: SLEEP MEDICINE | Facility: MEDICAL CENTER | Age: 80
End: 2020-11-10

## 2020-11-11 NOTE — TELEPHONE ENCOUNTER
From: Camille Rodriguez  To: RODDY Edmonds  Sent: 11/10/2020 11:04 AM PST  Subject: Non-Urgent Medical Question    Ashley, i have tried many times to get an appointment for a pulmonary test and an appointment with you but to no avail. the scheduling desk says you are booked until January. I will need at least one more refill of Trilegy to make it to January. and how soon in January can I get in? Please let me know what to do about this. Please send me a response to this matter, thanks in advance for your time and attention. Camille Rodriguez ( [email or web address] )

## 2020-12-01 ENCOUNTER — TELEPHONE (OUTPATIENT)
Dept: MEDICAL GROUP | Facility: PHYSICIAN GROUP | Age: 80
End: 2020-12-01

## 2020-12-03 ENCOUNTER — APPOINTMENT (OUTPATIENT)
Dept: RADIOLOGY | Facility: MEDICAL CENTER | Age: 80
DRG: 177 | End: 2020-12-03
Attending: EMERGENCY MEDICINE
Payer: MEDICARE

## 2020-12-03 ENCOUNTER — HOSPITAL ENCOUNTER (INPATIENT)
Facility: MEDICAL CENTER | Age: 80
LOS: 3 days | DRG: 177 | End: 2020-12-06
Attending: EMERGENCY MEDICINE | Admitting: INTERNAL MEDICINE
Payer: MEDICARE

## 2020-12-03 PROBLEM — J96.01 ACUTE HYPOXEMIC RESPIRATORY FAILURE DUE TO COVID-19 (HCC): Status: ACTIVE | Noted: 2020-12-03

## 2020-12-03 PROBLEM — U07.1 ACUTE HYPOXEMIC RESPIRATORY FAILURE DUE TO COVID-19 (HCC): Status: ACTIVE | Noted: 2020-12-03

## 2020-12-03 PROBLEM — N17.9 ACUTE KIDNEY INJURY (HCC): Status: ACTIVE | Noted: 2020-12-03

## 2020-12-03 LAB
ALBUMIN SERPL BCP-MCNC: 3.9 G/DL (ref 3.2–4.9)
ALBUMIN/GLOB SERPL: 1.3 G/DL
ALP SERPL-CCNC: 48 U/L (ref 30–99)
ALT SERPL-CCNC: 21 U/L (ref 2–50)
ANION GAP SERPL CALC-SCNC: 12 MMOL/L (ref 7–16)
APPEARANCE UR: ABNORMAL
AST SERPL-CCNC: 32 U/L (ref 12–45)
BACTERIA #/AREA URNS HPF: ABNORMAL /HPF
BASOPHILS # BLD AUTO: 0.2 % (ref 0–1.8)
BASOPHILS # BLD: 0.01 K/UL (ref 0–0.12)
BILIRUB SERPL-MCNC: 0.2 MG/DL (ref 0.1–1.5)
BILIRUB UR QL STRIP.AUTO: NEGATIVE
BUN SERPL-MCNC: 38 MG/DL (ref 8–22)
CALCIUM SERPL-MCNC: 9.9 MG/DL (ref 8.5–10.5)
CHLORIDE SERPL-SCNC: 98 MMOL/L (ref 96–112)
CO2 SERPL-SCNC: 26 MMOL/L (ref 20–33)
COLOR UR: ABNORMAL
COVID ORDER STATUS COVID19: NORMAL
CREAT SERPL-MCNC: 2.17 MG/DL (ref 0.5–1.4)
EOSINOPHIL # BLD AUTO: 0.03 K/UL (ref 0–0.51)
EOSINOPHIL NFR BLD: 0.5 % (ref 0–6.9)
EPI CELLS #/AREA URNS HPF: NEGATIVE /HPF
ERYTHROCYTE [DISTWIDTH] IN BLOOD BY AUTOMATED COUNT: 50.9 FL (ref 35.9–50)
FLUAV RNA SPEC QL NAA+PROBE: NEGATIVE
FLUBV RNA SPEC QL NAA+PROBE: NEGATIVE
GLOBULIN SER CALC-MCNC: 3.1 G/DL (ref 1.9–3.5)
GLUCOSE BLD-MCNC: 152 MG/DL (ref 65–99)
GLUCOSE SERPL-MCNC: 133 MG/DL (ref 65–99)
GLUCOSE UR STRIP.AUTO-MCNC: NEGATIVE MG/DL
HCT VFR BLD AUTO: 36.7 % (ref 37–47)
HGB BLD-MCNC: 11.3 G/DL (ref 12–16)
IMM GRANULOCYTES # BLD AUTO: 0.03 K/UL (ref 0–0.11)
IMM GRANULOCYTES NFR BLD AUTO: 0.5 % (ref 0–0.9)
KETONES UR STRIP.AUTO-MCNC: NEGATIVE MG/DL
LACTATE BLD-SCNC: 1.5 MMOL/L (ref 0.5–2)
LEUKOCYTE ESTERASE UR QL STRIP.AUTO: ABNORMAL
LYMPHOCYTES # BLD AUTO: 1.27 K/UL (ref 1–4.8)
LYMPHOCYTES NFR BLD: 19.5 % (ref 22–41)
MCH RBC QN AUTO: 27.4 PG (ref 27–33)
MCHC RBC AUTO-ENTMCNC: 30.8 G/DL (ref 33.6–35)
MCV RBC AUTO: 89.1 FL (ref 81.4–97.8)
MICRO URNS: ABNORMAL
MONOCYTES # BLD AUTO: 0.72 K/UL (ref 0–0.85)
MONOCYTES NFR BLD AUTO: 11.1 % (ref 0–13.4)
NEUTROPHILS # BLD AUTO: 4.44 K/UL (ref 2–7.15)
NEUTROPHILS NFR BLD: 68.2 % (ref 44–72)
NITRITE UR QL STRIP.AUTO: POSITIVE
NRBC # BLD AUTO: 0 K/UL
NRBC BLD-RTO: 0 /100 WBC
PH UR STRIP.AUTO: 5 [PH] (ref 5–8)
PLATELET # BLD AUTO: 162 K/UL (ref 164–446)
PMV BLD AUTO: 9.5 FL (ref 9–12.9)
POTASSIUM SERPL-SCNC: 4.1 MMOL/L (ref 3.6–5.5)
PROT SERPL-MCNC: 7 G/DL (ref 6–8.2)
PROT UR QL STRIP: 30 MG/DL
RBC # BLD AUTO: 4.12 M/UL (ref 4.2–5.4)
RBC # URNS HPF: ABNORMAL /HPF
RBC UR QL AUTO: ABNORMAL
RSV RNA SPEC QL NAA+PROBE: NEGATIVE
SARS-COV-2 RNA RESP QL NAA+PROBE: DETECTED
SODIUM SERPL-SCNC: 136 MMOL/L (ref 135–145)
SP GR UR STRIP.AUTO: 1.02
SPECIMEN SOURCE: ABNORMAL
UROBILINOGEN UR STRIP.AUTO-MCNC: 0.2 MG/DL
WBC # BLD AUTO: 6.5 K/UL (ref 4.8–10.8)
WBC #/AREA URNS HPF: ABNORMAL /HPF

## 2020-12-03 PROCEDURE — 700111 HCHG RX REV CODE 636 W/ 250 OVERRIDE (IP): Performed by: EMERGENCY MEDICINE

## 2020-12-03 PROCEDURE — 81001 URINALYSIS AUTO W/SCOPE: CPT

## 2020-12-03 PROCEDURE — 700105 HCHG RX REV CODE 258: Performed by: EMERGENCY MEDICINE

## 2020-12-03 PROCEDURE — 87077 CULTURE AEROBIC IDENTIFY: CPT

## 2020-12-03 PROCEDURE — 82962 GLUCOSE BLOOD TEST: CPT

## 2020-12-03 PROCEDURE — 99285 EMERGENCY DEPT VISIT HI MDM: CPT

## 2020-12-03 PROCEDURE — 96372 THER/PROPH/DIAG INJ SC/IM: CPT

## 2020-12-03 PROCEDURE — 87186 SC STD MICRODIL/AGAR DIL: CPT

## 2020-12-03 PROCEDURE — 85025 COMPLETE CBC W/AUTO DIFF WBC: CPT

## 2020-12-03 PROCEDURE — 80053 COMPREHEN METABOLIC PANEL: CPT

## 2020-12-03 PROCEDURE — 71045 X-RAY EXAM CHEST 1 VIEW: CPT

## 2020-12-03 PROCEDURE — 99223 1ST HOSP IP/OBS HIGH 75: CPT | Performed by: INTERNAL MEDICINE

## 2020-12-03 PROCEDURE — 0241U HCHG SARS-COV-2 COVID-19 NFCT DS RESP RNA 4 TRGT MIC: CPT

## 2020-12-03 PROCEDURE — 96365 THER/PROPH/DIAG IV INF INIT: CPT

## 2020-12-03 PROCEDURE — 770014 HCHG ROOM/CARE - WARD (150)

## 2020-12-03 PROCEDURE — 87086 URINE CULTURE/COLONY COUNT: CPT

## 2020-12-03 PROCEDURE — C9803 HOPD COVID-19 SPEC COLLECT: HCPCS | Performed by: EMERGENCY MEDICINE

## 2020-12-03 PROCEDURE — 700105 HCHG RX REV CODE 258: Performed by: INTERNAL MEDICINE

## 2020-12-03 PROCEDURE — 83605 ASSAY OF LACTIC ACID: CPT

## 2020-12-03 PROCEDURE — 87040 BLOOD CULTURE FOR BACTERIA: CPT | Mod: 91

## 2020-12-03 PROCEDURE — 96375 TX/PRO/DX INJ NEW DRUG ADDON: CPT

## 2020-12-03 PROCEDURE — 700111 HCHG RX REV CODE 636 W/ 250 OVERRIDE (IP): Performed by: INTERNAL MEDICINE

## 2020-12-03 RX ORDER — DEXTROSE MONOHYDRATE 25 G/50ML
50 INJECTION, SOLUTION INTRAVENOUS
Status: DISCONTINUED | OUTPATIENT
Start: 2020-12-03 | End: 2020-12-05

## 2020-12-03 RX ORDER — SODIUM CHLORIDE 9 MG/ML
1000 INJECTION, SOLUTION INTRAVENOUS ONCE
Status: COMPLETED | OUTPATIENT
Start: 2020-12-03 | End: 2020-12-03

## 2020-12-03 RX ORDER — ONDANSETRON 4 MG/1
4 TABLET, ORALLY DISINTEGRATING ORAL EVERY 6 HOURS PRN
Status: DISCONTINUED | OUTPATIENT
Start: 2020-12-03 | End: 2020-12-06 | Stop reason: HOSPADM

## 2020-12-03 RX ORDER — SODIUM CHLORIDE, SODIUM LACTATE, POTASSIUM CHLORIDE, CALCIUM CHLORIDE 600; 310; 30; 20 MG/100ML; MG/100ML; MG/100ML; MG/100ML
1000 INJECTION, SOLUTION INTRAVENOUS ONCE
Status: DISCONTINUED | OUTPATIENT
Start: 2020-12-03 | End: 2020-12-03

## 2020-12-03 RX ORDER — DEXAMETHASONE SODIUM PHOSPHATE 4 MG/ML
6 INJECTION, SOLUTION INTRA-ARTICULAR; INTRALESIONAL; INTRAMUSCULAR; INTRAVENOUS; SOFT TISSUE ONCE
Status: COMPLETED | OUTPATIENT
Start: 2020-12-03 | End: 2020-12-03

## 2020-12-03 RX ORDER — FLUTICASONE PROPIONATE 44 UG/1
2 AEROSOL, METERED RESPIRATORY (INHALATION)
Status: DISCONTINUED | OUTPATIENT
Start: 2020-12-04 | End: 2020-12-06 | Stop reason: HOSPADM

## 2020-12-03 RX ORDER — ACETAMINOPHEN 325 MG/1
650 TABLET ORAL EVERY 6 HOURS PRN
Status: DISCONTINUED | OUTPATIENT
Start: 2020-12-03 | End: 2020-12-06 | Stop reason: HOSPADM

## 2020-12-03 RX ORDER — LABETALOL HYDROCHLORIDE 5 MG/ML
10 INJECTION, SOLUTION INTRAVENOUS EVERY 6 HOURS PRN
Status: DISCONTINUED | OUTPATIENT
Start: 2020-12-03 | End: 2020-12-06 | Stop reason: HOSPADM

## 2020-12-03 RX ORDER — DEXAMETHASONE 4 MG/1
6 TABLET ORAL DAILY
Status: DISCONTINUED | OUTPATIENT
Start: 2020-12-04 | End: 2020-12-06 | Stop reason: HOSPADM

## 2020-12-03 RX ORDER — ONDANSETRON 2 MG/ML
4 INJECTION INTRAMUSCULAR; INTRAVENOUS EVERY 6 HOURS PRN
Status: DISCONTINUED | OUTPATIENT
Start: 2020-12-03 | End: 2020-12-06 | Stop reason: HOSPADM

## 2020-12-03 RX ORDER — GUAIFENESIN 600 MG/1
600 TABLET, EXTENDED RELEASE ORAL 2 TIMES DAILY PRN
Status: DISCONTINUED | OUTPATIENT
Start: 2020-12-03 | End: 2020-12-06 | Stop reason: HOSPADM

## 2020-12-03 RX ADMIN — DEXAMETHASONE SODIUM PHOSPHATE 6 MG: 4 INJECTION, SOLUTION INTRA-ARTICULAR; INTRALESIONAL; INTRAMUSCULAR; INTRAVENOUS; SOFT TISSUE at 17:00

## 2020-12-03 RX ADMIN — ENOXAPARIN SODIUM 30 MG: 30 INJECTION SUBCUTANEOUS at 18:35

## 2020-12-03 RX ADMIN — SODIUM CHLORIDE 1000 ML: 9 INJECTION, SOLUTION INTRAVENOUS at 20:27

## 2020-12-03 RX ADMIN — CEFTRIAXONE SODIUM 1 G: 1 INJECTION, POWDER, FOR SOLUTION INTRAMUSCULAR; INTRAVENOUS at 16:59

## 2020-12-03 ASSESSMENT — ENCOUNTER SYMPTOMS
PALPITATIONS: 0
CLAUDICATION: 0
ABDOMINAL PAIN: 0
DIAPHORESIS: 0
CHILLS: 1
COUGH: 1
VOMITING: 0
FEVER: 0
HEARTBURN: 0
PHOTOPHOBIA: 0
NAUSEA: 0
DIARRHEA: 0
SHORTNESS OF BREATH: 1
DIZZINESS: 0
DEPRESSION: 0
WHEEZING: 0
HALLUCINATIONS: 0
HEMOPTYSIS: 0
SPUTUM PRODUCTION: 0
BLURRED VISION: 0
DOUBLE VISION: 0
FLANK PAIN: 0
WEIGHT LOSS: 0
HEADACHES: 0
EYE PAIN: 0
TINGLING: 0

## 2020-12-03 ASSESSMENT — LIFESTYLE VARIABLES: SUBSTANCE_ABUSE: 0

## 2020-12-03 NOTE — ED PROVIDER NOTES
ED Provider Note    Scribed for Bill Yan M.D. by Robert Celis. 12/3/2020  12:50 PM    Primary care provider: Ruthie Escobedo P.A.-C.  Means of arrival: Ambulance  History obtained from: Patient  History limited by: None    CHIEF COMPLAINT  Chief Complaint   Patient presents with   • UTI   • Fever   • Chills     HPI  Camille Rodriguez is a 79 y.o. female who presents to the Emergency Department by ambulance for evaluation of moderate dysuria onset 3 days. She says she believes she has UTI because she has had several in the past. She admits to additional symptoms of intermittent fever, polyuria, and lethargy (EMS reported, this morning), generalized weakness (this morning, EMS reported), but denies cough, vomiting, or diarrhea. She has medicated with Tylenol, with significant alleviation of fever. She is unsure why exactly she was brought in by EMS. She lives in [place] with her family. Nursing staff informed me the patient's family is being ruled out for COVID. EMS found the patient at 80% and put her on 6 L. Nursing staff notes history of COPD. She denies smoking, alcohol or drug abuse. EMS administered 1 L IV fluids in route for BP of 71/42, which improved to 116/76. Blood sugar was 209 en route. She has history of diabetes.     PPE Note: I personally donned full PPE for all patient encounters during this visit, including being clean-shaven with an N95 respirator mask, gloves, and goggles.     Scribe remained outside the patient's room and did not have any contact with the patient for the duration of patient encounter.      REVIEW OF SYSTEMS  Pertinent positives include dysuria, intermittent fever, polyuria, and lethargy (EMS reported, this morning), generalized weakness (this morning, EMS reported).   Pertinent negatives include no cough, vomiting, or diarrhea.    All other systems reviewed and negative.    PAST MEDICAL HISTORY   has a past medical history of Chickenpox, Chronic airway  obstruction, not elsewhere classified, Clotting disorder (HCC), Diabetes, [place] measles, and Mumps.    SURGICAL HISTORY   has a past surgical history that includes remv 2nd cataract,corn-scler sectn.    SOCIAL HISTORY  Social History     Tobacco Use   • Smoking status: Former Smoker     Packs/day: 1.50     Years: 46.00     Pack years: 69.00     Types: Cigarettes     Quit date: 2007     Years since quittin.8   • Smokeless tobacco: Never Used   • Tobacco comment: Quit    Substance Use Topics   • Alcohol use: No   • Drug use: No      Social History     Substance and Sexual Activity   Drug Use No       FAMILY HISTORY  Family History   Problem Relation Age of Onset   • Heart Attack Father        CURRENT MEDICATIONS  Home Medications     Reviewed by Morales Perry R.N. (Registered Nurse) on 20 at 1224  Med List Status: Not Addressed   Medication Last Dose Status   acetaminophen (EQ ARTHRITIS PAIN) 650 MG CR tablet  Active   albuterol 108 (90 Base) MCG/ACT Aero Soln inhalation aerosol  Active   aspirin 81 MG tablet  Active   atenolol (TENORMIN) 50 MG Tab  Active   Blood Glucose Monitoring Suppl (FREESTYLE FREEDOM) Kit  Active   Blood Glucose Monitoring Suppl Device  Active   Blood Glucose Test Strips  Active   Calcium Carbonate-Vit D-Min (CALCIUM 1200 PO)  Active   Cholecalciferol (VITAMIN D) 2000 UNIT TABS  Active   doxycycline (VIBRAMYCIN) 100 MG Cap  Active   Fluticasone-Umeclidin-Vilant (TRELEGY ELLIPTA) 100-62.5-25 MCG/INH AEROSOL POWDER, BREATH ACTIVATED  Active   glucose blood strip  Active   hydroCHLOROthiazide (HYDRODIURIL) 25 MG Tab  Active   Influenza Vac Typ A&B Surf Ant (FLUVIRIN IM)  Active   ipratropium-albuterol (DUONEB) 0.5-2.5 (3) MG/3ML nebulizer solution  Active   Lancets  Active   Lancets Thin Misc  Active   levothyroxine (SYNTHROID) 125 MCG Tab  Active   lisinopril (PRINIVIL) 20 MG Tab  Active   methylPREDNISolone (MEDROL DOSEPAK) 4 MG Tablet Therapy Pack  Active   Multiple  "Vitamin (MULTIVITAMIN PO)  Active   Omega-3 Fatty Acids (FISH OIL) 1000 MG Cap capsule  Active   Probiotic Product (TRUBIOTICS) Cap  Active   promethazine-codeine (PHENERGAN-CODEINE) 6.25-10 MG/5ML Syrup  Active   simvastatin (ZOCOR) 10 MG Tab  Active   sitagliptan-metformin (JANUMET)  MG per tablet  Active   Tiotropium Bromide Monohydrate (SPIRIVA RESPIMAT) 2.5 MCG/ACT Aero Soln  Active   TRELEGY ELLIPTA 100-62.5-25 MCG/INH AEROSOL POWDER, BREATH ACTIVATED  Active                ALLERGIES  Allergies   Allergen Reactions   • Zithromax [Azithromycin] Diarrhea     Pt states severe diarrhea       PHYSICAL EXAM  VITAL SIGNS: /58   Pulse 63   Temp 36.1 °C (97 °F) (Temporal)   Resp 20   Ht 1.626 m (5' 4\")   Wt 108.9 kg (240 lb)   SpO2 99%   BMI 41.20 kg/m²   Vital signs reviewed.  Constitutional:  Morbidly obese. Appears well-developed. No distress.   Head: Normocephalic.   Mouth/Throat: Oropharynx is clear and dry.   Eyes: EOM are normal. Pupils are equal, round, and reactive to light.   Neck: Normal range of motion. Neck supple.   Cardiovascular: Normal rate, regular rhythm and normal heart sounds.    Pulmonary/Chest: Effort normal and breath sounds normal. No wheezes.   Abdominal: Soft. There is no tenderness. There is no rebound and no guarding.   Musculoskeletal: Exhibits no edema. No CVA tenderness.   Lymphadenopathy: No cervical adenopathy.   Neurological: Awake. Patient is alert and oriented to person, place, and time. CNs II - XII intact. DTRs intact. Normal sensation and strength.  Skin: Skin is warm and dry.   Psychiatric: Patient has a normal mood and affect. Behavior is normal.      LABS  Results for orders placed or performed during the hospital encounter of 12/03/20   LACTIC ACID   Result Value Ref Range    Lactic Acid 1.5 0.5 - 2.0 mmol/L   CBC WITH DIFFERENTIAL   Result Value Ref Range    WBC 6.5 4.8 - 10.8 K/uL    RBC 4.12 (L) 4.20 - 5.40 M/uL    Hemoglobin 11.3 (L) 12.0 - 16.0 g/dL    " Hematocrit 36.7 (L) 37.0 - 47.0 %    MCV 89.1 81.4 - 97.8 fL    MCH 27.4 27.0 - 33.0 pg    MCHC 30.8 (L) 33.6 - 35.0 g/dL    RDW 50.9 (H) 35.9 - 50.0 fL    Platelet Count 162 (L) 164 - 446 K/uL    MPV 9.5 9.0 - 12.9 fL    Neutrophils-Polys 68.20 44.00 - 72.00 %    Lymphocytes 19.50 (L) 22.00 - 41.00 %    Monocytes 11.10 0.00 - 13.40 %    Eosinophils 0.50 0.00 - 6.90 %    Basophils 0.20 0.00 - 1.80 %    Immature Granulocytes 0.50 0.00 - 0.90 %    Nucleated RBC 0.00 /100 WBC    Neutrophils (Absolute) 4.44 2.00 - 7.15 K/uL    Lymphs (Absolute) 1.27 1.00 - 4.80 K/uL    Monos (Absolute) 0.72 0.00 - 0.85 K/uL    Eos (Absolute) 0.03 0.00 - 0.51 K/uL    Baso (Absolute) 0.01 0.00 - 0.12 K/uL    Immature Granulocytes (abs) 0.03 0.00 - 0.11 K/uL    NRBC (Absolute) 0.00 K/uL   COMP METABOLIC PANEL   Result Value Ref Range    Sodium 136 135 - 145 mmol/L    Potassium 4.1 3.6 - 5.5 mmol/L    Chloride 98 96 - 112 mmol/L    Co2 26 20 - 33 mmol/L    Anion Gap 12.0 7.0 - 16.0    Glucose 133 (H) 65 - 99 mg/dL    Bun 38 (H) 8 - 22 mg/dL    Creatinine 2.17 (H) 0.50 - 1.40 mg/dL    Calcium 9.9 8.5 - 10.5 mg/dL    AST(SGOT) 32 12 - 45 U/L    ALT(SGPT) 21 2 - 50 U/L    Alkaline Phosphatase 48 30 - 99 U/L    Total Bilirubin 0.2 0.1 - 1.5 mg/dL    Albumin 3.9 3.2 - 4.9 g/dL    Total Protein 7.0 6.0 - 8.2 g/dL    Globulin 3.1 1.9 - 3.5 g/dL    A-G Ratio 1.3 g/dL   URINALYSIS    Specimen: Urine   Result Value Ref Range    Color DK Yellow     Character Turbid (A)     Specific Gravity 1.022 <1.035    Ph 5.0 5.0 - 8.0    Glucose Negative Negative mg/dL    Ketones Negative Negative mg/dL    Protein 30 (A) Negative mg/dL    Bilirubin Negative Negative    Urobilinogen, Urine 0.2 Negative    Nitrite Positive (A) Negative    Leukocyte Esterase Moderate (A) Negative    Occult Blood Moderate (A) Negative    Micro Urine Req Microscopic    COVID/SARS CoV-2 PCR    Specimen: Nasopharyngeal; Respirate   Result Value Ref Range    COVID Order Status Received     ESTIMATED GFR   Result Value Ref Range    GFR If  26 (A) >60 mL/min/1.73 m 2    GFR If Non African American 22 (A) >60 mL/min/1.73 m 2   CoV-2, Flu A/B, And RSV by PCR   Result Value Ref Range    Influenza virus A RNA Negative Negative    Influenza virus B, PCR Negative Negative    RSV, PCR Negative Negative    SARS-CoV-2 by PCR DETECTED (AA)     SARS-CoV-2 Source NP Swab    URINE MICROSCOPIC (W/UA)   Result Value Ref Range    WBC Packed (A) /hpf    RBC  (A) /hpf    Bacteria Many (A) None /hpf    Epithelial Cells Negative /hpf      All labs reviewed by me.    RADIOLOGY  DX-CHEST-PORTABLE (1 VIEW)   Final Result      1.  Prominent interstitial markings may be chronic in nature. Superimposed infection cannot be excluded.      2.  Mild left basilar atelectasis.        The radiologist's interpretation of all radiological studies have been reviewed by me.    COURSE & MEDICAL DECISION MAKING  Pertinent Labs & Imaging studies reviewed. (See chart for details) The patient's [facility] and past medical  records were reviewed    12:50 PM - Patient seen and examined at bedside. I informed the patient of my plan to run diagnostic studies to evaluate their symptoms including imaging and labs. Patient verbalizes understanding and support with my plan of care. Ordered DX-Chest, Lactic Acid now and every 2 hours, COVID/SARS CoV-2 PCR, CBC with diff, CMP, UA, Urine Culture, Blood Cultre x2 to evaluate her symptoms. The differential diagnoses include but are not limited to: Arrhythmia, UTI, COVID.     3:52 PM - The patient has tested positive for COVID.     4:16 PM - Paged Hospitalist    4:23 PM - The patient will be medicated with Decadron 6 mg injection and Rocephin 1 g in  mL IVPB.     4:38 PM - I reevaluated the patient at bedside. I discussed the patient's diagnostic study results which show COVID and UTI. I informed the patient of my plan to admit today given the patient's current presentation  and diagnostic study results. Patient verbalizes understanding and support with my plan for admission.      5:07 PM I discussed the patient's case and the above findings with Dr. Ngo (Hospitalist) who will assess the patient for hospitalization.      DISPOSITION:  Patient will be hospitalized by Dr. Ngo in guarded condition.     FINAL IMPRESSION  COVID 19 Pneumonia  Urosepsis      IRobert (Scribe), am scribing for, and in the presence of, Bill Yan M.D..    Electronically signed by: Robert Celis (Scribe), 12/3/2020    IBill M.D. personally performed the services described in this documentation, as scribed by Robert Celis in my presence, and it is both accurate and complete.    The note accurately reflects work and decisions made by me.  Bill Yan M.D.  12/3/2020  7:15 PM

## 2020-12-03 NOTE — ED TRIAGE NOTES
Ems brought in pt who states she feels like she is having UTI symptoms, also weakness, fever, flu like symptoms, on ems arrival her bp was 71/42 ems gave 1L fluids brought her bp to 116/76, last night pt had a fever she took 1000mg of tylenol and took tylenol again today, pt was also lethargic this morning, alert and oriented x4, blood sugar 209,

## 2020-12-04 LAB
ALBUMIN SERPL BCP-MCNC: 3.9 G/DL (ref 3.2–4.9)
ALBUMIN/GLOB SERPL: 1.2 G/DL
ALP SERPL-CCNC: 50 U/L (ref 30–99)
ALT SERPL-CCNC: 16 U/L (ref 2–50)
ANION GAP SERPL CALC-SCNC: 9 MMOL/L (ref 7–16)
AST SERPL-CCNC: 28 U/L (ref 12–45)
BASOPHILS # BLD AUTO: 0.2 % (ref 0–1.8)
BASOPHILS # BLD: 0.01 K/UL (ref 0–0.12)
BILIRUB SERPL-MCNC: 0.2 MG/DL (ref 0.1–1.5)
BUN SERPL-MCNC: 30 MG/DL (ref 8–22)
CALCIUM SERPL-MCNC: 9.1 MG/DL (ref 8.5–10.5)
CHLORIDE SERPL-SCNC: 98 MMOL/L (ref 96–112)
CO2 SERPL-SCNC: 28 MMOL/L (ref 20–33)
CREAT SERPL-MCNC: 1.31 MG/DL (ref 0.5–1.4)
CRP SERPL HS-MCNC: 2.79 MG/DL (ref 0–0.75)
D DIMER PPP IA.FEU-MCNC: 1.17 UG/ML (FEU) (ref 0–0.5)
EOSINOPHIL # BLD AUTO: 0 K/UL (ref 0–0.51)
EOSINOPHIL NFR BLD: 0 % (ref 0–6.9)
ERYTHROCYTE [DISTWIDTH] IN BLOOD BY AUTOMATED COUNT: 50.3 FL (ref 35.9–50)
EST. AVERAGE GLUCOSE BLD GHB EST-MCNC: 157 MG/DL
FERRITIN SERPL-MCNC: 204 NG/ML (ref 10–291)
GLOBULIN SER CALC-MCNC: 3.3 G/DL (ref 1.9–3.5)
GLUCOSE BLD-MCNC: 162 MG/DL (ref 65–99)
GLUCOSE BLD-MCNC: 234 MG/DL (ref 65–99)
GLUCOSE BLD-MCNC: 258 MG/DL (ref 65–99)
GLUCOSE BLD-MCNC: 294 MG/DL (ref 65–99)
GLUCOSE SERPL-MCNC: 268 MG/DL (ref 65–99)
HBA1C MFR BLD: 7.1 % (ref 0–5.6)
HCT VFR BLD AUTO: 36.8 % (ref 37–47)
HGB BLD-MCNC: 11.5 G/DL (ref 12–16)
IMM GRANULOCYTES # BLD AUTO: 0.03 K/UL (ref 0–0.11)
IMM GRANULOCYTES NFR BLD AUTO: 0.5 % (ref 0–0.9)
LYMPHOCYTES # BLD AUTO: 0.9 K/UL (ref 1–4.8)
LYMPHOCYTES NFR BLD: 16.4 % (ref 22–41)
MCH RBC QN AUTO: 27.5 PG (ref 27–33)
MCHC RBC AUTO-ENTMCNC: 31.3 G/DL (ref 33.6–35)
MCV RBC AUTO: 88 FL (ref 81.4–97.8)
MONOCYTES # BLD AUTO: 0.19 K/UL (ref 0–0.85)
MONOCYTES NFR BLD AUTO: 3.5 % (ref 0–13.4)
NEUTROPHILS # BLD AUTO: 4.37 K/UL (ref 2–7.15)
NEUTROPHILS NFR BLD: 79.4 % (ref 44–72)
NRBC # BLD AUTO: 0 K/UL
NRBC BLD-RTO: 0 /100 WBC
PLATELET # BLD AUTO: 165 K/UL (ref 164–446)
PMV BLD AUTO: 9.3 FL (ref 9–12.9)
POTASSIUM SERPL-SCNC: 4.6 MMOL/L (ref 3.6–5.5)
PROCALCITONIN SERPL-MCNC: <0.05 NG/ML
PROT SERPL-MCNC: 7.2 G/DL (ref 6–8.2)
RBC # BLD AUTO: 4.18 M/UL (ref 4.2–5.4)
SODIUM SERPL-SCNC: 135 MMOL/L (ref 135–145)
WBC # BLD AUTO: 5.5 K/UL (ref 4.8–10.8)

## 2020-12-04 PROCEDURE — 94640 AIRWAY INHALATION TREATMENT: CPT

## 2020-12-04 PROCEDURE — 85025 COMPLETE CBC W/AUTO DIFF WBC: CPT

## 2020-12-04 PROCEDURE — 82728 ASSAY OF FERRITIN: CPT

## 2020-12-04 PROCEDURE — 86140 C-REACTIVE PROTEIN: CPT

## 2020-12-04 PROCEDURE — 84145 PROCALCITONIN (PCT): CPT

## 2020-12-04 PROCEDURE — 770014 HCHG ROOM/CARE - WARD (150)

## 2020-12-04 PROCEDURE — 85379 FIBRIN DEGRADATION QUANT: CPT

## 2020-12-04 PROCEDURE — 80053 COMPREHEN METABOLIC PANEL: CPT

## 2020-12-04 PROCEDURE — 36415 COLL VENOUS BLD VENIPUNCTURE: CPT

## 2020-12-04 PROCEDURE — A9270 NON-COVERED ITEM OR SERVICE: HCPCS | Performed by: INTERNAL MEDICINE

## 2020-12-04 PROCEDURE — 700111 HCHG RX REV CODE 636 W/ 250 OVERRIDE (IP): Performed by: INTERNAL MEDICINE

## 2020-12-04 PROCEDURE — 82962 GLUCOSE BLOOD TEST: CPT | Mod: 91

## 2020-12-04 PROCEDURE — 700102 HCHG RX REV CODE 250 W/ 637 OVERRIDE(OP): Performed by: INTERNAL MEDICINE

## 2020-12-04 PROCEDURE — 99233 SBSQ HOSP IP/OBS HIGH 50: CPT | Performed by: INTERNAL MEDICINE

## 2020-12-04 PROCEDURE — 83036 HEMOGLOBIN GLYCOSYLATED A1C: CPT

## 2020-12-04 RX ADMIN — DEXAMETHASONE 6 MG: 4 TABLET ORAL at 05:08

## 2020-12-04 RX ADMIN — CEFTRIAXONE SODIUM 1 G: 10 INJECTION, POWDER, FOR SOLUTION INTRAVENOUS at 17:17

## 2020-12-04 RX ADMIN — FLUTICASONE PROPIONATE 88 MCG: 44 AEROSOL, METERED RESPIRATORY (INHALATION) at 07:18

## 2020-12-04 ASSESSMENT — FIBROSIS 4 INDEX: FIB4 SCORE: 3.36

## 2020-12-04 NOTE — DIETARY
NUTRITION SERVICES: BMI - Pt with BMI >40 (=Body mass index is 40.85 kg/m².), morbid obesity. Weight loss counseling not appropriate in acute care setting. RECOMMEND - Referral to outpatient nutrition services for weight management after D/C.

## 2020-12-04 NOTE — DISCHARGE PLANNING
Pt is Covid positive and CTT assessment done via phone with daughter Alexandra. She states her mom lives with her in a two story home but her bedroom is on lower level. She has a cane, FWW, scooter, shower chair and raised toilet seat. She has home oxygen with [employer]. She is in between PCP and has an appt next week to get established with Vita Dunaway PA. Alexandra just found out that she has tested positive for Covid also. Likely her sister will pick pt up when discharged and CM asked her to have her bring a portable oxygen tank with her.        Care Transition Team Assessment    Information Source  Orientation : Unable to Assess  Information Given By: Relative  Informant's Name: Alexandra-dtr  Who is responsible for making decisions for patient? : Patient    Readmission Evaluation  Is this a readmission?: No    Elopement Risk  Legal Hold: No  Ambulatory or Self Mobile in Wheelchair: No-Not an Elopement Risk    Interdisciplinary Discharge Planning  Does Admitting Nurse Feel This Could be a Complex Discharge?: No  Primary Care Physician: Vita Dunaway  Lives with - Patient's Self Care Capacity: Adult Children  Support Systems: Children  Housing / Facility: 2 Story House  Do You Take your Prescribed Medications Regularly: Yes  Able to Return to Previous ADL's: Yes  Mobility Issues: Yes  Prior Services: None  Patient Prefers to be Discharged to:: home  Assistance Needed: Unknown at this Time  Durable Medical Equipment: Home Oxygen  DME Provider / Phone: [employer]    Discharge Preparedness  What is your plan after discharge?: Home with help  What are your discharge supports?: Child  Prior Functional Level: Needs Assist with Activities of Daily Living, Uses Walker, Bladder Incontinence  Difficulity with ADLs: Walking  Difficulty with ADLs Comment: see note  Difficulity with IADLs: Driving  Difficulity with IADL Comments: see ntoe    Functional Assesment  Prior Functional Level: Needs Assist with Activities of  Daily Living, Uses Walker, Bladder Incontinence    Finances  Financial Barriers to Discharge: No  Prescription Coverage: Yes  Advance Directive  Advance Directive?: DPOA for Health Care  Durable Power of  Name and Contact : Matt    Domestic Abuse  Have you ever been the victim of abuse or violence?: No    Discharge Risks or Barriers  Discharge risks or barriers?: No    Anticipated Discharge Information  Discharge Disposition: Still a Patient (30)  Discharge Address: [address] [place], [place] [address]  Discharge Contact Phone Number: [phone]

## 2020-12-04 NOTE — ASSESSMENT & PLAN NOTE
The patient was found to have positive nitrites and bacteria, with symptoms of dysuria and frequency  We have started ceftriaxone  Pending urine culture and sensitivities to deescalate

## 2020-12-04 NOTE — ASSESSMENT & PLAN NOTE
The patient's Cr on admission is 2.17, one year ago it was 1.09  The patient's Cr improved today. curerntly it is 1.08  This is most likely ANGIE on CKD  We will renally dose all medications and avoid nephrotoxic medications  We will monitor closely and make any changes accordingly

## 2020-12-04 NOTE — H&P
Hospital Medicine History & Physical Note    Date of Service  12/3/2020    Primary Care Physician  RODDY Maya    Consultants  None    Code Status  DNAR/DNI    Chief Complaint  Chief Complaint   Patient presents with   • UTI   • Fever   • Chills       History of Presenting Illness  79 y.o.  female with a PM of DM last known A1c of 7.5 (9/2019), CKD, Anemia, HTN, COPD, DLD and Obesity that comes to the ED complaining of dysuria. The patient states that around 3 days ago he started complaining of dysuria, as well as urgency and frequency. The patient suspected she had a UTI because she has had several episodes in the past. The patient was not getting better and she developed unmeasured fever, chills, general malaise, cough and SOB on exertion. The patient states that she was having a virtual appointment with an urgent care, when her daughter told her she was acting funny and was feeling she was going to pass out. They called the EMS and as per chart review her saturation on arrival was on the low 80's reason for which they decided to bring her to the ED for further assessment and evaluation. Upon arrival to the ED the patient was found positive for COVID-19 and the patient was admitted to the ACS. The patient is currently on 4L of NC.    The patient denies chest pain, headache, diarrhea, abdominal pain or sick contacts. The patient mentions she lives with her daughter, son in law, and granddaughter, and she mentions non of them have develop symptoms but they have been tested today as well, waiting for results.    Review of Systems  Review of Systems   Constitutional: Positive for chills and malaise/fatigue. Negative for diaphoresis, fever and weight loss.   HENT: Negative for ear discharge, ear pain, hearing loss and tinnitus.    Eyes: Negative for blurred vision, double vision, photophobia and pain.   Respiratory: Positive for cough and shortness of breath. Negative for hemoptysis, sputum  production and wheezing.    Cardiovascular: Negative for chest pain, palpitations, claudication and leg swelling.   Gastrointestinal: Negative for abdominal pain, diarrhea, heartburn, nausea and vomiting.   Genitourinary: Positive for dysuria, frequency and urgency. Negative for flank pain and hematuria.   Skin: Negative for itching and rash.   Neurological: Negative for dizziness, tingling and headaches.   Psychiatric/Behavioral: Negative for depression, hallucinations, substance abuse and suicidal ideas.       Past Medical History  CKD, DM last known A1c of 7.5 (9/2019), Anemia most likely of chronic disease, HTN, COPD, DLD, Obesity    Surgical History   has a past surgical history that includes pr remv 2nd cataract,corn-scler sectn.     Family History  family history includes Heart Attack in her father. and heart disease in her mother as well.    Social History   reports that she quit smoking about 13 years ago. Her smoking use included cigarettes. She has a 69.00 pack-year smoking history. She has never used smokeless tobacco. She reports that she does not drink alcohol or use drugs. She lives with her daughter, son in law and granddaughter. She is a retired .    Allergies  Allergies   Allergen Reactions   • Zithromax [Azithromycin] Diarrhea     Pt states severe diarrhea       Medications  Prior to Admission Medications   Prescriptions Last Dose Informant Patient Reported? Taking?   Blood Glucose Monitoring Suppl Device NOT TAKING at NOT TAKING Patient No No   Sig: Meter: Dispense Device of Insurance Preference. Sig. Use as directed for blood sugar monitoring. #1. NR.   Patient not taking: Reported on 12/3/2020   Blood Glucose Test Strips NOT TAKING at NOT TAKING Patient No No   Sig: Test strips order: Test strips for insurance-preferred meter. Sig: use every morning before breakfast and prn ssx high or low sugar   Patient not taking: Reported on 12/3/2020   Calcium Carbonate-Vit D-Min (CALCIUM 1200 PO)  12/3/2020 at am Patient Yes No   Sig: Take 1 Tab by mouth every day.   Fluticasone-Umeclidin-Vilant (TRELEGY ELLIPTA) 100-62.5-25 MCG/INH AEROSOL POWDER, BREATH ACTIVATED 12/3/2020 at am Patient No No   Sig: Inhale 1 Puff by mouth every day.   Lancets NOT TAKING at NOT TAKING Patient No No   Sig: Lancets order: Lancets for insurance-preferred meter. Sig: use every morning before breakfast and prn ssx high or low sugar.   Patient not taking: Reported on 12/3/2020   Multiple Vitamin (MULTIVITAMIN PO) 12/3/2020 at am Patient Yes No   Sig: Take 1 Tab by mouth every day.   Omega-3 Fatty Acids (FISH OIL) 1000 MG Cap capsule 12/2/2020 at unknown Patient Yes No   Sig: Take 1,000 mg by mouth every day.   Probiotic Product (TRUBIOTICS) Cap 12/3/2020 at am Patient Yes No   Sig: Take 1 Capsule by mouth every morning.   TRELEGY ELLIPTA 100-62.5-25 MCG/INH AEROSOL POWDER, BREATH ACTIVATED DUPLICATE at DUPLICATE Patient No No   Sig: INHALE ONE PUFF BY MOUTH DAILY   Patient not taking: Reported on 12/3/2020   acetaminophen (EQ ARTHRITIS PAIN) 650 MG CR tablet 12/3/2020 at 0900 Patient Yes No   Sig: Take 650-1,300 mg by mouth every 6 hours as needed for Moderate Pain.   albuterol 108 (90 Base) MCG/ACT Aero Soln inhalation aerosol >3 days at unknown Patient No No   Sig: Inhale 2 Puffs by mouth every four hours as needed for Shortness of Breath.   aspirin 81 MG tablet 12/2/2020 at pm Patient Yes No   Sig: Take 81 mg by mouth every evening.   atenolol (TENORMIN) 50 MG Tab 12/2/2020 at am Patient No No   Sig: Take 1 Tab by mouth every day.   doxycycline (VIBRAMYCIN) 100 MG Cap NOT TAKING at NOT TAKING Patient No No   Sig: Take 1 Cap by mouth 2 times a day. Take until gone.   Patient not taking: Reported on 12/3/2020   hydroCHLOROthiazide (HYDRODIURIL) 25 MG Tab 12/3/2020 at am Patient No No   Sig: TAKE ONE TABLET BY MOUTH DAILY   ipratropium-albuterol (DUONEB) 0.5-2.5 (3) MG/3ML nebulizer solution NOT TAKING at NOT TAKING Patient No No    Sig: 3 mL by Nebulization route every four hours as needed for Shortness of Breath.   Patient not taking: Reported on 12/3/2020   levothyroxine (SYNTHROID) 125 MCG Tab 12/3/2020 at am Patient No No   Sig: TAKE ONE TABLET BY MOUTH EVERY MORNING ON AN EMPTY STOMACH   lisinopril (PRINIVIL) 20 MG Tab 12/3/2020 at am Patient No No   Sig: Take 1 Tab by mouth every day.   methylPREDNISolone (MEDROL DOSEPAK) 4 MG Tablet Therapy Pack NOT TAKING at NOT TAKING Patient No No   Sig: Take as directed.   Patient not taking: Reported on 12/3/2020   simvastatin (ZOCOR) 10 MG Tab 12/2/2020 at pm Patient No No   Sig: Take 1 Tab by mouth every evening.   sitagliptan-metformin (JANUMET)  MG per tablet 12/3/2020 at am Patient No No   Sig: Take 1 Tab by mouth 2 times a day, with meals.      Facility-Administered Medications: None       Physical Exam  Temp:  [36.1 °C (97 °F)] 36.1 °C (97 °F)  Pulse:  [62-67] 66  Resp:  [14-20] 15  BP: (113-134)/(57-78) 123/58  SpO2:  [86 %-99 %] 97 %    Physical Exam  Constitutional:       Appearance: She is obese. She is ill-appearing.   HENT:      Head: Normocephalic and atraumatic.      Mouth/Throat:      Mouth: Mucous membranes are dry.   Eyes:      General:         Right eye: No discharge.   Cardiovascular:      Rate and Rhythm: Normal rate and regular rhythm.      Pulses: Normal pulses.      Heart sounds: Normal heart sounds.   Pulmonary:      Effort: Respiratory distress present.      Breath sounds: Rhonchi present. No wheezing.   Chest:      Chest wall: No tenderness.   Abdominal:      General: There is no distension.      Palpations: Abdomen is soft.      Tenderness: There is no abdominal tenderness. There is no right CVA tenderness, left CVA tenderness or guarding.   Musculoskeletal:      Right lower leg: Edema (+1) present.      Left lower leg: Left lower leg edema: +1.   Neurological:      Mental Status: She is alert and oriented to person, place, and time. Mental status is at baseline.       Cranial Nerves: No cranial nerve deficit.      Motor: No weakness.   Psychiatric:         Mood and Affect: Mood normal.         Behavior: Behavior normal.         Thought Content: Thought content normal.         Judgment: Judgment normal.         Laboratory:  Recent Labs     12/03/20  1334   WBC 6.5   RBC 4.12*   HEMOGLOBIN 11.3*   HEMATOCRIT 36.7*   MCV 89.1   MCH 27.4   MCHC 30.8*   RDW 50.9*   PLATELETCT 162*   MPV 9.5     Recent Labs     12/03/20  1334   SODIUM 136   POTASSIUM 4.1   CHLORIDE 98   CO2 26   GLUCOSE 133*   BUN 38*   CREATININE 2.17*   CALCIUM 9.9     Recent Labs     12/03/20  1334   ALTSGPT 21   ASTSGOT 32   ALKPHOSPHAT 48   TBILIRUBIN 0.2   GLUCOSE 133*         No results for input(s): NTPROBNP in the last 72 hours.      No results for input(s): TROPONINT in the last 72 hours.    Imaging:  DX-CHEST-PORTABLE (1 VIEW)   Final Result      1.  Prominent interstitial markings may be chronic in nature. Superimposed infection cannot be excluded.      2.  Mild left basilar atelectasis.            Assessment/Plan:  I anticipate this patient will require at least two midnights for appropriate medical management, necessitating inpatient admission.    * Acute hypoxemic respiratory failure due to COVID-19 ([facility])  Assessment & Plan  We have started decadron 6 mg daily  Incentive spirometer  Encourage self proning  Patient currently on 4L of NC, we will continue oxygen supplementation as needed, and if >6L we will consider Remdesivir      UTI (urinary tract infection)- (present on admission)  Assessment & Plan  The patient was found to have positive nitrites and bacteria, with symptoms of dysuria and frequency  We have started ceftriaxone  Pending urine culture    Acute kidney injury (HCC)  Assessment & Plan  The patient's Cr on admission is 2.17, one year ago it was 1.09  This is most likely ANGIE on CKD  The patient is somewhat hypovolemic, we will gently hydrate with 1 L of NS at 75ml/hr  Repeat bmp  tomorrow  We will renally dose all medications and avoid nephrotoxic medications    Morbid obesity with BMI of 40.0-44.9, adult ([facility])- (present on admission)  Assessment & Plan  Patient will be counseled on diet an exercise  She will need close follow up as an outpatient    Chronic obstructive pulmonary disease ([facility])- (present on admission)  Assessment & Plan  Resume home medication    DM2 (diabetes mellitus, type 2) ([facility])- (present on admission)  Assessment & Plan  Pending A1c  We have started ISS, hypoglycemia protocol  We will monitor closely and make any changes accordingly    HTN (hypertension)- (present on admission)  Assessment & Plan  We will hold home medications for now due to current infection and BP is WNL  We will order labetalol PRN    Anemia- (present on admission)  Assessment & Plan  This is most likely of chronic disease  No obvious signs of bleeding at this time  Hb on admission is 11.3  We will monitor closely, repeat CBC tomorrow      DVT Phrophylaxis: Lovenox at 30 mg daily due to ANGIE    Diet: Cardiac Diet    CODE: DNR/DNI

## 2020-12-04 NOTE — ASSESSMENT & PLAN NOTE
We have started decadron 6 mg daily  Incentive spirometer  Encourage self proning  Patient currently on 4L of NC, we will continue oxygen supplementation as needed, and if >6L we will consider Remdesivir

## 2020-12-04 NOTE — ASSESSMENT & PLAN NOTE
A1c of 7.1 12/4/2020  We have increased to medium ISS, hypoglycemia protocol  We will monitor closely and make any changes accordingly

## 2020-12-04 NOTE — PROGRESS NOTES
[facility] Medicine Daily Progress Note    Date of Service  12/4/2020    Chief Complaint  79 y.o. female admitted 12/3/2020 with SOB and dysuria    Hospital Course  79 y.o.  female with a PM of DM last known A1c of 7.5 (9/2019), CKD, Anemia, HTN, COPD, DLD and Obesity that comes to the ED complaining of dysuria. The patient states that around 3 days ago he started complaining of dysuria, as well as urgency and frequency. The patient suspected she had a UTI because she has had several episodes in the past. The patient was not getting better and she developed unmeasured fever, chills, general malaise, cough and SOB on exertion. The patient states that she was having a virtual appointment with an urgent care, when her daughter told her she was acting funny and was feeling she was going to pass out. They called the EMS and as per chart review her saturation on arrival was on the low 80's reason for which they decided to bring her to the ED for further assessment and evaluation. Upon arrival to the ED the patient was found positive for COVID-19 and the patient was admitted to the ACS.     Interval Problem Update  12/4: The patient was seen at bedside this morning. The patient mentions she feels better, however she feels weak to walk by herself. We have ordered PT evaluation. She is currently on 4 L NC. The patient mentions her urinary symptoms have improved. As per nursing no other over night events were reported.    Consultants/Specialty  None    Code Status  DNAR/DNI    Disposition  Continue ACS monitoring.    Review of Systems   Review of Systems   Constitutional: Positive for malaise/fatigue. Negative for chills, diaphoresis, fever and weight loss.   HENT: Negative for hearing loss and tinnitus.    Eyes: Negative for blurred vision, double vision and photophobia.   Respiratory: Positive for cough and shortness of breath. Negative for hemoptysis and sputum production.    Cardiovascular: Negative for chest pain  and palpitations.   Gastrointestinal: Negative for heartburn, nausea and vomiting.   Genitourinary: Negative for dysuria, frequency and urgency.   Skin: Negative for itching and rash.   Neurological: Negative for dizziness, tingling and headaches.   Psychiatric/Behavioral: Negative for depression and hallucinations.     Physical Exam  Temp:  [36.4 °C (97.5 °F)-36.9 °C (98.5 °F)] 36.5 °C (97.7 °F)  Pulse:  [66-99] 99  Resp:  [14-21] 18  BP: (119-146)/(57-94) 119/57  SpO2:  [86 %-99 %] 91 %    Physical Exam  Physical Exam  Constitutional:       General: She is not in acute distress.     Appearance: She is obese. She is not toxic-appearing.   HENT:      Head: Normocephalic and atraumatic.   Eyes:      General:         Right eye: No discharge.         Left eye: No discharge.   Cardiovascular:      Rate and Rhythm: Normal rate and regular rhythm.      Heart sounds: No murmur. No friction rub. No gallop.    Pulmonary:      Breath sounds: Rhonchi present. No wheezing or rales.   Abdominal:      General: There is no distension.      Palpations: Abdomen is soft.      Tenderness: There is no abdominal tenderness. There is no right CVA tenderness or guarding.   Musculoskeletal:      Right lower leg: Edema (+1) present.      Left lower leg: Edema (+1) present.   Neurological:      General: No focal deficit present.      Mental Status: She is alert. Mental status is at baseline. She is disoriented.   Psychiatric:         Mood and Affect: Mood normal.         Behavior: Behavior normal.         Thought Content: Thought content normal.         Judgment: Judgment normal.       Fluids    Intake/Output Summary (Last 24 hours) at 12/4/2020 1314  Last data filed at 12/4/2020 0900  Gross per 24 hour   Intake 340 ml   Output --   Net 340 ml       Laboratory  Recent Labs     12/03/20  1334 12/04/20  0917   WBC 6.5 5.5   RBC 4.12* 4.18*   HEMOGLOBIN 11.3* 11.5*   HEMATOCRIT 36.7* 36.8*   MCV 89.1 88.0   MCH 27.4 27.5   MCHC 30.8* 31.3*    RDW 50.9* 50.3*   PLATELETCT 162* 165   MPV 9.5 9.3     Recent Labs     12/03/20  1334 12/04/20  0917   SODIUM 136 135   POTASSIUM 4.1 4.6   CHLORIDE 98 98   CO2 26 28   GLUCOSE 133* 268*   BUN 38* 30*   CREATININE 2.17* 1.31   CALCIUM 9.9 9.1                   Imaging  DX-CHEST-PORTABLE (1 VIEW)   Final Result      1.  Prominent interstitial markings may be chronic in nature. Superimposed infection cannot be excluded.      2.  Mild left basilar atelectasis.           Assessment/Plan  * Acute hypoxemic respiratory failure due to COVID-19 ([facility])  Assessment & Plan  We have started decadron 6 mg daily  Incentive spirometer  Encourage self proning  Patient currently on 4L of NC, we will continue oxygen supplementation as needed, and if >6L we will consider Remdesivir      UTI (urinary tract infection)- (present on admission)  Assessment & Plan  The patient was found to have positive nitrites and bacteria, with symptoms of dysuria and frequency  We have started ceftriaxone  Pending urine culture    Acute kidney injury ([facility])  Assessment & Plan  The patient's Cr on admission is 2.17, one year ago it was 1.09  The patient's Cr improved today. curerntly it is 1.31  This is most likely ANGIE on CKD  The patient is somewhat hypovolemic, we will gently hydrate with 1 L of NS at 75ml/hr  Repeat bmp tomorrow  We will renally dose all medications and avoid nephrotoxic medications    Morbid obesity with BMI of 40.0-44.9, adult ([facility])- (present on admission)  Assessment & Plan  Patient will be counseled on diet an exercise  She will need close follow up as an outpatient    Chronic obstructive pulmonary disease ([facility])- (present on admission)  Assessment & Plan  Resume home medication    DM2 (diabetes mellitus, type 2) ([facility])- (present on admission)  Assessment & Plan  Pending A1c  We have started ISS, hypoglycemia protocol  We will monitor closely and make any changes accordingly    HTN (hypertension)- (present on admission)  Assessment  & Plan  We will hold home medications for now due to current infection and BP is WNL  We will order labetalol PRN    Anemia- (present on admission)  Assessment & Plan  This is most likely of chronic disease  No obvious signs of bleeding at this time  Hb on admission is 11.3  We will monitor closely, repeat CBC tomorrow         VTE prophylaxis: Lovenox at 30 mg due to her ANGIE.

## 2020-12-04 NOTE — ASSESSMENT & PLAN NOTE
This is most likely of chronic disease  No obvious signs of bleeding at this time  Hb on admission is 11.3  We will monitor closely,

## 2020-12-04 NOTE — ED NOTES
"Med rec completed per pt at bedside  Allergies reviewed  No ABX in last 14 days     Pt reports that she \"thinks\" she took her medications this morning but is not certain   "

## 2020-12-04 NOTE — ASSESSMENT & PLAN NOTE
We will hold home medications for now due to current infection and BP is WNL  We will order labetalol PRN

## 2020-12-04 NOTE — PROGRESS NOTES
Pt transferred from ER to A281, via transporter. Pt ambulated from wheelchair to bed with hand held assist. Monitor applied. Pt on 4L nasal cannula, sating at 92%. Pt given snacks. Pt orientated to room. All needs met at this time.

## 2020-12-05 LAB
ANION GAP SERPL CALC-SCNC: 10 MMOL/L (ref 7–16)
BACTERIA UR CULT: ABNORMAL
BACTERIA UR CULT: ABNORMAL
BUN SERPL-MCNC: 33 MG/DL (ref 8–22)
CALCIUM SERPL-MCNC: 9.2 MG/DL (ref 8.5–10.5)
CHLORIDE SERPL-SCNC: 97 MMOL/L (ref 96–112)
CO2 SERPL-SCNC: 28 MMOL/L (ref 20–33)
CREAT SERPL-MCNC: 1.08 MG/DL (ref 0.5–1.4)
GLUCOSE BLD-MCNC: 128 MG/DL (ref 65–99)
GLUCOSE BLD-MCNC: 236 MG/DL (ref 65–99)
GLUCOSE BLD-MCNC: 254 MG/DL (ref 65–99)
GLUCOSE BLD-MCNC: 276 MG/DL (ref 65–99)
GLUCOSE SERPL-MCNC: 245 MG/DL (ref 65–99)
POTASSIUM SERPL-SCNC: 4.9 MMOL/L (ref 3.6–5.5)
SIGNIFICANT IND 70042: ABNORMAL
SITE SITE: ABNORMAL
SODIUM SERPL-SCNC: 135 MMOL/L (ref 135–145)
SOURCE SOURCE: ABNORMAL

## 2020-12-05 PROCEDURE — 82962 GLUCOSE BLOOD TEST: CPT

## 2020-12-05 PROCEDURE — 700111 HCHG RX REV CODE 636 W/ 250 OVERRIDE (IP): Performed by: INTERNAL MEDICINE

## 2020-12-05 PROCEDURE — A9270 NON-COVERED ITEM OR SERVICE: HCPCS | Performed by: INTERNAL MEDICINE

## 2020-12-05 PROCEDURE — 770014 HCHG ROOM/CARE - WARD (150)

## 2020-12-05 PROCEDURE — 97161 PT EVAL LOW COMPLEX 20 MIN: CPT

## 2020-12-05 PROCEDURE — 99232 SBSQ HOSP IP/OBS MODERATE 35: CPT | Performed by: INTERNAL MEDICINE

## 2020-12-05 PROCEDURE — 700102 HCHG RX REV CODE 250 W/ 637 OVERRIDE(OP): Performed by: INTERNAL MEDICINE

## 2020-12-05 PROCEDURE — 94760 N-INVAS EAR/PLS OXIMETRY 1: CPT

## 2020-12-05 PROCEDURE — 80048 BASIC METABOLIC PNL TOTAL CA: CPT

## 2020-12-05 PROCEDURE — 36415 COLL VENOUS BLD VENIPUNCTURE: CPT

## 2020-12-05 RX ORDER — ATENOLOL 50 MG/1
50 TABLET ORAL DAILY
Status: DISCONTINUED | OUTPATIENT
Start: 2020-12-05 | End: 2020-12-06 | Stop reason: HOSPADM

## 2020-12-05 RX ORDER — DEXTROSE MONOHYDRATE 25 G/50ML
50 INJECTION, SOLUTION INTRAVENOUS
Status: DISCONTINUED | OUTPATIENT
Start: 2020-12-05 | End: 2020-12-06 | Stop reason: HOSPADM

## 2020-12-05 RX ORDER — LISINOPRIL 20 MG/1
20 TABLET ORAL DAILY
Status: DISCONTINUED | OUTPATIENT
Start: 2020-12-05 | End: 2020-12-06 | Stop reason: HOSPADM

## 2020-12-05 RX ORDER — LOPERAMIDE HYDROCHLORIDE 2 MG/1
2 CAPSULE ORAL 4 TIMES DAILY PRN
Status: DISCONTINUED | OUTPATIENT
Start: 2020-12-05 | End: 2020-12-06 | Stop reason: HOSPADM

## 2020-12-05 RX ADMIN — DEXAMETHASONE 6 MG: 4 TABLET ORAL at 05:01

## 2020-12-05 RX ADMIN — CEFTRIAXONE SODIUM 1 G: 10 INJECTION, POWDER, FOR SOLUTION INTRAVENOUS at 18:02

## 2020-12-05 RX ADMIN — UMECLIDINIUM BROMIDE AND VILANTEROL TRIFENATATE 1 PUFF: 62.5; 25 POWDER RESPIRATORY (INHALATION) at 10:46

## 2020-12-05 RX ADMIN — LISINOPRIL 20 MG: 20 TABLET ORAL at 13:25

## 2020-12-05 RX ADMIN — ATENOLOL 50 MG: 50 TABLET ORAL at 13:25

## 2020-12-05 ASSESSMENT — ENCOUNTER SYMPTOMS
DEPRESSION: 0
VOMITING: 0
HEADACHES: 0
HEARTBURN: 0
CHILLS: 0
BLURRED VISION: 0
HALLUCINATIONS: 0
SHORTNESS OF BREATH: 1
COUGH: 1
NAUSEA: 0
FEVER: 0
PHOTOPHOBIA: 0
WEIGHT LOSS: 0
DOUBLE VISION: 0
SPUTUM PRODUCTION: 0
PALPITATIONS: 0
HEMOPTYSIS: 0
TINGLING: 0
DIAPHORESIS: 0
DIZZINESS: 0

## 2020-12-05 ASSESSMENT — COGNITIVE AND FUNCTIONAL STATUS - GENERAL
CLIMB 3 TO 5 STEPS WITH RAILING: A LITTLE
MOBILITY SCORE: 23
SUGGESTED CMS G CODE MODIFIER MOBILITY: CI

## 2020-12-05 ASSESSMENT — GAIT ASSESSMENTS
ASSISTIVE DEVICE: FRONT WHEEL WALKER
GAIT LEVEL OF ASSIST: SUPERVISED
DISTANCE (FEET): 75

## 2020-12-05 NOTE — PROGRESS NOTES
Assumed patein care, Baseline 4 liters of oxygen use at home. Got oxygen set up at home, Walker and power scooter. Continue IV ABX.

## 2020-12-05 NOTE — THERAPY
Physical Therapy   Initial Evaluation     Patient Name: Camille Rodriguez  Age:  79 y.o., Sex:  female  Medical Record #: [id number]  Today's Date: 12/5/2020          Assessment  Patient is 79 y.o. female with hx of COPD admitted for COVID+ workup presenting to PT very near her functional baseline. Pt utilizes 4WW at home and today was able to complete x75' of gait with FWW and no LOB. Pt reports she only ambulates in her home and mostly stays in her recliner. She did de-saturate to 75% on 4L but was able to re-saturate to 88% within 1min. Pt is familiar with Sp02 monitoring and parameters given her >10yr hx with COPD. No skilled need. DC PT.     Plan    Recommend Physical Therapy for Evaluation only     DC Equipment Recommendations: None  Discharge Recommendations: Anticipate that the patient will have no further physical therapy needs after discharge from the hospital          Objective       12/05/20 1227   Prior Living Situation   Prior Services None   Housing / Facility 2 Story House  (pt's room and bathroom (and kitchen/lving room) on 1st floor)   Equipment Owned 4-Wheel Walker   Lives with - Patient's Self Care Capacity Adult Children   Comments lives iw adult children and grandchildren   Prior Level of Functional Mobility   Bed Mobility Independent   Transfer Status Independent   Ambulation Independent   Distance Ambulation (Feet)   (household)   Assistive Devices Used 4-Wheel Walker   Gait Analysis   Gait Level Of Assist Supervised   Assistive Device Front Wheel Walker   Distance (Feet) 75   Weight Bearing Status FWB   Bed Mobility    Supine to Sit Independent   Scooting Independent   Rolling Independent   Functional Mobility   Sit to Stand Modified Independent   Bed, Chair, Wheelchair Transfer Modified Independent   Transfer Method Stand Step   Anticipated Discharge Equipment and Recommendations   DC Equipment Recommendations None   Discharge Recommendations Anticipate that the patient will have no  further physical therapy needs after discharge from the hospital

## 2020-12-05 NOTE — PROGRESS NOTES
[facility] Medicine Daily Progress Note    Date of Service  12/5/2020    Chief Complaint  79 y.o. female admitted 12/3/2020 with SOB and dysuria    Hospital Course  79 y.o.  female with a PM of DM last known A1c of 7.5 (9/2019), CKD, Anemia, HTN, COPD, DLD and Obesity that comes to the ED complaining of dysuria. The patient states that around 3 days ago he started complaining of dysuria, as well as urgency and frequency. The patient suspected she had a UTI because she has had several episodes in the past. The patient was not getting better and she developed unmeasured fever, chills, general malaise, cough and SOB on exertion. The patient states that she was having a virtual appointment with an urgent care, when her daughter told her she was acting funny and was feeling she was going to pass out. They called the EMS and as per chart review her saturation on arrival was on the low 80's reason for which they decided to bring her to the ED for further assessment and evaluation. Upon arrival to the ED the patient was found positive for COVID-19 and the patient was admitted to the ACS.     Interval Problem Update  12/4: The patient was seen at bedside this morning. The patient mentions she feels better, however she feels weak to walk by herself. We have ordered PT evaluation. She is currently on 4 L NC. The patient mentions her urinary symptoms have improved. As per nursing no other over night events were reported.    12/5: The patient was seen at bedisde this morning. The patient is not complaining of dysuria anymore, we are still using ceftriaxone as we are pending sensitivities. Apparantely the patient uses oxygen at home, and also she is mostly bedound at home. We are pending PT evaluation to see if she would benefit from home health. We appreciate their recommendations.    Consultants/Specialty  None    Code Status  DNAR/DNI    Disposition  Continue ACS monitoring. Pending PT evaluation.    Review of  Systems  Review of Systems   Constitutional: Positive for malaise/fatigue. Negative for chills, diaphoresis, fever and weight loss.   HENT: Negative for hearing loss and tinnitus.    Eyes: Negative for blurred vision, double vision and photophobia.   Respiratory: Positive for cough and shortness of breath. Negative for hemoptysis and sputum production.    Cardiovascular: Negative for chest pain and palpitations.   Gastrointestinal: Negative for heartburn, nausea and vomiting.   Genitourinary: Negative for dysuria, frequency and urgency.   Skin: Negative for itching and rash.   Neurological: Negative for dizziness, tingling and headaches.   Psychiatric/Behavioral: Negative for depression and hallucinations.        Physical Exam  Temp:  [36.4 °C (97.5 °F)-36.7 °C (98 °F)] 36.5 °C (97.7 °F)  Pulse:  [] 94  Resp:  [18-20] 18  BP: (109-140)/(54-66) 140/66  SpO2:  [90 %-96 %] 96 %    Physical Exam  Constitutional:       General: She is not in acute distress.     Appearance: She is obese. She is not toxic-appearing.   HENT:      Head: Normocephalic and atraumatic.   Eyes:      General:         Right eye: No discharge.         Left eye: No discharge.   Cardiovascular:      Rate and Rhythm: Normal rate and regular rhythm.      Heart sounds: No murmur. No friction rub. No gallop.    Pulmonary:      Breath sounds: Rhonchi present. No wheezing or rales.   Abdominal:      General: There is no distension.      Palpations: Abdomen is soft.      Tenderness: There is no abdominal tenderness. There is no right CVA tenderness or guarding.   Musculoskeletal:      Right lower leg: Edema (+1) present.      Left lower leg: Edema (+1) present.   Neurological:      General: No focal deficit present.      Mental Status: She is alert. Mental status is at baseline. She is disoriented.   Psychiatric:         Mood and Affect: Mood normal.         Behavior: Behavior normal.         Thought Content: Thought content normal.         Judgment:  Judgment normal.         Fluids  No intake or output data in the 24 hours ending 12/05/20 1146    Laboratory  Recent Labs     12/03/20  1334 12/04/20  0917   WBC 6.5 5.5   RBC 4.12* 4.18*   HEMOGLOBIN 11.3* 11.5*   HEMATOCRIT 36.7* 36.8*   MCV 89.1 88.0   MCH 27.4 27.5   MCHC 30.8* 31.3*   RDW 50.9* 50.3*   PLATELETCT 162* 165   MPV 9.5 9.3     Recent Labs     12/03/20  1334 12/04/20  0917 12/05/20  0933   SODIUM 136 135 135   POTASSIUM 4.1 4.6 4.9   CHLORIDE 98 98 97   CO2 26 28 28   GLUCOSE 133* 268* 245*   BUN 38* 30* 33*   CREATININE 2.17* 1.31 1.08   CALCIUM 9.9 9.1 9.2                   Imaging  DX-CHEST-PORTABLE (1 VIEW)   Final Result      1.  Prominent interstitial markings may be chronic in nature. Superimposed infection cannot be excluded.      2.  Mild left basilar atelectasis.           Assessment/Plan  * Acute hypoxemic respiratory failure due to COVID-19 ([facility])  Assessment & Plan  We have started decadron 6 mg daily  Incentive spirometer  Encourage self proning  Patient currently on 4L of NC, we will continue oxygen supplementation as needed, and if >6L we will consider Remdesivir      UTI (urinary tract infection)- (present on admission)  Assessment & Plan  The patient was found to have positive nitrites and bacteria, with symptoms of dysuria and frequency  We have started ceftriaxone  Pending urine culture and sensitivities to deescalate    Acute kidney injury ([facility])  Assessment & Plan  The patient's Cr on admission is 2.17, one year ago it was 1.09  The patient's Cr improved today. curerntly it is 1.08  This is most likely ANGIE on CKD  We will renally dose all medications and avoid nephrotoxic medications  We will monitor closely and make any changes accordingly    Morbid obesity with BMI of 40.0-44.9, adult ([facility])- (present on admission)  Assessment & Plan  Patient will be counseled on diet an exercise  She will need close follow up as an outpatient    Chronic obstructive pulmonary disease ([facility])-  (present on admission)  Assessment & Plan  Resume home medication    It appears patient is already on home oxygen.    DM2 (diabetes mellitus, type 2) (HCC)- (present on admission)  Assessment & Plan  A1c of 7.1 12/4/2020  We have increased to medium ISS, hypoglycemia protocol  We will monitor closely and make any changes accordingly    HTN (hypertension)- (present on admission)  Assessment & Plan  We have restarted home dose of lisinopril and atenolol  We will order labetalol PRN    Anemia- (present on admission)  Assessment & Plan  This is most likely of chronic disease  No obvious signs of bleeding at this time  Hb on admission is 11.3  We will monitor closely,       VTE prophylaxis: Lovenox at 30 mg due to her ANGIE.

## 2020-12-06 VITALS
HEIGHT: 64 IN | TEMPERATURE: 97.9 F | SYSTOLIC BLOOD PRESSURE: 118 MMHG | RESPIRATION RATE: 16 BRPM | DIASTOLIC BLOOD PRESSURE: 75 MMHG | HEART RATE: 78 BPM | WEIGHT: 238.1 LBS | BODY MASS INDEX: 40.65 KG/M2 | OXYGEN SATURATION: 93 %

## 2020-12-06 PROBLEM — N17.9 ACUTE KIDNEY INJURY (HCC): Status: RESOLVED | Noted: 2020-12-03 | Resolved: 2020-12-06

## 2020-12-06 LAB
GLUCOSE BLD-MCNC: 120 MG/DL (ref 65–99)
GLUCOSE BLD-MCNC: 235 MG/DL (ref 65–99)

## 2020-12-06 PROCEDURE — 700111 HCHG RX REV CODE 636 W/ 250 OVERRIDE (IP): Performed by: INTERNAL MEDICINE

## 2020-12-06 PROCEDURE — 700102 HCHG RX REV CODE 250 W/ 637 OVERRIDE(OP): Performed by: INTERNAL MEDICINE

## 2020-12-06 PROCEDURE — A9270 NON-COVERED ITEM OR SERVICE: HCPCS | Performed by: HOSPITALIST

## 2020-12-06 PROCEDURE — 82962 GLUCOSE BLOOD TEST: CPT | Mod: 91

## 2020-12-06 PROCEDURE — 99239 HOSP IP/OBS DSCHRG MGMT >30: CPT | Performed by: INTERNAL MEDICINE

## 2020-12-06 PROCEDURE — A9270 NON-COVERED ITEM OR SERVICE: HCPCS | Performed by: INTERNAL MEDICINE

## 2020-12-06 PROCEDURE — 700102 HCHG RX REV CODE 250 W/ 637 OVERRIDE(OP): Performed by: HOSPITALIST

## 2020-12-06 RX ORDER — GUAIFENESIN 600 MG/1
600 TABLET, EXTENDED RELEASE ORAL 2 TIMES DAILY PRN
Qty: 20 TAB | Refills: 0 | Status: ON HOLD | OUTPATIENT
Start: 2020-12-06 | End: 2020-12-22

## 2020-12-06 RX ORDER — ACETAMINOPHEN 325 MG/1
650 TABLET ORAL EVERY 6 HOURS PRN
Qty: 30 TAB | Refills: 0 | Status: SHIPPED
Start: 2020-12-06 | End: 2021-01-01

## 2020-12-06 RX ORDER — DEXAMETHASONE 6 MG/1
6 TABLET ORAL DAILY
Qty: 7 TAB | Refills: 0 | Status: ON HOLD | OUTPATIENT
Start: 2020-12-07 | End: 2020-12-22

## 2020-12-06 RX ORDER — SULFAMETHOXAZOLE AND TRIMETHOPRIM 800; 160 MG/1; MG/1
1 TABLET ORAL 2 TIMES DAILY
Qty: 6 TAB | Refills: 0 | Status: ON HOLD | OUTPATIENT
Start: 2020-12-06 | End: 2020-12-22

## 2020-12-06 RX ADMIN — LOPERAMIDE HYDROCHLORIDE 2 MG: 2 CAPSULE ORAL at 08:01

## 2020-12-06 RX ADMIN — DEXAMETHASONE 6 MG: 4 TABLET ORAL at 05:20

## 2020-12-06 RX ADMIN — UMECLIDINIUM BROMIDE AND VILANTEROL TRIFENATATE 1 PUFF: 62.5; 25 POWDER RESPIRATORY (INHALATION) at 08:03

## 2020-12-06 RX ADMIN — ENOXAPARIN SODIUM 30 MG: 30 INJECTION SUBCUTANEOUS at 05:20

## 2020-12-06 NOTE — DISCHARGE SUMMARY
Discharge Summary    CHIEF COMPLAINT ON ADMISSION  Chief Complaint   Patient presents with   • UTI   • Fever   • Chills       Reason for Admission  Flu Like Symptoms; Urinary Pain     Admission Date  12/3/2020    CODE STATUS  DNAR/DNI    HPI & HOSPITAL COURSE  79 y.o.  female with a PM of DM last known A1c of 7.5 (9/2019), CKD, Anemia, HTN, COPD, DLD and Obesity that comes to the ED complaining of dysuria. The patient states that around 3 days ago he started complaining of dysuria, as well as urgency and frequency. The patient suspected she had a UTI because she has had several episodes in the past. The patient was not getting better and she developed unmeasured fever, chills, general malaise, cough and SOB on exertion. The patient states that she was having a virtual appointment with an urgent care, when her daughter told her she was acting funny and was feeling she was going to pass out. They called the EMS and as per chart review her saturation on arrival was on the low 80's reason for which they decided to bring her to the ED for further assessment and evaluation. Upon arrival to the ED the patient was found positive for COVID-19 and the patient was admitted to the ACS. The patient is currently on 4L of NC.    The patient was transferred to the ACS for continued monitoring. The patient had ANGIE on admission most likely due to volume depletion which corrected after gentle IV hydration. Urine culture came back sensitive to bactrim. As per the patient she already uses oxygen at home, and also around 4 liters. PT did not recommend further intervention once discharged so the patient will be discharged home today to complete a 10 day course of decadron, as well as 3 more days of Bactrim for her UTI.    The patient's BP has been around 110's with her home dose of lisinorpril and atenolol. We will hold her home dose of HTZD and to be re-started by her PCP once resolution of infection and she needs further BP  medications.    The patient is feeling better and wants to go home, so she will be discharged home today with close follow up with PCP.    Therefore, she is discharged in fair and stable condition to home with close outpatient follow-up.    The patient met 2-midnight criteria for an inpatient stay at the time of discharge.    Discharge Date  12/06/2020    FOLLOW UP ITEMS POST DISCHARGE  Patient will need to complete a 10 day course of decadron (missing 7 days) and 3 more days of Bactrim. Follow up with PCP in 2 weeks.    DISCHARGE DIAGNOSES  Principal Problem:    Acute hypoxemic respiratory failure due to COVID-19 (HCC) POA: Yes  Active Problems:    UTI (urinary tract infection) POA: Yes    Anemia POA: Yes    HTN (hypertension) POA: Yes    DM2 (diabetes mellitus, type 2) ([facility]) POA: Yes    Chronic obstructive pulmonary disease ([facility]) (Chronic) POA: Yes    Morbid obesity with BMI of 40.0-44.9, adult ([facility]) POA: Yes  Resolved Problems:    Acute kidney injury ([facility]) POA: Unknown      FOLLOW UP  Future Appointments   Date Time Provider Department Center   12/11/2020  1:20 PM GRANT [email or web address]     GRANT [email or web address]  [address] [address]  [phone]    In 2 weeks        MEDICATIONS ON DISCHARGE     Medication List      START taking these medications      Instructions   acetaminophen 325 MG Tabs  Commonly known as: Tylenol  Replaces: EQ Arthritis Pain 650 MG CR tablet   Take 2 Tabs by mouth every 6 hours as needed (Mild Pain; (Pain scale 1-3); Temp greater than 100.5 F).  Dose: 650 mg     dexamethasone 6 MG Tabs  Start taking on: December 7, 2020  Commonly known as: DECADRON   Take 1 Tab by mouth every day for 7 days.  Dose: 6 mg     guaiFENesin  MG Tb12  Commonly known as: MUCINEX   Take 1 Tab by mouth 2 times a day as needed for Cough (productive cough) for up to 10 days.  Dose: 600 mg     sulfamethoxazole-trimethoprim 800-160 MG tablet  Commonly  known as: BACTRIM DS   Take 1 Tab by mouth 2 times a day for 3 days.  Dose: 1 Tab        CONTINUE taking these medications      Instructions   albuterol 108 (90 Base) MCG/ACT Aers inhalation aerosol   Inhale 2 Puffs by mouth every four hours as needed for Shortness of Breath.  Dose: 2 Puff     aspirin 81 MG tablet   Take 81 mg by mouth every evening.  Dose: 81 mg     atenolol 50 MG Tabs  Commonly known as: TENORMIN   Take 1 Tab by mouth every day.  Dose: 50 mg     * Blood Glucose Monitoring Suppl Kaity   Meter: Dispense Device of Insurance Preference. Sig. Use as directed for blood sugar monitoring. #1. NR.     * Blood Glucose Test Strips   Test strips order: Test strips for insurance-preferred meter. Sig: use every morning before breakfast and prn ssx high or low sugar     CALCIUM 1200 PO   Take 1 Tab by mouth every day.  Dose: 1 Tab     fish oil 1000 MG Caps capsule   Take 1,000 mg by mouth every day.  Dose: 1,000 mg     Janumet  MG per tablet  Generic drug: sitagliptan-metformin   Doctor's comments: Insurance will pay for 100 day supply, please cancel previous orders that have 30 or 90 day supply  Take 1 Tab by mouth 2 times a day, with meals.  Dose: 1 Tab     Lancets   Lancets order: Lancets for insurance-preferred meter. Sig: use every morning before breakfast and prn ssx high or low sugar.     levothyroxine 125 MCG Tabs  Commonly known as: SYNTHROID   TAKE ONE TABLET BY MOUTH EVERY MORNING ON AN EMPTY STOMACH     lisinopril 20 MG Tabs  Commonly known as: PRINIVIL   Doctor's comments: Insurance will pay for 100 day supply, please cancel previous orders that have 30 or 90 day supply  Take 1 Tab by mouth every day.  Dose: 20 mg     MULTIVITAMIN PO   Take 1 Tab by mouth every day.  Dose: 1 Tab     simvastatin 10 MG Tabs  Commonly known as: ZOCOR   Doctor's comments: Insurance will pay for 100 day supply  Take 1 Tab by mouth every evening.  Dose: 10 mg     * Trelegy Ellipta 100-62.5-25 MCG/INH Aepb  Generic  drug: Fluticasone-Umeclidin-Vilant   Doctor's comments: Needs oV for further fills  INHALE ONE PUFF BY MOUTH DAILY     * Trelegy Ellipta 100-62.5-25 MCG/INH Aepb  Generic drug: Fluticasone-Umeclidin-Vilant   Doctor's comments: sample  Inhale 1 Puff by mouth every day.  Dose: 1 Puff     TruBiotics Caps   Take 1 Capsule by mouth every morning.  Dose: 1 Capsule         * This list has 4 medication(s) that are the same as other medications prescribed for you. Read the directions carefully, and ask your doctor or other care provider to review them with you.            STOP taking these medications    doxycycline 100 MG Caps  Commonly known as: VIBRAMYCIN     EQ Arthritis Pain 650 MG CR tablet  Generic drug: acetaminophen  Replaced by: acetaminophen 325 MG Tabs     hydroCHLOROthiazide 25 MG Tabs  Commonly known as: HYDRODIURIL     ipratropium-albuterol 0.5-2.5 (3) MG/3ML nebulizer solution  Commonly known as: DUONEB     methylPREDNISolone 4 MG Tbpk  Commonly known as: MEDROL DOSEPAK            Allergies  Allergies   Allergen Reactions   • Zithromax [Azithromycin] Diarrhea     Pt states severe diarrhea       DIET  Orders Placed This Encounter   Procedures   • Diet Order Diet: Consistent CHO (Diabetic)     Standing Status:   Standing     Number of Occurrences:   1     Order Specific Question:   Diet:     Answer:   Consistent CHO (Diabetic) [4]       ACTIVITY  As tolerated.  Weight bearing as tolerated    CONSULTATIONS  None    PROCEDURES  None    LABORATORY  Lab Results   Component Value Date    SODIUM 135 12/05/2020    POTASSIUM 4.9 12/05/2020    CHLORIDE 97 12/05/2020    CO2 28 12/05/2020    GLUCOSE 245 (H) 12/05/2020    BUN 33 (H) 12/05/2020    CREATININE 1.08 12/05/2020        Lab Results   Component Value Date    WBC 5.5 12/04/2020    HEMOGLOBIN 11.5 (L) 12/04/2020    HEMATOCRIT 36.8 (L) 12/04/2020    PLATELETCT 165 12/04/2020        Total time of the discharge process exceeds 35 minutes.

## 2020-12-06 NOTE — DISCHARGE PLANNING
Anticipated Discharge Disposition: Home    Action: pt to dc with no needs from Case Management.    Barriers to Discharge: none    Plan: DC to home

## 2020-12-06 NOTE — DISCHARGE INSTRUCTIONS
Discharge Instructions    Discharged to home by car with relative. Discharged via wheelchair, hospital escort: Yes.  Special equipment needed: Not Applicable    Be sure to schedule a follow-up appointment with your primary care doctor or any specialists as instructed.     Discharge Plan:   Diet Plan: Discussed  Activity Level: Discussed  Confirmed Follow up Appointment: Patient to Call and Schedule Appointment  Confirmed Symptoms Management: Discussed  Medication Reconciliation Updated: Yes  Influenza Vaccine Indication: Patient Refuses    I understand that a diet low in cholesterol, fat, and sodium is recommended for good health. Unless I have been given specific instructions below for another diet, I accept this instruction as my diet prescription.   Other diet: Regular Diet    Special Instructions: None    · Is patient discharged on Warfarin / Coumadin?   No     Depression / Suicide Risk    As you are discharged from this [facility] facility, it is important to learn how to keep safe from harming yourself.    Recognize the warning signs:  · Abrupt changes in personality, positive or negative- including increase in energy   · Giving away possessions  · Change in eating patterns- significant weight changes-  positive or negative  · Change in sleeping patterns- unable to sleep or sleeping all the time   · Unwillingness or inability to communicate  · Depression  · Unusual sadness, discouragement and loneliness  · Talk of wanting to die  · Neglect of personal appearance   · Rebelliousness- reckless behavior  · Withdrawal from people/activities they love  · Confusion- inability to concentrate     If you or a loved one observes any of these behaviors or has concerns about self-harm, here's what you can do:  · Talk about it- your feelings and reasons for harming yourself  · Remove any means that you might use to hurt yourself (examples: pills, rope, extension cords, firearm)  · Get professional help from the community  (Mental Health, Substance Abuse, psychological counseling)  · Do not be alone:Call your Safe Contact- someone whom you trust who will be there for you.  · Call your local CRISIS HOTLINE 175-9636 or 353-714-0508  · Call your local Children's Mobile Crisis Response Team [place] (449) 774-9404 or www.MediaV  · Call the toll free National Suicide Prevention Hotlines   · National Suicide Prevention Lifeline 391-667-USZX (1974)  · National Hope Line Network 800-SUICIDE (104-8229)        - Discharge Instructions to Pt:  · Follow up with your Primary Care Physician in 2 weeks.  · Be compliant with your follow up appointments.  · Please be compliant with your medications, including new ones.  · A new medication has been given please take as advised.  -We have held you Hydrochlorotiazed, please follow up with PCP when and if you should resume it.  · Keep blood pressure controlled and be compliant to keep systolic blood pressure <140.  · Please adhere to a healthy diet, that consist of low fat, low salt, low calorie.  · Weight loss and physical activity as tolerated is encouraged.   · Ambulate with assistance.  ·  If there any signs or symptoms of worsening or symptoms recurring, please call your Primary Care Physician or return to Emergency Department for further assessment.  · Avoid sudden changes in position, like standing up quickly.  · Do not drive until cleared by PCP.  · Refrain from drinking alcohol and smoking.     Recommendation to PCP:  · Your pt will need close monitoring.  · If  failure to respond to therapy, we suggest having patient return for further care, or if cannot be treated as an outpatient, return to ED if worsening of symptoms.  · Please make certain your pt follows up with specialist appointments  · Ensure patient adheres to diet and lifestyle modifications and reconcile.  · Please note the change to medication and reconcile.  · Patient without progression of symptoms. Prognosis and  risk factors discussed in detail with patient and she understands.

## 2020-12-09 ENCOUNTER — HOSPITAL ENCOUNTER (INPATIENT)
Facility: MEDICAL CENTER | Age: 80
LOS: 13 days | DRG: 177 | End: 2020-12-22
Attending: EMERGENCY MEDICINE | Admitting: HOSPITALIST
Payer: MEDICARE

## 2020-12-09 ENCOUNTER — APPOINTMENT (OUTPATIENT)
Dept: RADIOLOGY | Facility: MEDICAL CENTER | Age: 80
DRG: 177 | End: 2020-12-09
Attending: EMERGENCY MEDICINE
Payer: MEDICARE

## 2020-12-09 ENCOUNTER — NURSE TRIAGE (OUTPATIENT)
Dept: HEALTH INFORMATION MANAGEMENT | Facility: OTHER | Age: 80
End: 2020-12-09

## 2020-12-09 DIAGNOSIS — J42 CHRONIC BRONCHITIS, UNSPECIFIED CHRONIC BRONCHITIS TYPE (HCC): Chronic | ICD-10-CM

## 2020-12-09 DIAGNOSIS — U07.1 PNEUMONIA DUE TO COVID-19 VIRUS: ICD-10-CM

## 2020-12-09 DIAGNOSIS — R06.02 SOB (SHORTNESS OF BREATH): ICD-10-CM

## 2020-12-09 DIAGNOSIS — J44.9 CHRONIC OBSTRUCTIVE PULMONARY DISEASE, UNSPECIFIED COPD TYPE (HCC): Chronic | ICD-10-CM

## 2020-12-09 DIAGNOSIS — R09.02 HYPOXIA: ICD-10-CM

## 2020-12-09 DIAGNOSIS — J12.82 PNEUMONIA DUE TO COVID-19 VIRUS: ICD-10-CM

## 2020-12-09 LAB
ALBUMIN SERPL BCP-MCNC: 3.8 G/DL (ref 3.2–4.9)
ALBUMIN/GLOB SERPL: 1.2 G/DL
ALP SERPL-CCNC: 46 U/L (ref 30–99)
ALT SERPL-CCNC: 21 U/L (ref 2–50)
ANION GAP SERPL CALC-SCNC: 10 MMOL/L (ref 7–16)
AST SERPL-CCNC: 27 U/L (ref 12–45)
BASOPHILS # BLD AUTO: 0.1 % (ref 0–1.8)
BASOPHILS # BLD: 0.02 K/UL (ref 0–0.12)
BILIRUB SERPL-MCNC: 0.2 MG/DL (ref 0.1–1.5)
BLOOD CULTURE HOLD CXBCH: NORMAL
BUN SERPL-MCNC: 25 MG/DL (ref 8–22)
CALCIUM SERPL-MCNC: 9.5 MG/DL (ref 8.5–10.5)
CHLORIDE SERPL-SCNC: 96 MMOL/L (ref 96–112)
CO2 SERPL-SCNC: 27 MMOL/L (ref 20–33)
CREAT SERPL-MCNC: 1.22 MG/DL (ref 0.5–1.4)
CRP SERPL HS-MCNC: 3.87 MG/DL (ref 0–0.75)
D DIMER PPP IA.FEU-MCNC: 2.23 UG/ML (FEU) (ref 0–0.5)
EKG IMPRESSION: NORMAL
EOSINOPHIL # BLD AUTO: 0 K/UL (ref 0–0.51)
EOSINOPHIL NFR BLD: 0 % (ref 0–6.9)
ERYTHROCYTE [DISTWIDTH] IN BLOOD BY AUTOMATED COUNT: 49.3 FL (ref 35.9–50)
GLOBULIN SER CALC-MCNC: 3.3 G/DL (ref 1.9–3.5)
GLUCOSE BLD-MCNC: 174 MG/DL (ref 65–99)
GLUCOSE SERPL-MCNC: 112 MG/DL (ref 65–99)
HCT VFR BLD AUTO: 39.3 % (ref 37–47)
HGB BLD-MCNC: 12 G/DL (ref 12–16)
IMM GRANULOCYTES # BLD AUTO: 0.1 K/UL (ref 0–0.11)
IMM GRANULOCYTES NFR BLD AUTO: 0.7 % (ref 0–0.9)
LYMPHOCYTES # BLD AUTO: 1.44 K/UL (ref 1–4.8)
LYMPHOCYTES NFR BLD: 10 % (ref 22–41)
MAGNESIUM SERPL-MCNC: 1.3 MG/DL (ref 1.5–2.5)
MCH RBC QN AUTO: 26.3 PG (ref 27–33)
MCHC RBC AUTO-ENTMCNC: 30.5 G/DL (ref 33.6–35)
MCV RBC AUTO: 86.2 FL (ref 81.4–97.8)
MONOCYTES # BLD AUTO: 1.02 K/UL (ref 0–0.85)
MONOCYTES NFR BLD AUTO: 7.1 % (ref 0–13.4)
NEUTROPHILS # BLD AUTO: 11.85 K/UL (ref 2–7.15)
NEUTROPHILS NFR BLD: 82.1 % (ref 44–72)
NRBC # BLD AUTO: 0 K/UL
NRBC BLD-RTO: 0 /100 WBC
PLATELET # BLD AUTO: 275 K/UL (ref 164–446)
PMV BLD AUTO: 9.7 FL (ref 9–12.9)
POTASSIUM SERPL-SCNC: 4.5 MMOL/L (ref 3.6–5.5)
PROCALCITONIN SERPL-MCNC: <0.05 NG/ML
PROT SERPL-MCNC: 7.1 G/DL (ref 6–8.2)
RBC # BLD AUTO: 4.56 M/UL (ref 4.2–5.4)
SODIUM SERPL-SCNC: 133 MMOL/L (ref 135–145)
WBC # BLD AUTO: 14.4 K/UL (ref 4.8–10.8)

## 2020-12-09 PROCEDURE — 94760 N-INVAS EAR/PLS OXIMETRY 1: CPT

## 2020-12-09 PROCEDURE — 84145 PROCALCITONIN (PCT): CPT

## 2020-12-09 PROCEDURE — 36415 COLL VENOUS BLD VENIPUNCTURE: CPT

## 2020-12-09 PROCEDURE — 99223 1ST HOSP IP/OBS HIGH 75: CPT | Performed by: HOSPITALIST

## 2020-12-09 PROCEDURE — 83520 IMMUNOASSAY QUANT NOS NONAB: CPT

## 2020-12-09 PROCEDURE — A9270 NON-COVERED ITEM OR SERVICE: HCPCS | Performed by: HOSPITALIST

## 2020-12-09 PROCEDURE — 71045 X-RAY EXAM CHEST 1 VIEW: CPT

## 2020-12-09 PROCEDURE — 700102 HCHG RX REV CODE 250 W/ 637 OVERRIDE(OP): Performed by: HOSPITALIST

## 2020-12-09 PROCEDURE — 85025 COMPLETE CBC W/AUTO DIFF WBC: CPT

## 2020-12-09 PROCEDURE — 94640 AIRWAY INHALATION TREATMENT: CPT

## 2020-12-09 PROCEDURE — 82962 GLUCOSE BLOOD TEST: CPT

## 2020-12-09 PROCEDURE — 80053 COMPREHEN METABOLIC PANEL: CPT

## 2020-12-09 PROCEDURE — 93005 ELECTROCARDIOGRAM TRACING: CPT | Performed by: EMERGENCY MEDICINE

## 2020-12-09 PROCEDURE — 700111 HCHG RX REV CODE 636 W/ 250 OVERRIDE (IP): Performed by: HOSPITALIST

## 2020-12-09 PROCEDURE — 85379 FIBRIN DEGRADATION QUANT: CPT

## 2020-12-09 PROCEDURE — 83735 ASSAY OF MAGNESIUM: CPT

## 2020-12-09 PROCEDURE — 770021 HCHG ROOM/CARE - ISO PRIVATE

## 2020-12-09 PROCEDURE — 86140 C-REACTIVE PROTEIN: CPT

## 2020-12-09 PROCEDURE — 700101 HCHG RX REV CODE 250: Performed by: HOSPITALIST

## 2020-12-09 PROCEDURE — 99285 EMERGENCY DEPT VISIT HI MDM: CPT

## 2020-12-09 RX ORDER — ONDANSETRON 4 MG/1
4 TABLET, ORALLY DISINTEGRATING ORAL EVERY 4 HOURS PRN
Status: DISCONTINUED | OUTPATIENT
Start: 2020-12-09 | End: 2020-12-22 | Stop reason: HOSPADM

## 2020-12-09 RX ORDER — BUDESONIDE 0.5 MG/2ML
0.5 INHALANT ORAL
Status: DISCONTINUED | OUTPATIENT
Start: 2020-12-09 | End: 2020-12-14

## 2020-12-09 RX ORDER — AMOXICILLIN 250 MG
2 CAPSULE ORAL 2 TIMES DAILY
Status: DISCONTINUED | OUTPATIENT
Start: 2020-12-09 | End: 2020-12-11

## 2020-12-09 RX ORDER — BISACODYL 10 MG
10 SUPPOSITORY, RECTAL RECTAL
Status: DISCONTINUED | OUTPATIENT
Start: 2020-12-09 | End: 2020-12-22 | Stop reason: HOSPADM

## 2020-12-09 RX ORDER — ATENOLOL 50 MG/1
50 TABLET ORAL DAILY
Status: DISCONTINUED | OUTPATIENT
Start: 2020-12-10 | End: 2020-12-22 | Stop reason: HOSPADM

## 2020-12-09 RX ORDER — HYDROCHLOROTHIAZIDE 25 MG/1
25 TABLET ORAL DAILY
Status: ON HOLD | COMMUNITY
End: 2020-12-22

## 2020-12-09 RX ORDER — GUAIFENESIN/DEXTROMETHORPHAN 100-10MG/5
10 SYRUP ORAL EVERY 6 HOURS PRN
Status: DISCONTINUED | OUTPATIENT
Start: 2020-12-09 | End: 2020-12-22 | Stop reason: HOSPADM

## 2020-12-09 RX ORDER — DEXAMETHASONE 4 MG/1
6 TABLET ORAL DAILY
Status: COMPLETED | OUTPATIENT
Start: 2020-12-10 | End: 2020-12-14

## 2020-12-09 RX ORDER — SIMVASTATIN 10 MG
10 TABLET ORAL EVERY EVENING
Status: DISCONTINUED | OUTPATIENT
Start: 2020-12-09 | End: 2020-12-22 | Stop reason: HOSPADM

## 2020-12-09 RX ORDER — ONDANSETRON 2 MG/ML
4 INJECTION INTRAMUSCULAR; INTRAVENOUS EVERY 4 HOURS PRN
Status: DISCONTINUED | OUTPATIENT
Start: 2020-12-09 | End: 2020-12-22 | Stop reason: HOSPADM

## 2020-12-09 RX ORDER — ACETAMINOPHEN 325 MG/1
650 TABLET ORAL EVERY 6 HOURS PRN
Status: DISCONTINUED | OUTPATIENT
Start: 2020-12-09 | End: 2020-12-22 | Stop reason: HOSPADM

## 2020-12-09 RX ORDER — LEVOTHYROXINE SODIUM 0.12 MG/1
125 TABLET ORAL EVERY EVENING
Status: DISCONTINUED | OUTPATIENT
Start: 2020-12-09 | End: 2020-12-22 | Stop reason: HOSPADM

## 2020-12-09 RX ORDER — IPRATROPIUM BROMIDE AND ALBUTEROL SULFATE 2.5; .5 MG/3ML; MG/3ML
3 SOLUTION RESPIRATORY (INHALATION)
Status: DISCONTINUED | OUTPATIENT
Start: 2020-12-09 | End: 2020-12-12

## 2020-12-09 RX ORDER — LABETALOL HYDROCHLORIDE 5 MG/ML
10 INJECTION, SOLUTION INTRAVENOUS EVERY 4 HOURS PRN
Status: DISCONTINUED | OUTPATIENT
Start: 2020-12-09 | End: 2020-12-10

## 2020-12-09 RX ORDER — DOXYCYCLINE 100 MG/1
100 TABLET ORAL EVERY 12 HOURS
Status: DISCONTINUED | OUTPATIENT
Start: 2020-12-09 | End: 2020-12-12

## 2020-12-09 RX ORDER — POLYETHYLENE GLYCOL 3350 17 G/17G
1 POWDER, FOR SOLUTION ORAL
Status: DISCONTINUED | OUTPATIENT
Start: 2020-12-09 | End: 2020-12-22 | Stop reason: HOSPADM

## 2020-12-09 RX ORDER — LISINOPRIL 20 MG/1
20 TABLET ORAL DAILY
Status: DISCONTINUED | OUTPATIENT
Start: 2020-12-10 | End: 2020-12-22 | Stop reason: HOSPADM

## 2020-12-09 RX ORDER — GUAIFENESIN 600 MG/1
1200 TABLET, EXTENDED RELEASE ORAL 2 TIMES DAILY
Status: DISCONTINUED | OUTPATIENT
Start: 2020-12-09 | End: 2020-12-22 | Stop reason: HOSPADM

## 2020-12-09 RX ORDER — DEXTROSE MONOHYDRATE 25 G/50ML
50 INJECTION, SOLUTION INTRAVENOUS
Status: DISCONTINUED | OUTPATIENT
Start: 2020-12-09 | End: 2020-12-22 | Stop reason: HOSPADM

## 2020-12-09 RX ADMIN — BUDESONIDE 0.5 MG: 0.5 SUSPENSION RESPIRATORY (INHALATION) at 18:36

## 2020-12-09 RX ADMIN — DOXYCYCLINE 100 MG: 100 TABLET, FILM COATED ORAL at 17:34

## 2020-12-09 RX ADMIN — IPRATROPIUM BROMIDE AND ALBUTEROL SULFATE 3 ML: .5; 3 SOLUTION RESPIRATORY (INHALATION) at 23:00

## 2020-12-09 RX ADMIN — IPRATROPIUM BROMIDE AND ALBUTEROL SULFATE 3 ML: .5; 3 SOLUTION RESPIRATORY (INHALATION) at 15:16

## 2020-12-09 RX ADMIN — GUAIFENESIN 1200 MG: 600 TABLET, EXTENDED RELEASE ORAL at 17:34

## 2020-12-09 RX ADMIN — ACETAMINOPHEN 650 MG: 325 TABLET, FILM COATED ORAL at 17:34

## 2020-12-09 RX ADMIN — IPRATROPIUM BROMIDE AND ALBUTEROL SULFATE 3 ML: .5; 3 SOLUTION RESPIRATORY (INHALATION) at 18:35

## 2020-12-09 RX ADMIN — SIMVASTATIN 10 MG: 10 TABLET, FILM COATED ORAL at 17:34

## 2020-12-09 RX ADMIN — INSULIN HUMAN 2 UNITS: 100 INJECTION, SOLUTION PARENTERAL at 20:25

## 2020-12-09 ASSESSMENT — ENCOUNTER SYMPTOMS
POLYDIPSIA: 0
BACK PAIN: 0
BRUISES/BLEEDS EASILY: 0
TREMORS: 0
CONSTIPATION: 0
ORTHOPNEA: 0
DIAPHORESIS: 0
BLURRED VISION: 0
EYE PAIN: 0
HEARTBURN: 0
PND: 0
SINUS PAIN: 0
ABDOMINAL PAIN: 0
DIARRHEA: 1
PHOTOPHOBIA: 0
DOUBLE VISION: 0
FLANK PAIN: 0
CHILLS: 0
FEVER: 1
NAUSEA: 0
COUGH: 1
TINGLING: 0
MYALGIAS: 0
HEADACHES: 0
NECK PAIN: 0
SPUTUM PRODUCTION: 0
WEAKNESS: 0
HALLUCINATIONS: 0
WHEEZING: 1
VOMITING: 0
DEPRESSION: 0
STRIDOR: 0
FALLS: 0
SHORTNESS OF BREATH: 1
PALPITATIONS: 0
SORE THROAT: 0
BLOOD IN STOOL: 0
DIZZINESS: 0
CLAUDICATION: 0
HEMOPTYSIS: 0

## 2020-12-09 ASSESSMENT — PAIN DESCRIPTION - PAIN TYPE: TYPE: ACUTE PAIN

## 2020-12-09 ASSESSMENT — FIBROSIS 4 INDEX
FIB4 SCORE: 3.31
FIB4 SCORE: 1.67

## 2020-12-09 ASSESSMENT — LIFESTYLE VARIABLES: SUBSTANCE_ABUSE: 0

## 2020-12-09 NOTE — ASSESSMENT & PLAN NOTE
With exacerbation due to COVID-19 pneumonia  DuoNebs every 4 Symbicort and seebri   Completed course of Dexamethasone for COVID19  RT

## 2020-12-09 NOTE — ED TRIAGE NOTES
Camille Moreno Michael presents to triage, wearing a mask, reporting:  Chief Complaint   Patient presents with   • Shortness of Breath   Pt reportedly tested + 1 week ago, called today for worsening dyspnea at rest. Admitted from 12/3-12/6 for COVID. Maintaining o2 sat with 10 L Oxymask.

## 2020-12-09 NOTE — H&P
Hospital Medicine History & Physical Note    Date of Service  12/9/2020    Primary Care Physician  RODDY Maya    Consultants  None    Code Status  DNAR/DNI    Chief Complaint  Chief Complaint   Patient presents with   • Shortness of Breath       History of Presenting Illness  79 y.o. female who presented 12/9/2020 with past medical history of COPD, diabetes, hypertension who comes into the emergency room with complaints of shortness of breath for the past 2 days.  Patient was recently admitted in the hospital for COVID-19.  At the time she had a UTI that was treated for.  She required minimal amounts of oxygen and was discharged on 4 L.  She was under home monitoring and noticed that she has increased oxygen requirements.  Associated with fevers, nonproductive cough, diarrhea, generalized malaise, myalgias.  She denies loss of taste and loss of smell.  Patient states that she completed her home doses of Decadron.    Chest x-ray found bilateral groundglass opacities mostly in the lower lobes  EKG found normal sinus rhythm, left axis deviation, left bundle branch block  Review of Systems  Review of Systems   Constitutional: Positive for fever and malaise/fatigue. Negative for chills and diaphoresis.   HENT: Negative for congestion, ear discharge, ear pain, hearing loss, nosebleeds, sinus pain, sore throat and tinnitus.    Eyes: Negative for blurred vision, double vision, photophobia and pain.   Respiratory: Positive for cough, shortness of breath and wheezing. Negative for hemoptysis, sputum production and stridor.    Cardiovascular: Negative for chest pain, palpitations, orthopnea, claudication, leg swelling and PND.   Gastrointestinal: Positive for diarrhea. Negative for abdominal pain, blood in stool, constipation, heartburn, melena, nausea and vomiting.   Genitourinary: Negative for dysuria, flank pain, frequency, hematuria and urgency.   Musculoskeletal: Negative for back pain, falls, joint  pain, myalgias and neck pain.   Skin: Negative for itching and rash.   Neurological: Negative for dizziness, tingling, tremors, weakness and headaches.   Endo/Heme/Allergies: Negative for environmental allergies and polydipsia. Does not bruise/bleed easily.   Psychiatric/Behavioral: Negative for depression, hallucinations, substance abuse and suicidal ideas.       Past Medical History   has a past medical history of Chickenpox, Chronic airway obstruction, not elsewhere classified, Clotting disorder (HCC), Diabetes, [place] measles, and Mumps.    Surgical History   has a past surgical history that includes pr remv 2nd cataract,corn-scler sectn.     Family History  family history includes Heart Attack in her father.     Social History   reports that she quit smoking about 13 years ago. Her smoking use included cigarettes. She has a 69.00 pack-year smoking history. She has never used smokeless tobacco. She reports that she does not drink alcohol or use drugs.    Allergies  Allergies   Allergen Reactions   • Zithromax [Azithromycin] Diarrhea     Pt states severe diarrhea       Medications  Prior to Admission Medications   Prescriptions Last Dose Informant Patient Reported? Taking?   Blood Glucose Monitoring Suppl Device Not Taking at Unknown time Patient No No   Sig: Meter: Dispense Device of Insurance Preference. Sig. Use as directed for blood sugar monitoring. #1. NR.   Patient not taking: Reported on 12/9/2020   Blood Glucose Test Strips Not Taking at Unknown time Patient No No   Sig: Test strips order: Test strips for insurance-preferred meter. Sig: use every morning before breakfast and prn ssx high or low sugar   Patient not taking: Reported on 12/9/2020   Calcium Carbonate-Vit D-Min (CALCIUM 1200 PO) 12/9/2020 at AM Patient Yes No   Sig: Take 1 Tab by mouth 2 Times a Day.   Fluticasone-Umeclidin-Vilant (TRELEGY ELLIPTA) 100-62.5-25 MCG/INH AEROSOL POWDER, BREATH ACTIVATED 12/8/2020 at AM Patient No No   Sig:  Inhale 1 Puff by mouth every day.   Lancets Not Taking at Unknown time Patient No No   Sig: Lancets order: Lancets for insurance-preferred meter. Sig: use every morning before breakfast and prn ssx high or low sugar.   Patient not taking: Reported on 12/9/2020   Multiple Vitamin (MULTIVITAMIN PO) 12/9/2020 at AM Patient Yes No   Sig: Take 1 Tab by mouth every day.   Omega-3 Fatty Acids (FISH OIL) 1000 MG Cap capsule 12/8/2020 at PM Patient Yes No   Sig: Take 1,000 mg by mouth every day.   Probiotic Product (TRUBIOTICS) Cap 12/9/2020 at AM Patient Yes No   Sig: Take 1 Capsule by mouth every morning.   TRELEGY ELLIPTA 100-62.5-25 MCG/INH AEROSOL POWDER, BREATH ACTIVATED Not Taking at Unknown time Patient No No   Sig: INHALE ONE PUFF BY MOUTH DAILY   Patient not taking: Reported on 12/3/2020   acetaminophen (TYLENOL) 325 MG Tab 12/9/2020 at AM Patient No No   Sig: Take 2 Tabs by mouth every 6 hours as needed (Mild Pain; (Pain scale 1-3); Temp greater than 100.5 F).   albuterol 108 (90 Base) MCG/ACT Aero Soln inhalation aerosol 12/9/2020 at AM Patient No No   Sig: Inhale 2 Puffs by mouth every four hours as needed for Shortness of Breath.   aspirin 81 MG tablet 12/8/2020 at PM Patient Yes No   Sig: Take 81 mg by mouth every evening.   atenolol (TENORMIN) 50 MG Tab unknown at unknown Patient No No   Sig: Take 1 Tab by mouth every day.   dexamethasone (DECADRON) 6 MG Tab 12/9/2020 at AM Patient No No   Sig: Take 1 Tab by mouth every day for 7 days.   guaiFENesin ER (MUCINEX) 600 MG TABLET SR 12 HR 12/8/2020 at AM Patient No No   Sig: Take 1 Tab by mouth 2 times a day as needed for Cough (productive cough) for up to 10 days.   hydroCHLOROthiazide (HYDRODIURIL) 25 MG Tab 12/8/2020 at stopped Patient Yes Yes   Sig: Take 25 mg by mouth every day.   levothyroxine (SYNTHROID) 125 MCG Tab 12/8/2020 at PM Patient No No   Sig: TAKE ONE TABLET BY MOUTH EVERY MORNING ON AN EMPTY STOMACH   Patient taking differently: Take 125 mcg by  mouth every evening.   lisinopril (PRINIVIL) 20 MG Tab 12/9/2020 at AM Patient No No   Sig: Take 1 Tab by mouth every day.   simvastatin (ZOCOR) 10 MG Tab 12/8/2020 at PM Patient No No   Sig: Take 1 Tab by mouth every evening.   sitagliptan-metformin (JANUMET)  MG per tablet 12/9/2020 at AM Patient No No   Sig: Take 1 Tab by mouth 2 times a day, with meals.   sulfamethoxazole-trimethoprim (BACTRIM DS) 800-160 MG tablet 12/8/2020 at finished Patient No No   Sig: Take 1 Tab by mouth 2 times a day for 3 days.      Facility-Administered Medications: None       Physical Exam  Temp:  [37.7 °C (99.9 °F)] 37.7 °C (99.9 °F)  Pulse:  [] 95  Resp:  [20-29] 29  BP: (149-178)/(68-91) 168/68  SpO2:  [74 %-99 %] 90 %    Physical Exam  Vitals signs and nursing note reviewed.   Constitutional:       General: She is in acute distress.      Appearance: Normal appearance. She is not ill-appearing, toxic-appearing or diaphoretic.   HENT:      Head: Normocephalic and atraumatic.      Nose: No congestion or rhinorrhea.      Mouth/Throat:      Pharynx: No oropharyngeal exudate or posterior oropharyngeal erythema.   Eyes:      General: No scleral icterus.  Neck:      Musculoskeletal: No neck rigidity or muscular tenderness.      Vascular: No carotid bruit.   Cardiovascular:      Rate and Rhythm: Normal rate and regular rhythm.      Pulses: Normal pulses.      Heart sounds: Normal heart sounds. No murmur. No friction rub. No gallop.    Pulmonary:      Effort: Pulmonary effort is normal. No respiratory distress.      Breath sounds: No stridor. Wheezing and rales present. No rhonchi.   Abdominal:      General: Abdomen is flat. There is no distension.      Palpations: There is no mass.      Tenderness: There is no abdominal tenderness. There is no left CVA tenderness, guarding or rebound.      Hernia: No hernia is present.   Musculoskeletal: Normal range of motion.         General: No swelling.      Right lower leg: Edema present.       Left lower leg: Edema present.   Lymphadenopathy:      Cervical: No cervical adenopathy.   Skin:     General: Skin is warm and dry.      Capillary Refill: Capillary refill takes more than 3 seconds.      Coloration: Skin is not jaundiced or pale.      Findings: No bruising or erythema.   Neurological:      Mental Status: She is alert.         Laboratory:  Recent Labs     12/09/20  1204   WBC 14.4*   RBC 4.56   HEMOGLOBIN 12.0   HEMATOCRIT 39.3   MCV 86.2   MCH 26.3*   MCHC 30.5*   RDW 49.3   PLATELETCT 275   MPV 9.7     Recent Labs     12/09/20  1204   SODIUM 133*   POTASSIUM 4.5   CHLORIDE 96   CO2 27   GLUCOSE 112*   BUN 25*   CREATININE 1.22   CALCIUM 9.5     Recent Labs     12/09/20  1204   ALTSGPT 21   ASTSGOT 27   ALKPHOSPHAT 46   TBILIRUBIN 0.2   GLUCOSE 112*         No results for input(s): NTPROBNP in the last 72 hours.      No results for input(s): TROPONINT in the last 72 hours.    Imaging:  DX-CHEST-PORTABLE (1 VIEW)   Final Result      Development of right lower and left mid airspace process that are consistent with pneumonia and likely Covid pneumonia      US-EXTREMITY VENOUS LOWER BILAT    (Results Pending)         Assessment/Plan:  I anticipate this patient will require at least two midnights for appropriate medical management, necessitating inpatient admission.    * Acute hypoxemic respiratory failure due to COVID-19 (HCC)- (present on admission)  Assessment & Plan  Monitor O2 status closely  Prone positioning and frequent change position  ISS and ambulation aggressively  Extend course of Decadron to 5 additional days  Inflammatory markers every 48 hours  Prophylactic Lovenox  Consult ID for Remdisivir-never received during the last admission      Hypothyroidism- (present on admission)  Assessment & Plan  Continue home medications    Chronic obstructive pulmonary disease (HCC)- (present on admission)  Assessment & Plan  With exacerbation  Will start home inhalers  DuoNebs every 4  Started on  steroids           DM2 (diabetes mellitus, type 2) (HCC)- (present on admission)  Assessment & Plan  Start on insulin sliding scale with serial Accu-Checks  Check hemoglobin A1c  Hypoglycemic protocol in place        HTN (hypertension)- (present on admission)  Assessment & Plan  Uncontrolled  Continue current home medications  IV as needed medications have been ordered      CKD (chronic kidney disease), stage III- (present on admission)  Assessment & Plan  Monitor BMP and assess response  Avoid IV contrast/nephrotoxins/NSAIDs  Dose adjust meds for decreased GFR        Left bundle branch block- (present on admission)  Assessment & Plan  Chronic

## 2020-12-09 NOTE — ED PROVIDER NOTES
ED Provider Note    CHIEF COMPLAINT  Chief Complaint   Patient presents with   • Shortness of Breath       HPI  Camille Rodriguez is a 79 y.o. female who presents with complaint of worsening shortness of breath and cough.  She has known COVID-19 positive, sent home 3 days ago with home oxygen.  She states she is gradually had to turn this oxygen up with progressively worsening pulse oximetry at home.  Here she is 89% on a 10 L facemask, 93% on a 15 L facemask.  She denies chest pain.  She denies fever today.  No headache or neck stiffness.  No abdominal pain, no vomiting.  Any activity worsens cough and shortness of breath    REVIEW OF SYSTEMS    Constitutional: General malaise and weakness, denies fever today  Respiratory: Cough, dyspnea on exertion, COVID-19 infection  Cardiac: No chest pain, no syncope  Gastrointestinal: Nominal pain, no vomiting or diarrhea  Musculoskeletal: Denies acute neck or back pain  Neurologic: No headache  No current: Diabetes       All other systems are negative.       PAST MEDICAL HISTORY  Past Medical History:   Diagnosis Date   • Chickenpox    • Chronic airway obstruction, not elsewhere classified    • Clotting disorder (HCC)    • Diabetes    • [place] measles    • Mumps        FAMILY HISTORY  Family History   Problem Relation Age of Onset   • Heart Attack Father        SOCIAL HISTORY  Social History     Socioeconomic History   • Marital status:      Spouse name: Not on file   • Number of children: Not on file   • Years of education: Not on file   • Highest education level: Not on file   Occupational History   • Not on file   Social Needs   • Financial resource strain: Not on file   • Food insecurity     Worry: Not on file     Inability: Not on file   • Transportation needs     Medical: Not on file     Non-medical: Not on file   Tobacco Use   • Smoking status: Former Smoker     Packs/day: 1.50     Years: 46.00     Pack years: 69.00     Types: Cigarettes     Quit date:  2007     Years since quittin.8   • Smokeless tobacco: Never Used   • Tobacco comment: Quit 2007   Substance and Sexual Activity   • Alcohol use: No   • Drug use: No   • Sexual activity: Never   Lifestyle   • Physical activity     Days per week: Not on file     Minutes per session: Not on file   • Stress: Not on file   Relationships   • Social connections     Talks on phone: Not on file     Gets together: Not on file     Attends Restorationist service: Not on file     Active member of club or organization: Not on file     Attends meetings of clubs or organizations: Not on file     Relationship status: Not on file   • Intimate partner violence     Fear of current or ex partner: Not on file     Emotionally abused: Not on file     Physically abused: Not on file     Forced sexual activity: Not on file   Other Topics Concern   • Not on file   Social History Narrative   • Not on file       SURGICAL HISTORY  Past Surgical History:   Procedure Laterality Date   • PB REMV 2ND CATARACT,CORN-SCLER SECTN         CURRENT MEDICATIONS  Home Medications     Reviewed by Schuyler B Collett, R.N. (Registered Nurse) on 20 at 1157  Med List Status: <None>   Medication Last Dose Status   acetaminophen (TYLENOL) 325 MG Tab  Active   albuterol 108 (90 Base) MCG/ACT Aero Soln inhalation aerosol  Active   aspirin 81 MG tablet  Active   atenolol (TENORMIN) 50 MG Tab  Active   Blood Glucose Monitoring Suppl Device  Active   Blood Glucose Test Strips  Active   Calcium Carbonate-Vit D-Min (CALCIUM 1200 PO)  Active   dexamethasone (DECADRON) 6 MG Tab  Active   Fluticasone-Umeclidin-Vilant (TRELEGY ELLIPTA) 100-62.5-25 MCG/INH AEROSOL POWDER, BREATH ACTIVATED  Active   guaiFENesin ER (MUCINEX) 600 MG TABLET SR 12 HR  Active   Lancets  Active   levothyroxine (SYNTHROID) 125 MCG Tab  Active   lisinopril (PRINIVIL) 20 MG Tab  Active   Multiple Vitamin (MULTIVITAMIN PO)  Active   Omega-3 Fatty Acids (FISH OIL) 1000 MG Cap capsule  Active  "  Probiotic Product (TRUBIOTICS) Cap  Active   simvastatin (ZOCOR) 10 MG Tab  Active   sitagliptan-metformin (JANUMET)  MG per tablet  Active   sulfamethoxazole-trimethoprim (BACTRIM DS) 800-160 MG tablet  Active   TRELEGY ELLIPTA 100-62.5-25 MCG/INH AEROSOL POWDER, BREATH ACTIVATED  Active                ALLERGIES  Allergies   Allergen Reactions   • Zithromax [Azithromycin] Diarrhea     Pt states severe diarrhea       PHYSICAL EXAM  VITAL SIGNS: Pulse 83   Temp 37.7 °C (99.9 °F) (Temporal)   Resp (!) 28   Ht 1.626 m (5' 4\")   Wt 108.4 kg (239 lb)   SpO2 95%   BMI 41.02 kg/m²   Constitutional: Dyspneic, well-nourished appearance.   HENT: No facial swelling, no epistaxis  Eyes: Anicteric, no conjunctivitis.  Her ocular motions intact  Cardiovascular: Normal heart rate, Normal rhythm  Pulmonary: Lateral crackles with diminished breath sounds.   Gastrointestinal: Soft, No tenderness, No distention  Skin: Warm, Dry, No cyanosis.  No asymmetric edema  Neurologic: Speech is clear, follows commands, facial expression is symmetrical.  Psychiatric: Affect normal,  Mood normal.  Patient is calm and cooperative  Musculoskeletal: Neck is nontender    EKG/Labs  Results for orders placed or performed during the hospital encounter of 12/09/20   CBC with Differential   Result Value Ref Range    WBC 14.4 (H) 4.8 - 10.8 K/uL    RBC 4.56 4.20 - 5.40 M/uL    Hemoglobin 12.0 12.0 - 16.0 g/dL    Hematocrit 39.3 37.0 - 47.0 %    MCV 86.2 81.4 - 97.8 fL    MCH 26.3 (L) 27.0 - 33.0 pg    MCHC 30.5 (L) 33.6 - 35.0 g/dL    RDW 49.3 35.9 - 50.0 fL    Platelet Count 275 164 - 446 K/uL    MPV 9.7 9.0 - 12.9 fL    Neutrophils-Polys 82.10 (H) 44.00 - 72.00 %    Lymphocytes 10.00 (L) 22.00 - 41.00 %    Monocytes 7.10 0.00 - 13.40 %    Eosinophils 0.00 0.00 - 6.90 %    Basophils 0.10 0.00 - 1.80 %    Immature Granulocytes 0.70 0.00 - 0.90 %    Nucleated RBC 0.00 /100 WBC    Neutrophils (Absolute) 11.85 (H) 2.00 - 7.15 K/uL    Lymphs " (Absolute) 1.44 1.00 - 4.80 K/uL    Monos (Absolute) 1.02 (H) 0.00 - 0.85 K/uL    Eos (Absolute) 0.00 0.00 - 0.51 K/uL    Baso (Absolute) 0.02 0.00 - 0.12 K/uL    Immature Granulocytes (abs) 0.10 0.00 - 0.11 K/uL    NRBC (Absolute) 0.00 K/uL   Comp Metabolic Panel   Result Value Ref Range    Sodium 133 (L) 135 - 145 mmol/L    Potassium 4.5 3.6 - 5.5 mmol/L    Chloride 96 96 - 112 mmol/L    Co2 27 20 - 33 mmol/L    Anion Gap 10.0 7.0 - 16.0    Glucose 112 (H) 65 - 99 mg/dL    Bun 25 (H) 8 - 22 mg/dL    Creatinine 1.22 0.50 - 1.40 mg/dL    Calcium 9.5 8.5 - 10.5 mg/dL    AST(SGOT) 27 12 - 45 U/L    ALT(SGPT) 21 2 - 50 U/L    Alkaline Phosphatase 46 30 - 99 U/L    Total Bilirubin 0.2 0.1 - 1.5 mg/dL    Albumin 3.8 3.2 - 4.9 g/dL    Total Protein 7.1 6.0 - 8.2 g/dL    Globulin 3.3 1.9 - 3.5 g/dL    A-G Ratio 1.2 g/dL   D-Dimer   Result Value Ref Range    D-Dimer Screen 2.23 (H) 0.00 - 0.50 ug/mL (FEU)   CRP Quantitative (Non-Cardiac)   Result Value Ref Range    Stat C-Reactive Protein 3.87 (H) 0.00 - 0.75 mg/dL   Procalcitonin   Result Value Ref Range    Procalcitonin <0.05 <0.25 ng/mL   Blood Culture,Hold   Result Value Ref Range    Blood Culture Hold Collected    ESTIMATED GFR   Result Value Ref Range    GFR If  51 (A) >60 mL/min/1.73 m 2    GFR If Non  43 (A) >60 mL/min/1.73 m 2   Magnesium   Result Value Ref Range    Magnesium 1.3 (L) 1.5 - 2.5 mg/dL   EKG   Result Value Ref Range    Report       [facility]    Test Date:  2020  Pt Name:    LETICIA SINGH                 Department: ER  MRN:        [id number]                      Room:       [facility]  Gender:     Female                       Technician: [id number]  :        [id number]                   Requested By:CANDACE MELGOZA  Order #:    [id number]                    Reading MD: CANDACE MELGOZA MD    Measurements  Intervals                                Axis  Rate:       101                           P:          11  OR:         125                          QRS:        9  QRSD:       154                          T:          152  QT:         379  QTc:        492    Interpretive Statements  Sinus tachycardia  Left bundle branch block  Compared to ECG 06/03/2017 06:56:27  Sinus rhythm no longer present  Left bundle branch block is old  Electronically Signed On 12-9-2020 13:15:39 PST by CAN MELGOZA MD           RADIOLOGY/PROCEDURES  DX-CHEST-PORTABLE (1 VIEW)   Final Result      Development of right lower and left mid airspace process that are consistent with pneumonia and likely Covid pneumonia      US-EXTREMITY VENOUS LOWER BILAT    (Results Pending)         COURSE & MEDICAL DECISION MAKING  Pertinent Labs & Imaging studies reviewed. (See chart for details)  Patient returns still symptomatic from her COVID-19 infection, with worsening pulse oximetry despite home oxygen.  Here in the emergency department she required a 15 L facemask to get above 90% saturation.  She subjectively states shortness of breath is worsened as well as cough.  Patient did not require high flow nasal cannula oxygen in the emergency department.  Chest x-ray has worsened as well.  No evidence of pneumothorax.  Plan for hospitalization, ongoing evaluation and treatment.    FINAL IMPRESSION     1. Hypoxia     2. SOB (shortness of breath)     3. Pneumonia due to COVID-19 virus     4. Chronic obstructive pulmonary disease, unspecified COPD type (HCC)  REFERRAL TO PULMONARY REHAB                   Electronically signed by: Can Melgoza M.D., 12/9/2020 12:25 PM

## 2020-12-09 NOTE — TELEPHONE ENCOUNTER
Regarding: O2 LOW/  ----- Message from Arlene Ruth sent at 12/9/2020 10:13 AM PST -----  AMBULANCE CALLED 12-03-20/SEVERE UTI/TESTED POSITIVE FOR COVID 19/24 HR O2/LOW/WET COUGH

## 2020-12-09 NOTE — ASSESSMENT & PLAN NOTE
Baseline Cr around 1.2>>> 1.6  Monitor BMP and assess response  Avoid IV contrast/nephrotoxins/NSAIDs  Dose adjust meds for decreased GFR

## 2020-12-09 NOTE — TELEPHONE ENCOUNTER
"Spoke with mariother who is planning to take her Mom back to the ER.  She was discharged and positive for Covid.  Nalini is on 5 liters nasal canula and is saturating between 84-89% at rest.  Oxygen drops to below 80% when she walks around and is not at rest.      Daughter stated she would bring her back to the ER.         Reason for Disposition  • MODERATE difficulty breathing (e.g., speaks in phrases, SOB even at rest, pulse 100-120)    Additional Information  • Negative: SEVERE difficulty breathing (e.g., struggling for each breath, speaks in single words)  • Negative: Difficult to awaken or acting confused (e.g., disoriented, slurred speech)  • Negative: Bluish (or gray) lips or face now  • Negative: Shock suspected (e.g., cold/pale/clammy skin, too weak to stand, low BP, rapid pulse)  • Negative: Sounds like a life-threatening emergency to the triager  • Negative: [1] COVID-19 suspected (e.g., cough, fever, shortness of breath) AND [2] public health department recommends testing  • Negative: [1] COVID-19 exposure AND [2] no symptoms  • Negative: COVID-19 and Breastfeeding, questions about    Answer Assessment - Initial Assessment Questions  1. COVID-19 DIAGNOSIS: \"Who made your Coronavirus (COVID-19) diagnosis?\" \"Was it confirmed by a positive lab test?\" If not diagnosed by a HCP, ask \"Are there lots of cases (community spread) where you live?\" (See public health department website, if unsure)    * MAJOR community spread: high number of cases; numbers of cases are increasing; many people hospitalized.    * MINOR community spread: low number of cases; not increasing; few or no people hospitalized      Major  2. ONSET: \"When did the COVID-19 symptoms start?\"       Sore throat and cough around 11/25/20  3. WORST SYMPTOM: \"What is your worst symptom?\" (e.g., cough, fever, shortness of breath, muscle aches)      Shortness of breath and coughing.   4. COUGH: \"How bad is the cough?\"        Wet cough and frequent.   5. " "FEVER: \"Do you have a fever?\" If so, ask: \"What is your temperature, how was it measured, and when did it start?\"      She has fever and chills.  100.5.   6. RESPIRATORY STATUS: \"Describe your breathing?\" (e.g., shortness of breath, wheezing, unable to speak)       Shortness of breath, low oxygen levels with activity.  Oxygen on 5 liters nasal canula is 84-89%  7. BETTER-SAME-WORSE: \"Are you getting better, staying the same or getting worse compared to yesterday?\"  If getting worse, ask, \"In what way?\"      Worse since yesterday.   8. HIGH RISK DISEASE: \"Do you have any chronic medical problems?\" (e.g., asthma, heart or lung disease, weak immune system, etc.)      COPD, DM.   9. PREGNANCY: \"Is there any chance you are pregnant?\" \"When was your last menstrual period?\"      No  10. OTHER SYMPTOMS: \"Do you have any other symptoms?\"  (e.g., runny nose, headache, sore throat, loss of smell)        Congestion, shortness of breath, cough, fever and chills. Diarrhea but she is on antibiotics.    Protocols used: CORONAVIRUS (COVID-19) DIAGNOSED OR [id number]    "

## 2020-12-09 NOTE — DISCHARGE PLANNING
Received a call back from daughter Alexandra who provides 24 hr care for pt. They do not have any HH set up. They live in a 2 level home but pt's bedroom is on the first floor. Pt usually uses a walker at home. Pt still has a 's lisence but has not been driving.     Alexandra would like an update from BS RN or unit CM once pt is assigned a room . Her cellphone number is # [phone].    Care Transition Team Assessment    Information Source  Information Given By: Relative  Informant's Name: Daughter Alexandra([phone])    Elopement Risk  Legal Hold: No  Ambulatory or Self Mobile in Wheelchair: No-Not an Elopement Risk    Interdisciplinary Discharge Planning  Does Admitting Nurse Feel This Could be a Complex Discharge?: No  Primary Care Physician: [facility]  Lives with - Patient's Self Care Capacity: Adult Children  Support Systems: Children  Housing / Facility: 2 Story House  Do You Take your Prescribed Medications Regularly: Yes([address])  Able to Return to Previous ADL's: Yes  Mobility Issues: No  Prior Services: Continuous (24 Hour) Care Giving Family  Patient Prefers to be Discharged to:: home  Assistance Needed: Yes  Durable Medical Equipment: Home Oxygen, Walker(Preferred DME)    Discharge Preparedness  What is your plan after discharge?: Home with help(daughter provides 24 hr care. )  What are your discharge supports?: Child  Prior Functional Level: Ambulatory, Needs Assist with Activities of Daily Living, Needs Assist with Medication Management, Uses Walker  Difficulity with ADLs: Dressing, Toileting, Walking  Difficulity with IADLs: Cooking, Driving, Laundry, Shopping    Functional Assesment  Prior Functional Level: Ambulatory, Needs Assist with Activities of Daily Living, Needs Assist with Medication Management, Uses Walker    Finances  Financial Barriers to Discharge: No  Prescription Coverage: Yes    Vision / Hearing Impairment  Vision Impairment :  No  Hearing Impairment : Yes              Domestic Abuse  Have you ever been the victim of abuse or violence?: No         Discharge Risks or Barriers  Discharge risks or barriers?: No    Anticipated Discharge Information  Discharge Disposition: Still a Patient (30)

## 2020-12-09 NOTE — ASSESSMENT & PLAN NOTE
Dyspnea and coughing  History of COPD (on home oxygen, home oxygen concentrator up to 4 L)  Chest x-ray bilateral infiltration  Oxygen requirement improving  Completed Decadron  Completed remdesivir

## 2020-12-09 NOTE — ED NOTES
Med rec complete via interview with pt at bedside. Pt was unsure of when she last took her atenolol. Allergies reviewed.   Pt finished a 3 day course of bactrim ds yesterday. Pt was started on a 7 day course of dexamethazone 6 mg 1 tab qday on 12/7 and last dose was this morning.

## 2020-12-09 NOTE — PROGRESS NOTES
ID evaluation for remdesivir  COVID + 12/3/2020  ANGIE on last admit (discharged 12/6) and renal function worsening again today  Start decadron  Recheck BMP in am-if improved will start remdesivir

## 2020-12-09 NOTE — ASSESSMENT & PLAN NOTE
A1c 7.7  With hyperglycemia present upon admit   Exacerbated by dexamethasone  Continue Lantus  Hold home medications sitagliptin and Metformin since she has infection  Regular SSI and diabetic diet

## 2020-12-09 NOTE — ED NOTES
Pt is on 15L with an oxy mask. Pt keeps taking her mask off to drink. Pt presents very nervous. I dimmed the light and put on the TV to help calm pt down. Xray is at bedside will continue to monitor.

## 2020-12-10 ENCOUNTER — PATIENT OUTREACH (OUTPATIENT)
Dept: HEALTH INFORMATION MANAGEMENT | Facility: OTHER | Age: 80
End: 2020-12-10

## 2020-12-10 ENCOUNTER — APPOINTMENT (OUTPATIENT)
Dept: RADIOLOGY | Facility: MEDICAL CENTER | Age: 80
DRG: 177 | End: 2020-12-10
Attending: HOSPITALIST
Payer: MEDICARE

## 2020-12-10 PROBLEM — E87.1 HYPONATREMIA: Status: ACTIVE | Noted: 2020-12-10

## 2020-12-10 PROBLEM — R60.9 PERIPHERAL EDEMA: Status: ACTIVE | Noted: 2020-12-10

## 2020-12-10 PROBLEM — R60.0 PERIPHERAL EDEMA: Status: ACTIVE | Noted: 2020-12-10

## 2020-12-10 LAB
ALBUMIN SERPL BCP-MCNC: 3.5 G/DL (ref 3.2–4.9)
ALBUMIN/GLOB SERPL: 0.9 G/DL
ALP SERPL-CCNC: 47 U/L (ref 30–99)
ALT SERPL-CCNC: 26 U/L (ref 2–50)
ANION GAP SERPL CALC-SCNC: 11 MMOL/L (ref 7–16)
AST SERPL-CCNC: 64 U/L (ref 12–45)
BASOPHILS # BLD AUTO: 0.1 % (ref 0–1.8)
BASOPHILS # BLD: 0.01 K/UL (ref 0–0.12)
BILIRUB SERPL-MCNC: 0.3 MG/DL (ref 0.1–1.5)
BUN SERPL-MCNC: 32 MG/DL (ref 8–22)
CALCIUM SERPL-MCNC: 9.3 MG/DL (ref 8.5–10.5)
CHLORIDE SERPL-SCNC: 94 MMOL/L (ref 96–112)
CO2 SERPL-SCNC: 25 MMOL/L (ref 20–33)
CREAT SERPL-MCNC: 1.76 MG/DL (ref 0.5–1.4)
EOSINOPHIL # BLD AUTO: 0 K/UL (ref 0–0.51)
EOSINOPHIL NFR BLD: 0 % (ref 0–6.9)
ERYTHROCYTE [DISTWIDTH] IN BLOOD BY AUTOMATED COUNT: 50.4 FL (ref 35.9–50)
EST. AVERAGE GLUCOSE BLD GHB EST-MCNC: 174 MG/DL
GLOBULIN SER CALC-MCNC: 3.7 G/DL (ref 1.9–3.5)
GLUCOSE BLD-MCNC: 174 MG/DL (ref 65–99)
GLUCOSE BLD-MCNC: 193 MG/DL (ref 65–99)
GLUCOSE BLD-MCNC: 213 MG/DL (ref 65–99)
GLUCOSE BLD-MCNC: 316 MG/DL (ref 65–99)
GLUCOSE SERPL-MCNC: 155 MG/DL (ref 65–99)
HBA1C MFR BLD: 7.7 % (ref 0–5.6)
HCT VFR BLD AUTO: 38.2 % (ref 37–47)
HGB BLD-MCNC: 12.1 G/DL (ref 12–16)
IMM GRANULOCYTES # BLD AUTO: 0.1 K/UL (ref 0–0.11)
IMM GRANULOCYTES NFR BLD AUTO: 0.8 % (ref 0–0.9)
LYMPHOCYTES # BLD AUTO: 1.35 K/UL (ref 1–4.8)
LYMPHOCYTES NFR BLD: 11.4 % (ref 22–41)
MCH RBC QN AUTO: 27.2 PG (ref 27–33)
MCHC RBC AUTO-ENTMCNC: 31.7 G/DL (ref 33.6–35)
MCV RBC AUTO: 85.8 FL (ref 81.4–97.8)
MONOCYTES # BLD AUTO: 0.93 K/UL (ref 0–0.85)
MONOCYTES NFR BLD AUTO: 7.8 % (ref 0–13.4)
NEUTROPHILS # BLD AUTO: 9.48 K/UL (ref 2–7.15)
NEUTROPHILS NFR BLD: 79.9 % (ref 44–72)
NRBC # BLD AUTO: 0 K/UL
NRBC BLD-RTO: 0 /100 WBC
PLATELET # BLD AUTO: 211 K/UL (ref 164–446)
PMV BLD AUTO: 9.6 FL (ref 9–12.9)
POTASSIUM SERPL-SCNC: 4.6 MMOL/L (ref 3.6–5.5)
PROT SERPL-MCNC: 7.2 G/DL (ref 6–8.2)
RBC # BLD AUTO: 4.45 M/UL (ref 4.2–5.4)
SODIUM SERPL-SCNC: 130 MMOL/L (ref 135–145)
WBC # BLD AUTO: 11.9 K/UL (ref 4.8–10.8)

## 2020-12-10 PROCEDURE — 700102 HCHG RX REV CODE 250 W/ 637 OVERRIDE(OP): Performed by: HOSPITALIST

## 2020-12-10 PROCEDURE — 83036 HEMOGLOBIN GLYCOSYLATED A1C: CPT

## 2020-12-10 PROCEDURE — 82962 GLUCOSE BLOOD TEST: CPT | Mod: 91

## 2020-12-10 PROCEDURE — 700111 HCHG RX REV CODE 636 W/ 250 OVERRIDE (IP): Performed by: HOSPITALIST

## 2020-12-10 PROCEDURE — 770021 HCHG ROOM/CARE - ISO PRIVATE

## 2020-12-10 PROCEDURE — 94640 AIRWAY INHALATION TREATMENT: CPT

## 2020-12-10 PROCEDURE — A9270 NON-COVERED ITEM OR SERVICE: HCPCS | Performed by: HOSPITALIST

## 2020-12-10 PROCEDURE — 80053 COMPREHEN METABOLIC PANEL: CPT

## 2020-12-10 PROCEDURE — 700105 HCHG RX REV CODE 258: Performed by: STUDENT IN AN ORGANIZED HEALTH CARE EDUCATION/TRAINING PROGRAM

## 2020-12-10 PROCEDURE — 93970 EXTREMITY STUDY: CPT

## 2020-12-10 PROCEDURE — 700101 HCHG RX REV CODE 250: Performed by: HOSPITALIST

## 2020-12-10 PROCEDURE — 99233 SBSQ HOSP IP/OBS HIGH 50: CPT | Performed by: STUDENT IN AN ORGANIZED HEALTH CARE EDUCATION/TRAINING PROGRAM

## 2020-12-10 PROCEDURE — 36415 COLL VENOUS BLD VENIPUNCTURE: CPT

## 2020-12-10 PROCEDURE — 85025 COMPLETE CBC W/AUTO DIFF WBC: CPT

## 2020-12-10 RX ORDER — SODIUM CHLORIDE 9 MG/ML
500 INJECTION, SOLUTION INTRAVENOUS ONCE
Status: COMPLETED | OUTPATIENT
Start: 2020-12-10 | End: 2020-12-11

## 2020-12-10 RX ADMIN — SODIUM CHLORIDE 500 ML: 9 INJECTION, SOLUTION INTRAVENOUS at 23:27

## 2020-12-10 RX ADMIN — BUDESONIDE 0.5 MG: 0.5 SUSPENSION RESPIRATORY (INHALATION) at 20:19

## 2020-12-10 RX ADMIN — GUAIFENESIN 1200 MG: 600 TABLET, EXTENDED RELEASE ORAL at 05:19

## 2020-12-10 RX ADMIN — IPRATROPIUM BROMIDE AND ALBUTEROL SULFATE 3 ML: .5; 3 SOLUTION RESPIRATORY (INHALATION) at 01:45

## 2020-12-10 RX ADMIN — DEXAMETHASONE 6 MG: 4 TABLET ORAL at 05:19

## 2020-12-10 RX ADMIN — GUAIFENESIN 1200 MG: 600 TABLET, EXTENDED RELEASE ORAL at 17:32

## 2020-12-10 RX ADMIN — LISINOPRIL 20 MG: 20 TABLET ORAL at 05:20

## 2020-12-10 RX ADMIN — ENOXAPARIN SODIUM 40 MG: 40 INJECTION SUBCUTANEOUS at 05:19

## 2020-12-10 RX ADMIN — DOXYCYCLINE 100 MG: 100 TABLET, FILM COATED ORAL at 17:32

## 2020-12-10 RX ADMIN — SIMVASTATIN 10 MG: 10 TABLET, FILM COATED ORAL at 17:32

## 2020-12-10 RX ADMIN — INSULIN HUMAN 3 UNITS: 100 INJECTION, SOLUTION PARENTERAL at 11:59

## 2020-12-10 RX ADMIN — IPRATROPIUM BROMIDE AND ALBUTEROL SULFATE 3 ML: .5; 3 SOLUTION RESPIRATORY (INHALATION) at 14:34

## 2020-12-10 RX ADMIN — INSULIN HUMAN 2 UNITS: 100 INJECTION, SOLUTION PARENTERAL at 21:49

## 2020-12-10 RX ADMIN — ATENOLOL 50 MG: 50 TABLET ORAL at 05:20

## 2020-12-10 RX ADMIN — INSULIN HUMAN 2 UNITS: 100 INJECTION, SOLUTION PARENTERAL at 07:46

## 2020-12-10 RX ADMIN — INSULIN HUMAN 6 UNITS: 100 INJECTION, SOLUTION PARENTERAL at 17:27

## 2020-12-10 RX ADMIN — IPRATROPIUM BROMIDE AND ALBUTEROL SULFATE 3 ML: .5; 3 SOLUTION RESPIRATORY (INHALATION) at 22:53

## 2020-12-10 RX ADMIN — IPRATROPIUM BROMIDE AND ALBUTEROL SULFATE 3 ML: .5; 3 SOLUTION RESPIRATORY (INHALATION) at 07:38

## 2020-12-10 RX ADMIN — LEVOTHYROXINE SODIUM 125 MCG: 0.12 TABLET ORAL at 17:32

## 2020-12-10 RX ADMIN — DOXYCYCLINE 100 MG: 100 TABLET, FILM COATED ORAL at 05:20

## 2020-12-10 RX ADMIN — IPRATROPIUM BROMIDE AND ALBUTEROL SULFATE 3 ML: .5; 3 SOLUTION RESPIRATORY (INHALATION) at 11:08

## 2020-12-10 RX ADMIN — IPRATROPIUM BROMIDE AND ALBUTEROL SULFATE 3 ML: .5; 3 SOLUTION RESPIRATORY (INHALATION) at 20:19

## 2020-12-10 RX ADMIN — BUDESONIDE 0.5 MG: 0.5 SUSPENSION RESPIRATORY (INHALATION) at 07:38

## 2020-12-10 RX ADMIN — ACETAMINOPHEN 650 MG: 325 TABLET, FILM COATED ORAL at 08:13

## 2020-12-10 RX ADMIN — DOCUSATE SODIUM 50 MG AND SENNOSIDES 8.6 MG 2 TABLET: 8.6; 5 TABLET, FILM COATED ORAL at 05:20

## 2020-12-10 SDOH — ECONOMIC STABILITY: FOOD INSECURITY: WITHIN THE PAST 12 MONTHS, THE FOOD YOU BOUGHT JUST DIDN'T LAST AND YOU DIDN'T HAVE MONEY TO GET MORE.: NEVER TRUE

## 2020-12-10 SDOH — ECONOMIC STABILITY: INCOME INSECURITY: HOW HARD IS IT FOR YOU TO PAY FOR THE VERY BASICS LIKE FOOD, HOUSING, MEDICAL CARE, AND HEATING?: NOT HARD AT ALL

## 2020-12-10 SDOH — ECONOMIC STABILITY: FOOD INSECURITY: WITHIN THE PAST 12 MONTHS, YOU WORRIED THAT YOUR FOOD WOULD RUN OUT BEFORE YOU GOT MONEY TO BUY MORE.: NEVER TRUE

## 2020-12-10 SDOH — ECONOMIC STABILITY: TRANSPORTATION INSECURITY
IN THE PAST 12 MONTHS, HAS LACK OF TRANSPORTATION KEPT YOU FROM MEETINGS, WORK, OR FROM GETTING THINGS NEEDED FOR DAILY LIVING?: NO

## 2020-12-10 SDOH — ECONOMIC STABILITY: TRANSPORTATION INSECURITY
IN THE PAST 12 MONTHS, HAS THE LACK OF TRANSPORTATION KEPT YOU FROM MEDICAL APPOINTMENTS OR FROM GETTING MEDICATIONS?: NO

## 2020-12-10 ASSESSMENT — ENCOUNTER SYMPTOMS
NERVOUS/ANXIOUS: 0
SINUS PAIN: 0
NAUSEA: 0
SHORTNESS OF BREATH: 1
HEADACHES: 0
SORE THROAT: 0
MYALGIAS: 0
VOMITING: 0
EYE PAIN: 0
HEMOPTYSIS: 0
BACK PAIN: 0
FLANK PAIN: 0
BRUISES/BLEEDS EASILY: 0
DIARRHEA: 0
COUGH: 1
BLOOD IN STOOL: 0
ABDOMINAL PAIN: 0
NECK PAIN: 1
WHEEZING: 0
SPUTUM PRODUCTION: 0
DEPRESSION: 0
CONSTIPATION: 0

## 2020-12-10 ASSESSMENT — FIBROSIS 4 INDEX: FIB4 SCORE: 4.64

## 2020-12-10 NOTE — PROGRESS NOTES
Community Health Worker Intake    • Social determinates of health intake completed.   • Identified barriers to: none.  • Contact information provided to Camille Rodriguez   • Declined Meds-To-Beds.   • Inpatient assessment completed.    CHW Emil reached out to pt's daughter Alexandra to introduce CCM/GSC services. She accepted GSC services for her mother. Per Alexandra, the pt does not have trouble paying for food, housing or medical care and has reliable transportation to [facility] (Alexandra). The pt lives with her daughter who is also her support system outside of the hospital. Alexandra indicated that pt does not currently need additional resources/services at this time.     Plan: warm hand-off to GSC 12/10.

## 2020-12-10 NOTE — PROGRESS NOTES
Patient brought to T7, patient care assumed. Pt AAOx4, No signs of acute distress noted at this time. Plan of care discussed with pt and verbalizes no questions. Pt denies any additional needs at this time. Bed locked/in lowest position, pt educated on fall risk and verbalized understanding, call light within reach, hourly rounding initiated.

## 2020-12-10 NOTE — DIETARY
NUTRITION SERVICES: BMI - Pt with BMI >40 (=Body mass index is 42.52 kg/m².), morbid obesity. Weight loss counseling not appropriate in acute care setting.   RECOMMEND - Referral to outpatient nutrition services for weight management after D/C.

## 2020-12-10 NOTE — DISCHARGE PLANNING
Anticipated Discharge Disposition: TBD    Action: LSW called Pt's Dtr per earlier note requesting contact. Per Dtr Alexandra she has already spoken with BS RN and has no new questions at this time.     Barriers to Discharge: Medical Clearance    Plan: LSW to continue coordinating with Pt, her family, and medical team for discharge planning.

## 2020-12-11 PROBLEM — T36.95XA ANTIBIOTIC-ASSOCIATED DIARRHEA: Status: ACTIVE | Noted: 2020-12-11

## 2020-12-11 PROBLEM — K52.1 ANTIBIOTIC-ASSOCIATED DIARRHEA: Status: ACTIVE | Noted: 2020-12-11

## 2020-12-11 LAB
ANION GAP SERPL CALC-SCNC: 13 MMOL/L (ref 7–16)
BUN SERPL-MCNC: 33 MG/DL (ref 8–22)
CALCIUM SERPL-MCNC: 9 MG/DL (ref 8.5–10.5)
CHLORIDE SERPL-SCNC: 97 MMOL/L (ref 96–112)
CO2 SERPL-SCNC: 21 MMOL/L (ref 20–33)
CREAT SERPL-MCNC: 1.19 MG/DL (ref 0.5–1.4)
GLUCOSE BLD-MCNC: 185 MG/DL (ref 65–99)
GLUCOSE BLD-MCNC: 240 MG/DL (ref 65–99)
GLUCOSE BLD-MCNC: 249 MG/DL (ref 65–99)
GLUCOSE BLD-MCNC: 286 MG/DL (ref 65–99)
GLUCOSE SERPL-MCNC: 184 MG/DL (ref 65–99)
POTASSIUM SERPL-SCNC: 4.9 MMOL/L (ref 3.6–5.5)
SODIUM SERPL-SCNC: 131 MMOL/L (ref 135–145)

## 2020-12-11 PROCEDURE — 80048 BASIC METABOLIC PNL TOTAL CA: CPT

## 2020-12-11 PROCEDURE — 700111 HCHG RX REV CODE 636 W/ 250 OVERRIDE (IP): Performed by: HOSPITALIST

## 2020-12-11 PROCEDURE — 36415 COLL VENOUS BLD VENIPUNCTURE: CPT

## 2020-12-11 PROCEDURE — 700102 HCHG RX REV CODE 250 W/ 637 OVERRIDE(OP): Performed by: STUDENT IN AN ORGANIZED HEALTH CARE EDUCATION/TRAINING PROGRAM

## 2020-12-11 PROCEDURE — 700101 HCHG RX REV CODE 250: Performed by: HOSPITALIST

## 2020-12-11 PROCEDURE — 82962 GLUCOSE BLOOD TEST: CPT

## 2020-12-11 PROCEDURE — 99233 SBSQ HOSP IP/OBS HIGH 50: CPT | Performed by: STUDENT IN AN ORGANIZED HEALTH CARE EDUCATION/TRAINING PROGRAM

## 2020-12-11 PROCEDURE — 770021 HCHG ROOM/CARE - ISO PRIVATE

## 2020-12-11 PROCEDURE — 94669 MECHANICAL CHEST WALL OSCILL: CPT

## 2020-12-11 PROCEDURE — A9270 NON-COVERED ITEM OR SERVICE: HCPCS | Performed by: HOSPITALIST

## 2020-12-11 PROCEDURE — A9270 NON-COVERED ITEM OR SERVICE: HCPCS | Performed by: STUDENT IN AN ORGANIZED HEALTH CARE EDUCATION/TRAINING PROGRAM

## 2020-12-11 PROCEDURE — 94760 N-INVAS EAR/PLS OXIMETRY 1: CPT

## 2020-12-11 PROCEDURE — 94640 AIRWAY INHALATION TREATMENT: CPT

## 2020-12-11 PROCEDURE — 700102 HCHG RX REV CODE 250 W/ 637 OVERRIDE(OP): Performed by: HOSPITALIST

## 2020-12-11 RX ORDER — INSULIN GLARGINE 100 [IU]/ML
15 INJECTION, SOLUTION SUBCUTANEOUS EVERY EVENING
Status: DISCONTINUED | OUTPATIENT
Start: 2020-12-11 | End: 2020-12-15

## 2020-12-11 RX ORDER — LOPERAMIDE HYDROCHLORIDE 2 MG/1
2 CAPSULE ORAL 4 TIMES DAILY PRN
Status: DISCONTINUED | OUTPATIENT
Start: 2020-12-11 | End: 2020-12-22 | Stop reason: HOSPADM

## 2020-12-11 RX ADMIN — IPRATROPIUM BROMIDE AND ALBUTEROL SULFATE 3 ML: .5; 3 SOLUTION RESPIRATORY (INHALATION) at 03:53

## 2020-12-11 RX ADMIN — GUAIFENESIN 1200 MG: 600 TABLET, EXTENDED RELEASE ORAL at 17:41

## 2020-12-11 RX ADMIN — BUDESONIDE 0.5 MG: 0.5 SUSPENSION RESPIRATORY (INHALATION) at 07:41

## 2020-12-11 RX ADMIN — INSULIN HUMAN 3 UNITS: 100 INJECTION, SOLUTION PARENTERAL at 17:43

## 2020-12-11 RX ADMIN — SIMVASTATIN 10 MG: 10 TABLET, FILM COATED ORAL at 17:41

## 2020-12-11 RX ADMIN — INSULIN HUMAN 3 UNITS: 100 INJECTION, SOLUTION PARENTERAL at 20:37

## 2020-12-11 RX ADMIN — DOXYCYCLINE 100 MG: 100 TABLET, FILM COATED ORAL at 06:21

## 2020-12-11 RX ADMIN — INSULIN GLARGINE 15 UNITS: 100 INJECTION, SOLUTION SUBCUTANEOUS at 20:37

## 2020-12-11 RX ADMIN — DOXYCYCLINE 100 MG: 100 TABLET, FILM COATED ORAL at 17:41

## 2020-12-11 RX ADMIN — LOPERAMIDE HYDROCHLORIDE 2 MG: 2 CAPSULE ORAL at 11:47

## 2020-12-11 RX ADMIN — GUAIFENESIN 1200 MG: 600 TABLET, EXTENDED RELEASE ORAL at 06:21

## 2020-12-11 RX ADMIN — LEVOTHYROXINE SODIUM 125 MCG: 0.12 TABLET ORAL at 17:41

## 2020-12-11 RX ADMIN — BUDESONIDE 0.5 MG: 0.5 SUSPENSION RESPIRATORY (INHALATION) at 20:28

## 2020-12-11 RX ADMIN — IPRATROPIUM BROMIDE AND ALBUTEROL SULFATE 3 ML: .5; 3 SOLUTION RESPIRATORY (INHALATION) at 20:27

## 2020-12-11 RX ADMIN — INSULIN HUMAN 2 UNITS: 100 INJECTION, SOLUTION PARENTERAL at 08:44

## 2020-12-11 RX ADMIN — IPRATROPIUM BROMIDE AND ALBUTEROL SULFATE 3 ML: .5; 3 SOLUTION RESPIRATORY (INHALATION) at 07:41

## 2020-12-11 RX ADMIN — LOPERAMIDE HYDROCHLORIDE 2 MG: 2 CAPSULE ORAL at 20:46

## 2020-12-11 RX ADMIN — DEXAMETHASONE 6 MG: 4 TABLET ORAL at 06:20

## 2020-12-11 RX ADMIN — INSULIN HUMAN 5 UNITS: 100 INJECTION, SOLUTION PARENTERAL at 12:27

## 2020-12-11 RX ADMIN — IPRATROPIUM BROMIDE AND ALBUTEROL SULFATE 3 ML: .5; 3 SOLUTION RESPIRATORY (INHALATION) at 11:21

## 2020-12-11 RX ADMIN — ENOXAPARIN SODIUM 40 MG: 40 INJECTION SUBCUTANEOUS at 06:21

## 2020-12-11 RX ADMIN — ATENOLOL 50 MG: 50 TABLET ORAL at 06:21

## 2020-12-11 ASSESSMENT — ENCOUNTER SYMPTOMS
EYE PAIN: 0
NAUSEA: 0
HEMOPTYSIS: 0
CONSTIPATION: 0
VOMITING: 0
DEPRESSION: 0
SHORTNESS OF BREATH: 0
SPUTUM PRODUCTION: 0
HEADACHES: 0
ABDOMINAL PAIN: 0
SORE THROAT: 0
FLANK PAIN: 0
NECK PAIN: 0
WHEEZING: 0
BLOOD IN STOOL: 0
BACK PAIN: 0
DIARRHEA: 1
SINUS PAIN: 0
BRUISES/BLEEDS EASILY: 0
MYALGIAS: 0
COUGH: 1
NERVOUS/ANXIOUS: 0

## 2020-12-11 ASSESSMENT — PAIN DESCRIPTION - PAIN TYPE: TYPE: ACUTE PAIN

## 2020-12-11 ASSESSMENT — FIBROSIS 4 INDEX: FIB4 SCORE: 4.7

## 2020-12-11 NOTE — DOCUMENTATION QUERY
"                                                                         [facility]                                                                       Query Response Note      PATIENT:               LETICIA SINGH  ACCT #:                  [id number]  MRN:                     [id number]  :                      1940  ADMIT DATE:       12/3/2020 12:05 PM  DISCH DATE:        2020 1:05 PM  RESPONDING  PROVIDER #:        [id number]           QUERY TEXT:    Sepsis is documented in the ED Provider Note but was not addressed in the H&P. Please clarify status of this condition:    NOTE:  If an appropriate response is not listed below, please respond with a new note.       The patient's Clinical Indicators include:  \"Final Impression: COVID-19 PNA, Urosepsis\" is documented in the ED Provider Note. 0/4 SIRS Criteria POA. UA on admission noted moderate leukocyte esterase, packed WBCs, and many bacteria. BUN/Cr: 38/2.17 and GFR: 22 on admission. CXR on admission noted \"Prominent interstitial markings may be chronic in nature. Superimposed infection cannot be excluded.\"    Treatments include: Rocephin, Decadron, and RT/O2 per Protocol.    Risk factors include: dx UTI, dx COVID-19, dx ANGIE, dx Acute Respiratory Failure w/Hypoxia, hx HTN + CKD, hx DM II, hx Morbid Obesity, and Advanced Age.    Thank you,  Shashank Pozo RN, BSN  Clinical   Connect via [employer]  Options provided:   -- Sepsis without acute organ dysfunction 2/2 UTI is ruled in   -- Sepsis without acute organ dysfunction 2/2 COVID-19 is ruled in   -- Severe sepsis with acute organ dysfunction 2/2 UTI is ruled in, Please specify the acute organ dysfunction present (e.g. ANGIE, Acute Respiratory Failure, etc.)   -- Severe sepsis with acute organ dysfunction 2/2 COVID-19 is ruled in, Please specify the acute organ dysfunction present (e.g. AGNIE, Acute Respiratory Failure, etc.)   -- Sepsis is ruled out   -- Unable to " "determine      Query created by: Shashank Pozo on 12/4/2020 9:34 AM    RESPONSE TEXT:    Sepsis is ruled out       QUERY TEXT:    PNA 2/2 COVID-19 is documented in the ED Provider Note but was not addressed in the H&P. Please clarify status of this condition:    NOTE:  If an appropriate response is not listed below, please respond with a new note.    The patient's Clinical Indicators include:  \"Final Impression: COVID-19 PNA\" is documented in the ED Provider Note. CXR on admission noted \"Prominent interstitial markings may be chronic in nature. Superimposed infection cannot be excluded.\"    Treatments include: Decadron, Rocephin, and RT/O2 per Protocol.    Risk factors include: dx COVID-19, dx Acute Respiratory Failure w/Hypoxia, hx DM II, hx Morbid Obesity, and Advanced Age.    Thank you,  Shashank Pozo RN, BSN  Clinical   Connect via [employer]  Options provided:   -- PNA 2/2 COVID-19 is ruled in   -- PNA not d/t or associated with COVID-19 is ruled in, Please specify if this is community-acquired PNA or hospital-acquired PNA (response includes suspected, likely, or probable)   -- PNA is ruled out   -- Unable to determine      Query created by: Shashank Pozo on 12/4/2020 9:37 AM    RESPONSE TEXT:    PNA 2/2 COVID-19 is ruled in          Electronically signed by:  KOFFI BOJORQUEZ MD 12/11/2020 7:53 AM              "

## 2020-12-11 NOTE — PROGRESS NOTES
Hospital Medicine Daily Progress Note    Date of Service  12/10/2020    Chief Complaint  79 y.o. female admitted 12/9/2020 with acute on chronic respiratory failure    Hospital Course  No notes on file    Interval Problem Update  Set up on HHFNC  Feels her breathing is improved  Has a cough but no sputum nor   Denies wheezing  Her legs are usually swollen, denies h/o DVTs  Denies pain, bowel/bladder dysfunction,     Consultants/Specialty  ID - Remdesivir    Code Status  DNAR/DNI    [facility]    Review of Systems  Review of Systems   Constitutional: Negative for malaise/fatigue.   HENT: Negative for ear pain, nosebleeds, sinus pain and sore throat.    Eyes: Negative for pain.   Respiratory: Positive for cough and shortness of breath. Negative for hemoptysis, sputum production and wheezing.    Cardiovascular: Positive for leg swelling. Negative for chest pain.   Gastrointestinal: Negative for abdominal pain, blood in stool, constipation, diarrhea, melena, nausea and vomiting.   Genitourinary: Negative for dysuria, flank pain and hematuria.   Musculoskeletal: Positive for neck pain. Negative for back pain, joint pain and myalgias.   Neurological: Negative for headaches.   Endo/Heme/Allergies: Does not bruise/bleed easily.   Psychiatric/Behavioral: Negative for depression. The patient is not nervous/anxious.         Physical Exam  Temp:  [36.1 °C (96.9 °F)-37.4 °C (99.4 °F)] 36.1 °C (96.9 °F)  Pulse:  [] 63  Resp:  [18-22] 22  BP: ()/(40-60) 99/56  SpO2:  [90 %-95 %] 91 %    Physical Exam  Vitals signs and nursing note reviewed.   Constitutional:       Appearance: She is obese.      Interventions: Nasal cannula in place.   HENT:      Head: Normocephalic.      Nose: Nose normal.      Comments: +HHFNC     Mouth/Throat:      Mouth: Mucous membranes are moist.      Pharynx: Oropharynx is clear.   Eyes:      General: No scleral icterus.     Conjunctiva/sclera: Conjunctivae  normal.   Neck:      Musculoskeletal: Neck supple.   Cardiovascular:      Rate and Rhythm: Normal rate and regular rhythm.      Pulses: Normal pulses.      Heart sounds: Normal heart sounds. No murmur. No friction rub. No gallop.    Pulmonary:      Effort: Pulmonary effort is normal. No respiratory distress.      Breath sounds: Rhonchi (End-expiratory) present. No wheezing or rales.   Abdominal:      General: Abdomen is protuberant. Bowel sounds are normal. There is no distension.      Palpations: Abdomen is soft.      Tenderness: There is no abdominal tenderness. There is no guarding or rebound.   Genitourinary:     Comments: No carrillo  Musculoskeletal:      Right lower leg: Edema present.      Left lower leg: Edema present.   Skin:     General: Skin is warm and dry.   Neurological:      Comments: Appropriately conversant   Psychiatric:         Mood and Affect: Mood normal.         Behavior: Behavior normal.         Thought Content: Thought content normal.         Judgment: Judgment normal.         Fluids    Intake/Output Summary (Last 24 hours) at 12/10/2020 2119  Last data filed at 12/10/2020 1000  Gross per 24 hour   Intake 240 ml   Output --   Net 240 ml       Laboratory  Recent Labs     12/09/20  1204 12/10/20  0511   WBC 14.4* 11.9*   RBC 4.56 4.45   HEMOGLOBIN 12.0 12.1   HEMATOCRIT 39.3 38.2   MCV 86.2 85.8   MCH 26.3* 27.2   MCHC 30.5* 31.7*   RDW 49.3 50.4*   PLATELETCT 275 211   MPV 9.7 9.6     Recent Labs     12/09/20  1204 12/10/20  0511   SODIUM 133* 130*   POTASSIUM 4.5 4.6   CHLORIDE 96 94*   CO2 27 25   GLUCOSE 112* 155*   BUN 25* 32*   CREATININE 1.22 1.76*   CALCIUM 9.5 9.3                   Imaging  US-EXTREMITY VENOUS LOWER BILAT   Final Result      DX-CHEST-PORTABLE (1 VIEW)   Final Result      Development of right lower and left mid airspace process that are consistent with pneumonia and likely Covid pneumonia           Assessment/Plan  * Acute hypoxemic respiratory failure due to COVID-19  (HCC)- (present on admission)  Assessment & Plan  Requiring HHFNC  CXR with multifocal opacities c/w COVID19 Pneumonia  Continuous pulse oxymetry  Prone positioning and frequent change position  ISS and ambulation aggressively  Extend course of Decadron to 5 additional days  Prophylactic Lovenox  ID consulted, not a remdesivir candidate due to renal impairment      Chronic obstructive pulmonary disease (HCC)- (present on admission)  Assessment & Plan  With exacerbation  Doxycycline substituted due to azithro allergy and borderline QTc 492  DuoNebs every 4  Dexamethasone for COVID19           DM2 (diabetes mellitus, type 2) (HCC)- (present on admission)  Assessment & Plan  A1c 7.1  24h -155  Regular SSI  Continue simvastatin, ASA for ASCVD risk reduction      CKD (chronic kidney disease), stage III- (present on admission)  Assessment & Plan  Monitor BMP and assess response  Avoid IV contrast/nephrotoxins/NSAIDs  Dose adjust meds for decreased GFR        Hyponatremia- (present on admission)  Assessment & Plan  Na 130 from 133   cc  Repeat AM BMP    Acute kidney injury (HCC)- (present on admission)  Assessment & Plan  Cr 1.8 from 1.2 on admission  Hold ACEi  500 cc NS, conservative use due to COVID19 and AHRF  Repeat AM BMP    Peripheral edema- (present on admission)  Assessment & Plan  Pitting BLE edema  BLE US negative for DVT  Most likely due to pHTN given underlying COPD  Diuresis after resolution of ANGIE    Hypothyroidism- (present on admission)  Assessment & Plan  Continue levothyroxine    HTN (hypertension)- (present on admission)  Assessment & Plan  Continue atenolol  Lisinopril and HCTZ held for ANGIE      Left bundle branch block- (present on admission)  Assessment & Plan  Present on EKG 6/3/17  Not indicative of acute ischemia       VTE prophylaxis: Enoxaparin

## 2020-12-11 NOTE — DISCHARGE PLANNING
Anticipated Discharge Disposition: Home     Action: Discussed in IDT rounds; pt not medically cleared for discharge.      Barriers to Discharge: Pt's O2 needs are too high to be managed outpatient.      Plan: LSW will continue to follow and assist with discharge planning needs

## 2020-12-11 NOTE — PROGRESS NOTES
Assumed care of patient. Report received. Assessment complete.  All questions and concerns addressed. Pt incontinent of bowel and bladder this shift, redness on sacrum, barrier cream applied and q2hr turns implemented. Pt's O2 saturations maintained.

## 2020-12-12 PROBLEM — Z66 DNR (DO NOT RESUSCITATE): Status: ACTIVE | Noted: 2020-12-12

## 2020-12-12 LAB
ANION GAP SERPL CALC-SCNC: 11 MMOL/L (ref 7–16)
BUN SERPL-MCNC: 35 MG/DL (ref 8–22)
CALCIUM SERPL-MCNC: 9 MG/DL (ref 8.5–10.5)
CHLORIDE SERPL-SCNC: 98 MMOL/L (ref 96–112)
CO2 SERPL-SCNC: 22 MMOL/L (ref 20–33)
CREAT SERPL-MCNC: 1.05 MG/DL (ref 0.5–1.4)
GLUCOSE BLD-MCNC: 174 MG/DL (ref 65–99)
GLUCOSE BLD-MCNC: 182 MG/DL (ref 65–99)
GLUCOSE BLD-MCNC: 199 MG/DL (ref 65–99)
GLUCOSE BLD-MCNC: 288 MG/DL (ref 65–99)
GLUCOSE SERPL-MCNC: 172 MG/DL (ref 65–99)
IL6 SERPL-MCNC: 91.2 PG/ML
MAGNESIUM SERPL-MCNC: 1.6 MG/DL (ref 1.5–2.5)
POTASSIUM SERPL-SCNC: 5 MMOL/L (ref 3.6–5.5)
SODIUM SERPL-SCNC: 131 MMOL/L (ref 135–145)

## 2020-12-12 PROCEDURE — 700102 HCHG RX REV CODE 250 W/ 637 OVERRIDE(OP): Performed by: HOSPITALIST

## 2020-12-12 PROCEDURE — 80048 BASIC METABOLIC PNL TOTAL CA: CPT

## 2020-12-12 PROCEDURE — 94640 AIRWAY INHALATION TREATMENT: CPT

## 2020-12-12 PROCEDURE — 700101 HCHG RX REV CODE 250: Performed by: HOSPITALIST

## 2020-12-12 PROCEDURE — 36415 COLL VENOUS BLD VENIPUNCTURE: CPT

## 2020-12-12 PROCEDURE — 700111 HCHG RX REV CODE 636 W/ 250 OVERRIDE (IP): Performed by: HOSPITALIST

## 2020-12-12 PROCEDURE — 94760 N-INVAS EAR/PLS OXIMETRY 1: CPT

## 2020-12-12 PROCEDURE — A9270 NON-COVERED ITEM OR SERVICE: HCPCS | Performed by: HOSPITALIST

## 2020-12-12 PROCEDURE — 700102 HCHG RX REV CODE 250 W/ 637 OVERRIDE(OP): Performed by: STUDENT IN AN ORGANIZED HEALTH CARE EDUCATION/TRAINING PROGRAM

## 2020-12-12 PROCEDURE — 83735 ASSAY OF MAGNESIUM: CPT

## 2020-12-12 PROCEDURE — 770021 HCHG ROOM/CARE - ISO PRIVATE

## 2020-12-12 PROCEDURE — A9270 NON-COVERED ITEM OR SERVICE: HCPCS | Performed by: STUDENT IN AN ORGANIZED HEALTH CARE EDUCATION/TRAINING PROGRAM

## 2020-12-12 PROCEDURE — 99233 SBSQ HOSP IP/OBS HIGH 50: CPT | Performed by: HOSPITALIST

## 2020-12-12 PROCEDURE — 82962 GLUCOSE BLOOD TEST: CPT

## 2020-12-12 RX ORDER — IPRATROPIUM BROMIDE AND ALBUTEROL SULFATE 2.5; .5 MG/3ML; MG/3ML
3 SOLUTION RESPIRATORY (INHALATION)
Status: DISCONTINUED | OUTPATIENT
Start: 2020-12-12 | End: 2020-12-22 | Stop reason: HOSPADM

## 2020-12-12 RX ADMIN — DOXYCYCLINE 100 MG: 100 TABLET, FILM COATED ORAL at 04:50

## 2020-12-12 RX ADMIN — INSULIN HUMAN 2 UNITS: 100 INJECTION, SOLUTION PARENTERAL at 18:38

## 2020-12-12 RX ADMIN — GUAIFENESIN 1200 MG: 600 TABLET, EXTENDED RELEASE ORAL at 04:50

## 2020-12-12 RX ADMIN — INSULIN HUMAN 5 UNITS: 100 INJECTION, SOLUTION PARENTERAL at 11:00

## 2020-12-12 RX ADMIN — BUDESONIDE 0.5 MG: 0.5 SUSPENSION RESPIRATORY (INHALATION) at 22:26

## 2020-12-12 RX ADMIN — INSULIN HUMAN 2 UNITS: 100 INJECTION, SOLUTION PARENTERAL at 21:10

## 2020-12-12 RX ADMIN — BUDESONIDE 0.5 MG: 0.5 SUSPENSION RESPIRATORY (INHALATION) at 06:28

## 2020-12-12 RX ADMIN — ENOXAPARIN SODIUM 40 MG: 40 INJECTION SUBCUTANEOUS at 04:51

## 2020-12-12 RX ADMIN — GUAIFENESIN 1200 MG: 600 TABLET, EXTENDED RELEASE ORAL at 18:31

## 2020-12-12 RX ADMIN — SITAGLIPTIN 50 MG: 50 TABLET, FILM COATED ORAL at 18:33

## 2020-12-12 RX ADMIN — IPRATROPIUM BROMIDE AND ALBUTEROL SULFATE 3 ML: .5; 3 SOLUTION RESPIRATORY (INHALATION) at 06:28

## 2020-12-12 RX ADMIN — ATENOLOL 50 MG: 50 TABLET ORAL at 04:50

## 2020-12-12 RX ADMIN — INSULIN HUMAN 2 UNITS: 100 INJECTION, SOLUTION PARENTERAL at 09:30

## 2020-12-12 RX ADMIN — METFORMIN HYDROCHLORIDE 500 MG: 500 TABLET ORAL at 18:32

## 2020-12-12 RX ADMIN — LOPERAMIDE HYDROCHLORIDE 2 MG: 2 CAPSULE ORAL at 09:30

## 2020-12-12 RX ADMIN — DEXAMETHASONE 6 MG: 4 TABLET ORAL at 04:50

## 2020-12-12 RX ADMIN — IPRATROPIUM BROMIDE AND ALBUTEROL SULFATE 3 ML: .5; 3 SOLUTION RESPIRATORY (INHALATION) at 03:40

## 2020-12-12 RX ADMIN — LEVOTHYROXINE SODIUM 125 MCG: 0.12 TABLET ORAL at 18:33

## 2020-12-12 RX ADMIN — IPRATROPIUM BROMIDE AND ALBUTEROL SULFATE 3 ML: .5; 3 SOLUTION RESPIRATORY (INHALATION) at 22:26

## 2020-12-12 RX ADMIN — INSULIN GLARGINE 15 UNITS: 100 INJECTION, SOLUTION SUBCUTANEOUS at 18:37

## 2020-12-12 RX ADMIN — SIMVASTATIN 10 MG: 10 TABLET, FILM COATED ORAL at 18:32

## 2020-12-12 RX ADMIN — IPRATROPIUM BROMIDE AND ALBUTEROL SULFATE 3 ML: .5; 3 SOLUTION RESPIRATORY (INHALATION) at 00:24

## 2020-12-12 ASSESSMENT — ENCOUNTER SYMPTOMS
WHEEZING: 0
SPUTUM PRODUCTION: 0
SHORTNESS OF BREATH: 1
FEVER: 0
COUGH: 1
ROS GI COMMENTS: MODERATE APPETITE

## 2020-12-12 ASSESSMENT — FIBROSIS 4 INDEX: FIB4 SCORE: 4.7

## 2020-12-12 NOTE — ASSESSMENT & PLAN NOTE
Occurred after initiating doxycycline which was stopped however could be related to COVID-19  Loperamide PRN  Resolved

## 2020-12-12 NOTE — PROGRESS NOTES
Assumed patient care. Patient not in distress. Patient A&O3 on highflow 50L. Patient is not on telemetry. Safety precautions in place and call light within reach. Educated to call staff for assistance.

## 2020-12-12 NOTE — PROGRESS NOTES
"Hospital Medicine Daily Progress Note    Date of Service  12/12/2020    Chief Complaint  80 y.o. female admitted 12/9/2020 with shortness of breath.    Hospital Course  79 y.o. female who presented 12/9/2020 with past medical history of COPD, diabetes, hypertension who comes into the emergency room with complaints of shortness of breath for the past 2 days.  Patient was recently admitted in the hospital for COVID-19.  At the time she had a UTI that was treated for.  She required minimal amounts of oxygen and was discharged on 4 L.  She was under home monitoring and noticed that she has increased oxygen requirements.  Associated with fevers, nonproductive cough, diarrhea, generalized malaise, myalgias.  She denies loss of taste and loss of smell.  Patient states that she completed her home doses of Decadron.     Chest x-ray found bilateral groundglass opacities mostly in the lower lobes. She was admitted to the tele floor requiring high flow oxygen.     Interval Problem Update  12/12: patient seen and evaluated on the tele floor. Yesterday was her birthday and family has sent two beautiful bouquets. She is on 4 liters of oxygen at home and now is on high flow 70 liters and 20%. The dry cough has been \"annoying\". She has been up to chair.     Consultants/Specialty  Infectious disease.    Code Status  DNAR/DNI    Disposition  Medical     Review of Systems  Review of Systems   Constitutional: Negative for fever.   Respiratory: Positive for cough and shortness of breath. Negative for sputum production and wheezing.    Cardiovascular: Negative for chest pain and leg swelling.   Gastrointestinal:        Moderate appetite   Skin: Negative for rash.   Neurological:        Generally weak   All other systems reviewed and are negative.       Physical Exam  Temp:  [36.4 °C (97.5 °F)-36.5 °C (97.7 °F)] 36.4 °C (97.5 °F)  Pulse:  [] 76  Resp:  [17-22] 22  BP: (125-152)/() 125/72  SpO2:  [88 %-93 %] 88 %    Physical " Exam  Vitals signs and nursing note reviewed.   Constitutional:       Appearance: She is obese. She is ill-appearing. She is not toxic-appearing.   HENT:      Head: Normocephalic and atraumatic.      Mouth/Throat:      Mouth: Mucous membranes are dry.      Pharynx: Oropharynx is clear.   Eyes:      General: No scleral icterus.     Conjunctiva/sclera: Conjunctivae normal.   Neck:      Musculoskeletal: Normal range of motion and neck supple.   Cardiovascular:      Comments: Distant heart sounds  Pulmonary:      Comments: High flow oxygen  Good air movement  Normal work of breathing  Abdominal:      General: There is distension.      Tenderness: There is no abdominal tenderness.   Musculoskeletal:      Right lower leg: Edema present.      Left lower leg: Edema present.   Skin:     General: Skin is warm and dry.      Coloration: Skin is pale.   Neurological:      General: No focal deficit present.      Mental Status: She is alert and oriented to person, place, and time.   Psychiatric:         Mood and Affect: Mood normal.         Behavior: Behavior normal.         Fluids    Intake/Output Summary (Last 24 hours) at 12/12/2020 0846  Last data filed at 12/12/2020 0300  Gross per 24 hour   Intake 30 ml   Output 1575 ml   Net -1545 ml       Laboratory  Recent Labs     12/09/20  1204 12/10/20  0511   WBC 14.4* 11.9*   RBC 4.56 4.45   HEMOGLOBIN 12.0 12.1   HEMATOCRIT 39.3 38.2   MCV 86.2 85.8   MCH 26.3* 27.2   MCHC 30.5* 31.7*   RDW 49.3 50.4*   PLATELETCT 275 211   MPV 9.7 9.6     Recent Labs     12/09/20  1204 12/10/20  0511 12/11/20  0826   SODIUM 133* 130* 131*   POTASSIUM 4.5 4.6 4.9   CHLORIDE 96 94* 97   CO2 27 25 21   GLUCOSE 112* 155* 184*   BUN 25* 32* 33*   CREATININE 1.22 1.76* 1.19   CALCIUM 9.5 9.3 9.0                   Imaging  US-EXTREMITY VENOUS LOWER BILAT   Final Result      DX-CHEST-PORTABLE (1 VIEW)   Final Result      Development of right lower and left mid airspace process that are consistent with  pneumonia and likely Covid pneumonia           Assessment/Plan  * Acute hypoxemic respiratory failure due to COVID-19 (HCC)- (present on admission)  Assessment & Plan  Requiring high flow oxygen  Secondary to COVID19 Pneumonia  Continuous pulse oxymetry  Prone positioning as tolerated and frequent change position  ISS and ambulation aggressively  Extend course of Decadron to 5 additional days  Prophylactic Lovenox  ID consulted, not a remdesivir candidate due to renal impairment      Chronic obstructive pulmonary disease (HCC)- (present on admission)  Assessment & Plan  With exacerbation  Stop doxycycline  DuoNebs every 4  Dexamethasone for COVID19  She is on 4 liters of oxygen outpatient           DM2 (diabetes mellitus, type 2) ([facility])- (present on admission)  Assessment & Plan  A1c 7.1  With hyperglycemia present upon admit   Exacerbated by dexamethasone  Restart home sitagliptan 50 mg (or equivalent DPP-4 inhibitor on formulary) and metformin 500 mg BID  Initiated glargine 15 units QHS  Regular SSI and diabetic diet        CKD (chronic kidney disease), stage III- (present on admission)  Assessment & Plan  Baseline Cr around 1.2  Monitor BMP and assess response  Avoid IV contrast/nephrotoxins/NSAIDs  Dose adjust meds for decreased GFR        DNR (do not resuscitate)- (present on admission)  Assessment & Plan  Per patient's wishes    Hyponatremia- (present on admission)  Assessment & Plan  Mild, Na 131   cc      Acute kidney injury (HCC)- (present on admission)  Assessment & Plan  Resolved after 500 mL IV NS       Antibiotic-associated diarrhea  Assessment & Plan  Occurred after initiating doxycycline which was stopped  Loperamide PRN    Peripheral edema- (present on admission)  Assessment & Plan  Pitting BLE edema  BLE US negative for DVT      Hypothyroidism- (present on admission)  Assessment & Plan  Continue levothyroxine    HTN (hypertension)- (present on admission)  Assessment & Plan  Continue  atenolol  Lisinopril and HCTZ held for ANGIE      Left bundle branch block- (present on admission)  Assessment & Plan  Present on EKG 6/3/17  Not indicative of acute ischemia       VTE prophylaxis: Lovenox

## 2020-12-12 NOTE — PROGRESS NOTES
Hospital Medicine Daily Progress Note    Date of Service  12/11/2020    Chief Complaint  79 y.o. female admitted 12/9/2020 with acute on chronic respiratory failure    Hospital Course  No notes on file    Interval Problem Update    Still on HHFNC  She reports improved sensation of breathing  Cough is nonproductive, denies wheezing  She has diarrhea which she attributes to doxycycline from prior experience  Scheduled senna/docusate discontinued and loperamide ordered  Denies pain, dyspnea, N/V, bladder dysfunction, bleeding    Consultants/Specialty  ID - Remdesivir    Code Status  DNAR/DNI    Disposition  Home pending improvement in AHRF    Review of Systems  Review of Systems   Constitutional: Negative for malaise/fatigue.   HENT: Negative for ear pain, nosebleeds, sinus pain and sore throat.    Eyes: Negative for pain.   Respiratory: Positive for cough. Negative for hemoptysis, sputum production, shortness of breath and wheezing.    Cardiovascular: Positive for leg swelling. Negative for chest pain.   Gastrointestinal: Positive for diarrhea. Negative for abdominal pain, blood in stool, constipation, melena, nausea and vomiting.   Genitourinary: Negative for dysuria, flank pain and hematuria.   Musculoskeletal: Negative for back pain, joint pain, myalgias and neck pain.   Neurological: Negative for headaches.   Endo/Heme/Allergies: Does not bruise/bleed easily.   Psychiatric/Behavioral: Negative for depression. The patient is not nervous/anxious.         Physical Exam  Temp:  [36.3 °C (97.4 °F)-36.4 °C (97.6 °F)] 36.4 °C (97.6 °F)  Pulse:  [] 103  Resp:  [20-22] 20  BP: (121-134)/(57-84) 121/57  SpO2:  [88 %-93 %] 91 %    Physical Exam  Vitals signs and nursing note reviewed.   Constitutional:       Appearance: She is obese.      Interventions: Nasal cannula in place.   HENT:      Head: Normocephalic.      Nose: Nose normal.      Comments: +HHFNC     Mouth/Throat:      Mouth: Mucous membranes are moist.       Pharynx: Oropharynx is clear.   Eyes:      General: No scleral icterus.     Conjunctiva/sclera: Conjunctivae normal.   Neck:      Musculoskeletal: Neck supple.   Cardiovascular:      Rate and Rhythm: Normal rate and regular rhythm.      Pulses: Normal pulses.      Heart sounds: Normal heart sounds. No murmur. No friction rub. No gallop.    Pulmonary:      Effort: Pulmonary effort is normal. No respiratory distress.      Breath sounds: No wheezing, rhonchi or rales.   Abdominal:      General: Abdomen is protuberant. Bowel sounds are normal. There is no distension.      Palpations: Abdomen is soft.      Tenderness: There is no abdominal tenderness. There is no guarding or rebound.   Genitourinary:     Comments: No carrillo  Musculoskeletal:      Right lower leg: Edema present.      Left lower leg: Edema present.   Skin:     General: Skin is warm and dry.   Neurological:      Comments: Appropriately conversant   Psychiatric:         Mood and Affect: Mood normal.         Behavior: Behavior normal.         Thought Content: Thought content normal.         Judgment: Judgment normal.         Fluids    Intake/Output Summary (Last 24 hours) at 12/11/2020 1624  Last data filed at 12/11/2020 0800  Gross per 24 hour   Intake 750 ml   Output 500 ml   Net 250 ml       Laboratory  Recent Labs     12/09/20  1204 12/10/20  0511   WBC 14.4* 11.9*   RBC 4.56 4.45   HEMOGLOBIN 12.0 12.1   HEMATOCRIT 39.3 38.2   MCV 86.2 85.8   MCH 26.3* 27.2   MCHC 30.5* 31.7*   RDW 49.3 50.4*   PLATELETCT 275 211   MPV 9.7 9.6     Recent Labs     12/09/20  1204 12/10/20  0511 12/11/20  0826   SODIUM 133* 130* 131*   POTASSIUM 4.5 4.6 4.9   CHLORIDE 96 94* 97   CO2 27 25 21   GLUCOSE 112* 155* 184*   BUN 25* 32* 33*   CREATININE 1.22 1.76* 1.19   CALCIUM 9.5 9.3 9.0                   Imaging  US-EXTREMITY VENOUS LOWER BILAT   Final Result      DX-CHEST-PORTABLE (1 VIEW)   Final Result      Development of right lower and left mid airspace process that are  consistent with pneumonia and likely Covid pneumonia           Assessment/Plan  * Acute hypoxemic respiratory failure due to COVID-19 (HCC)- (present on admission)  Assessment & Plan  Requiring HHFNC  CXR with multifocal opacities c/w COVID19 Pneumonia  Continuous pulse oxymetry  Prone positioning and frequent change position  ISS and ambulation aggressively  Extend course of Decadron to 5 additional days  Prophylactic Lovenox  ID consulted, not a remdesivir candidate due to renal impairment  Lasix not initiated due to ANGIE requiring NS, restart tomorrow if stable    Chronic obstructive pulmonary disease (HCC)- (present on admission)  Assessment & Plan  With exacerbation  Doxycycline substituted due to azithro allergy and borderline QTc 492  DuoNebs every 4  Dexamethasone for COVID19           DM2 (diabetes mellitus, type 2) ([facility])- (present on admission)  Assessment & Plan  A1c 7.1  24h -316  Exacerbated by dexamethasone  Initiate glargine 15 units QHS  Regular SSI  Continue simvastatin, ASA for ASCVD risk reduction      CKD (chronic kidney disease), stage III- (present on admission)  Assessment & Plan  Baseline Cr around 1.2  Monitor BMP and assess response  Avoid IV contrast/nephrotoxins/NSAIDs  Dose adjust meds for decreased GFR        Hyponatremia- (present on admission)  Assessment & Plan  Mild, Na 131   cc  Repeat AM BMP    Acute kidney injury (HCC)- (present on admission)  Assessment & Plan  Resolved after 500 cc NS yesterday, Cr 1.2  Hold ACEi due to borderline hyperK  No additional IVF indicated, judicious due to COVID19 and AHRF  Repeat AM BMP    Antibiotic-associated diarrhea  Assessment & Plan  Occurred after initiating doxycycline  Loperamide PRN    Peripheral edema- (present on admission)  Assessment & Plan  Pitting BLE edema  BLE US negative for DVT  Most likely due to pHTN given underlying COPD  Diuresis after resolution of ANGIE    Hypothyroidism- (present on admission)  Assessment &  Plan  Continue levothyroxine    HTN (hypertension)- (present on admission)  Assessment & Plan  Continue atenolol  Lisinopril and HCTZ held for ANGIE      Left bundle branch block- (present on admission)  Assessment & Plan  Present on EKG 6/3/17  Not indicative of acute ischemia       VTE prophylaxis: Enoxaparin    I saw and examined Nalini Rodriguez (12/11) and have updated the HPI, ROS, Physical Exam, and Assessment & Plan where appropriate.

## 2020-12-12 NOTE — PROGRESS NOTES
Bedside report received and patient care assumed. Pt is sitting up in chair, AOx4, and is on HHF @ 45L 65% FiO2 with SpO2 91%. Breathing is even and unlabored. Pt is medical. Bed in lowest position, bed alarm on, and call light within reach. All fall precautions are in place, belongings at bedside table. Hourly rounding initiated.

## 2020-12-13 LAB
GLUCOSE BLD-MCNC: 124 MG/DL (ref 65–99)
GLUCOSE BLD-MCNC: 171 MG/DL (ref 65–99)
GLUCOSE BLD-MCNC: 207 MG/DL (ref 65–99)

## 2020-12-13 PROCEDURE — 700111 HCHG RX REV CODE 636 W/ 250 OVERRIDE (IP): Performed by: HOSPITALIST

## 2020-12-13 PROCEDURE — A9270 NON-COVERED ITEM OR SERVICE: HCPCS | Performed by: HOSPITALIST

## 2020-12-13 PROCEDURE — 700102 HCHG RX REV CODE 250 W/ 637 OVERRIDE(OP): Performed by: HOSPITALIST

## 2020-12-13 PROCEDURE — 770021 HCHG ROOM/CARE - ISO PRIVATE

## 2020-12-13 PROCEDURE — 94640 AIRWAY INHALATION TREATMENT: CPT

## 2020-12-13 PROCEDURE — 94760 N-INVAS EAR/PLS OXIMETRY 1: CPT

## 2020-12-13 PROCEDURE — 99233 SBSQ HOSP IP/OBS HIGH 50: CPT | Performed by: HOSPITALIST

## 2020-12-13 PROCEDURE — 82962 GLUCOSE BLOOD TEST: CPT

## 2020-12-13 RX ADMIN — INSULIN GLARGINE 15 UNITS: 100 INJECTION, SOLUTION SUBCUTANEOUS at 17:08

## 2020-12-13 RX ADMIN — GUAIFENESIN 1200 MG: 600 TABLET, EXTENDED RELEASE ORAL at 17:04

## 2020-12-13 RX ADMIN — SIMVASTATIN 10 MG: 10 TABLET, FILM COATED ORAL at 17:28

## 2020-12-13 RX ADMIN — INSULIN HUMAN 2 UNITS: 100 INJECTION, SOLUTION PARENTERAL at 17:13

## 2020-12-13 RX ADMIN — SITAGLIPTIN 50 MG: 50 TABLET, FILM COATED ORAL at 17:28

## 2020-12-13 RX ADMIN — METFORMIN HYDROCHLORIDE 500 MG: 500 TABLET ORAL at 17:28

## 2020-12-13 RX ADMIN — INSULIN HUMAN 2 UNITS: 100 INJECTION, SOLUTION PARENTERAL at 21:25

## 2020-12-13 RX ADMIN — DEXAMETHASONE 6 MG: 4 TABLET ORAL at 05:32

## 2020-12-13 RX ADMIN — SITAGLIPTIN 50 MG: 50 TABLET, FILM COATED ORAL at 05:32

## 2020-12-13 RX ADMIN — GUAIFENESIN 1200 MG: 600 TABLET, EXTENDED RELEASE ORAL at 05:32

## 2020-12-13 RX ADMIN — ENOXAPARIN SODIUM 40 MG: 40 INJECTION SUBCUTANEOUS at 05:32

## 2020-12-13 RX ADMIN — LEVOTHYROXINE SODIUM 125 MCG: 0.12 TABLET ORAL at 17:04

## 2020-12-13 RX ADMIN — ATENOLOL 50 MG: 50 TABLET ORAL at 05:32

## 2020-12-13 RX ADMIN — METFORMIN HYDROCHLORIDE 500 MG: 500 TABLET ORAL at 08:25

## 2020-12-13 RX ADMIN — ENOXAPARIN SODIUM 40 MG: 40 INJECTION SUBCUTANEOUS at 17:04

## 2020-12-13 RX ADMIN — BUDESONIDE 0.5 MG: 0.5 SUSPENSION RESPIRATORY (INHALATION) at 11:00

## 2020-12-13 RX ADMIN — INSULIN HUMAN 3 UNITS: 100 INJECTION, SOLUTION PARENTERAL at 11:00

## 2020-12-13 ASSESSMENT — ENCOUNTER SYMPTOMS
WHEEZING: 0
COUGH: 1
DIARRHEA: 0
FEVER: 0
ROS GI COMMENTS: EATING WELL
SHORTNESS OF BREATH: 1
SPUTUM PRODUCTION: 0

## 2020-12-13 ASSESSMENT — FIBROSIS 4 INDEX: FIB4 SCORE: 4.7

## 2020-12-13 NOTE — PROGRESS NOTES
Assumed patient care. Patient sitting at side of the bed not in distress. Patient A&Ox3 on 40L, 90% highflow.

## 2020-12-13 NOTE — PROGRESS NOTES
"Hospital Medicine Daily Progress Note    Date of Service  12/13/2020    Chief Complaint  80 y.o. female admitted 12/9/2020 with shortness of breath.    Hospital Course  79 y.o. female who presented 12/9/2020 with past medical history of COPD, diabetes, hypertension who comes into the emergency room with complaints of shortness of breath for the past 2 days.  Patient was recently admitted in the hospital for COVID-19.  At the time she had a UTI that was treated for.  She required minimal amounts of oxygen and was discharged on 4 L.  She was under home monitoring and noticed that she has increased oxygen requirements.  Associated with fevers, nonproductive cough, diarrhea, generalized malaise, myalgias.  She denies loss of taste and loss of smell.  Patient states that she completed her home doses of Decadron.     Chest x-ray found bilateral groundglass opacities mostly in the lower lobes. She was admitted to the tele floor requiring high flow oxygen.     Interval Problem Update  12/12: patient seen and evaluated on the tele floor. Yesterday was her birthday and family has sent two beautiful bouquets. She is on 4 liters of oxygen at home and now is on high flow 70 liters and 20%. The dry cough has been \"annoying\". She has been up to chair.   12/13: Ms. Rodriguez was evaluated and examined on the tele floor. She is on high flow oxygen at 50 liters and 90%. She has been eating and up to chair. Her daughter is 5 months pregnant and is COVID + though reportedly doing well.     Consultants/Specialty  Infectious disease.    Code Status  DNAR/DNI    Disposition  Medical     Review of Systems  Review of Systems   Constitutional: Negative for fever.   Respiratory: Positive for cough and shortness of breath. Negative for sputum production and wheezing.    Cardiovascular: Negative for chest pain and leg swelling.   Gastrointestinal: Negative for diarrhea.        Eating well   Skin: Negative for rash.   Neurological:        Generally " weak   All other systems reviewed and are negative.       Physical Exam  Temp:  [36.4 °C (97.5 °F)-37 °C (98.6 °F)] 37 °C (98.6 °F)  Pulse:  [59-74] 59  Resp:  [16-22] 18  BP: (136-151)/(50-71) 136/66  SpO2:  [85 %-96 %] 96 %    Physical Exam  Vitals signs and nursing note reviewed.   Constitutional:       Appearance: She is obese. She is ill-appearing. She is not toxic-appearing.   HENT:      Head: Normocephalic and atraumatic.      Mouth/Throat:      Mouth: Mucous membranes are dry.      Pharynx: Oropharynx is clear.   Eyes:      General: No scleral icterus.     Conjunctiva/sclera: Conjunctivae normal.   Neck:      Musculoskeletal: Normal range of motion and neck supple.   Cardiovascular:      Comments: Distant heart sounds  Pulmonary:      Comments: High flow oxygen  Good air movement  Normal work of breathing   No tachypnea   Abdominal:      General: There is distension.      Tenderness: There is no abdominal tenderness.   Musculoskeletal:      Right lower leg: Edema present.      Left lower leg: Edema present.   Skin:     General: Skin is warm and dry.      Coloration: Skin is pale.   Neurological:      General: No focal deficit present.      Mental Status: She is alert and oriented to person, place, and time.   Psychiatric:         Mood and Affect: Mood normal.         Behavior: Behavior normal.         Thought Content: Thought content normal.         Judgment: Judgment normal.         Fluids    Intake/Output Summary (Last 24 hours) at 12/13/2020 0854  Last data filed at 12/12/2020 1609  Gross per 24 hour   Intake 480 ml   Output --   Net 480 ml       Laboratory      Recent Labs     12/11/20  0826 12/12/20  0806   SODIUM 131* 131*   POTASSIUM 4.9 5.0   CHLORIDE 97 98   CO2 21 22   GLUCOSE 184* 172*   BUN 33* 35*   CREATININE 1.19 1.05   CALCIUM 9.0 9.0                   Imaging  US-EXTREMITY VENOUS LOWER BILAT   Final Result      DX-CHEST-PORTABLE (1 VIEW)   Final Result      Development of right lower and  left mid airspace process that are consistent with pneumonia and likely Covid pneumonia           Assessment/Plan  * Acute hypoxemic respiratory failure due to COVID-19 ([facility])- (present on admission)  Assessment & Plan  Requiring high flow oxygen 50 liters 90%  Secondary to COVID19 Pneumonia  Continuous pulse oxymetry  Prone positioning as tolerated and frequent change position  ISS and ambulation aggressively  Extend course of Decadron to 5 additional days  Prophylactic Lovenox. D dimer is 2 thus not a candidate for full-dose anticoagulation  ID consulted, not a remdesivir candidate due to renal impairment      Chronic obstructive pulmonary disease (HCC)- (present on admission)  Assessment & Plan  With exacerbation  Stop doxycycline  DuoNebs every 4  Dexamethasone for COVID19  She is on 4 liters of oxygen outpatient           DM2 (diabetes mellitus, type 2) ([facility])- (present on admission)  Assessment & Plan  A1c 7.7  With hyperglycemia present upon admit   Exacerbated by dexamethasone  Restart home sitagliptan 50 mg (or equivalent DPP-4 inhibitor on formulary) and metformin 500 mg BID  Initiated glargine 15 units QHS  Regular SSI and diabetic diet  Fasting glucose this morning was 124        CKD (chronic kidney disease), stage III- (present on admission)  Assessment & Plan  Baseline Cr around 1.2  Monitor BMP and assess response  Avoid IV contrast/nephrotoxins/NSAIDs  Dose adjust meds for decreased GFR        DNR (do not resuscitate)- (present on admission)  Assessment & Plan  Per patient's wishes    Hyponatremia- (present on admission)  Assessment & Plan  Mild, Na 131   cc      Acute kidney injury (HCC)- (present on admission)  Assessment & Plan  Resolved after 500 mL IV NS       Antibiotic-associated diarrhea  Assessment & Plan  Occurred after initiating doxycycline which was stopped  Loperamide PRN    Peripheral edema- (present on admission)  Assessment & Plan  Pitting BLE edema  BLE US negative for  DVT      Hypothyroidism- (present on admission)  Assessment & Plan  Continue levothyroxine    HTN (hypertension)- (present on admission)  Assessment & Plan  Continue atenolol  Lisinopril and HCTZ held for ANGIE      Left bundle branch block- (present on admission)  Assessment & Plan  Present on EKG 6/3/17  Not indicative of acute ischemia       VTE prophylaxis: Lovenox

## 2020-12-13 NOTE — PROGRESS NOTES
Bedside report received from day nurse. Assumed care of patient. Pt. resting comfortably without any sign of distress. A&Ox4, on 60L high flow at 20%, no complaints at this time. POC reviewed and white board updated, Call light in reach, Bed locked in lowest position.

## 2020-12-14 ENCOUNTER — APPOINTMENT (OUTPATIENT)
Dept: RADIOLOGY | Facility: MEDICAL CENTER | Age: 80
DRG: 177 | End: 2020-12-14
Attending: INTERNAL MEDICINE
Payer: MEDICARE

## 2020-12-14 LAB
ALBUMIN SERPL BCP-MCNC: 3.5 G/DL (ref 3.2–4.9)
ALBUMIN/GLOB SERPL: 0.9 G/DL
ALP SERPL-CCNC: 53 U/L (ref 30–99)
ALT SERPL-CCNC: 27 U/L (ref 2–50)
ANION GAP SERPL CALC-SCNC: 12 MMOL/L (ref 7–16)
AST SERPL-CCNC: 19 U/L (ref 12–45)
BASOPHILS # BLD AUTO: 0.2 % (ref 0–1.8)
BASOPHILS # BLD: 0.04 K/UL (ref 0–0.12)
BILIRUB SERPL-MCNC: 0.4 MG/DL (ref 0.1–1.5)
BUN SERPL-MCNC: 40 MG/DL (ref 8–22)
CALCIUM SERPL-MCNC: 9 MG/DL (ref 8.5–10.5)
CHLORIDE SERPL-SCNC: 100 MMOL/L (ref 96–112)
CO2 SERPL-SCNC: 20 MMOL/L (ref 20–33)
CREAT SERPL-MCNC: 0.98 MG/DL (ref 0.5–1.4)
CRP SERPL HS-MCNC: 1.64 MG/DL (ref 0–0.75)
D DIMER PPP IA.FEU-MCNC: 3.59 UG/ML (FEU) (ref 0–0.5)
EOSINOPHIL # BLD AUTO: 0.01 K/UL (ref 0–0.51)
EOSINOPHIL NFR BLD: 0.1 % (ref 0–6.9)
ERYTHROCYTE [DISTWIDTH] IN BLOOD BY AUTOMATED COUNT: 48.2 FL (ref 35.9–50)
GLOBULIN SER CALC-MCNC: 3.7 G/DL (ref 1.9–3.5)
GLUCOSE BLD-MCNC: 108 MG/DL (ref 65–99)
GLUCOSE BLD-MCNC: 151 MG/DL (ref 65–99)
GLUCOSE BLD-MCNC: 183 MG/DL (ref 65–99)
GLUCOSE BLD-MCNC: 289 MG/DL (ref 65–99)
GLUCOSE BLD-MCNC: 341 MG/DL (ref 65–99)
GLUCOSE SERPL-MCNC: 117 MG/DL (ref 65–99)
HCT VFR BLD AUTO: 40.4 % (ref 37–47)
HGB BLD-MCNC: 12.7 G/DL (ref 12–16)
IMM GRANULOCYTES # BLD AUTO: 0.16 K/UL (ref 0–0.11)
IMM GRANULOCYTES NFR BLD AUTO: 0.9 % (ref 0–0.9)
LDH SERPL L TO P-CCNC: 313 U/L (ref 107–266)
LYMPHOCYTES # BLD AUTO: 1.39 K/UL (ref 1–4.8)
LYMPHOCYTES NFR BLD: 7.6 % (ref 22–41)
MCH RBC QN AUTO: 26.8 PG (ref 27–33)
MCHC RBC AUTO-ENTMCNC: 31.4 G/DL (ref 33.6–35)
MCV RBC AUTO: 85.2 FL (ref 81.4–97.8)
MONOCYTES # BLD AUTO: 1.31 K/UL (ref 0–0.85)
MONOCYTES NFR BLD AUTO: 7.1 % (ref 0–13.4)
NEUTROPHILS # BLD AUTO: 15.45 K/UL (ref 2–7.15)
NEUTROPHILS NFR BLD: 84.1 % (ref 44–72)
NRBC # BLD AUTO: 0 K/UL
NRBC BLD-RTO: 0 /100 WBC
PLATELET # BLD AUTO: 339 K/UL (ref 164–446)
PMV BLD AUTO: 8.9 FL (ref 9–12.9)
POTASSIUM SERPL-SCNC: 4.8 MMOL/L (ref 3.6–5.5)
PROT SERPL-MCNC: 7.2 G/DL (ref 6–8.2)
RBC # BLD AUTO: 4.74 M/UL (ref 4.2–5.4)
SODIUM SERPL-SCNC: 132 MMOL/L (ref 135–145)
WBC # BLD AUTO: 18.4 K/UL (ref 4.8–10.8)

## 2020-12-14 PROCEDURE — 82962 GLUCOSE BLOOD TEST: CPT

## 2020-12-14 PROCEDURE — A9270 NON-COVERED ITEM OR SERVICE: HCPCS | Performed by: INTERNAL MEDICINE

## 2020-12-14 PROCEDURE — 99233 SBSQ HOSP IP/OBS HIGH 50: CPT | Performed by: INTERNAL MEDICINE

## 2020-12-14 PROCEDURE — 700102 HCHG RX REV CODE 250 W/ 637 OVERRIDE(OP): Performed by: HOSPITALIST

## 2020-12-14 PROCEDURE — 700101 HCHG RX REV CODE 250: Performed by: INTERNAL MEDICINE

## 2020-12-14 PROCEDURE — 700111 HCHG RX REV CODE 636 W/ 250 OVERRIDE (IP): Performed by: HOSPITALIST

## 2020-12-14 PROCEDURE — A9270 NON-COVERED ITEM OR SERVICE: HCPCS | Performed by: HOSPITALIST

## 2020-12-14 PROCEDURE — 83615 LACTATE (LD) (LDH) ENZYME: CPT

## 2020-12-14 PROCEDURE — 80053 COMPREHEN METABOLIC PANEL: CPT

## 2020-12-14 PROCEDURE — 700111 HCHG RX REV CODE 636 W/ 250 OVERRIDE (IP): Performed by: INTERNAL MEDICINE

## 2020-12-14 PROCEDURE — 85025 COMPLETE CBC W/AUTO DIFF WBC: CPT

## 2020-12-14 PROCEDURE — 700105 HCHG RX REV CODE 258: Performed by: INTERNAL MEDICINE

## 2020-12-14 PROCEDURE — 770021 HCHG ROOM/CARE - ISO PRIVATE

## 2020-12-14 PROCEDURE — 700102 HCHG RX REV CODE 250 W/ 637 OVERRIDE(OP): Performed by: INTERNAL MEDICINE

## 2020-12-14 PROCEDURE — 94760 N-INVAS EAR/PLS OXIMETRY 1: CPT

## 2020-12-14 PROCEDURE — 36415 COLL VENOUS BLD VENIPUNCTURE: CPT

## 2020-12-14 PROCEDURE — 86140 C-REACTIVE PROTEIN: CPT

## 2020-12-14 PROCEDURE — 94640 AIRWAY INHALATION TREATMENT: CPT

## 2020-12-14 PROCEDURE — 71045 X-RAY EXAM CHEST 1 VIEW: CPT

## 2020-12-14 PROCEDURE — 85379 FIBRIN DEGRADATION QUANT: CPT

## 2020-12-14 RX ORDER — DEXAMETHASONE 4 MG/1
6 TABLET ORAL DAILY
Status: COMPLETED | OUTPATIENT
Start: 2020-12-14 | End: 2020-12-19

## 2020-12-14 RX ORDER — BUDESONIDE AND FORMOTEROL FUMARATE DIHYDRATE 80; 4.5 UG/1; UG/1
2 AEROSOL RESPIRATORY (INHALATION)
Status: DISCONTINUED | OUTPATIENT
Start: 2020-12-14 | End: 2020-12-18

## 2020-12-14 RX ORDER — FUROSEMIDE 10 MG/ML
40 INJECTION INTRAMUSCULAR; INTRAVENOUS ONCE
Status: COMPLETED | OUTPATIENT
Start: 2020-12-14 | End: 2020-12-14

## 2020-12-14 RX ORDER — IPRATROPIUM BROMIDE AND ALBUTEROL SULFATE 2.5; .5 MG/3ML; MG/3ML
3 SOLUTION RESPIRATORY (INHALATION)
Status: DISCONTINUED | OUTPATIENT
Start: 2020-12-14 | End: 2020-12-22 | Stop reason: HOSPADM

## 2020-12-14 RX ADMIN — FUROSEMIDE 40 MG: 10 INJECTION, SOLUTION INTRAMUSCULAR; INTRAVENOUS at 12:52

## 2020-12-14 RX ADMIN — INSULIN GLARGINE 15 UNITS: 100 INJECTION, SOLUTION SUBCUTANEOUS at 18:11

## 2020-12-14 RX ADMIN — BUDESONIDE AND FORMOTEROL FUMARATE DIHYDRATE 2 PUFF: 80; 4.5 AEROSOL RESPIRATORY (INHALATION) at 20:00

## 2020-12-14 RX ADMIN — ENOXAPARIN SODIUM 40 MG: 40 INJECTION SUBCUTANEOUS at 18:01

## 2020-12-14 RX ADMIN — INSULIN HUMAN 6 UNITS: 100 INJECTION, SOLUTION PARENTERAL at 20:39

## 2020-12-14 RX ADMIN — METFORMIN HYDROCHLORIDE 500 MG: 500 TABLET ORAL at 17:58

## 2020-12-14 RX ADMIN — DEXAMETHASONE 6 MG: 4 TABLET ORAL at 10:27

## 2020-12-14 RX ADMIN — BUDESONIDE AND FORMOTEROL FUMARATE DIHYDRATE 2 PUFF: 80; 4.5 AEROSOL RESPIRATORY (INHALATION) at 12:01

## 2020-12-14 RX ADMIN — SITAGLIPTIN 50 MG: 50 TABLET, FILM COATED ORAL at 05:07

## 2020-12-14 RX ADMIN — INSULIN HUMAN 5 UNITS: 100 INJECTION, SOLUTION PARENTERAL at 18:10

## 2020-12-14 RX ADMIN — GLYCOPYRROLATE 1 CAPSULE: 15.6 CAPSULE RESPIRATORY (INHALATION) at 12:02

## 2020-12-14 RX ADMIN — DEXAMETHASONE 6 MG: 4 TABLET ORAL at 05:07

## 2020-12-14 RX ADMIN — LEVOTHYROXINE SODIUM 125 MCG: 0.12 TABLET ORAL at 17:59

## 2020-12-14 RX ADMIN — GUAIFENESIN 1200 MG: 600 TABLET, EXTENDED RELEASE ORAL at 05:07

## 2020-12-14 RX ADMIN — GUAIFENESIN 1200 MG: 600 TABLET, EXTENDED RELEASE ORAL at 17:58

## 2020-12-14 RX ADMIN — ENOXAPARIN SODIUM 40 MG: 40 INJECTION SUBCUTANEOUS at 05:10

## 2020-12-14 RX ADMIN — SIMVASTATIN 10 MG: 10 TABLET, FILM COATED ORAL at 17:59

## 2020-12-14 RX ADMIN — INSULIN HUMAN 2 UNITS: 100 INJECTION, SOLUTION PARENTERAL at 12:52

## 2020-12-14 RX ADMIN — METFORMIN HYDROCHLORIDE 500 MG: 500 TABLET ORAL at 08:34

## 2020-12-14 RX ADMIN — REMDESIVIR 200 MG: 100 INJECTION, POWDER, LYOPHILIZED, FOR SOLUTION INTRAVENOUS at 18:07

## 2020-12-14 RX ADMIN — GLYCOPYRROLATE 1 CAPSULE: 15.6 CAPSULE RESPIRATORY (INHALATION) at 20:00

## 2020-12-14 RX ADMIN — SITAGLIPTIN 50 MG: 50 TABLET, FILM COATED ORAL at 17:59

## 2020-12-14 ASSESSMENT — ENCOUNTER SYMPTOMS
COUGH: 1
SPUTUM PRODUCTION: 0
DIARRHEA: 0
WHEEZING: 0
SHORTNESS OF BREATH: 1
FEVER: 0
ROS GI COMMENTS: EATING WELL

## 2020-12-14 ASSESSMENT — FIBROSIS 4 INDEX: FIB4 SCORE: 0.85

## 2020-12-14 NOTE — PROGRESS NOTES
ID evaluation for remdesivir  COVID + 12/3/2020  O2 50L  procalcitonin less than 0.05  Renal and liver function acceptable-prior ANGIE resolved  CXR bilateral hazy infiltrates-unable to visualize left base  Continue supportive care with oxygen supplementation, proning, judicious use of IV fluids  Monitor inflammatory markers every 48 hours as indicated  Prophylactic anticoagulation not recommended unless high risk   Agree with dexamethasone 6 mg PO daily   Meets [facility] criteria for remdesivir  Start Remdesivir for 5 days IV  Please obtain daily CMP to evaluate kidney and liver function while on Remdesivir   Discontinue remdesivir if creatinine clearance less than 30 or significant increase in LFTs  Please call if we can be of further assistance

## 2020-12-14 NOTE — PROGRESS NOTES
"[facility] Medicine Daily Progress Note    Date of Service  12/14/2020    Chief Complaint  80 y.o. female admitted 12/9/2020 with shortness of breath.    Hospital Course    79-year-old female with history of COPD, diabetes and hypertension presented 12/9 with worsening dyspnea for last couple days, on admission COVID-19 test was positive chest x-ray showed bilateral infiltration, patient needed 4 L nasal cannula on admission however oxygen requirement got worse and she needed high flow oxygen, the patient had generalized weakness with malacia, Decadron was  Started,ID was for remdesivir however the patient had ANGIE.  Also the patient was treated with inhalers.    On admission the patient had ANGIE with creatinine around 1.7 however improved with IV fluid to 0.9     Interval Problem Update  12/12: patient seen and evaluated on the tele floor. Yesterday was her birthday and family has sent two beautiful bouquets. She is on 4 liters of oxygen at home and now is on high flow 70 liters and 20%. The dry cough has been \"annoying\". She has been up to chair.   12/13: Ms. Rodriguez was evaluated and examined on the tele floor. She is on high flow oxygen at 50 liters and 90%. She has been eating and up to chair. Her daughter is 5 months pregnant and is COVID + though reportedly doing well.     12/14: The patient still on high flow oxygen, she feels around the same, start with inhalers, labs showed normal kidney function, reconsult ID for remdesivir.    Consultants/Specialty  Infectious disease.    Code Status  DNAR/DNI    Disposition  Medical     Review of Systems  Review of Systems   Constitutional: Negative for fever.   Respiratory: Positive for cough and shortness of breath. Negative for sputum production and wheezing.    Cardiovascular: Negative for chest pain and leg swelling.   Gastrointestinal: Negative for diarrhea.        Eating well   Skin: Negative for rash.   Neurological:        Generally weak   All other systems reviewed " and are negative.       Physical Exam  Temp:  [36.2 °C (97.1 °F)-37.1 °C (98.7 °F)] 36.6 °C (97.8 °F)  Pulse:  [60-80] 76  Resp:  [17-20] 20  BP: (106-140)/(56-91) 138/64  SpO2:  [88 %-99 %] 92 %    Physical Exam  Vitals signs and nursing note reviewed.   Constitutional:       Appearance: She is obese. She is not ill-appearing or toxic-appearing.   HENT:      Head: Normocephalic and atraumatic.      Mouth/Throat:      Mouth: Mucous membranes are dry.      Pharynx: Oropharynx is clear.   Eyes:      General: No scleral icterus.     Conjunctiva/sclera: Conjunctivae normal.   Neck:      Musculoskeletal: Normal range of motion and neck supple.   Cardiovascular:      Rate and Rhythm: Normal rate.      Heart sounds: No murmur.      Comments: Distant heart sounds  Pulmonary:      Effort: Pulmonary effort is normal. No respiratory distress.      Breath sounds: Wheezing and rales present.   Abdominal:      General: There is no distension.      Palpations: There is no mass.      Tenderness: There is no abdominal tenderness. There is no guarding.   Musculoskeletal:      Right lower leg: No edema.      Left lower leg: No edema.   Skin:     General: Skin is warm.      Coloration: Skin is not jaundiced or pale.   Neurological:      General: No focal deficit present.      Mental Status: She is alert and oriented to person, place, and time.   Psychiatric:         Mood and Affect: Mood normal.         Behavior: Behavior normal.         Thought Content: Thought content normal.         Judgment: Judgment normal.         Fluids    Intake/Output Summary (Last 24 hours) at 12/14/2020 1238  Last data filed at 12/14/2020 0516  Gross per 24 hour   Intake --   Output 500 ml   Net -500 ml       Laboratory  Recent Labs     12/14/20  0905   WBC 18.4*   RBC 4.74   HEMOGLOBIN 12.7   HEMATOCRIT 40.4   MCV 85.2   MCH 26.8*   MCHC 31.4*   RDW 48.2   PLATELETCT 339   MPV 8.9*     Recent Labs     12/12/20  0806 12/14/20  0905   SODIUM 131* 132*    POTASSIUM 5.0 4.8   CHLORIDE 98 100   CO2 22 20   GLUCOSE 172* 117*   BUN 35* 40*   CREATININE 1.05 0.98   CALCIUM 9.0 9.0                   Imaging  US-EXTREMITY VENOUS LOWER BILAT   Final Result      DX-CHEST-PORTABLE (1 VIEW)   Final Result      Development of right lower and left mid airspace process that are consistent with pneumonia and likely Covid pneumonia      DX-CHEST-LIMITED (1 VIEW)    (Results Pending)        Assessment/Plan  * Acute hypoxemic respiratory failure due to COVID-19 (HCC)- (present on admission)  Assessment & Plan  Dyspnea and coughing  History of COPD  Chest x-ray bilateral infiltration  Oxygen requirement worsening to high flow  Continue Decadron  The patient was not qualified for remdesivir due to ANGIE, will reconsult ID since the patient still on high flow.  Proning  Follow-up closely with labs and images      Chronic obstructive pulmonary disease (HCC)- (present on admission)  Assessment & Plan  With exacerbation due to COVID-19 pneumonia  DuoNebs every 4 Symbicort and seebri   Dexamethasone for COVID19  RT           DM2 (diabetes mellitus, type 2) ([facility])- (present on admission)  Assessment & Plan  A1c 7.7  With hyperglycemia present upon admit   Exacerbated by dexamethasone  Continue Lantus  Hold home medications sitagliptin and Metformin since she has infection  Regular SSI and diabetic diet        CKD (chronic kidney disease), stage III- (present on admission)  Assessment & Plan  Baseline Cr around 1.2>>> improved to 0.9  Monitor BMP and assess response  Avoid IV contrast/nephrotoxins/NSAIDs  Dose adjust meds for decreased GFR        DNR (do not resuscitate)- (present on admission)  Assessment & Plan  Per patient's wishes    Hyponatremia- (present on admission)  Assessment & Plan  Mild, Na 131  Chronic no symptoms      Antibiotic-associated diarrhea  Assessment & Plan  Occurred after initiating doxycycline which was stopped however could be related to COVID-19  Loperamide  PRN  Resolved    Peripheral edema- (present on admission)  Assessment & Plan  Pitting BLE edema  BLE US negative for DVT      Acute kidney injury (HCC)- (present on admission)  Assessment & Plan  Resolved with IV fluid      Hypothyroidism- (present on admission)  Assessment & Plan  Continue levothyroxine  Last TSH 2.2    HTN (hypertension)- (present on admission)  Assessment & Plan  Continue atenolol  Lisinopril and HCTZ held for ANGIE      Left bundle branch block- (present on admission)  Assessment & Plan  Present on EKG 6/3/17  Not indicative of acute ischemia       VTE prophylaxis: Lovenox

## 2020-12-14 NOTE — PROGRESS NOTES
Bedside report received from day nurse. Assumed care of patient. Pt. resting comfortably without any sign of distress. A&Ox4, on 50L at 90% high flow. POC reviewed and white board updated, Call light in reach, Bed locked in lowest position.

## 2020-12-14 NOTE — PROGRESS NOTES
Assumed care of pt. Pt. Sitting upright at edge of bed. Bed in lowest position. Pt. On 50L @ 90%. All needs med at this time.

## 2020-12-15 PROBLEM — I51.7 CARDIOMEGALY: Status: ACTIVE | Noted: 2020-12-15

## 2020-12-15 LAB
ANION GAP SERPL CALC-SCNC: 12 MMOL/L (ref 7–16)
BASOPHILS # BLD AUTO: 0.2 % (ref 0–1.8)
BASOPHILS # BLD: 0.02 K/UL (ref 0–0.12)
BUN SERPL-MCNC: 44 MG/DL (ref 8–22)
CALCIUM SERPL-MCNC: 8.7 MG/DL (ref 8.5–10.5)
CHLORIDE SERPL-SCNC: 99 MMOL/L (ref 96–112)
CO2 SERPL-SCNC: 22 MMOL/L (ref 20–33)
CREAT SERPL-MCNC: 1.21 MG/DL (ref 0.5–1.4)
CRP SERPL HS-MCNC: 1.28 MG/DL (ref 0–0.75)
D DIMER PPP IA.FEU-MCNC: 2.5 UG/ML (FEU) (ref 0–0.5)
EOSINOPHIL # BLD AUTO: 0 K/UL (ref 0–0.51)
EOSINOPHIL NFR BLD: 0 % (ref 0–6.9)
ERYTHROCYTE [DISTWIDTH] IN BLOOD BY AUTOMATED COUNT: 46.5 FL (ref 35.9–50)
GLUCOSE BLD-MCNC: 168 MG/DL (ref 65–99)
GLUCOSE BLD-MCNC: 221 MG/DL (ref 65–99)
GLUCOSE SERPL-MCNC: 112 MG/DL (ref 65–99)
HCT VFR BLD AUTO: 38.8 % (ref 37–47)
HGB BLD-MCNC: 12 G/DL (ref 12–16)
IMM GRANULOCYTES # BLD AUTO: 0.09 K/UL (ref 0–0.11)
IMM GRANULOCYTES NFR BLD AUTO: 0.8 % (ref 0–0.9)
LYMPHOCYTES # BLD AUTO: 1.28 K/UL (ref 1–4.8)
LYMPHOCYTES NFR BLD: 11.2 % (ref 22–41)
MAGNESIUM SERPL-MCNC: 1.6 MG/DL (ref 1.5–2.5)
MCH RBC QN AUTO: 26.2 PG (ref 27–33)
MCHC RBC AUTO-ENTMCNC: 30.9 G/DL (ref 33.6–35)
MCV RBC AUTO: 84.7 FL (ref 81.4–97.8)
MONOCYTES # BLD AUTO: 1.01 K/UL (ref 0–0.85)
MONOCYTES NFR BLD AUTO: 8.9 % (ref 0–13.4)
NEUTROPHILS # BLD AUTO: 9 K/UL (ref 2–7.15)
NEUTROPHILS NFR BLD: 78.9 % (ref 44–72)
NRBC # BLD AUTO: 0 K/UL
NRBC BLD-RTO: 0 /100 WBC
NT-PROBNP SERPL IA-MCNC: 598 PG/ML (ref 0–125)
OSMOLALITY SERPL: 301 MOSM/KG H2O (ref 278–298)
PLATELET # BLD AUTO: 292 K/UL (ref 164–446)
PMV BLD AUTO: 9.2 FL (ref 9–12.9)
POTASSIUM SERPL-SCNC: 4.5 MMOL/L (ref 3.6–5.5)
RBC # BLD AUTO: 4.58 M/UL (ref 4.2–5.4)
SODIUM SERPL-SCNC: 133 MMOL/L (ref 135–145)
WBC # BLD AUTO: 11.4 K/UL (ref 4.8–10.8)

## 2020-12-15 PROCEDURE — 700102 HCHG RX REV CODE 250 W/ 637 OVERRIDE(OP): Performed by: HOSPITALIST

## 2020-12-15 PROCEDURE — 85379 FIBRIN DEGRADATION QUANT: CPT

## 2020-12-15 PROCEDURE — 86140 C-REACTIVE PROTEIN: CPT

## 2020-12-15 PROCEDURE — 83930 ASSAY OF BLOOD OSMOLALITY: CPT

## 2020-12-15 PROCEDURE — 700111 HCHG RX REV CODE 636 W/ 250 OVERRIDE (IP): Performed by: HOSPITALIST

## 2020-12-15 PROCEDURE — 700101 HCHG RX REV CODE 250: Performed by: INTERNAL MEDICINE

## 2020-12-15 PROCEDURE — 82962 GLUCOSE BLOOD TEST: CPT

## 2020-12-15 PROCEDURE — 80048 BASIC METABOLIC PNL TOTAL CA: CPT

## 2020-12-15 PROCEDURE — 83735 ASSAY OF MAGNESIUM: CPT

## 2020-12-15 PROCEDURE — 700102 HCHG RX REV CODE 250 W/ 637 OVERRIDE(OP): Performed by: INTERNAL MEDICINE

## 2020-12-15 PROCEDURE — 770021 HCHG ROOM/CARE - ISO PRIVATE

## 2020-12-15 PROCEDURE — 94640 AIRWAY INHALATION TREATMENT: CPT

## 2020-12-15 PROCEDURE — 94760 N-INVAS EAR/PLS OXIMETRY 1: CPT

## 2020-12-15 PROCEDURE — 700105 HCHG RX REV CODE 258: Performed by: INTERNAL MEDICINE

## 2020-12-15 PROCEDURE — A9270 NON-COVERED ITEM OR SERVICE: HCPCS | Performed by: HOSPITALIST

## 2020-12-15 PROCEDURE — 99233 SBSQ HOSP IP/OBS HIGH 50: CPT | Performed by: HOSPITALIST

## 2020-12-15 PROCEDURE — A9270 NON-COVERED ITEM OR SERVICE: HCPCS | Performed by: INTERNAL MEDICINE

## 2020-12-15 PROCEDURE — 83880 ASSAY OF NATRIURETIC PEPTIDE: CPT

## 2020-12-15 PROCEDURE — 85025 COMPLETE CBC W/AUTO DIFF WBC: CPT

## 2020-12-15 PROCEDURE — 36415 COLL VENOUS BLD VENIPUNCTURE: CPT

## 2020-12-15 RX ORDER — INSULIN GLARGINE 100 [IU]/ML
20 INJECTION, SOLUTION SUBCUTANEOUS EVERY EVENING
Status: DISCONTINUED | OUTPATIENT
Start: 2020-12-15 | End: 2020-12-20

## 2020-12-15 RX ORDER — FUROSEMIDE 10 MG/ML
20 INJECTION INTRAMUSCULAR; INTRAVENOUS
Status: DISCONTINUED | OUTPATIENT
Start: 2020-12-15 | End: 2020-12-17

## 2020-12-15 RX ORDER — MAGNESIUM SULFATE HEPTAHYDRATE 40 MG/ML
2 INJECTION, SOLUTION INTRAVENOUS ONCE
Status: COMPLETED | OUTPATIENT
Start: 2020-12-15 | End: 2020-12-15

## 2020-12-15 RX ADMIN — METFORMIN HYDROCHLORIDE 500 MG: 500 TABLET ORAL at 08:49

## 2020-12-15 RX ADMIN — ENOXAPARIN SODIUM 40 MG: 40 INJECTION SUBCUTANEOUS at 18:00

## 2020-12-15 RX ADMIN — GLYCOPYRROLATE 1 CAPSULE: 15.6 CAPSULE RESPIRATORY (INHALATION) at 08:49

## 2020-12-15 RX ADMIN — ENOXAPARIN SODIUM 40 MG: 40 INJECTION SUBCUTANEOUS at 05:45

## 2020-12-15 RX ADMIN — FUROSEMIDE 20 MG: 10 INJECTION, SOLUTION INTRAMUSCULAR; INTRAVENOUS at 13:19

## 2020-12-15 RX ADMIN — DEXAMETHASONE 6 MG: 4 TABLET ORAL at 05:46

## 2020-12-15 RX ADMIN — BUDESONIDE AND FORMOTEROL FUMARATE DIHYDRATE 2 PUFF: 80; 4.5 AEROSOL RESPIRATORY (INHALATION) at 20:10

## 2020-12-15 RX ADMIN — INSULIN HUMAN 2 UNITS: 100 INJECTION, SOLUTION PARENTERAL at 20:13

## 2020-12-15 RX ADMIN — INSULIN GLARGINE 20 UNITS: 100 INJECTION, SOLUTION SUBCUTANEOUS at 18:06

## 2020-12-15 RX ADMIN — GUAIFENESIN 1200 MG: 600 TABLET, EXTENDED RELEASE ORAL at 05:45

## 2020-12-15 RX ADMIN — ATENOLOL 50 MG: 50 TABLET ORAL at 05:45

## 2020-12-15 RX ADMIN — REMDESIVIR 100 MG: 100 INJECTION, POWDER, LYOPHILIZED, FOR SOLUTION INTRAVENOUS at 18:00

## 2020-12-15 RX ADMIN — METFORMIN HYDROCHLORIDE 500 MG: 500 TABLET ORAL at 18:00

## 2020-12-15 RX ADMIN — SIMVASTATIN 10 MG: 10 TABLET, FILM COATED ORAL at 18:00

## 2020-12-15 RX ADMIN — GUAIFENESIN 1200 MG: 600 TABLET, EXTENDED RELEASE ORAL at 18:00

## 2020-12-15 RX ADMIN — LEVOTHYROXINE SODIUM 125 MCG: 0.12 TABLET ORAL at 18:00

## 2020-12-15 RX ADMIN — GLYCOPYRROLATE 1 CAPSULE: 15.6 CAPSULE RESPIRATORY (INHALATION) at 20:10

## 2020-12-15 RX ADMIN — SITAGLIPTIN 50 MG: 50 TABLET, FILM COATED ORAL at 05:50

## 2020-12-15 RX ADMIN — INSULIN HUMAN 3 UNITS: 100 INJECTION, SOLUTION PARENTERAL at 18:05

## 2020-12-15 RX ADMIN — BUDESONIDE AND FORMOTEROL FUMARATE DIHYDRATE 2 PUFF: 80; 4.5 AEROSOL RESPIRATORY (INHALATION) at 08:49

## 2020-12-15 RX ADMIN — LISINOPRIL 20 MG: 20 TABLET ORAL at 05:45

## 2020-12-15 RX ADMIN — SITAGLIPTIN 50 MG: 50 TABLET, FILM COATED ORAL at 18:00

## 2020-12-15 RX ADMIN — MAGNESIUM SULFATE 2 G: 2 INJECTION INTRAVENOUS at 08:54

## 2020-12-15 RX ADMIN — INSULIN HUMAN 2 UNITS: 100 INJECTION, SOLUTION PARENTERAL at 13:18

## 2020-12-15 ASSESSMENT — ENCOUNTER SYMPTOMS
FEVER: 0
ROS GI COMMENTS: EATING WELL
NAUSEA: 0
DIARRHEA: 0
SPUTUM PRODUCTION: 0
SHORTNESS OF BREATH: 1
VOMITING: 0
WHEEZING: 0
COUGH: 1

## 2020-12-15 ASSESSMENT — FIBROSIS 4 INDEX: FIB4 SCORE: 0.99

## 2020-12-15 NOTE — PROGRESS NOTES
"Hospital Medicine Daily Progress Note    Date of Service  12/15/2020    Chief Complaint  80 y.o. female admitted 12/9/2020 with shortness of breath.    Hospital Course    79-year-old female with history of COPD, diabetes and hypertension presented 12/9 with worsening dyspnea for last couple days, on admission COVID-19 test was positive chest x-ray showed bilateral infiltration, patient needed 4 L nasal cannula on admission however oxygen requirement got worse and she needed high flow oxygen, the patient had generalized weakness with malacia, Decadron was  Started,ID was for remdesivir however the patient had ANGIE.  Also the patient was treated with inhalers.    On admission the patient had ANGIE with creatinine around 1.7 however improved with IV fluid to 0.9     Interval Problem Update  12/12: patient seen and evaluated on the tele floor. Yesterday was her birthday and family has sent two beautiful bouquets. She is on 4 liters of oxygen at home and now is on high flow 70 liters and 20%. The dry cough has been \"annoying\". She has been up to chair.   12/13: Ms. Rodriguez was evaluated and examined on the tele floor. She is on high flow oxygen at 50 liters and 90%. She has been eating and up to chair. Her daughter is 5 months pregnant and is COVID + though reportedly doing well.   12/14: The patient still on high flow oxygen, she feels around the same, start with inhalers, labs showed normal kidney function, reconsult ID for remdesivir.  12/15: Chest x-ray showed stable infiltrates and cardiomegaly.  Still short of breath and stable on HFNC.  BNP elevated so started gentle diuresis.  Also ordered echo.  Increased Lantus to 20.  I updated daughter Alexandra over the phone.    Consultants/Specialty  Infectious disease.    Code Status  DNAR/DNI    Disposition  Daughter hoping that if patient can be weaned off of high flow nasal cannula, she can eventually go home on oxygen    Review of Systems  Review of Systems "   Constitutional: Negative for fever.   Respiratory: Positive for cough and shortness of breath. Negative for sputum production and wheezing.    Cardiovascular: Negative for chest pain and leg swelling.   Gastrointestinal: Negative for diarrhea, nausea and vomiting.        Eating well   Skin: Negative for rash.   Neurological:        Generally weak        Physical Exam  Temp:  [36.1 °C (97 °F)-36.6 °C (97.8 °F)] 36.1 °C (97 °F)  Pulse:  [49-90] 75  Resp:  [18-22] 18  BP: (111-143)/(56-76) 111/66  SpO2:  [86 %-92 %] 92 %    Physical Exam  Vitals signs and nursing note reviewed.   Constitutional:       Appearance: She is obese. She is not ill-appearing or toxic-appearing.   HENT:      Head: Normocephalic and atraumatic.      Ears:      Comments: Hard of hearing     Mouth/Throat:      Mouth: Mucous membranes are dry.      Pharynx: Oropharynx is clear.   Eyes:      General: No scleral icterus.     Extraocular Movements: Extraocular movements intact.   Neck:      Musculoskeletal: Normal range of motion and neck supple.   Cardiovascular:      Rate and Rhythm: Normal rate.      Heart sounds: No murmur.      Comments: Distant heart sounds  Pulmonary:      Effort: Pulmonary effort is normal. No respiratory distress.      Breath sounds: Wheezing and rales present.   Abdominal:      General: There is no distension.      Tenderness: There is no abdominal tenderness.   Musculoskeletal:      Right lower leg: No edema.      Left lower leg: No edema.   Skin:     General: Skin is warm.      Coloration: Skin is not jaundiced or pale.   Neurological:      General: No focal deficit present.      Mental Status: She is alert and oriented to person, place, and time.   Psychiatric:         Mood and Affect: Mood normal.         Behavior: Behavior normal.         Fluids    Intake/Output Summary (Last 24 hours) at 12/15/2020 1233  Last data filed at 12/15/2020 1000  Gross per 24 hour   Intake 680 ml   Output --   Net 680 ml        Laboratory  Recent Labs     12/14/20  0905 12/15/20  0450   WBC 18.4* 11.4*   RBC 4.74 4.58   HEMOGLOBIN 12.7 12.0   HEMATOCRIT 40.4 38.8   MCV 85.2 84.7   MCH 26.8* 26.2*   MCHC 31.4* 30.9*   RDW 48.2 46.5   PLATELETCT 339 292   MPV 8.9* 9.2     Recent Labs     12/14/20  0905 12/15/20  0450   SODIUM 132* 133*   POTASSIUM 4.8 4.5   CHLORIDE 100 99   CO2 20 22   GLUCOSE 117* 112*   BUN 40* 44*   CREATININE 0.98 1.21   CALCIUM 9.0 8.7                   Imaging  DX-CHEST-LIMITED (1 VIEW)   Final Result      1.  Again seen bilateral interstitial and airspace infiltrates. This may represent Covid pneumonia.      2.  Cardiomegaly.      US-EXTREMITY VENOUS LOWER BILAT   Final Result      DX-CHEST-PORTABLE (1 VIEW)   Final Result      Development of right lower and left mid airspace process that are consistent with pneumonia and likely Covid pneumonia           Assessment/Plan  * Acute hypoxemic respiratory failure due to COVID-19 (HCC)- (present on admission)  Assessment & Plan  Dyspnea and coughing  History of COPD  Chest x-ray bilateral infiltration  Oxygen requirement worsening to high flow  Continue Decadron  Initially was not started on remdesivir due to ANGIE  ID now consulted and started on remdesivir -monitor CMP  Proning    Chronic obstructive pulmonary disease (HCC)- (present on admission)  Assessment & Plan  With exacerbation due to COVID-19 pneumonia  DuoNebs every 4 Symbicort and seebri   Dexamethasone for COVID19  RT           DM2 (diabetes mellitus, type 2) ([facility])- (present on admission)  Assessment & Plan  A1c 7.7  With hyperglycemia present upon admit   Exacerbated by dexamethasone  Continue Lantus  Hold home medications sitagliptin and Metformin since she has infection  Regular SSI and diabetic diet        CKD (chronic kidney disease), stage III- (present on admission)  Assessment & Plan  Baseline Cr around 1.2>>> improved to 0.9  Monitor BMP and assess response  Avoid IV  contrast/nephrotoxins/NSAIDs  Dose adjust meds for decreased GFR        Cardiomegaly- (present on admission)  Assessment & Plan  Seen on chest x-ray  With elevated BNP  Started gentle Lasix IV 20 mg 12/15/2020  Echo pending    DNR (do not resuscitate)- (present on admission)  Assessment & Plan  Per patient's wishes    Hyponatremia- (present on admission)  Assessment & Plan  Mild, Na 131  Chronic no symptoms      Antibiotic-associated diarrhea  Assessment & Plan  Occurred after initiating doxycycline which was stopped however could be related to COVID-19  Loperamide PRN  Resolved    Peripheral edema- (present on admission)  Assessment & Plan  Pitting BLE edema  BLE US negative for DVT      Acute kidney injury (HCC)- (present on admission)  Assessment & Plan  Resolved with IV fluid      Hypothyroidism- (present on admission)  Assessment & Plan  Continue levothyroxine  Last TSH 2.2    HTN (hypertension)- (present on admission)  Assessment & Plan  Continue atenolol  Lisinopril and HCTZ held for ANGIE      Left bundle branch block- (present on admission)  Assessment & Plan  Present on EKG 6/3/17  Not indicative of acute ischemia       VTE prophylaxis: Lovenox

## 2020-12-15 NOTE — ASSESSMENT & PLAN NOTE
Seen on chest x-ray  With elevated BNP  Echo: Left ventricle not well visualized. No contrast was used by technologist. Unable to asses LV function. Dilated inferior vena cava without inspiratory collapse

## 2020-12-15 NOTE — PROGRESS NOTES
Bedside report received, pt care assumed. VSS, pt assessment complete. Pt aaox4, no signs of distress noted at this time. POC discussed with pt and verbalizes no questions. Pt denies any additional needs at this time. Bed in lowest position, bed alarm on, pt educated on fall risk and verbalized understanding, call light within reach.

## 2020-12-16 ENCOUNTER — APPOINTMENT (OUTPATIENT)
Dept: CARDIOLOGY | Facility: MEDICAL CENTER | Age: 80
DRG: 177 | End: 2020-12-16
Attending: HOSPITALIST
Payer: MEDICARE

## 2020-12-16 PROBLEM — T36.95XA ANTIBIOTIC-ASSOCIATED DIARRHEA: Status: RESOLVED | Noted: 2020-12-11 | Resolved: 2020-12-16

## 2020-12-16 PROBLEM — N17.9 ACUTE KIDNEY INJURY (HCC): Status: RESOLVED | Noted: 2020-12-03 | Resolved: 2020-12-16

## 2020-12-16 PROBLEM — K52.1 ANTIBIOTIC-ASSOCIATED DIARRHEA: Status: RESOLVED | Noted: 2020-12-11 | Resolved: 2020-12-16

## 2020-12-16 LAB
ALBUMIN SERPL BCP-MCNC: 3.5 G/DL (ref 3.2–4.9)
ALBUMIN/GLOB SERPL: 1.1 G/DL
ALP SERPL-CCNC: 46 U/L (ref 30–99)
ALT SERPL-CCNC: 26 U/L (ref 2–50)
ANION GAP SERPL CALC-SCNC: 10 MMOL/L (ref 7–16)
AST SERPL-CCNC: 15 U/L (ref 12–45)
BILIRUB SERPL-MCNC: 0.3 MG/DL (ref 0.1–1.5)
BUN SERPL-MCNC: 58 MG/DL (ref 8–22)
CALCIUM SERPL-MCNC: 8.7 MG/DL (ref 8.5–10.5)
CHLORIDE SERPL-SCNC: 101 MMOL/L (ref 96–112)
CO2 SERPL-SCNC: 23 MMOL/L (ref 20–33)
CREAT SERPL-MCNC: 1.34 MG/DL (ref 0.5–1.4)
ERYTHROCYTE [DISTWIDTH] IN BLOOD BY AUTOMATED COUNT: 47.7 FL (ref 35.9–50)
GLOBULIN SER CALC-MCNC: 3.1 G/DL (ref 1.9–3.5)
GLUCOSE BLD-MCNC: 123 MG/DL (ref 65–99)
GLUCOSE BLD-MCNC: 141 MG/DL (ref 65–99)
GLUCOSE BLD-MCNC: 147 MG/DL (ref 65–99)
GLUCOSE BLD-MCNC: 156 MG/DL (ref 65–99)
GLUCOSE BLD-MCNC: 168 MG/DL (ref 65–99)
GLUCOSE BLD-MCNC: 238 MG/DL (ref 65–99)
GLUCOSE SERPL-MCNC: 80 MG/DL (ref 65–99)
HCT VFR BLD AUTO: 39.7 % (ref 37–47)
HGB BLD-MCNC: 12.2 G/DL (ref 12–16)
LV EJECT FRACT MOD 2C 99903: 37.69
LV EJECT FRACT MOD 4C 99902: 55
LV EJECT FRACT MOD BP 99901: 50.31
MAGNESIUM SERPL-MCNC: 2 MG/DL (ref 1.5–2.5)
MCH RBC QN AUTO: 25.9 PG (ref 27–33)
MCHC RBC AUTO-ENTMCNC: 30.7 G/DL (ref 33.6–35)
MCV RBC AUTO: 84.3 FL (ref 81.4–97.8)
PLATELET # BLD AUTO: 337 K/UL (ref 164–446)
PMV BLD AUTO: 9.2 FL (ref 9–12.9)
POTASSIUM SERPL-SCNC: 4.2 MMOL/L (ref 3.6–5.5)
PROT SERPL-MCNC: 6.6 G/DL (ref 6–8.2)
RBC # BLD AUTO: 4.71 M/UL (ref 4.2–5.4)
SODIUM SERPL-SCNC: 134 MMOL/L (ref 135–145)
WBC # BLD AUTO: 16.6 K/UL (ref 4.8–10.8)

## 2020-12-16 PROCEDURE — 700111 HCHG RX REV CODE 636 W/ 250 OVERRIDE (IP): Performed by: HOSPITALIST

## 2020-12-16 PROCEDURE — 99233 SBSQ HOSP IP/OBS HIGH 50: CPT | Performed by: STUDENT IN AN ORGANIZED HEALTH CARE EDUCATION/TRAINING PROGRAM

## 2020-12-16 PROCEDURE — 93308 TTE F-UP OR LMTD: CPT | Mod: 26 | Performed by: INTERNAL MEDICINE

## 2020-12-16 PROCEDURE — 700105 HCHG RX REV CODE 258: Performed by: INTERNAL MEDICINE

## 2020-12-16 PROCEDURE — 94640 AIRWAY INHALATION TREATMENT: CPT

## 2020-12-16 PROCEDURE — 36415 COLL VENOUS BLD VENIPUNCTURE: CPT

## 2020-12-16 PROCEDURE — 93321 DOPPLER ECHO F-UP/LMTD STD: CPT | Mod: 26 | Performed by: INTERNAL MEDICINE

## 2020-12-16 PROCEDURE — 80053 COMPREHEN METABOLIC PANEL: CPT

## 2020-12-16 PROCEDURE — A9270 NON-COVERED ITEM OR SERVICE: HCPCS | Performed by: HOSPITALIST

## 2020-12-16 PROCEDURE — A9270 NON-COVERED ITEM OR SERVICE: HCPCS | Performed by: INTERNAL MEDICINE

## 2020-12-16 PROCEDURE — 700102 HCHG RX REV CODE 250 W/ 637 OVERRIDE(OP): Performed by: STUDENT IN AN ORGANIZED HEALTH CARE EDUCATION/TRAINING PROGRAM

## 2020-12-16 PROCEDURE — 700102 HCHG RX REV CODE 250 W/ 637 OVERRIDE(OP): Performed by: HOSPITALIST

## 2020-12-16 PROCEDURE — 93308 TTE F-UP OR LMTD: CPT

## 2020-12-16 PROCEDURE — 700102 HCHG RX REV CODE 250 W/ 637 OVERRIDE(OP): Performed by: INTERNAL MEDICINE

## 2020-12-16 PROCEDURE — 93325 DOPPLER ECHO COLOR FLOW MAPG: CPT | Mod: 26 | Performed by: INTERNAL MEDICINE

## 2020-12-16 PROCEDURE — 94760 N-INVAS EAR/PLS OXIMETRY 1: CPT

## 2020-12-16 PROCEDURE — 307059 PAD,EAR PROTECTOR: Performed by: STUDENT IN AN ORGANIZED HEALTH CARE EDUCATION/TRAINING PROGRAM

## 2020-12-16 PROCEDURE — 770021 HCHG ROOM/CARE - ISO PRIVATE

## 2020-12-16 PROCEDURE — 82962 GLUCOSE BLOOD TEST: CPT | Mod: 91

## 2020-12-16 PROCEDURE — 85027 COMPLETE CBC AUTOMATED: CPT

## 2020-12-16 PROCEDURE — 83735 ASSAY OF MAGNESIUM: CPT

## 2020-12-16 PROCEDURE — A9270 NON-COVERED ITEM OR SERVICE: HCPCS | Performed by: STUDENT IN AN ORGANIZED HEALTH CARE EDUCATION/TRAINING PROGRAM

## 2020-12-16 PROCEDURE — 700101 HCHG RX REV CODE 250: Performed by: INTERNAL MEDICINE

## 2020-12-16 RX ORDER — MICONAZOLE NITRATE 20 MG/G
CREAM TOPICAL 2 TIMES DAILY
Status: DISCONTINUED | OUTPATIENT
Start: 2020-12-16 | End: 2020-12-22 | Stop reason: HOSPADM

## 2020-12-16 RX ADMIN — SIMVASTATIN 10 MG: 10 TABLET, FILM COATED ORAL at 16:44

## 2020-12-16 RX ADMIN — SITAGLIPTIN 50 MG: 50 TABLET, FILM COATED ORAL at 16:45

## 2020-12-16 RX ADMIN — FUROSEMIDE 20 MG: 10 INJECTION, SOLUTION INTRAMUSCULAR; INTRAVENOUS at 04:58

## 2020-12-16 RX ADMIN — REMDESIVIR 100 MG: 100 INJECTION, POWDER, LYOPHILIZED, FOR SOLUTION INTRAVENOUS at 16:46

## 2020-12-16 RX ADMIN — GUAIFENESIN 1200 MG: 600 TABLET, EXTENDED RELEASE ORAL at 16:44

## 2020-12-16 RX ADMIN — GLYCOPYRROLATE 1 CAPSULE: 15.6 CAPSULE RESPIRATORY (INHALATION) at 23:36

## 2020-12-16 RX ADMIN — INSULIN HUMAN 3 UNITS: 100 INJECTION, SOLUTION PARENTERAL at 12:59

## 2020-12-16 RX ADMIN — METFORMIN HYDROCHLORIDE 500 MG: 500 TABLET ORAL at 08:41

## 2020-12-16 RX ADMIN — ENOXAPARIN SODIUM 40 MG: 40 INJECTION SUBCUTANEOUS at 16:44

## 2020-12-16 RX ADMIN — MICONAZOLE NITRATE: 20 CREAM TOPICAL at 20:37

## 2020-12-16 RX ADMIN — INSULIN HUMAN 2 UNITS: 100 INJECTION, SOLUTION PARENTERAL at 17:53

## 2020-12-16 RX ADMIN — DEXAMETHASONE 6 MG: 4 TABLET ORAL at 04:57

## 2020-12-16 RX ADMIN — LEVOTHYROXINE SODIUM 125 MCG: 0.12 TABLET ORAL at 16:44

## 2020-12-16 RX ADMIN — ENOXAPARIN SODIUM 40 MG: 40 INJECTION SUBCUTANEOUS at 04:58

## 2020-12-16 RX ADMIN — SITAGLIPTIN 50 MG: 50 TABLET, FILM COATED ORAL at 04:57

## 2020-12-16 RX ADMIN — BUDESONIDE AND FORMOTEROL FUMARATE DIHYDRATE 2 PUFF: 80; 4.5 AEROSOL RESPIRATORY (INHALATION) at 08:41

## 2020-12-16 RX ADMIN — GLYCOPYRROLATE 1 CAPSULE: 15.6 CAPSULE RESPIRATORY (INHALATION) at 08:42

## 2020-12-16 RX ADMIN — INSULIN GLARGINE 20 UNITS: 100 INJECTION, SOLUTION SUBCUTANEOUS at 16:47

## 2020-12-16 RX ADMIN — BUDESONIDE AND FORMOTEROL FUMARATE DIHYDRATE 2 PUFF: 80; 4.5 AEROSOL RESPIRATORY (INHALATION) at 20:38

## 2020-12-16 RX ADMIN — METFORMIN HYDROCHLORIDE 500 MG: 500 TABLET ORAL at 16:44

## 2020-12-16 RX ADMIN — GUAIFENESIN 1200 MG: 600 TABLET, EXTENDED RELEASE ORAL at 04:57

## 2020-12-16 ASSESSMENT — ENCOUNTER SYMPTOMS
FOCAL WEAKNESS: 0
ABDOMINAL PAIN: 0
COUGH: 1
CHILLS: 0
EYES NEGATIVE: 1
HEADACHES: 0
NAUSEA: 0
SHORTNESS OF BREATH: 1
FEVER: 0
MYALGIAS: 0
VOMITING: 0

## 2020-12-16 ASSESSMENT — PAIN DESCRIPTION - PAIN TYPE: TYPE: ACUTE PAIN

## 2020-12-16 ASSESSMENT — FIBROSIS 4 INDEX: FIB4 SCORE: 0.69

## 2020-12-16 NOTE — PROGRESS NOTES
Bedside report received from day RN. PT is A&Ox4. PT is on High Flow at 50L and 70% FiO2 . Tele box in place and verified by monitor room. No S/S of pain or discomfort at this time. PT is educated to use call light. Bed is in lowest and locked position. Isolation precautions in place Will continue plan of care.

## 2020-12-17 LAB
ALBUMIN SERPL BCP-MCNC: 3.3 G/DL (ref 3.2–4.9)
ALBUMIN/GLOB SERPL: 1.1 G/DL
ALP SERPL-CCNC: 47 U/L (ref 30–99)
ALT SERPL-CCNC: 26 U/L (ref 2–50)
ANION GAP SERPL CALC-SCNC: 9 MMOL/L (ref 7–16)
AST SERPL-CCNC: 13 U/L (ref 12–45)
BILIRUB SERPL-MCNC: 0.3 MG/DL (ref 0.1–1.5)
BUN SERPL-MCNC: 58 MG/DL (ref 8–22)
CALCIUM SERPL-MCNC: 8.6 MG/DL (ref 8.5–10.5)
CHLORIDE SERPL-SCNC: 102 MMOL/L (ref 96–112)
CO2 SERPL-SCNC: 22 MMOL/L (ref 20–33)
CREAT SERPL-MCNC: 1.6 MG/DL (ref 0.5–1.4)
ERYTHROCYTE [DISTWIDTH] IN BLOOD BY AUTOMATED COUNT: 48.3 FL (ref 35.9–50)
GLOBULIN SER CALC-MCNC: 2.9 G/DL (ref 1.9–3.5)
GLUCOSE BLD-MCNC: 116 MG/DL (ref 65–99)
GLUCOSE BLD-MCNC: 141 MG/DL (ref 65–99)
GLUCOSE BLD-MCNC: 182 MG/DL (ref 65–99)
GLUCOSE BLD-MCNC: 182 MG/DL (ref 65–99)
GLUCOSE SERPL-MCNC: 88 MG/DL (ref 65–99)
HCT VFR BLD AUTO: 38.4 % (ref 37–47)
HGB BLD-MCNC: 12.2 G/DL (ref 12–16)
MCH RBC QN AUTO: 26.8 PG (ref 27–33)
MCHC RBC AUTO-ENTMCNC: 31.8 G/DL (ref 33.6–35)
MCV RBC AUTO: 84.4 FL (ref 81.4–97.8)
PLATELET # BLD AUTO: 282 K/UL (ref 164–446)
PMV BLD AUTO: 9.3 FL (ref 9–12.9)
POTASSIUM SERPL-SCNC: 4.1 MMOL/L (ref 3.6–5.5)
PROT SERPL-MCNC: 6.2 G/DL (ref 6–8.2)
RBC # BLD AUTO: 4.55 M/UL (ref 4.2–5.4)
SODIUM SERPL-SCNC: 133 MMOL/L (ref 135–145)
WBC # BLD AUTO: 15 K/UL (ref 4.8–10.8)

## 2020-12-17 PROCEDURE — 770021 HCHG ROOM/CARE - ISO PRIVATE

## 2020-12-17 PROCEDURE — 700102 HCHG RX REV CODE 250 W/ 637 OVERRIDE(OP): Performed by: HOSPITALIST

## 2020-12-17 PROCEDURE — 700102 HCHG RX REV CODE 250 W/ 637 OVERRIDE(OP): Performed by: INTERNAL MEDICINE

## 2020-12-17 PROCEDURE — A9270 NON-COVERED ITEM OR SERVICE: HCPCS | Performed by: HOSPITALIST

## 2020-12-17 PROCEDURE — 94760 N-INVAS EAR/PLS OXIMETRY 1: CPT

## 2020-12-17 PROCEDURE — 700111 HCHG RX REV CODE 636 W/ 250 OVERRIDE (IP): Performed by: HOSPITALIST

## 2020-12-17 PROCEDURE — 700105 HCHG RX REV CODE 258: Performed by: INTERNAL MEDICINE

## 2020-12-17 PROCEDURE — 85027 COMPLETE CBC AUTOMATED: CPT

## 2020-12-17 PROCEDURE — A9270 NON-COVERED ITEM OR SERVICE: HCPCS | Performed by: INTERNAL MEDICINE

## 2020-12-17 PROCEDURE — 94640 AIRWAY INHALATION TREATMENT: CPT

## 2020-12-17 PROCEDURE — 99233 SBSQ HOSP IP/OBS HIGH 50: CPT | Performed by: STUDENT IN AN ORGANIZED HEALTH CARE EDUCATION/TRAINING PROGRAM

## 2020-12-17 PROCEDURE — 36415 COLL VENOUS BLD VENIPUNCTURE: CPT

## 2020-12-17 PROCEDURE — 82962 GLUCOSE BLOOD TEST: CPT

## 2020-12-17 PROCEDURE — 80053 COMPREHEN METABOLIC PANEL: CPT

## 2020-12-17 PROCEDURE — 700101 HCHG RX REV CODE 250: Performed by: INTERNAL MEDICINE

## 2020-12-17 RX ADMIN — INSULIN HUMAN 2 UNITS: 100 INJECTION, SOLUTION PARENTERAL at 20:52

## 2020-12-17 RX ADMIN — INSULIN HUMAN 2 UNITS: 100 INJECTION, SOLUTION PARENTERAL at 18:01

## 2020-12-17 RX ADMIN — GUAIFENESIN 1200 MG: 600 TABLET, EXTENDED RELEASE ORAL at 17:51

## 2020-12-17 RX ADMIN — INSULIN GLARGINE 20 UNITS: 100 INJECTION, SOLUTION SUBCUTANEOUS at 18:06

## 2020-12-17 RX ADMIN — ATENOLOL 50 MG: 50 TABLET ORAL at 05:35

## 2020-12-17 RX ADMIN — GUAIFENESIN 1200 MG: 600 TABLET, EXTENDED RELEASE ORAL at 05:35

## 2020-12-17 RX ADMIN — REMDESIVIR 100 MG: 100 INJECTION, POWDER, LYOPHILIZED, FOR SOLUTION INTRAVENOUS at 17:54

## 2020-12-17 RX ADMIN — FUROSEMIDE 20 MG: 10 INJECTION, SOLUTION INTRAMUSCULAR; INTRAVENOUS at 05:35

## 2020-12-17 RX ADMIN — MICONAZOLE NITRATE: 20 CREAM TOPICAL at 18:00

## 2020-12-17 RX ADMIN — LEVOTHYROXINE SODIUM 125 MCG: 0.12 TABLET ORAL at 17:52

## 2020-12-17 RX ADMIN — SIMVASTATIN 10 MG: 10 TABLET, FILM COATED ORAL at 17:51

## 2020-12-17 RX ADMIN — ENOXAPARIN SODIUM 40 MG: 40 INJECTION SUBCUTANEOUS at 17:53

## 2020-12-17 RX ADMIN — LISINOPRIL 20 MG: 20 TABLET ORAL at 05:35

## 2020-12-17 RX ADMIN — MICONAZOLE NITRATE: 20 CREAM TOPICAL at 05:35

## 2020-12-17 RX ADMIN — GLYCOPYRROLATE 1 CAPSULE: 15.6 CAPSULE RESPIRATORY (INHALATION) at 20:45

## 2020-12-17 RX ADMIN — BUDESONIDE AND FORMOTEROL FUMARATE DIHYDRATE 2 PUFF: 80; 4.5 AEROSOL RESPIRATORY (INHALATION) at 20:45

## 2020-12-17 RX ADMIN — SITAGLIPTIN 50 MG: 50 TABLET, FILM COATED ORAL at 05:35

## 2020-12-17 RX ADMIN — DEXAMETHASONE 6 MG: 4 TABLET ORAL at 06:39

## 2020-12-17 RX ADMIN — ENOXAPARIN SODIUM 40 MG: 40 INJECTION SUBCUTANEOUS at 05:35

## 2020-12-17 ASSESSMENT — ENCOUNTER SYMPTOMS
NAUSEA: 0
MYALGIAS: 0
FEVER: 0
CHILLS: 0
WEAKNESS: 1
EYES NEGATIVE: 1
VOMITING: 0
ABDOMINAL PAIN: 0
SHORTNESS OF BREATH: 1

## 2020-12-17 ASSESSMENT — PAIN DESCRIPTION - PAIN TYPE: TYPE: ACUTE PAIN

## 2020-12-17 ASSESSMENT — FIBROSIS 4 INDEX: FIB4 SCORE: 0.71

## 2020-12-17 NOTE — RESPIRATORY CARE
Respiratory Rapid Response Note    Symptoms Hypoxia     Patient had desaturation event after using bedside commode. HHFNC increased to 50 liters and 100% fio2 will cont to monitor.

## 2020-12-17 NOTE — CODE DOCUMENTATION
Pt assisted back to bed, recovering well, pt alert and oriented. RRT to follow. No current concerns from primary RN.

## 2020-12-17 NOTE — PROGRESS NOTES
Pt sitting on bedside commode.  Awake, alert, ready to go back to bed after completing bowel movement.  Pt attempts to stand with assist, becomes very short of breath on exertion, Pt is too weak to stand.  CNA at bedside calls for assist and rapid response team alerted.  Pt lifted from bedside commode to bed with max assist of 3 staff members.  Initial O2 sats in 80's on HF Oxygen at 40L 60%.  Respiratory care titrates O2 to maintain sats at 95%.  Pt becomes more alert and responsive as sats increase and work of breathing return to normal.  Rapid response team at bedside recording vitals and assessments.  Plan of care for today includes maintaining bedrest to prevent shortness of breath on exertion.  Pt understands plan and is agreeable.  Physician at bedside aware of acute changes in Pt status and that Pt has returned to baseline status at rest.  Will continue to monitor.

## 2020-12-17 NOTE — PROGRESS NOTES
Hospital Medicine Daily Progress Note    Date of Service  12/16/2020    Chief Complaint  80 y.o. female admitted 12/9/2020 with SOB    Hospital Course  A 80 year-old-woman with h/o DM, HTN, COPD presented with worsening SOB. Patient was found to have COVID 19 pneumonia, ANGIE.     Interval Problem Update  Afebrile, hemodynamically stable.  On HFNC 50 L 60% saturation 89-91  Patient reports SOB, cough,weakness,       Consultants/Specialty  None    Code Status  DNAR/DNI    Disposition  TBD    Review of Systems  Review of Systems   Constitutional: Positive for malaise/fatigue. Negative for chills and fever.   Eyes: Negative.    Respiratory: Positive for cough and shortness of breath.    Cardiovascular: Negative for chest pain.   Gastrointestinal: Negative for abdominal pain, nausea and vomiting.   Genitourinary: Negative for dysuria.   Musculoskeletal: Negative for myalgias.   Skin: Negative.    Neurological: Negative for focal weakness and headaches.        Physical Exam  Temp:  [35.8 °C (96.5 °F)-36.6 °C (97.8 °F)] 36.6 °C (97.8 °F)  Pulse:  [62-79] 74  Resp:  [18-23] 18  BP: (105-121)/(52-73) 105/73  SpO2:  [90 %-98 %] 94 %    Physical Exam  Vitals signs and nursing note reviewed.   Constitutional:       Appearance: She is obese. She is ill-appearing.   HENT:      Head: Normocephalic.      Ears:      Comments: Hard of hearing  Eyes:      Pupils: Pupils are equal, round, and reactive to light.   Cardiovascular:      Rate and Rhythm: Normal rate.      Heart sounds: Normal heart sounds.   Pulmonary:      Effort: Pulmonary effort is normal.      Breath sounds: Rhonchi and rales present.   Abdominal:      General: Abdomen is flat.      Tenderness: There is no abdominal tenderness.   Musculoskeletal: Normal range of motion.   Skin:     General: Skin is warm.   Neurological:      General: No focal deficit present.      Mental Status: She is alert and oriented to person, place, and time.         Fluids    Intake/Output  Summary (Last 24 hours) at 12/16/2020 1721  Last data filed at 12/15/2020 2100  Gross per 24 hour   Intake 120 ml   Output 300 ml   Net -180 ml       Laboratory  Recent Labs     12/14/20  0905 12/15/20  0450 12/16/20  0527   WBC 18.4* 11.4* 16.6*   RBC 4.74 4.58 4.71   HEMOGLOBIN 12.7 12.0 12.2   HEMATOCRIT 40.4 38.8 39.7   MCV 85.2 84.7 84.3   MCH 26.8* 26.2* 25.9*   MCHC 31.4* 30.9* 30.7*   RDW 48.2 46.5 47.7   PLATELETCT 339 292 337   MPV 8.9* 9.2 9.2     Recent Labs     12/14/20  0905 12/15/20  0450 12/16/20  0527   SODIUM 132* 133* 134*   POTASSIUM 4.8 4.5 4.2   CHLORIDE 100 99 101   CO2 20 22 23   GLUCOSE 117* 112* 80   BUN 40* 44* 58*   CREATININE 0.98 1.21 1.34   CALCIUM 9.0 8.7 8.7                   Imaging  DX-CHEST-LIMITED (1 VIEW)   Final Result      1.  Again seen bilateral interstitial and airspace infiltrates. This may represent Covid pneumonia.      2.  Cardiomegaly.      US-EXTREMITY VENOUS LOWER BILAT   Final Result      DX-CHEST-PORTABLE (1 VIEW)   Final Result      Development of right lower and left mid airspace process that are consistent with pneumonia and likely Covid pneumonia      EC-ECHOCARDIOGRAM COMPLETE W/O CONT    (Results Pending)        Assessment/Plan  * Acute hypoxemic respiratory failure due to COVID-19 (HCC)- (present on admission)  Assessment & Plan  Dyspnea and coughing  History of COPD  Chest x-ray bilateral infiltration  Oxygen requirement worsening to high flow  Continue Decadron  Initially was not started on remdesivir due to ANGIE  ID now consulted and started on remdesivir -monitor CMP  Proning    Chronic obstructive pulmonary disease ([facility])- (present on admission)  Assessment & Plan  With exacerbation due to COVID-19 pneumonia  DuoNebs every 4 Symbicort and seebri   Dexamethasone for COVID19  RT    DM2 (diabetes mellitus, type 2) ([facility])- (present on admission)  Assessment & Plan  A1c 7.7  With hyperglycemia present upon admit   Exacerbated by dexamethasone  Continue  Elisha  Hold home medications sitagliptin and Metformin since she has infection  Regular SSI and diabetic diet    CKD (chronic kidney disease), stage III- (present on admission)  Assessment & Plan  Baseline Cr around 1.2>>> improved to 0.9  Monitor BMP and assess response  Avoid IV contrast/nephrotoxins/NSAIDs  Dose adjust meds for decreased GFR    Cardiomegaly- (present on admission)  Assessment & Plan  Seen on chest x-ray  With elevated BNP  Started gentle Lasix IV 20 mg 12/15/2020  Echo pending    DNR (do not resuscitate)- (present on admission)  Assessment & Plan  Per patient's wishes    Hyponatremia- (present on admission)  Assessment & Plan  Mild, Na 131->134  Chronic no symptoms    Peripheral edema- (present on admission)  Assessment & Plan  Pitting BLE edema  BLE US negative for DVT    Hypothyroidism- (present on admission)  Assessment & Plan  Continue levothyroxine  Last TSH 2.2    HTN (hypertension)- (present on admission)  Assessment & Plan  Continue atenolol  Lisinopril and HCTZ held for ANGIE    Left bundle branch block- (present on admission)  Assessment & Plan  Present on EKG 6/3/17  Not indicative of acute ischemia       VTE prophylaxis: lovenox

## 2020-12-17 NOTE — PROGRESS NOTES
Received bedside report from RN, pt care assumed, VSS, pt assessment complete. Pt AAOx4, c/o 0/10 pain at this time. No signs of acute distress noted at this time. POC discussed with pt and verbalizes no questions. Pt denies any additional needs at this time. Bed in lowest position, pt educated on fall risk and verbalized understanding, call light within reach, hourly rounding initiated.

## 2020-12-17 NOTE — PROGRESS NOTES
Pt's daughter called and updated with Pt status.  Questions and concerns addressed.  Pt's daughter aware of Pt's activity intolerance and plan to maintain bedrest at this time.

## 2020-12-17 NOTE — WOUND TEAM
Received wound consult for sacrum. Pt presenting with MASD and satellite lesions. Gui ointment and barrier paste already in use. Dressing care order previously placed. Pt does not require advanced wound care. Wound team signing off and nursing to re-consult as needed for new or worsening wounds. Nursing to continue  implementing offloading measures.

## 2020-12-17 NOTE — PROGRESS NOTES
Hospital Medicine Daily Progress Note    Date of Service  12/17/2020    Chief Complaint  80 y.o. female admitted 12/9/2020 with SOB    Hospital Course  A 80 year-old-woman with h/o DM, HTN, COPD presented with worsening SOB. Patient was found to have COVID 19 pneumonia, ANGIE.     Interval Problem Update  Afebrile, hemodynamically stable.  Has leukocytosis 15.0, most likely 2/2 steroids.  Kidney function is getting worse: BUN 58, creatinine 1.60.  This morning patient became unresponsive after going to bedside commode. Patient desaturated too 80s during event. RRT was called. HFNC increased to 50L and 100% FiO2 100% oxygen. Saturation 95%. Patient regained consciousness. Mental status back to baseline.    Consultants/Specialty  None    Code Status  DNAR/DNI    Disposition  TBD    Review of Systems  Review of Systems   Constitutional: Positive for malaise/fatigue. Negative for chills and fever.   HENT: Positive for hearing loss.    Eyes: Negative.    Respiratory: Positive for shortness of breath.    Cardiovascular: Negative for chest pain.   Gastrointestinal: Negative for abdominal pain, nausea and vomiting.   Genitourinary: Negative for dysuria.   Musculoskeletal: Negative for myalgias.   Skin: Negative for rash.   Neurological: Positive for weakness.        Physical Exam  Temp:  [35.9 °C (96.7 °F)-36.6 °C (97.8 °F)] 35.9 °C (96.7 °F)  Pulse:  [60-89] 60  Resp:  [10-30] 16  BP: (105-138)/(58-78) 114/58  SpO2:  [83 %-100 %] 100 %    Physical Exam  Vitals signs and nursing note reviewed.   Constitutional:       Appearance: She is obese. She is ill-appearing.   HENT:      Head: Normocephalic.      Ears:      Comments: Hard of hearing  Eyes:      Pupils: Pupils are equal, round, and reactive to light.   Cardiovascular:      Rate and Rhythm: Normal rate.      Heart sounds: Normal heart sounds.   Pulmonary:      Effort: Pulmonary effort is normal.      Breath sounds: Rhonchi and rales present on left side.   Abdominal:       General: Abdomen is flat.      Tenderness: There is no abdominal tenderness.   Musculoskeletal: Normal range of motion.   Skin:     General: Skin is warm.   Neurological:      General: No focal deficit present.      Mental Status: She is alert and oriented to person, place, and time.         Fluids  No intake or output data in the 24 hours ending 12/17/20 1229    Laboratory  Recent Labs     12/15/20  0450 12/16/20  0527 12/17/20  0620   WBC 11.4* 16.6* 15.0*   RBC 4.58 4.71 4.55   HEMOGLOBIN 12.0 12.2 12.2   HEMATOCRIT 38.8 39.7 38.4   MCV 84.7 84.3 84.4   MCH 26.2* 25.9* 26.8*   MCHC 30.9* 30.7* 31.8*   RDW 46.5 47.7 48.3   PLATELETCT 292 337 282   MPV 9.2 9.2 9.3     Recent Labs     12/15/20  0450 12/16/20  0527 12/17/20  0620   SODIUM 133* 134* 133*   POTASSIUM 4.5 4.2 4.1   CHLORIDE 99 101 102   CO2 22 23 22   GLUCOSE 112* 80 88   BUN 44* 58* 58*   CREATININE 1.21 1.34 1.60*   CALCIUM 8.7 8.7 8.6                   Imaging  EC-ECHOCARDIOGRAM LTD W/O CONT   Final Result      DX-CHEST-LIMITED (1 VIEW)   Final Result      1.  Again seen bilateral interstitial and airspace infiltrates. This may represent Covid pneumonia.      2.  Cardiomegaly.      US-EXTREMITY VENOUS LOWER BILAT   Final Result      DX-CHEST-PORTABLE (1 VIEW)   Final Result      Development of right lower and left mid airspace process that are consistent with pneumonia and likely Covid pneumonia           Assessment/Plan  * Acute hypoxemic respiratory failure due to COVID-19 (HCC)- (present on admission)  Assessment & Plan  Dyspnea and coughing  History of COPD  Chest x-ray bilateral infiltration  Oxygen requirement worsening to high flow  Continue Decadron  Initially was not started on remdesivir due to ANGIE  ID now consulted and started on remdesivir -monitor CMP  Proning    Chronic obstructive pulmonary disease (HCC)- (present on admission)  Assessment & Plan  With exacerbation due to COVID-19 pneumonia  DuoNebs every 4 Symbicort and seebri    Dexamethasone for COVID19  RT    DM2 (diabetes mellitus, type 2) (HCC)- (present on admission)  Assessment & Plan  A1c 7.7  With hyperglycemia present upon admit   Exacerbated by dexamethasone  Continue Lantus  Hold home medications sitagliptin and Metformin since she has infection  Regular SSI and diabetic diet    CKD (chronic kidney disease), stage III- (present on admission)  Assessment & Plan  Baseline Cr around 1.2>>> 1.6  Monitor BMP and assess response  Avoid IV contrast/nephrotoxins/NSAIDs  Dose adjust meds for decreased GFR    Cardiomegaly- (present on admission)  Assessment & Plan  Seen on chest x-ray  With elevated BNP  Started gentle Lasix IV 20 mg 12/15/2020  Echo: Left ventricle not well visualized. No contrast was used by technologist. Unable to asses LV function. Dilated inferior vena cava without inspiratory collapse.    DNR (do not resuscitate)- (present on admission)  Assessment & Plan  Per patient's wishes    Hyponatremia- (present on admission)  Assessment & Plan  Mild, Na 131->134->133  Chronic no symptoms    Peripheral edema- (present on admission)  Assessment & Plan  Pitting BLE edema  BLE US negative for DVT    Hypothyroidism- (present on admission)  Assessment & Plan  Continue levothyroxine  Last TSH 2.2    HTN (hypertension)- (present on admission)  Assessment & Plan  Continue atenolol  Lisinopril and HCTZ held for ANGIE    Left bundle branch block- (present on admission)  Assessment & Plan  Present on EKG 6/3/17  Not indicative of acute ischemia       VTE prophylaxis: lovenox

## 2020-12-18 LAB
ANION GAP SERPL CALC-SCNC: 6 MMOL/L (ref 7–16)
BASOPHILS # BLD AUTO: 0.1 % (ref 0–1.8)
BASOPHILS # BLD: 0.02 K/UL (ref 0–0.12)
BUN SERPL-MCNC: 56 MG/DL (ref 8–22)
CALCIUM SERPL-MCNC: 8.8 MG/DL (ref 8.5–10.5)
CHLORIDE SERPL-SCNC: 104 MMOL/L (ref 96–112)
CO2 SERPL-SCNC: 22 MMOL/L (ref 20–33)
CREAT SERPL-MCNC: 1.47 MG/DL (ref 0.5–1.4)
EOSINOPHIL # BLD AUTO: 0.02 K/UL (ref 0–0.51)
EOSINOPHIL NFR BLD: 0.1 % (ref 0–6.9)
ERYTHROCYTE [DISTWIDTH] IN BLOOD BY AUTOMATED COUNT: 48.1 FL (ref 35.9–50)
GLUCOSE BLD-MCNC: 133 MG/DL (ref 65–99)
GLUCOSE BLD-MCNC: 158 MG/DL (ref 65–99)
GLUCOSE BLD-MCNC: 198 MG/DL (ref 65–99)
GLUCOSE BLD-MCNC: 213 MG/DL (ref 65–99)
GLUCOSE SERPL-MCNC: 121 MG/DL (ref 65–99)
HCT VFR BLD AUTO: 39.9 % (ref 37–47)
HGB BLD-MCNC: 12.3 G/DL (ref 12–16)
IMM GRANULOCYTES # BLD AUTO: 0.14 K/UL (ref 0–0.11)
IMM GRANULOCYTES NFR BLD AUTO: 0.9 % (ref 0–0.9)
LYMPHOCYTES # BLD AUTO: 1.83 K/UL (ref 1–4.8)
LYMPHOCYTES NFR BLD: 12.4 % (ref 22–41)
MCH RBC QN AUTO: 26.3 PG (ref 27–33)
MCHC RBC AUTO-ENTMCNC: 30.8 G/DL (ref 33.6–35)
MCV RBC AUTO: 85.3 FL (ref 81.4–97.8)
MONOCYTES # BLD AUTO: 1.29 K/UL (ref 0–0.85)
MONOCYTES NFR BLD AUTO: 8.7 % (ref 0–13.4)
NEUTROPHILS # BLD AUTO: 11.45 K/UL (ref 2–7.15)
NEUTROPHILS NFR BLD: 77.8 % (ref 44–72)
NRBC # BLD AUTO: 0 K/UL
NRBC BLD-RTO: 0 /100 WBC
PLATELET # BLD AUTO: 342 K/UL (ref 164–446)
PMV BLD AUTO: 9.5 FL (ref 9–12.9)
POTASSIUM SERPL-SCNC: 4.5 MMOL/L (ref 3.6–5.5)
RBC # BLD AUTO: 4.68 M/UL (ref 4.2–5.4)
SODIUM SERPL-SCNC: 132 MMOL/L (ref 135–145)
WBC # BLD AUTO: 14.8 K/UL (ref 4.8–10.8)

## 2020-12-18 PROCEDURE — 700105 HCHG RX REV CODE 258: Performed by: INTERNAL MEDICINE

## 2020-12-18 PROCEDURE — A9270 NON-COVERED ITEM OR SERVICE: HCPCS | Performed by: HOSPITALIST

## 2020-12-18 PROCEDURE — 80048 BASIC METABOLIC PNL TOTAL CA: CPT

## 2020-12-18 PROCEDURE — 99233 SBSQ HOSP IP/OBS HIGH 50: CPT | Performed by: STUDENT IN AN ORGANIZED HEALTH CARE EDUCATION/TRAINING PROGRAM

## 2020-12-18 PROCEDURE — 82962 GLUCOSE BLOOD TEST: CPT | Mod: 91

## 2020-12-18 PROCEDURE — 700102 HCHG RX REV CODE 250 W/ 637 OVERRIDE(OP): Performed by: HOSPITALIST

## 2020-12-18 PROCEDURE — 85025 COMPLETE CBC W/AUTO DIFF WBC: CPT

## 2020-12-18 PROCEDURE — 700102 HCHG RX REV CODE 250 W/ 637 OVERRIDE(OP): Performed by: INTERNAL MEDICINE

## 2020-12-18 PROCEDURE — A9270 NON-COVERED ITEM OR SERVICE: HCPCS | Performed by: INTERNAL MEDICINE

## 2020-12-18 PROCEDURE — 36415 COLL VENOUS BLD VENIPUNCTURE: CPT

## 2020-12-18 PROCEDURE — 770021 HCHG ROOM/CARE - ISO PRIVATE

## 2020-12-18 PROCEDURE — 700101 HCHG RX REV CODE 250: Performed by: INTERNAL MEDICINE

## 2020-12-18 PROCEDURE — 700111 HCHG RX REV CODE 636 W/ 250 OVERRIDE (IP): Performed by: HOSPITALIST

## 2020-12-18 RX ORDER — BUDESONIDE AND FORMOTEROL FUMARATE DIHYDRATE 80; 4.5 UG/1; UG/1
2 AEROSOL RESPIRATORY (INHALATION) 2 TIMES DAILY
Status: DISCONTINUED | OUTPATIENT
Start: 2020-12-19 | End: 2020-12-22 | Stop reason: HOSPADM

## 2020-12-18 RX ADMIN — GUAIFENESIN 1200 MG: 600 TABLET, EXTENDED RELEASE ORAL at 18:57

## 2020-12-18 RX ADMIN — GUAIFENESIN 1200 MG: 600 TABLET, EXTENDED RELEASE ORAL at 06:39

## 2020-12-18 RX ADMIN — REMDESIVIR 100 MG: 100 INJECTION, POWDER, LYOPHILIZED, FOR SOLUTION INTRAVENOUS at 18:55

## 2020-12-18 RX ADMIN — DEXAMETHASONE 6 MG: 4 TABLET ORAL at 05:07

## 2020-12-18 RX ADMIN — SIMVASTATIN 10 MG: 10 TABLET, FILM COATED ORAL at 18:57

## 2020-12-18 RX ADMIN — INSULIN HUMAN 2 UNITS: 100 INJECTION, SOLUTION PARENTERAL at 18:59

## 2020-12-18 RX ADMIN — MICONAZOLE NITRATE: 20 CREAM TOPICAL at 05:08

## 2020-12-18 RX ADMIN — ATENOLOL 50 MG: 50 TABLET ORAL at 05:07

## 2020-12-18 RX ADMIN — INSULIN GLARGINE 20 UNITS: 100 INJECTION, SOLUTION SUBCUTANEOUS at 19:01

## 2020-12-18 RX ADMIN — ENOXAPARIN SODIUM 40 MG: 40 INJECTION SUBCUTANEOUS at 05:07

## 2020-12-18 RX ADMIN — INSULIN HUMAN 2 UNITS: 100 INJECTION, SOLUTION PARENTERAL at 20:40

## 2020-12-18 RX ADMIN — LEVOTHYROXINE SODIUM 125 MCG: 0.12 TABLET ORAL at 18:57

## 2020-12-18 RX ADMIN — ENOXAPARIN SODIUM 40 MG: 40 INJECTION SUBCUTANEOUS at 18:56

## 2020-12-18 RX ADMIN — INSULIN HUMAN 3 UNITS: 100 INJECTION, SOLUTION PARENTERAL at 13:49

## 2020-12-18 RX ADMIN — LISINOPRIL 20 MG: 20 TABLET ORAL at 05:07

## 2020-12-18 ASSESSMENT — PAIN DESCRIPTION - PAIN TYPE: TYPE: ACUTE PAIN

## 2020-12-18 ASSESSMENT — FIBROSIS 4 INDEX: FIB4 SCORE: 0.59

## 2020-12-18 NOTE — PROGRESS NOTES
Unsuccessful attempt to restart IV X 2 in right arm, will consult charge nurse to obtain IV access.

## 2020-12-19 LAB
ANION GAP SERPL CALC-SCNC: 8 MMOL/L (ref 7–16)
BASOPHILS # BLD AUTO: 0.2 % (ref 0–1.8)
BASOPHILS # BLD: 0.02 K/UL (ref 0–0.12)
BUN SERPL-MCNC: 52 MG/DL (ref 8–22)
CALCIUM SERPL-MCNC: 8.9 MG/DL (ref 8.5–10.5)
CHLORIDE SERPL-SCNC: 101 MMOL/L (ref 96–112)
CO2 SERPL-SCNC: 23 MMOL/L (ref 20–33)
CREAT SERPL-MCNC: 1.18 MG/DL (ref 0.5–1.4)
EOSINOPHIL # BLD AUTO: 0.03 K/UL (ref 0–0.51)
EOSINOPHIL NFR BLD: 0.2 % (ref 0–6.9)
ERYTHROCYTE [DISTWIDTH] IN BLOOD BY AUTOMATED COUNT: 50.1 FL (ref 35.9–50)
GLUCOSE BLD-MCNC: 200 MG/DL (ref 65–99)
GLUCOSE BLD-MCNC: 203 MG/DL (ref 65–99)
GLUCOSE BLD-MCNC: 269 MG/DL (ref 65–99)
GLUCOSE BLD-MCNC: 71 MG/DL (ref 65–99)
GLUCOSE SERPL-MCNC: 71 MG/DL (ref 65–99)
HCT VFR BLD AUTO: 39.5 % (ref 37–47)
HGB BLD-MCNC: 12.1 G/DL (ref 12–16)
IMM GRANULOCYTES # BLD AUTO: 0.09 K/UL (ref 0–0.11)
IMM GRANULOCYTES NFR BLD AUTO: 0.7 % (ref 0–0.9)
LYMPHOCYTES # BLD AUTO: 1.66 K/UL (ref 1–4.8)
LYMPHOCYTES NFR BLD: 13.8 % (ref 22–41)
MCH RBC QN AUTO: 26.6 PG (ref 27–33)
MCHC RBC AUTO-ENTMCNC: 30.6 G/DL (ref 33.6–35)
MCV RBC AUTO: 86.8 FL (ref 81.4–97.8)
MONOCYTES # BLD AUTO: 1.12 K/UL (ref 0–0.85)
MONOCYTES NFR BLD AUTO: 9.3 % (ref 0–13.4)
NEUTROPHILS # BLD AUTO: 9.09 K/UL (ref 2–7.15)
NEUTROPHILS NFR BLD: 75.8 % (ref 44–72)
NRBC # BLD AUTO: 0 K/UL
NRBC BLD-RTO: 0 /100 WBC
PLATELET # BLD AUTO: 286 K/UL (ref 164–446)
PMV BLD AUTO: 9.7 FL (ref 9–12.9)
POTASSIUM SERPL-SCNC: 4.4 MMOL/L (ref 3.6–5.5)
RBC # BLD AUTO: 4.55 M/UL (ref 4.2–5.4)
SODIUM SERPL-SCNC: 132 MMOL/L (ref 135–145)
WBC # BLD AUTO: 12 K/UL (ref 4.8–10.8)

## 2020-12-19 PROCEDURE — 700102 HCHG RX REV CODE 250 W/ 637 OVERRIDE(OP): Performed by: HOSPITALIST

## 2020-12-19 PROCEDURE — 99232 SBSQ HOSP IP/OBS MODERATE 35: CPT | Performed by: STUDENT IN AN ORGANIZED HEALTH CARE EDUCATION/TRAINING PROGRAM

## 2020-12-19 PROCEDURE — 36415 COLL VENOUS BLD VENIPUNCTURE: CPT

## 2020-12-19 PROCEDURE — 770021 HCHG ROOM/CARE - ISO PRIVATE

## 2020-12-19 PROCEDURE — 700102 HCHG RX REV CODE 250 W/ 637 OVERRIDE(OP): Performed by: INTERNAL MEDICINE

## 2020-12-19 PROCEDURE — 80048 BASIC METABOLIC PNL TOTAL CA: CPT

## 2020-12-19 PROCEDURE — 85025 COMPLETE CBC W/AUTO DIFF WBC: CPT

## 2020-12-19 PROCEDURE — 82962 GLUCOSE BLOOD TEST: CPT

## 2020-12-19 PROCEDURE — A9270 NON-COVERED ITEM OR SERVICE: HCPCS | Performed by: INTERNAL MEDICINE

## 2020-12-19 PROCEDURE — A9270 NON-COVERED ITEM OR SERVICE: HCPCS | Performed by: HOSPITALIST

## 2020-12-19 PROCEDURE — 700111 HCHG RX REV CODE 636 W/ 250 OVERRIDE (IP): Performed by: HOSPITALIST

## 2020-12-19 RX ADMIN — ENOXAPARIN SODIUM 40 MG: 40 INJECTION SUBCUTANEOUS at 17:20

## 2020-12-19 RX ADMIN — BUDESONIDE AND FORMOTEROL FUMARATE DIHYDRATE 2 PUFF: 80; 4.5 AEROSOL RESPIRATORY (INHALATION) at 17:19

## 2020-12-19 RX ADMIN — SIMVASTATIN 10 MG: 10 TABLET, FILM COATED ORAL at 17:20

## 2020-12-19 RX ADMIN — GLYCOPYRROLATE 1 CAPSULE: 15.6 CAPSULE RESPIRATORY (INHALATION) at 05:10

## 2020-12-19 RX ADMIN — INSULIN HUMAN 3 UNITS: 100 INJECTION, SOLUTION PARENTERAL at 21:34

## 2020-12-19 RX ADMIN — INSULIN GLARGINE 20 UNITS: 100 INJECTION, SOLUTION SUBCUTANEOUS at 17:27

## 2020-12-19 RX ADMIN — GLYCOPYRROLATE 1 CAPSULE: 15.6 CAPSULE RESPIRATORY (INHALATION) at 17:19

## 2020-12-19 RX ADMIN — INSULIN HUMAN 5 UNITS: 100 INJECTION, SOLUTION PARENTERAL at 11:43

## 2020-12-19 RX ADMIN — GUAIFENESIN 1200 MG: 600 TABLET, EXTENDED RELEASE ORAL at 05:10

## 2020-12-19 RX ADMIN — ENOXAPARIN SODIUM 40 MG: 40 INJECTION SUBCUTANEOUS at 05:11

## 2020-12-19 RX ADMIN — DEXAMETHASONE 6 MG: 4 TABLET ORAL at 05:10

## 2020-12-19 RX ADMIN — LEVOTHYROXINE SODIUM 125 MCG: 0.12 TABLET ORAL at 17:20

## 2020-12-19 RX ADMIN — GUAIFENESIN 1200 MG: 600 TABLET, EXTENDED RELEASE ORAL at 17:20

## 2020-12-19 RX ADMIN — INSULIN HUMAN 2 UNITS: 100 INJECTION, SOLUTION PARENTERAL at 17:26

## 2020-12-19 RX ADMIN — BUDESONIDE AND FORMOTEROL FUMARATE DIHYDRATE 2 PUFF: 80; 4.5 AEROSOL RESPIRATORY (INHALATION) at 05:10

## 2020-12-19 RX ADMIN — MICONAZOLE NITRATE: 20 CREAM TOPICAL at 05:10

## 2020-12-19 RX ADMIN — MICONAZOLE NITRATE: 20 CREAM TOPICAL at 17:20

## 2020-12-19 NOTE — PROGRESS NOTES
Hospital Medicine Daily Progress Note    Date of Service  12/19/2020    Chief Complaint  80 y.o. female admitted 12/9/2020 with SOB    Hospital Course  A 80 year-old-woman with h/o DM, HTN, COPD (on home oxygen) presented with worsening SOB. Patient was found to have COVID 19 pneumonia, ANGIE.   On 12/17/2020 patient became unresponsive after going to bedside commode. Patient desaturated too 80s during event. RRT was called. HFNC increased to 50L and 100% FiO2 100% oxygen. Saturation 95%. Patient regained consciousness. Mental status back to baseline.     Interval Problem Update  Reports SOB, cough, weakness  Afebrile, hemodynamically stable  Leukocytosis improving 14.8->12.0  Kidney function improving BUN 56->52, creatinine 1.47->1.18  Oxygen requirements decreasing from 10 L yesterday to 7 L oxymask today.  Updated daughter over phone. Home oxygen concentrator up to 5L.      Consultants/Specialty  None    Code Status  DNAR/DNI    Disposition  TBD    Review of Systems  Review of Systems   Constitutional: Positive for malaise/fatigue. Negative for chills and fever.   HENT: Positive for hearing loss.    Eyes: Negative.    Respiratory: Positive for shortness of breath.    Cardiovascular: Negative for chest pain.   Gastrointestinal: Negative for abdominal pain, nausea and vomiting.   Genitourinary: Negative for dysuria.   Musculoskeletal: Negative for myalgias.   Skin: Negative for rash.   Neurological: Positive for weakness.     Physical Exam  Temp:  [36 °C (96.8 °F)-36.6 °C (97.9 °F)] 36.3 °C (97.3 °F)  Pulse:  [60-66] 64  Resp:  [18-19] 19  BP: ()/(44-65) 116/44  SpO2:  [95 %-99 %] 97 %    Physical Exam  Vitals signs and nursing note reviewed.   Constitutional:       Appearance: She is obese. She is ill-appearing.   HENT:      Head: Normocephalic.      Ears:      Comments: Hard of hearing  Eyes:      Pupils: Pupils are equal, round, and reactive to light.   Cardiovascular:      Rate and Rhythm: Normal rate.       Heart sounds: Normal heart sounds.   Pulmonary:      Effort: Pulmonary effort is normal.      Breath sounds: Rhonchi and rales present on left side.   Abdominal:      General: Abdomen is flat.      Tenderness: There is no abdominal tenderness.   Musculoskeletal: Normal range of motion.   Skin:     General: Skin is warm.   Neurological:      General: No focal deficit present.      Mental Status: She is alert and oriented to person, place, and time.      Fluids    Intake/Output Summary (Last 24 hours) at 12/19/2020 1406  Last data filed at 12/19/2020 1346  Gross per 24 hour   Intake 540 ml   Output 550 ml   Net -10 ml       Laboratory  Recent Labs     12/17/20  0620 12/18/20  0619 12/19/20  0624   WBC 15.0* 14.8* 12.0*   RBC 4.55 4.68 4.55   HEMOGLOBIN 12.2 12.3 12.1   HEMATOCRIT 38.4 39.9 39.5   MCV 84.4 85.3 86.8   MCH 26.8* 26.3* 26.6*   MCHC 31.8* 30.8* 30.6*   RDW 48.3 48.1 50.1*   PLATELETCT 282 342 286   MPV 9.3 9.5 9.7     Recent Labs     12/17/20  0620 12/18/20  0619 12/19/20  0624   SODIUM 133* 132* 132*   POTASSIUM 4.1 4.5 4.4   CHLORIDE 102 104 101   CO2 22 22 23   GLUCOSE 88 121* 71   BUN 58* 56* 52*   CREATININE 1.60* 1.47* 1.18   CALCIUM 8.6 8.8 8.9                   Imaging  EC-ECHOCARDIOGRAM LTD W/O CONT   Final Result      DX-CHEST-LIMITED (1 VIEW)   Final Result      1.  Again seen bilateral interstitial and airspace infiltrates. This may represent Covid pneumonia.      2.  Cardiomegaly.      US-EXTREMITY VENOUS LOWER BILAT   Final Result      DX-CHEST-PORTABLE (1 VIEW)   Final Result      Development of right lower and left mid airspace process that are consistent with pneumonia and likely Covid pneumonia           Assessment/Plan  * Acute hypoxemic respiratory failure due to COVID-19 (HCC)- (present on admission)  Assessment & Plan  Dyspnea and coughing  History of COPD (on home oxygen, home oxygen concentrator up to 5L)  Chest x-ray bilateral infiltration  Oxygen requirement  improving  Completed Decadron  Completed remdesivir  Proning    Chronic obstructive pulmonary disease (HCC)- (present on admission)  Assessment & Plan  With exacerbation due to COVID-19 pneumonia  DuoNebs every 4 Symbicort and seebri   Completed course of Dexamethasone for COVID19  RT    DM2 (diabetes mellitus, type 2) (HCC)- (present on admission)  Assessment & Plan  A1c 7.7  With hyperglycemia present upon admit   Exacerbated by dexamethasone  Continue Lantus  Hold home medications sitagliptin and Metformin since she has infection  Regular SSI and diabetic diet    CKD (chronic kidney disease), stage III- (present on admission)  Assessment & Plan  Baseline Cr around 1.2>>> 1.6  Monitor BMP and assess response  Avoid IV contrast/nephrotoxins/NSAIDs  Dose adjust meds for decreased GFR    Cardiomegaly- (present on admission)  Assessment & Plan  Seen on chest x-ray  With elevated BNP  Echo: Left ventricle not well visualized. No contrast was used by technologist. Unable to asses LV function. Dilated inferior vena cava without inspiratory collapse    DNR (do not resuscitate)- (present on admission)  Assessment & Plan  Per patient's wishes    Hyponatremia- (present on admission)  Assessment & Plan  Mild, Na 131->134->133  Chronic no symptoms    Peripheral edema- (present on admission)  Assessment & Plan  Pitting BLE edema  BLE US negative for DVT    Hypothyroidism- (present on admission)  Assessment & Plan  Continue levothyroxine  Last TSH 2.2    HTN (hypertension)- (present on admission)  Assessment & Plan  Continue atenolol  Lisinopril and HCTZ held for ANGIE    Left bundle branch block- (present on admission)  Assessment & Plan  Present on EKG 6/3/17  Not indicative of acute ischemia       VTE prophylaxis: lovenox

## 2020-12-19 NOTE — PROGRESS NOTES
Hospital Medicine Daily Progress Note    Date of Service  12/18/2020    Chief Complaint  80 y.o. female admitted 12/9/2020 with SOB    Hospital Course  A 80 year-old-woman with h/o DM, HTN, COPD presented with worsening SOB. Patient was found to have COVID 19 pneumonia, ANGIE.   On 12/17/2020 patient became unresponsive after going to bedside commode. Patient desaturated too 80s during event. RRT was called. HFNC increased to 50L and 100% FiO2 100% oxygen. Saturation 95%. Patient regained consciousness. Mental status back to baseline.     Interval Problem Update  Patient is on 10 L oxygen via mask  Afebrile, hemodynamically stable.  Leukocytosis stable, 14.8, likely 2/2 steroids  Kidney function stable BUN 56, creatinine 1.47    Consultants/Specialty  None    Code Status  DNAR/DNI    Disposition  TBD    Review of Systems  Review of Systems   Constitutional: Positive for malaise/fatigue. Negative for chills and fever.   HENT: Positive for hearing loss.    Eyes: Negative.    Respiratory: Positive for shortness of breath.    Cardiovascular: Negative for chest pain.   Gastrointestinal: Negative for abdominal pain, nausea and vomiting.   Genitourinary: Negative for dysuria.   Musculoskeletal: Negative for myalgias.   Skin: Negative for rash.   Neurological: Positive for weakness.     Physical Exam  Temp:  [36.1 °C (97 °F)-37.1 °C (98.8 °F)] 36.6 °C (97.9 °F)  Pulse:  [60-70] 60  Resp:  [17-20] 18  BP: (110-119)/(53-68) 118/53  SpO2:  [92 %-99 %] 95 %    Physical Exam  Vitals signs and nursing note reviewed.   Constitutional:       Appearance: She is obese. She is ill-appearing.   HENT:      Head: Normocephalic.      Ears:      Comments: Hard of hearing  Eyes:      Pupils: Pupils are equal, round, and reactive to light.   Cardiovascular:      Rate and Rhythm: Normal rate.      Heart sounds: Normal heart sounds.   Pulmonary:      Effort: Pulmonary effort is normal.      Breath sounds: Rhonchi and rales present on left side.    Abdominal:      General: Abdomen is flat.      Tenderness: There is no abdominal tenderness.   Musculoskeletal: Normal range of motion.   Skin:     General: Skin is warm.   Neurological:      General: No focal deficit present.      Mental Status: She is alert and oriented to person, place, and time.      Fluids    Intake/Output Summary (Last 24 hours) at 12/18/2020 1634  Last data filed at 12/18/2020 0500  Gross per 24 hour   Intake --   Output 800 ml   Net -800 ml       Laboratory  Recent Labs     12/16/20 0527 12/17/20 0620 12/18/20 0619   WBC 16.6* 15.0* 14.8*   RBC 4.71 4.55 4.68   HEMOGLOBIN 12.2 12.2 12.3   HEMATOCRIT 39.7 38.4 39.9   MCV 84.3 84.4 85.3   MCH 25.9* 26.8* 26.3*   MCHC 30.7* 31.8* 30.8*   RDW 47.7 48.3 48.1   PLATELETCT 337 282 342   MPV 9.2 9.3 9.5     Recent Labs     12/16/20 0527 12/17/20 0620 12/18/20 0619   SODIUM 134* 133* 132*   POTASSIUM 4.2 4.1 4.5   CHLORIDE 101 102 104   CO2 23 22 22   GLUCOSE 80 88 121*   BUN 58* 58* 56*   CREATININE 1.34 1.60* 1.47*   CALCIUM 8.7 8.6 8.8                   Imaging  EC-ECHOCARDIOGRAM LTD W/O CONT   Final Result      DX-CHEST-LIMITED (1 VIEW)   Final Result      1.  Again seen bilateral interstitial and airspace infiltrates. This may represent Covid pneumonia.      2.  Cardiomegaly.      US-EXTREMITY VENOUS LOWER BILAT   Final Result      DX-CHEST-PORTABLE (1 VIEW)   Final Result      Development of right lower and left mid airspace process that are consistent with pneumonia and likely Covid pneumonia           Assessment/Plan  * Acute hypoxemic respiratory failure due to COVID-19 (HCC)- (present on admission)  Assessment & Plan  Dyspnea and coughing  History of COPD  Chest x-ray bilateral infiltration  Oxygen requirement worsening to high flow  Continue Decadron  Initially was not started on remdesivir due to ANGIE  ID now consulted and started on remdesivir -monitor CMP  Proning    Chronic obstructive pulmonary disease (HCC)- (present on  admission)  Assessment & Plan  With exacerbation due to COVID-19 pneumonia  DuoNebs every 4 Symbicort and seebri   Dexamethasone for COVID19  RT    DM2 (diabetes mellitus, type 2) (HCC)- (present on admission)  Assessment & Plan  A1c 7.7  With hyperglycemia present upon admit   Exacerbated by dexamethasone  Continue Lantus  Hold home medications sitagliptin and Metformin since she has infection  Regular SSI and diabetic diet    CKD (chronic kidney disease), stage III- (present on admission)  Assessment & Plan  Baseline Cr around 1.2>>> 1.6  Monitor BMP and assess response  Avoid IV contrast/nephrotoxins/NSAIDs  Dose adjust meds for decreased GFR    Cardiomegaly- (present on admission)  Assessment & Plan  Seen on chest x-ray  With elevated BNP  Started gentle Lasix IV 20 mg 12/15/2020  Echo: Left ventricle not well visualized. No contrast was used by technologist. Unable to asses LV function. Dilated inferior vena cava without inspiratory collapse.    DNR (do not resuscitate)- (present on admission)  Assessment & Plan  Per patient's wishes    Hyponatremia- (present on admission)  Assessment & Plan  Mild, Na 131->134->133  Chronic no symptoms    Peripheral edema- (present on admission)  Assessment & Plan  Pitting BLE edema  BLE US negative for DVT    Hypothyroidism- (present on admission)  Assessment & Plan  Continue levothyroxine  Last TSH 2.2    HTN (hypertension)- (present on admission)  Assessment & Plan  Continue atenolol  Lisinopril and HCTZ held for ANGIE    Left bundle branch block- (present on admission)  Assessment & Plan  Present on EKG 6/3/17  Not indicative of acute ischemia         VTE prophylaxis: lovenox

## 2020-12-20 LAB
ANION GAP SERPL CALC-SCNC: 8 MMOL/L (ref 7–16)
BASOPHILS # BLD AUTO: 0.1 % (ref 0–1.8)
BASOPHILS # BLD: 0.01 K/UL (ref 0–0.12)
BUN SERPL-MCNC: 44 MG/DL (ref 8–22)
CALCIUM SERPL-MCNC: 8.7 MG/DL (ref 8.5–10.5)
CHLORIDE SERPL-SCNC: 102 MMOL/L (ref 96–112)
CO2 SERPL-SCNC: 24 MMOL/L (ref 20–33)
CREAT SERPL-MCNC: 1.28 MG/DL (ref 0.5–1.4)
EOSINOPHIL # BLD AUTO: 0.05 K/UL (ref 0–0.51)
EOSINOPHIL NFR BLD: 0.4 % (ref 0–6.9)
ERYTHROCYTE [DISTWIDTH] IN BLOOD BY AUTOMATED COUNT: 50.4 FL (ref 35.9–50)
GLUCOSE BLD-MCNC: 107 MG/DL (ref 65–99)
GLUCOSE BLD-MCNC: 113 MG/DL (ref 65–99)
GLUCOSE BLD-MCNC: 117 MG/DL (ref 65–99)
GLUCOSE BLD-MCNC: 119 MG/DL (ref 65–99)
GLUCOSE BLD-MCNC: 63 MG/DL (ref 65–99)
GLUCOSE SERPL-MCNC: 112 MG/DL (ref 65–99)
HCT VFR BLD AUTO: 40.6 % (ref 37–47)
HGB BLD-MCNC: 12.2 G/DL (ref 12–16)
IMM GRANULOCYTES # BLD AUTO: 0.08 K/UL (ref 0–0.11)
IMM GRANULOCYTES NFR BLD AUTO: 0.6 % (ref 0–0.9)
LYMPHOCYTES # BLD AUTO: 1.53 K/UL (ref 1–4.8)
LYMPHOCYTES NFR BLD: 11.7 % (ref 22–41)
MCH RBC QN AUTO: 26.3 PG (ref 27–33)
MCHC RBC AUTO-ENTMCNC: 30 G/DL (ref 33.6–35)
MCV RBC AUTO: 87.7 FL (ref 81.4–97.8)
MONOCYTES # BLD AUTO: 1.29 K/UL (ref 0–0.85)
MONOCYTES NFR BLD AUTO: 9.9 % (ref 0–13.4)
NEUTROPHILS # BLD AUTO: 10.1 K/UL (ref 2–7.15)
NEUTROPHILS NFR BLD: 77.3 % (ref 44–72)
NRBC # BLD AUTO: 0 K/UL
NRBC BLD-RTO: 0 /100 WBC
PLATELET # BLD AUTO: 258 K/UL (ref 164–446)
PMV BLD AUTO: 9.6 FL (ref 9–12.9)
POTASSIUM SERPL-SCNC: 4.8 MMOL/L (ref 3.6–5.5)
RBC # BLD AUTO: 4.63 M/UL (ref 4.2–5.4)
SODIUM SERPL-SCNC: 134 MMOL/L (ref 135–145)
WBC # BLD AUTO: 13.1 K/UL (ref 4.8–10.8)

## 2020-12-20 PROCEDURE — 80048 BASIC METABOLIC PNL TOTAL CA: CPT

## 2020-12-20 PROCEDURE — 99232 SBSQ HOSP IP/OBS MODERATE 35: CPT | Performed by: STUDENT IN AN ORGANIZED HEALTH CARE EDUCATION/TRAINING PROGRAM

## 2020-12-20 PROCEDURE — 82962 GLUCOSE BLOOD TEST: CPT | Mod: 91

## 2020-12-20 PROCEDURE — 85025 COMPLETE CBC W/AUTO DIFF WBC: CPT

## 2020-12-20 PROCEDURE — 36415 COLL VENOUS BLD VENIPUNCTURE: CPT

## 2020-12-20 PROCEDURE — A9270 NON-COVERED ITEM OR SERVICE: HCPCS | Performed by: HOSPITALIST

## 2020-12-20 PROCEDURE — 770021 HCHG ROOM/CARE - ISO PRIVATE

## 2020-12-20 PROCEDURE — 700111 HCHG RX REV CODE 636 W/ 250 OVERRIDE (IP): Performed by: HOSPITALIST

## 2020-12-20 PROCEDURE — 700102 HCHG RX REV CODE 250 W/ 637 OVERRIDE(OP): Performed by: HOSPITALIST

## 2020-12-20 RX ORDER — INSULIN GLARGINE 100 [IU]/ML
15 INJECTION, SOLUTION SUBCUTANEOUS EVERY EVENING
Status: DISCONTINUED | OUTPATIENT
Start: 2020-12-20 | End: 2020-12-21

## 2020-12-20 RX ADMIN — GUAIFENESIN 1200 MG: 600 TABLET, EXTENDED RELEASE ORAL at 05:45

## 2020-12-20 RX ADMIN — INSULIN GLARGINE 15 UNITS: 100 INJECTION, SOLUTION SUBCUTANEOUS at 17:04

## 2020-12-20 RX ADMIN — ENOXAPARIN SODIUM 40 MG: 40 INJECTION SUBCUTANEOUS at 05:46

## 2020-12-20 RX ADMIN — MICONAZOLE NITRATE: 20 CREAM TOPICAL at 20:46

## 2020-12-20 RX ADMIN — BUDESONIDE AND FORMOTEROL FUMARATE DIHYDRATE 2 PUFF: 80; 4.5 AEROSOL RESPIRATORY (INHALATION) at 05:46

## 2020-12-20 RX ADMIN — ENOXAPARIN SODIUM 40 MG: 40 INJECTION SUBCUTANEOUS at 17:01

## 2020-12-20 RX ADMIN — GUAIFENESIN 1200 MG: 600 TABLET, EXTENDED RELEASE ORAL at 17:01

## 2020-12-20 RX ADMIN — SIMVASTATIN 10 MG: 10 TABLET, FILM COATED ORAL at 17:01

## 2020-12-20 RX ADMIN — GLYCOPYRROLATE 1 CAPSULE: 15.6 CAPSULE RESPIRATORY (INHALATION) at 17:03

## 2020-12-20 RX ADMIN — BUDESONIDE AND FORMOTEROL FUMARATE DIHYDRATE 2 PUFF: 80; 4.5 AEROSOL RESPIRATORY (INHALATION) at 17:00

## 2020-12-20 RX ADMIN — LISINOPRIL 20 MG: 20 TABLET ORAL at 05:45

## 2020-12-20 RX ADMIN — ATENOLOL 50 MG: 50 TABLET ORAL at 05:45

## 2020-12-20 RX ADMIN — LEVOTHYROXINE SODIUM 125 MCG: 0.12 TABLET ORAL at 17:01

## 2020-12-20 RX ADMIN — GLYCOPYRROLATE 1 CAPSULE: 15.6 CAPSULE RESPIRATORY (INHALATION) at 05:46

## 2020-12-20 NOTE — PROGRESS NOTES
Hospital Medicine Daily Progress Note    Date of Service  12/20/2020    Chief Complaint  80 y.o. female admitted 12/9/2020 with SOB    Hospital Course  A 80 year-old-woman with h/o DM, HTN, COPD (on home oxygen) presented with worsening SOB. Patient was found to have COVID 19 pneumonia, ANGIE.   On 12/17/2020 patient became unresponsive after going to bedside commode. Patient desaturated too 80s during event. RRT was called. HFNC increased to 50L and 100% FiO2 100% oxygen. Saturation 95%. Patient regained consciousness. Mental status back to baseline.     Interval Problem Update  No symptoms today.  Patient is afebrile, hemodynamically stable. Sitting on the bed in good mood. On 5 L NC (home oxygen concentrator gives up to 4 L per patient). Possible discharge tomorrow    Consultants/Specialty  None    Code Status  DNAR/DNI    Disposition  TBD, most likely home    Review of Systems  Review of Systems   Constitutional: Positive for malaise/fatigue. Negative for chills and fever.   HENT: Positive for hearing loss.    Eyes: Negative.    Respiratory: Negative for SOB  Cardiovascular: Negative for chest pain.   Gastrointestinal: Negative for abdominal pain, nausea and vomiting.   Genitourinary: Negative for dysuria.   Musculoskeletal: Negative for myalgias.   Skin: Negative for rash.   Neurological: Negative for weakness.     Physical Exam  Temp:  [35.9 °C (96.7 °F)-36.2 °C (97.2 °F)] 35.9 °C (96.7 °F)  Pulse:  [66-95] 67  Resp:  [18-20] 18  BP: (103-140)/(40-83) 103/40  SpO2:  [95 %-100 %] 99 %    Physical Exam  Vitals signs and nursing note reviewed.   Constitutional:       Appearance: She is obese. She is ill-appearing.   HENT:      Head: Normocephalic.      Ears:      Comments: Hard of hearing  Eyes:      Pupils: Pupils are equal, round, and reactive to light.   Cardiovascular:      Rate and Rhythm: Normal rate.      Heart sounds: Normal heart sounds.   Pulmonary:      Effort: Pulmonary effort is normal.      Breath  sounds: Rhonchi and rales present on left side.   Abdominal:      General: Abdomen is flat.      Tenderness: There is no abdominal tenderness.   Musculoskeletal: Normal range of motion.   Skin:     General: Skin is warm.   Neurological:      General: No focal deficit present.      Mental Status: She is alert and oriented to person, place, and time.      Fluids    Intake/Output Summary (Last 24 hours) at 12/20/2020 1312  Last data filed at 12/19/2020 1500  Gross per 24 hour   Intake 300 ml   Output --   Net 300 ml       Laboratory  Recent Labs     12/18/20  0619 12/19/20  0624 12/20/20  1218   WBC 14.8* 12.0* 13.1*   RBC 4.68 4.55 4.63   HEMOGLOBIN 12.3 12.1 12.2   HEMATOCRIT 39.9 39.5 40.6   MCV 85.3 86.8 87.7   MCH 26.3* 26.6* 26.3*   MCHC 30.8* 30.6* 30.0*   RDW 48.1 50.1* 50.4*   PLATELETCT 342 286 258   MPV 9.5 9.7 9.6     Recent Labs     12/18/20  0619 12/19/20  0624 12/20/20  1218   SODIUM 132* 132* 134*   POTASSIUM 4.5 4.4 4.8   CHLORIDE 104 101 102   CO2 22 23 24   GLUCOSE 121* 71 112*   BUN 56* 52* 44*   CREATININE 1.47* 1.18 1.28   CALCIUM 8.8 8.9 8.7                   Imaging  EC-ECHOCARDIOGRAM LTD W/O CONT   Final Result      DX-CHEST-LIMITED (1 VIEW)   Final Result      1.  Again seen bilateral interstitial and airspace infiltrates. This may represent Covid pneumonia.      2.  Cardiomegaly.      US-EXTREMITY VENOUS LOWER BILAT   Final Result      DX-CHEST-PORTABLE (1 VIEW)   Final Result      Development of right lower and left mid airspace process that are consistent with pneumonia and likely Covid pneumonia           Assessment/Plan  * Acute hypoxemic respiratory failure due to COVID-19 (HCC)- (present on admission)  Assessment & Plan  Dyspnea and coughing  History of COPD (on home oxygen, home oxygen concentrator up to 5L)  Chest x-ray bilateral infiltration  Oxygen requirement improving  Completed Decadron  Completed remdesivir  Proning    Chronic obstructive pulmonary disease (HCC)- (present on  admission)  Assessment & Plan  With exacerbation due to COVID-19 pneumonia  DuoNebs every 4 Symbicort and seebri   Completed course of Dexamethasone for COVID19  RT    DM2 (diabetes mellitus, type 2) (HCC)- (present on admission)  Assessment & Plan  A1c 7.7  With hyperglycemia present upon admit   Exacerbated by dexamethasone  Continue Lantus  Hold home medications sitagliptin and Metformin since she has infection  Regular SSI and diabetic diet    CKD (chronic kidney disease), stage III- (present on admission)  Assessment & Plan  Baseline Cr around 1.2>>> 1.6  Monitor BMP and assess response  Avoid IV contrast/nephrotoxins/NSAIDs  Dose adjust meds for decreased GFR    Cardiomegaly- (present on admission)  Assessment & Plan  Seen on chest x-ray  With elevated BNP  Echo: Left ventricle not well visualized. No contrast was used by technologist. Unable to asses LV function. Dilated inferior vena cava without inspiratory collapse    DNR (do not resuscitate)- (present on admission)  Assessment & Plan  Per patient's wishes    Hyponatremia- (present on admission)  Assessment & Plan  Mild  Chronic  No symptoms    Peripheral edema- (present on admission)  Assessment & Plan  Pitting BLE edema  BLE US negative for DVT    Hypothyroidism- (present on admission)  Assessment & Plan  Continue levothyroxine  Last TSH 2.2    HTN (hypertension)- (present on admission)  Assessment & Plan  Continue atenolol  Lisinopril and HCTZ held for ANGIE    Left bundle branch block- (present on admission)  Assessment & Plan  Present on EKG 6/3/17  Not indicative of acute ischemia       VTE prophylaxis: lovenox

## 2020-12-20 NOTE — PROGRESS NOTES
Bedside report received from day nurse. Assumed care of patient. Pt. resting comfortably without any sign of distress. A&Ox4, on 7L oxymask, no complaints at this time. POC reviewed and white board updated, Call light in reach, Bed locked in lowest position.

## 2020-12-21 LAB
GLUCOSE BLD-MCNC: 141 MG/DL (ref 65–99)
GLUCOSE BLD-MCNC: 147 MG/DL (ref 65–99)
GLUCOSE BLD-MCNC: 171 MG/DL (ref 65–99)
GLUCOSE BLD-MCNC: 70 MG/DL (ref 65–99)

## 2020-12-21 PROCEDURE — A9270 NON-COVERED ITEM OR SERVICE: HCPCS | Performed by: HOSPITALIST

## 2020-12-21 PROCEDURE — 770021 HCHG ROOM/CARE - ISO PRIVATE

## 2020-12-21 PROCEDURE — A9270 NON-COVERED ITEM OR SERVICE: HCPCS | Performed by: STUDENT IN AN ORGANIZED HEALTH CARE EDUCATION/TRAINING PROGRAM

## 2020-12-21 PROCEDURE — 700102 HCHG RX REV CODE 250 W/ 637 OVERRIDE(OP): Performed by: STUDENT IN AN ORGANIZED HEALTH CARE EDUCATION/TRAINING PROGRAM

## 2020-12-21 PROCEDURE — 82962 GLUCOSE BLOOD TEST: CPT

## 2020-12-21 PROCEDURE — 700111 HCHG RX REV CODE 636 W/ 250 OVERRIDE (IP): Performed by: HOSPITALIST

## 2020-12-21 PROCEDURE — 99232 SBSQ HOSP IP/OBS MODERATE 35: CPT | Performed by: STUDENT IN AN ORGANIZED HEALTH CARE EDUCATION/TRAINING PROGRAM

## 2020-12-21 PROCEDURE — 700102 HCHG RX REV CODE 250 W/ 637 OVERRIDE(OP): Performed by: HOSPITALIST

## 2020-12-21 RX ORDER — INSULIN GLARGINE 100 [IU]/ML
10 INJECTION, SOLUTION SUBCUTANEOUS EVERY EVENING
Status: DISCONTINUED | OUTPATIENT
Start: 2020-12-21 | End: 2020-12-22 | Stop reason: HOSPADM

## 2020-12-21 RX ADMIN — GUAIFENESIN 1200 MG: 600 TABLET, EXTENDED RELEASE ORAL at 06:02

## 2020-12-21 RX ADMIN — INSULIN GLARGINE 10 UNITS: 100 INJECTION, SOLUTION SUBCUTANEOUS at 17:06

## 2020-12-21 RX ADMIN — ENOXAPARIN SODIUM 40 MG: 40 INJECTION SUBCUTANEOUS at 17:05

## 2020-12-21 RX ADMIN — GUAIFENESIN 1200 MG: 600 TABLET, EXTENDED RELEASE ORAL at 17:06

## 2020-12-21 RX ADMIN — ENOXAPARIN SODIUM 40 MG: 40 INJECTION SUBCUTANEOUS at 06:02

## 2020-12-21 RX ADMIN — MICONAZOLE NITRATE: 20 CREAM TOPICAL at 23:43

## 2020-12-21 RX ADMIN — MICONAZOLE NITRATE: 20 CREAM TOPICAL at 09:24

## 2020-12-21 RX ADMIN — LEVOTHYROXINE SODIUM 125 MCG: 0.12 TABLET ORAL at 17:06

## 2020-12-21 RX ADMIN — GLYCOPYRROLATE 1 CAPSULE: 15.6 CAPSULE RESPIRATORY (INHALATION) at 06:02

## 2020-12-21 RX ADMIN — SIMVASTATIN 10 MG: 10 TABLET, FILM COATED ORAL at 17:06

## 2020-12-21 RX ADMIN — INSULIN HUMAN 2 UNITS: 100 INJECTION, SOLUTION PARENTERAL at 17:06

## 2020-12-21 RX ADMIN — BUDESONIDE AND FORMOTEROL FUMARATE DIHYDRATE 2 PUFF: 80; 4.5 AEROSOL RESPIRATORY (INHALATION) at 06:02

## 2020-12-21 RX ADMIN — GLYCOPYRROLATE 1 CAPSULE: 15.6 CAPSULE RESPIRATORY (INHALATION) at 18:31

## 2020-12-21 RX ADMIN — BUDESONIDE AND FORMOTEROL FUMARATE DIHYDRATE 2 PUFF: 80; 4.5 AEROSOL RESPIRATORY (INHALATION) at 17:07

## 2020-12-21 ASSESSMENT — COGNITIVE AND FUNCTIONAL STATUS - GENERAL
STANDING UP FROM CHAIR USING ARMS: A LOT
EATING MEALS: A LITTLE
DRESSING REGULAR LOWER BODY CLOTHING: A LOT
TOILETING: A LOT
SUGGESTED CMS G CODE MODIFIER DAILY ACTIVITY: CL
MOVING TO AND FROM BED TO CHAIR: A LOT
WALKING IN HOSPITAL ROOM: A LOT
TURNING FROM BACK TO SIDE WHILE IN FLAT BAD: A LITTLE
DAILY ACTIVITIY SCORE: 13
DRESSING REGULAR UPPER BODY CLOTHING: A LOT
MOVING FROM LYING ON BACK TO SITTING ON SIDE OF FLAT BED: A LOT
CLIMB 3 TO 5 STEPS WITH RAILING: A LOT
PERSONAL GROOMING: A LOT
SUGGESTED CMS G CODE MODIFIER MOBILITY: CL
MOBILITY SCORE: 13
HELP NEEDED FOR BATHING: A LOT

## 2020-12-21 ASSESSMENT — PAIN DESCRIPTION - PAIN TYPE
TYPE: ACUTE PAIN

## 2020-12-21 NOTE — PROGRESS NOTES
Hospital Medicine Daily Progress Note    Date of Service  12/21/2020    Chief Complaint  80 y.o. female admitted 12/9/2020 with SOB    Hospital Course  A 80 year-old-woman with h/o DM, HTN, COPD (on home oxygen) presented with worsening SOB. Patient was found to have COVID 19 pneumonia, ANGIE.   On 12/17/2020 patient became unresponsive after going to bedside commode. Patient desaturated too 80s during event. RRT was called. HFNC increased to 50L and 100% FiO2 100% oxygen. Saturation 95%. Patient regained consciousness. Mental status back to baseline.     Interval Problem Update  Oxygen requirement down to 4 L NC (has home oxygen concentrator which can provide 4 L oxygen). Patient denies any chest pain, SOB, cough. Afebrile, hemodynamically stable. Family requested home health or rehab. Placed orders for PT, OT evaluation.    Consultants/Specialty  None    Code Status  DNAR/DNI    Disposition  TBD    Review of Systems  Review of Systems   Constitutional: Negative for chills and fever.   HENT: Positive for hearing loss.    Eyes: Negative.    Respiratory: Negative for SOB  Cardiovascular: Negative for chest pain.   Gastrointestinal: Negative for abdominal pain, nausea and vomiting.   Genitourinary: Negative for dysuria.   Musculoskeletal: Negative for myalgias.   Skin: Negative for rash.   Neurological: Negative for weakness.     Physical Exam  Temp:  [35.7 °C (96.3 °F)-36.1 °C (97 °F)] 36.1 °C (97 °F)  Pulse:  [63-88] 78  Resp:  [18] 18  BP: ()/(40-54) 111/48  SpO2:  [92 %-99 %] 93 %    Physical Exam  Vitals signs and nursing note reviewed.   Constitutional:       Appearance: She is obese.    HENT:      Head: Normocephalic.      Ears:      Comments: Hard of hearing  Eyes:      Pupils: Pupils are equal, round, and reactive to light.   Cardiovascular:      Rate and Rhythm: Normal rate.      Heart sounds: Normal heart sounds.   Pulmonary:      Effort: Pulmonary effort is normal.      Breath sounds: Rales present on  left side.   Abdominal:      General: Abdomen is flat.      Tenderness: There is no abdominal tenderness.   Musculoskeletal: Normal range of motion.   Skin:     General: Skin is warm.   Neurological:      General: No focal deficit present.      Mental Status: She is alert and oriented to person, place, and time.      Fluids  No intake or output data in the 24 hours ending 12/21/20 1007    Laboratory  Recent Labs     12/19/20  0624 12/20/20  1218   WBC 12.0* 13.1*   RBC 4.55 4.63   HEMOGLOBIN 12.1 12.2   HEMATOCRIT 39.5 40.6   MCV 86.8 87.7   MCH 26.6* 26.3*   MCHC 30.6* 30.0*   RDW 50.1* 50.4*   PLATELETCT 286 258   MPV 9.7 9.6     Recent Labs     12/19/20  0624 12/20/20  1218   SODIUM 132* 134*   POTASSIUM 4.4 4.8   CHLORIDE 101 102   CO2 23 24   GLUCOSE 71 112*   BUN 52* 44*   CREATININE 1.18 1.28   CALCIUM 8.9 8.7                   Imaging  EC-ECHOCARDIOGRAM LTD W/O CONT   Final Result      DX-CHEST-LIMITED (1 VIEW)   Final Result      1.  Again seen bilateral interstitial and airspace infiltrates. This may represent Covid pneumonia.      2.  Cardiomegaly.      US-EXTREMITY VENOUS LOWER BILAT   Final Result      DX-CHEST-PORTABLE (1 VIEW)   Final Result      Development of right lower and left mid airspace process that are consistent with pneumonia and likely Covid pneumonia           Assessment/Plan  * Acute hypoxemic respiratory failure due to COVID-19 ([facility])- (present on admission)  Assessment & Plan  Dyspnea and coughing  History of COPD (on home oxygen, home oxygen concentrator up to 4 L)  Chest x-ray bilateral infiltration  Oxygen requirement improving  Completed Decadron  Completed remdesivir    Chronic obstructive pulmonary disease ([facility])- (present on admission)  Assessment & Plan  With exacerbation due to COVID-19 pneumonia  DuoNebs every 4 Symbicort and seebri   Completed course of Dexamethasone for COVID19  RT    DM2 (diabetes mellitus, type 2) ([facility])- (present on admission)  Assessment & Plan  A1c  7.7  With hyperglycemia present upon admit   Exacerbated by dexamethasone  Continue Lantus  Hold home medications sitagliptin and Metformin since she has infection  Regular SSI and diabetic diet    CKD (chronic kidney disease), stage III- (present on admission)  Assessment & Plan  Baseline Cr around 1.2>>> 1.6  Monitor BMP and assess response  Avoid IV contrast/nephrotoxins/NSAIDs  Dose adjust meds for decreased GFR    Cardiomegaly- (present on admission)  Assessment & Plan  Seen on chest x-ray  With elevated BNP  Echo: Left ventricle not well visualized. No contrast was used by technologist. Unable to asses LV function. Dilated inferior vena cava without inspiratory collapse    DNR (do not resuscitate)- (present on admission)  Assessment & Plan  Per patient's wishes    Hyponatremia- (present on admission)  Assessment & Plan  Mild  Chronic  No symptoms    Peripheral edema- (present on admission)  Assessment & Plan  Pitting BLE edema  BLE US negative for DVT    Hypothyroidism- (present on admission)  Assessment & Plan  Continue levothyroxine  Last TSH 2.2    HTN (hypertension)- (present on admission)  Assessment & Plan  Continue atenolol  Lisinopril and HCTZ held for ANGIE    Left bundle branch block- (present on admission)  Assessment & Plan  Present on EKG 6/3/17  Not indicative of acute ischemia       VTE prophylaxis: lovenox

## 2020-12-21 NOTE — PROGRESS NOTES
Received bedside report from RN, pt care assumed, VSS on 5L oxymask, pt assessment complete. Pt AAOx4, 0/10 pain at this time. No signs of acute distress noted at this time. POC discussed with pt and verbalizes no questions. Pt denies any additional needs at this time. Bed in lowest position, bed alarm in place, pt educated on fall risk and verbalized understanding, call light within reach, hourly rounding initiated.

## 2020-12-21 NOTE — PROGRESS NOTES
Bedside report received and patient care assumed. Pt is resting in bed, A&O4, with no complaints of pain, and is on 5L oxymask. Tele box on. All fall precautions are in place, belongings at bedside table.  Pt was updated on POC, no questions or concerns. Pt educated on use of call light for assistance. Will continue to monitor.

## 2020-12-21 NOTE — THERAPY
PT Contact Note    PT Consult received and acknowledged. Pt recently evaluated last hospital admit 12/5 and determined to have no mobility deficits as she demonstrated fxnl mob at baseline. Per RN, pt steady walking to bathroom this morning. Spoke with pt, pt reports no mobility concerns at this time. No indicated for acute skilled PT evaluation. Pt already owns all required DME per her report. Will CX order.    Vira Washburn, PT, DPT  Ext. [phone]

## 2020-12-22 VITALS
OXYGEN SATURATION: 98 % | SYSTOLIC BLOOD PRESSURE: 102 MMHG | RESPIRATION RATE: 18 BRPM | TEMPERATURE: 98.2 F | WEIGHT: 234.53 LBS | DIASTOLIC BLOOD PRESSURE: 51 MMHG | BODY MASS INDEX: 40.04 KG/M2 | HEART RATE: 102 BPM | HEIGHT: 64 IN

## 2020-12-22 LAB
GLUCOSE BLD-MCNC: 114 MG/DL (ref 65–99)
GLUCOSE BLD-MCNC: 130 MG/DL (ref 65–99)

## 2020-12-22 PROCEDURE — 90471 IMMUNIZATION ADMIN: CPT

## 2020-12-22 PROCEDURE — 90662 IIV NO PRSV INCREASED AG IM: CPT | Performed by: STUDENT IN AN ORGANIZED HEALTH CARE EDUCATION/TRAINING PROGRAM

## 2020-12-22 PROCEDURE — 99239 HOSP IP/OBS DSCHRG MGMT >30: CPT | Performed by: STUDENT IN AN ORGANIZED HEALTH CARE EDUCATION/TRAINING PROGRAM

## 2020-12-22 PROCEDURE — 700102 HCHG RX REV CODE 250 W/ 637 OVERRIDE(OP): Performed by: HOSPITALIST

## 2020-12-22 PROCEDURE — A9270 NON-COVERED ITEM OR SERVICE: HCPCS | Performed by: HOSPITALIST

## 2020-12-22 PROCEDURE — 700111 HCHG RX REV CODE 636 W/ 250 OVERRIDE (IP): Performed by: HOSPITALIST

## 2020-12-22 PROCEDURE — 3E02340 INTRODUCTION OF INFLUENZA VACCINE INTO MUSCLE, PERCUTANEOUS APPROACH: ICD-10-PCS | Performed by: STUDENT IN AN ORGANIZED HEALTH CARE EDUCATION/TRAINING PROGRAM

## 2020-12-22 PROCEDURE — 82962 GLUCOSE BLOOD TEST: CPT | Mod: 91

## 2020-12-22 PROCEDURE — 700111 HCHG RX REV CODE 636 W/ 250 OVERRIDE (IP): Performed by: STUDENT IN AN ORGANIZED HEALTH CARE EDUCATION/TRAINING PROGRAM

## 2020-12-22 RX ORDER — GUAIFENESIN 600 MG/1
600 TABLET, EXTENDED RELEASE ORAL 2 TIMES DAILY PRN
Qty: 20 TAB | Refills: 0 | Status: SHIPPED | OUTPATIENT
Start: 2020-12-22 | End: 2021-01-01

## 2020-12-22 RX ORDER — BUDESONIDE AND FORMOTEROL FUMARATE DIHYDRATE 80; 4.5 UG/1; UG/1
2 AEROSOL RESPIRATORY (INHALATION) 2 TIMES DAILY
Qty: 2 EACH | Refills: 0 | Status: CANCELLED | OUTPATIENT
Start: 2020-12-22 | End: 2021-01-01

## 2020-12-22 RX ADMIN — GLYCOPYRROLATE 1 CAPSULE: 15.6 CAPSULE RESPIRATORY (INHALATION) at 07:30

## 2020-12-22 RX ADMIN — INFLUENZA A VIRUS A/MICHIGAN/45/2015 X-275 (H1N1) ANTIGEN (FORMALDEHYDE INACTIVATED), INFLUENZA A VIRUS A/SINGAPORE/INFIMH-16-0019/2016 IVR-186 (H3N2) ANTIGEN (FORMALDEHYDE INACTIVATED), INFLUENZA B VIRUS B/PHUKET/3073/2013 ANTIGEN (FORMALDEHYDE INACTIVATED), AND INFLUENZA B VIRUS B/MARYLAND/15/2016 BX-69A ANTIGEN (FORMALDEHYDE INACTIVATED) 0.7 ML: 60; 60; 60; 60 INJECTION, SUSPENSION INTRAMUSCULAR at 10:32

## 2020-12-22 RX ADMIN — ENOXAPARIN SODIUM 40 MG: 40 INJECTION SUBCUTANEOUS at 06:06

## 2020-12-22 RX ADMIN — BUDESONIDE AND FORMOTEROL FUMARATE DIHYDRATE 2 PUFF: 80; 4.5 AEROSOL RESPIRATORY (INHALATION) at 07:31

## 2020-12-22 RX ADMIN — GUAIFENESIN AND DEXTROMETHORPHAN 10 ML: 100; 10 SYRUP ORAL at 08:17

## 2020-12-22 RX ADMIN — MICONAZOLE NITRATE: 20 CREAM TOPICAL at 08:17

## 2020-12-22 ASSESSMENT — PAIN DESCRIPTION - PAIN TYPE
TYPE: ACUTE PAIN
TYPE: ACUTE PAIN

## 2020-12-22 NOTE — DISCHARGE INSTRUCTIONS
COVID-19  COVID-19 is a respiratory infection that is caused by a virus called severe acute respiratory syndrome coronavirus 2 (SARS-CoV-2). The disease is also known as coronavirus disease or novel coronavirus. In some people, the virus may not cause any symptoms. In others, it may cause a serious infection. The infection can get worse quickly and can lead to complications, such as:  · Pneumonia, or infection of the lungs.  · Acute respiratory distress syndrome or ARDS. This is fluid build-up in the lungs.  · Acute respiratory failure. This is a condition in which there is not enough oxygen passing from the lungs to the body.  · Sepsis or septic shock. This is a serious bodily reaction to an infection.  · Blood clotting problems.  · Secondary infections due to bacteria or fungus.  The virus that causes COVID-19 is contagious. This means that it can spread from person to person through droplets from coughs and sneezes (respiratory secretions).  What are the causes?  This illness is caused by a virus. You may catch the virus by:  · Breathing in droplets from an infected person's cough or sneeze.  · Touching something, like a table or a doorknob, that was exposed to the virus (contaminated) and then touching your mouth, nose, or eyes.  What increases the risk?  Risk for infection  You are more likely to be infected with this virus if you:  · Live in or travel to an area with a COVID-19 outbreak.  · Come in contact with a sick person who recently traveled to an area with a COVID-19 outbreak.  · Provide care for or live with a person who is infected with COVID-19.  Risk for serious illness  You are more likely to become seriously ill from the virus if you:  · Are 65 years of age or older.  · Have a long-term disease that lowers your body's ability to fight infection (immunocompromised).  · Live in a nursing home or long-term care facility.  · Have a long-term (chronic) disease such as:  ? Chronic lung disease, including  chronic obstructive pulmonary disease or asthma  ? Heart disease.  ? Diabetes.  ? Chronic kidney disease.  ? Liver disease.  · Are obese.  What are the signs or symptoms?  Symptoms of this condition can range from mild to severe. Symptoms may appear any time from 2 to 14 days after being exposed to the virus. They include:  · A fever.  · A cough.  · Difficulty breathing.  · Chills.  · Muscle pains.  · A sore throat.  · Loss of taste or smell.  Some people may also have stomach problems, such as nausea, vomiting, or diarrhea.  Other people may not have any symptoms of COVID-19.  How is this diagnosed?  This condition may be diagnosed based on:  · Your signs and symptoms, especially if:  ? You live in an area with a COVID-19 outbreak.  ? You recently traveled to or from an area where the virus is common.  ? You provide care for or live with a person who was diagnosed with COVID-19.  · A physical exam.  · Lab tests, which may include:  ? A nasal swab to take a sample of fluid from your nose.  ? A throat swab to take a sample of fluid from your throat.  ? A sample of mucus from your lungs (sputum).  ? Blood tests.  · Imaging tests, which may include, X-rays, CT scan, or ultrasound.  How is this treated?  At present, there is no medicine to treat COVID-19. Medicines that treat other diseases are being used on a trial basis to see if they are effective against COVID-19.  Your health care provider will talk with you about ways to treat your symptoms. For most people, the infection is mild and can be managed at home with rest, fluids, and over-the-counter medicines.  Treatment for a serious infection usually takes places in a hospital intensive care unit (ICU). It may include one or more of the following treatments. These treatments are given until your symptoms improve.  · Receiving fluids and medicines through an IV.  · Supplemental oxygen. Extra oxygen is given through a tube in the nose, a face mask, or a  cabrera.  · Positioning you to lie on your stomach (prone position). This makes it easier for oxygen to get into the lungs.  · Continuous positive airway pressure (CPAP) or bi-level positive airway pressure (BPAP) machine. This treatment uses mild air pressure to keep the airways open. A tube that is connected to a motor delivers oxygen to the body.  · Ventilator. This treatment moves air into and out of the lungs by using a tube that is placed in your windpipe.  · Tracheostomy. This is a procedure to create a hole in the neck so that a breathing tube can be inserted.  · Extracorporeal membrane oxygenation (ECMO). This procedure gives the lungs a chance to recover by taking over the functions of the heart and lungs. It supplies oxygen to the body and removes carbon dioxide.  Follow these instructions at home:  Lifestyle  · If you are sick, stay home except to get medical care. Your health care provider will tell you how long to stay home. Call your health care provider before you go for medical care.  · Rest at home as told by your health care provider.  · Do not use any products that contain nicotine or tobacco, such as cigarettes, e-cigarettes, and chewing tobacco. If you need help quitting, ask your health care provider.  · Return to your normal activities as told by your health care provider. Ask your health care provider what activities are safe for you.  General instructions  · Take over-the-counter and prescription medicines only as told by your health care provider.  · Drink enough fluid to keep your urine pale yellow.  · Keep all follow-up visits as told by your health care provider. This is important.  How is this prevented?       There is no vaccine to help prevent COVID-19 infection. However, there are steps you can take to protect yourself and others from this virus.  To protect yourself:   · Do not travel to areas where COVID-19 is a risk. The areas where COVID-19 is reported change often. To identify  high-risk areas and travel restrictions, check the [facility] travel website: wwwnc.cdc.gov/travel/notices  · If you live in, or must travel to, an area where COVID-19 is a risk, take precautions to avoid infection.  ? Stay away from people who are sick.  ? Wash your hands often with soap and water for 20 seconds. If soap and water are not available, use an alcohol-based hand .  ? Avoid touching your mouth, face, eyes, or nose.  ? Avoid going out in public, follow guidance from your state and local health authorities.  ? If you must go out in public, wear a cloth face covering or face mask.  ? Disinfect objects and surfaces that are frequently touched every day. This may include:  § Counters and tables.  § Doorknobs and light switches.  § Sinks and faucets.  § Electronics, such as phones, remote controls, keyboards, computers, and tablets.  To protect others:  If you have symptoms of COVID-19, take steps to prevent the virus from spreading to others.  · If you think you have a COVID-19 infection, contact your health care provider right away. Tell your health care team that you think you may have a COVID-19 infection.  · Stay home. Leave your house only to seek medical care. Do not use public transport.  · Do not travel while you are sick.  · Wash your hands often with soap and water for 20 seconds. If soap and water are not available, use alcohol-based hand .  · Stay away from other members of your household. Let healthy household members care for children and pets, if possible. If you have to care for children or pets, wash your hands often and wear a mask. If possible, stay in your own room, separate from others. Use a different bathroom.  · Make sure that all people in your household wash their hands well and often.  · Cough or sneeze into a tissue or your sleeve or elbow. Do not cough or sneeze into your hand or into the air.  · Wear a cloth face covering or face mask.  Where to find more  information  · Centers for Disease Control and Prevention: www.cdc.gov/coronavirus/2019-ncov/index.html  · World Health Organization: www.who.int/health-topics/coronavirus  Contact a health care provider if:  · You live in or have traveled to an area where COVID-19 is a risk and you have symptoms of the infection.  · You have had contact with someone who has COVID-19 and you have symptoms of the infection.  Get help right away if:  · You have trouble breathing.  · You have pain or pressure in your chest.  · You have confusion.  · You have bluish lips and fingernails.  · You have difficulty waking from sleep.  · You have symptoms that get worse.  These symptoms may represent a serious problem that is an emergency. Do not wait to see if the symptoms will go away. Get medical help right away. Call your local emergency services (911 in the [place] Do not drive yourself to the hospital. Let the emergency medical personnel know if you think you have COVID-19.  Summary  · COVID-19 is a respiratory infection that is caused by a virus. It is also known as coronavirus disease or novel coronavirus. It can cause serious infections, such as pneumonia, acute respiratory distress syndrome, acute respiratory failure, or sepsis.  · The virus that causes COVID-19 is contagious. This means that it can spread from person to person through droplets from coughs and sneezes.  · You are more likely to develop a serious illness if you are 65 years of age or older, have a weak immunity, live in a nursing home, or have chronic disease.  · There is no medicine to treat COVID-19. Your health care provider will talk with you about ways to treat your symptoms.  · Take steps to protect yourself and others from infection. Wash your hands often and disinfect objects and surfaces that are frequently touched every day. Stay away from people who are sick and wear a mask if you are sick.  This information is not intended to replace advice given to you by  your health care provider. Make sure you discuss any questions you have with your health care provider.  Document Released: 01/23/2020 Document Revised: 05/14/2020 Document Reviewed: 01/23/2020  Elsevier Patient Education © 2020 [employer]     Discharge Instructions    Discharged to home by car with relative. Discharged via wheelchair, hospital escort: Yes.  Special equipment needed: Wheelchair    Be sure to schedule a follow-up appointment with your primary care doctor or any specialists as instructed.     Discharge Plan:   Diet Plan: Discussed  Activity Level: Discussed  Confirmed Follow up Appointment: Appointment Scheduled  Confirmed Symptoms Management: Discussed  Medication Reconciliation Updated: Yes  Influenza Vaccine Indication: Indicated: 65 years and older    I understand that a diet low in cholesterol, fat, and sodium is recommended for good health. Unless I have been given specific instructions below for another diet, I accept this instruction as my diet prescription.   Other diet: diabetic    Special Instructions: None    · Is patient discharged on Warfarin / Coumadin?   No     Depression / Suicide Risk    As you are discharged from this [facility] facility, it is important to learn how to keep safe from harming yourself.    Recognize the warning signs:  · Abrupt changes in personality, positive or negative- including increase in energy   · Giving away possessions  · Change in eating patterns- significant weight changes-  positive or negative  · Change in sleeping patterns- unable to sleep or sleeping all the time   · Unwillingness or inability to communicate  · Depression  · Unusual sadness, discouragement and loneliness  · Talk of wanting to die  · Neglect of personal appearance   · Rebelliousness- reckless behavior  · Withdrawal from people/activities they love  · Confusion- inability to concentrate     If you or a loved one observes any of these behaviors or has concerns about self-harm,  here's what you can do:  · Talk about it- your feelings and reasons for harming yourself  · Remove any means that you might use to hurt yourself (examples: pills, rope, extension cords, firearm)  · Get professional help from the community (Mental Health, Substance Abuse, psychological counseling)  · Do not be alone:Call your Safe Contact- someone whom you trust who will be there for you.  · Call your local CRISIS HOTLINE 588-5193 or 822-023-6370  · Call your local Children's Mobile Crisis Response Team [place] (111) 215-4577 or www.[employer] ([place])  · Call the toll free National Suicide Prevention Hotlines   · National Suicide Prevention Lifeline 810-090-AARG (1783)  · National Hope Line Network 800-SUICIDE (014-8173)

## 2020-12-22 NOTE — DISCHARGE SUMMARY
Discharge Summary    CHIEF COMPLAINT ON ADMISSION  Chief Complaint   Patient presents with   • Shortness of Breath       Reason for Admission  Other     Admission Date  12/9/2020    CODE STATUS  DNAR/DNI    HPI & HOSPITAL COURSE  A 80 year-old-woman with h/o DM, HTN, COPD (on home oxygen) presented with worsening SOB. Patient was found to have COVID 19 pneumonia, ANGIE.   On 12/17/2020 patient became unresponsive after going to bedside commode. Patient desaturated too 80s during event. RRT was called. Patient was HFNC 50L FiO2 100% oxygen. Patient weaned off from HFNC and now is on 4 L NC oxygenc.     Therefore, she is discharged in fair and stable condition to home with close outpatient follow-up.    The patient met 2-midnight criteria for an inpatient stay at the time of discharge.    Acute hypoxemic respiratory failure due to COVID-19 (HCC)- (present on admission)  Assessment & Plan  History of COPD (on home oxygen, home oxygen concentrator up to 4 L)  Chest x-ray bilateral infiltration  Oxygen requirement 4 L nasal canula  Completed Decadron  Completed remdesivir     Chronic obstructive pulmonary disease (HCC)- (present on admission)  Assessment & Plan  Resumed home medications  Completed course of Dexamethasone for COVID19  Continue 4 L NC oxygen, wean as tolerated     DM2 (diabetes mellitus, type 2) ([facility])- (present on admission)  Assessment & Plan  A1c 7.7  Resumed home medications     CKD (chronic kidney disease), stage III- (present on admission)  Assessment & Plan  Stable      Cardiomegaly- (present on admission)  Assessment & Plan  Seen on chest x-ray  With elevated BNP  Echo: Left ventricle not well visualized. No contrast was used by technologist. Unable to asses LV function. Dilated inferior vena cava without inspiratory collapse  Follow up with cardiology as outpatient     DNR (do not resuscitate)- (present on admission)  Assessment & Plan  Per patient's wishes     Hyponatremia- (present on  admission)  Assessment & Plan  Mild  Chronic  No symptoms     Peripheral edema- (present on admission)  Assessment & Plan  Pitting BLE edema  BLE US negative for DVT     Hypothyroidism- (present on admission)  Assessment & Plan  Continue levothyroxine  Last TSH 2.2     HTN (hypertension)- (present on admission)  Assessment & Plan  Continue atenolol  Continue Lisinopril  HCTZ held for ANGIE       Discharge Date  12/22/2020    FOLLOW UP ITEMS POST DISCHARGE  Follow up with PCP within one week of discharge  Follow up with cardiology as outpatient      DISCHARGE DIAGNOSES  Principal Problem:    Acute hypoxemic respiratory failure due to COVID-19 (HCC) POA: Yes  Active Problems:    CKD (chronic kidney disease), stage III POA: Yes    DM2 (diabetes mellitus, type 2) (HCC) POA: Yes    Chronic obstructive pulmonary disease (HCC) (Chronic) POA: Yes    Hyponatremia POA: Yes    DNR (do not resuscitate) POA: Yes    Cardiomegaly POA: Yes    Left bundle branch block POA: Yes    HTN (hypertension) POA: Yes    Hypothyroidism POA: Yes    Peripheral edema POA: Yes  Resolved Problems:    Acute kidney injury (HCC) POA: Yes    Antibiotic-associated diarrhea POA: No      FOLLOW UP  No future appointments.  [facility], A.P.R.N.  [address]  [place] [place] [address] [phone]    In 1 week        MEDICATIONS ON DISCHARGE     Medication List      CHANGE how you take these medications      Instructions   levothyroxine 125 MCG Tabs  What changed:   · how much to take  · when to take this  · additional instructions  Commonly known as: SYNTHROID   TAKE ONE TABLET BY MOUTH EVERY MORNING ON AN EMPTY STOMACH     Trelegy Ellipta 100-62.5-25 MCG/INH Aepb  What changed: Another medication with the same name was removed. Continue taking this medication, and follow the directions you see here.  Generic drug: Fluticasone-Umeclidin-Vilant   Doctor's comments: sample  Inhale 1 Puff by mouth every day.  Dose: 1 Puff        CONTINUE  taking these medications      Instructions   acetaminophen 325 MG Tabs  Commonly known as: Tylenol   Take 2 Tabs by mouth every 6 hours as needed (Mild Pain; (Pain scale 1-3); Temp greater than 100.5 F).  Dose: 650 mg     albuterol 108 (90 Base) MCG/ACT Aers inhalation aerosol   Inhale 2 Puffs by mouth every four hours as needed for Shortness of Breath.  Dose: 2 Puff     aspirin 81 MG tablet   Take 81 mg by mouth every evening.  Dose: 81 mg     atenolol 50 MG Tabs  Commonly known as: TENORMIN   Take 1 Tab by mouth every day.  Dose: 50 mg     * Blood Glucose Monitoring Suppl Kaity   Meter: Dispense Device of Insurance Preference. Sig. Use as directed for blood sugar monitoring. #1. NR.     * Blood Glucose Test Strips   Test strips order: Test strips for insurance-preferred meter. Sig: use every morning before breakfast and prn ssx high or low sugar     CALCIUM 1200 PO   Take 1 Tab by mouth 2 Times a Day.  Dose: 1 Tab     fish oil 1000 MG Caps capsule   Take 1,000 mg by mouth every day.  Dose: 1,000 mg     guaiFENesin  MG Tb12  Commonly known as: MUCINEX   Take 1 Tab by mouth 2 times a day as needed for Cough (productive cough) for up to 10 days.  Dose: 600 mg     Janumet  MG per tablet  Generic drug: sitagliptan-metformin   Doctor's comments: Insurance will pay for 100 day supply, please cancel previous orders that have 30 or 90 day supply  Take 1 Tab by mouth 2 times a day, with meals.  Dose: 1 Tab     Lancets   Lancets order: Lancets for insurance-preferred meter. Sig: use every morning before breakfast and prn ssx high or low sugar.     lisinopril 20 MG Tabs  Commonly known as: PRINIVIL   Doctor's comments: Insurance will pay for 100 day supply, please cancel previous orders that have 30 or 90 day supply  Take 1 Tab by mouth every day.  Dose: 20 mg     MULTIVITAMIN PO   Take 1 Tab by mouth every day.  Dose: 1 Tab     simvastatin 10 MG Tabs  Commonly known as: ZOCOR   Doctor's comments: Insurance will  pay for 100 day supply  Take 1 Tab by mouth every evening.  Dose: 10 mg     TruBiotics Caps   Take 1 Capsule by mouth every morning.  Dose: 1 Capsule         * This list has 2 medication(s) that are the same as other medications prescribed for you. Read the directions carefully, and ask your doctor or other care provider to review them with you.            STOP taking these medications    dexamethasone 6 MG Tabs  Commonly known as: DECADRON     hydroCHLOROthiazide 25 MG Tabs  Commonly known as: HYDRODIURIL     sulfamethoxazole-trimethoprim 800-160 MG tablet  Commonly known as: BACTRIM DS            Allergies  Allergies   Allergen Reactions   • Zithromax [Azithromycin] Diarrhea     Pt states severe diarrhea       DIET  Orders Placed This Encounter   Procedures   • Diet Order Diet: Consistent CHO (Diabetic)     Standing Status:   Standing     Number of Occurrences:   1     Order Specific Question:   Diet:     Answer:   Consistent CHO (Diabetic) [4]       ACTIVITY  As tolerated.  Weight bearing as tolerated    CONSULTATIONS  None    PROCEDURES  None    LABORATORY  Lab Results   Component Value Date    SODIUM 134 (L) 12/20/2020    POTASSIUM 4.8 12/20/2020    CHLORIDE 102 12/20/2020    CO2 24 12/20/2020    GLUCOSE 112 (H) 12/20/2020    BUN 44 (H) 12/20/2020    CREATININE 1.28 12/20/2020        Lab Results   Component Value Date    WBC 13.1 (H) 12/20/2020    HEMOGLOBIN 12.2 12/20/2020    HEMATOCRIT 40.6 12/20/2020    PLATELETCT 258 12/20/2020        Total time of the discharge process exceeds 35 minutes.

## 2020-12-22 NOTE — DISCHARGE PLANNING
Anticipated Discharge Disposition: Home with O2    Action: LSW called [facility] to confirm that Pt is on their service and her liters. According to [facility] she is on their service and she was at 3l. LSW discussed with  who will put in new face to face/order for 4L.     LSW sent choice for [facility] to resume DME services with 4L O@    LSW called Pt's dtr Alexandra, Pt has been on O2 for 4 years so they have portable tanks they can bring. Alexandra was told that Pt would likely discharge at 1. Her  is coming to help her  Pt and and he will be home from work around 12:30. They can  Pt after that time.     Barriers to Discharge: F/u with [facility] for O2 resumption.     Plan: Pt to discharge today after [facility] accepts new referral.

## 2020-12-22 NOTE — CARE PLAN
Patient knowledge and history was assessed about falls or potential falls. Patient was informed about different fall precaution interventions used to keep them safe. Patient teaching was especially focused on using call light as needed to assist or inform questions about care.

## 2020-12-22 NOTE — DISCHARGE PLANNING
@8140  Agency/Facility Name: [facility]  Spoke To: Luis Enrique  Outcome: Accepted and does not need portable since family is bringing one.    Received Choice form at [phone]  Agency/Facility Name: [facility]  Referral sent per Choice form @ [phone]

## 2020-12-22 NOTE — FACE TO FACE
"Face to Face Note  -  Durable Medical Equipment    Tessa Adamson M.D. - NPI: [id number]  I certify that this patient is under my care and that they had a durable medical equipment(DME)face to face encounter by myself that meets the physician DME face-to-face encounter requirements with this patient on:    Date of encounter:   Patient:                    MRN:                       YOB: 2019  Camille Rodriguez  [id number]  1940     The encounter with the patient was in whole, or in part, for the following medical condition, which is the primary reason for durable medical equipment:  COPD    I certify that, based on my findings, the following durable medical equipment is medically necessary:  Oxygen.    HOME O2 Saturation Measurements:(Values must be present for Home Oxygen orders)  Room air sat at rest: 85  Room air sat with amb: 85  With liters of O2: 4, O2 sat at rest with O2: 94  With Liters of O2: 4, O2 sat with amb with O2 : 90  Is the patient mobile?: Yes    My Clinical findings support the need for the above equipment due to:  Hypoxia    Supporting Symptoms: The patient requires supplemental oxygen, as the following interventions have been tried with limited or no improvement: \"Ambulation with oximetry    If patient feels more short of breath, they can go up to 6 liters per minute and contact healthcare provider.  "

## 2020-12-22 NOTE — CARE PLAN
Patient knowledge was assessed at the bedside. Patient will be educated about the importance of frequent turning, wound development on sacrum or bony prominences when continued pressure, friction, or moisture contacts the skin.

## 2020-12-23 DIAGNOSIS — I10 ESSENTIAL HYPERTENSION: ICD-10-CM

## 2020-12-23 DIAGNOSIS — N18.30 TYPE 2 DIABETES MELLITUS WITH STAGE 3 CHRONIC KIDNEY DISEASE, WITHOUT LONG-TERM CURRENT USE OF INSULIN (HCC): ICD-10-CM

## 2020-12-23 DIAGNOSIS — E11.22 TYPE 2 DIABETES MELLITUS WITH STAGE 3 CHRONIC KIDNEY DISEASE, WITHOUT LONG-TERM CURRENT USE OF INSULIN (HCC): ICD-10-CM

## 2020-12-23 NOTE — TELEPHONE ENCOUNTER
1. Name: Camille Rodriguez      Call Back Number: [phone]        How would the patient prefer to be contacted with a response: Phone call OK to leave a detailed message    2. Which medication(s) is being requested? lisinopril (PRINIVIL) 20   atenolol (TENORMIN) 50 MG Tab  simvastatin (ZOCOR) 10 MG Tab    3. What is the preferred Pharmacy? [facility] on [address]    Patient was informed they may receive a return phone call from our office with any additional questions before processing this request.      Patients daughter called and is requesting a refill on behalf of patient. The patient just got out of the hospital due to COVID and is requesting a refill on the 3 medications above. She is establishing with [facility], but due to being hospitalized had to cancel the appointment. Thank you

## 2020-12-24 RX ORDER — LISINOPRIL 20 MG/1
20 TABLET ORAL DAILY
Qty: 100 TAB | Refills: 0 | Status: SHIPPED
Start: 2020-12-24 | End: 2021-01-01

## 2020-12-24 RX ORDER — SIMVASTATIN 10 MG
10 TABLET ORAL EVERY EVENING
Qty: 100 TAB | Refills: 0 | Status: SHIPPED | OUTPATIENT
Start: 2020-12-24 | End: 2022-01-01

## 2020-12-24 RX ORDER — ATENOLOL 50 MG/1
50 TABLET ORAL DAILY
Qty: 100 TAB | Refills: 0 | Status: SHIPPED
Start: 2020-12-24 | End: 2021-01-01

## 2020-12-29 ENCOUNTER — TELEPHONE (OUTPATIENT)
Dept: MEDICAL GROUP | Facility: PHYSICIAN GROUP | Age: 80
End: 2020-12-29

## 2020-12-29 NOTE — TELEPHONE ENCOUNTER
Have we ever prescribed this med? Yes.  If yes, what date? 11/11/2020      Last OV: 07/12/2019- SANDY James    Next OV: No appointment on file    Medications:   Requested Prescriptions     Pending Prescriptions Disp Refills   • Fluticasone-Umeclidin-Vilant (TRELEGY ELLIPTA) 100-62.5-25 MCG/INH AEROSOL POWDER, BREATH ACTIVATED  0     Sig: Inhale 1 Puff every day.

## 2020-12-29 NOTE — TELEPHONE ENCOUNTER
ESTABLISHED PATIENT PRE-VISIT PLANNING     Patient was NOT contacted to complete PVP.     Note: Patient will not be contacted if there is no indication to call.     1.  Reviewed notes from the last few office visits within the medical group: Yes    2.  If any orders were placed at last visit or intended to be done for this visit (i.e. 6 mos follow-up), do we have Results/Consult Notes?         •  Labs - Labs were not ordered at last office visit.  Note: If patient appointment is for lab review and patient did not complete labs, check with provider if OK to reschedule patient until labs completed.       •  Imaging - Imaging was not ordered at last office visit.       •  Referrals - No referrals were ordered at last office visit.    3. Is this appointment scheduled as a Hospital Follow-Up? Yes, visit was at [facility].     4.  Immunizations were updated in Epic using Reconcile Outside Information activity? Yes    5.  Patient is due for the following Health Maintenance Topics:   Health Maintenance Due   Topic Date Due   • MAMMOGRAM  04/27/2019   • FASTING LIPID PROFILE  03/04/2020   • URINE ACR / MICROALBUMIN  03/04/2020   • IMM HEP B VACCINE (2 of 3 - Risk 3-dose series) 03/06/2020   • DIABETES MONOFILAMENT / LE EXAM  03/06/2020   • IMM ZOSTER VACCINES (3 of 3) 04/03/2020   • IMM PNEUMOCOCCAL VACCINE: 65+ Years (2 of 2 - PPSV23) 04/07/2020   • RETINAL SCREENING  06/07/2020   • Annual Pulmonary Function Test / Spirometry  07/12/2020   • Annual Wellness Visit  09/27/2020       6.  AHA (Pulse8) form printed for Provider? No, already completed

## 2021-01-01 ENCOUNTER — HOSPITAL ENCOUNTER (INPATIENT)
Facility: MEDICAL CENTER | Age: 81
LOS: 3 days | DRG: 871 | End: 2021-07-04
Attending: EMERGENCY MEDICINE | Admitting: STUDENT IN AN ORGANIZED HEALTH CARE EDUCATION/TRAINING PROGRAM
Payer: MEDICARE

## 2021-01-01 ENCOUNTER — PHARMACY VISIT (OUTPATIENT)
Dept: PHARMACY | Facility: MEDICAL CENTER | Age: 81
End: 2021-01-01
Payer: MEDICARE

## 2021-01-01 ENCOUNTER — APPOINTMENT (OUTPATIENT)
Dept: RADIOLOGY | Facility: MEDICAL CENTER | Age: 81
DRG: 871 | End: 2021-01-01
Attending: EMERGENCY MEDICINE
Payer: MEDICARE

## 2021-01-01 ENCOUNTER — PATIENT MESSAGE (OUTPATIENT)
Dept: HEALTH INFORMATION MANAGEMENT | Facility: OTHER | Age: 81
End: 2021-01-01

## 2021-01-01 ENCOUNTER — PATIENT OUTREACH (OUTPATIENT)
Dept: HEALTH INFORMATION MANAGEMENT | Facility: OTHER | Age: 81
End: 2021-01-01

## 2021-01-01 ENCOUNTER — HOSPITAL ENCOUNTER (OUTPATIENT)
Facility: MEDICAL CENTER | Age: 81
End: 2021-10-26
Payer: MEDICARE

## 2021-01-01 ENCOUNTER — HOSPITAL ENCOUNTER (OUTPATIENT)
Facility: MEDICAL CENTER | Age: 81
End: 2021-09-08
Payer: MEDICARE

## 2021-01-01 ENCOUNTER — APPOINTMENT (OUTPATIENT)
Dept: SLEEP MEDICINE | Facility: MEDICAL CENTER | Age: 81
End: 2021-01-01
Payer: MEDICARE

## 2021-01-01 ENCOUNTER — APPOINTMENT (OUTPATIENT)
Dept: RADIOLOGY | Facility: MEDICAL CENTER | Age: 81
DRG: 871 | End: 2021-01-01
Attending: INTERNAL MEDICINE
Payer: MEDICARE

## 2021-01-01 ENCOUNTER — DOCUMENTATION (OUTPATIENT)
Dept: RESPIRATORY THERAPY | Facility: MEDICAL CENTER | Age: 81
End: 2021-01-01

## 2021-01-01 VITALS
SYSTOLIC BLOOD PRESSURE: 112 MMHG | BODY MASS INDEX: 42.68 KG/M2 | HEART RATE: 89 BPM | TEMPERATURE: 97.7 F | RESPIRATION RATE: 18 BRPM | HEIGHT: 64 IN | OXYGEN SATURATION: 91 % | DIASTOLIC BLOOD PRESSURE: 43 MMHG | WEIGHT: 250 LBS

## 2021-01-01 DIAGNOSIS — J42 CHRONIC BRONCHITIS, UNSPECIFIED CHRONIC BRONCHITIS TYPE (HCC): ICD-10-CM

## 2021-01-01 DIAGNOSIS — A41.9 SEPSIS, DUE TO UNSPECIFIED ORGANISM, UNSPECIFIED WHETHER ACUTE ORGAN DYSFUNCTION PRESENT (HCC): ICD-10-CM

## 2021-01-01 DIAGNOSIS — J44.1 ACUTE EXACERBATION OF CHRONIC OBSTRUCTIVE PULMONARY DISEASE (COPD) (HCC): ICD-10-CM

## 2021-01-01 DIAGNOSIS — J96.11 CHRONIC RESPIRATORY FAILURE WITH HYPOXIA (HCC): ICD-10-CM

## 2021-01-01 DIAGNOSIS — N39.0 URINARY TRACT INFECTION WITHOUT HEMATURIA, SITE UNSPECIFIED: ICD-10-CM

## 2021-01-01 DIAGNOSIS — J96.01 ACUTE RESPIRATORY FAILURE WITH HYPOXIA (HCC): Primary | ICD-10-CM

## 2021-01-01 DIAGNOSIS — J44.1 CHRONIC OBSTRUCTIVE PULMONARY DISEASE WITH ACUTE EXACERBATION (HCC): ICD-10-CM

## 2021-01-01 LAB
ALBUMIN SERPL BCP-MCNC: 3.8 G/DL (ref 3.2–4.9)
ALBUMIN/GLOB SERPL: 1 G/DL
ALP SERPL-CCNC: 74 U/L (ref 30–99)
ALT SERPL-CCNC: 11 U/L (ref 2–50)
AMBIGUOUS DTTM AMBI4: NORMAL
ANION GAP SERPL CALC-SCNC: 10 MMOL/L (ref 7–16)
ANION GAP SERPL CALC-SCNC: 11 MMOL/L (ref 7–16)
ANION GAP SERPL CALC-SCNC: 8 MMOL/L (ref 7–16)
ANION GAP SERPL CALC-SCNC: 9 MMOL/L (ref 7–16)
ANISOCYTOSIS BLD QL SMEAR: ABNORMAL
APPEARANCE UR: ABNORMAL
AST SERPL-CCNC: 22 U/L (ref 12–45)
BACTERIA #/AREA URNS HPF: ABNORMAL /HPF
BACTERIA BLD CULT: NORMAL
BACTERIA BLD CULT: NORMAL
BACTERIA UR CULT: ABNORMAL
BASOPHILS # BLD AUTO: 0.3 % (ref 0–1.8)
BASOPHILS # BLD AUTO: 0.3 % (ref 0–1.8)
BASOPHILS # BLD AUTO: 0.5 % (ref 0–1.8)
BASOPHILS # BLD AUTO: 0.6 % (ref 0–1.8)
BASOPHILS # BLD: 0.05 K/UL (ref 0–0.12)
BASOPHILS # BLD: 0.05 K/UL (ref 0–0.12)
BASOPHILS # BLD: 0.06 K/UL (ref 0–0.12)
BASOPHILS # BLD: 0.08 K/UL (ref 0–0.12)
BILIRUB SERPL-MCNC: 0.3 MG/DL (ref 0.1–1.5)
BILIRUB UR QL STRIP.AUTO: NEGATIVE
BUN SERPL-MCNC: 20 MG/DL (ref 8–22)
BUN SERPL-MCNC: 21 MG/DL (ref 8–22)
BUN SERPL-MCNC: 21 MG/DL (ref 8–22)
BUN SERPL-MCNC: 24 MG/DL (ref 8–22)
CALCIUM SERPL-MCNC: 8.6 MG/DL (ref 8.5–10.5)
CALCIUM SERPL-MCNC: 9 MG/DL (ref 8.5–10.5)
CALCIUM SERPL-MCNC: 9.1 MG/DL (ref 8.5–10.5)
CALCIUM SERPL-MCNC: 9.6 MG/DL (ref 8.5–10.5)
CHLORIDE SERPL-SCNC: 101 MMOL/L (ref 96–112)
CHLORIDE SERPL-SCNC: 101 MMOL/L (ref 96–112)
CHLORIDE SERPL-SCNC: 102 MMOL/L (ref 96–112)
CHLORIDE SERPL-SCNC: 99 MMOL/L (ref 96–112)
CO2 SERPL-SCNC: 25 MMOL/L (ref 20–33)
CO2 SERPL-SCNC: 27 MMOL/L (ref 20–33)
CO2 SERPL-SCNC: 28 MMOL/L (ref 20–33)
CO2 SERPL-SCNC: 29 MMOL/L (ref 20–33)
COLOR UR: YELLOW
COMMENT 1642: NORMAL
CREAT SERPL-MCNC: 0.86 MG/DL (ref 0.5–1.4)
CREAT SERPL-MCNC: 0.87 MG/DL (ref 0.5–1.4)
CREAT SERPL-MCNC: 0.95 MG/DL (ref 0.5–1.4)
CREAT SERPL-MCNC: 1.01 MG/DL (ref 0.5–1.4)
EKG IMPRESSION: NORMAL
EOSINOPHIL # BLD AUTO: 0 K/UL (ref 0–0.51)
EOSINOPHIL # BLD AUTO: 0.14 K/UL (ref 0–0.51)
EOSINOPHIL # BLD AUTO: 0.34 K/UL (ref 0–0.51)
EOSINOPHIL # BLD AUTO: 0.42 K/UL (ref 0–0.51)
EOSINOPHIL NFR BLD: 0 % (ref 0–6.9)
EOSINOPHIL NFR BLD: 0.7 % (ref 0–6.9)
EOSINOPHIL NFR BLD: 2.4 % (ref 0–6.9)
EOSINOPHIL NFR BLD: 4.6 % (ref 0–6.9)
EPI CELLS #/AREA URNS HPF: NEGATIVE /HPF
ERYTHROCYTE [DISTWIDTH] IN BLOOD BY AUTOMATED COUNT: 55.2 FL (ref 35.9–50)
ERYTHROCYTE [DISTWIDTH] IN BLOOD BY AUTOMATED COUNT: 55.6 FL (ref 35.9–50)
ERYTHROCYTE [DISTWIDTH] IN BLOOD BY AUTOMATED COUNT: 55.8 FL (ref 35.9–50)
ERYTHROCYTE [DISTWIDTH] IN BLOOD BY AUTOMATED COUNT: 56.1 FL (ref 35.9–50)
FERRITIN SERPL-MCNC: 48.2 NG/ML (ref 10–291)
FLUAV RNA SPEC QL NAA+PROBE: NEGATIVE
FLUBV RNA SPEC QL NAA+PROBE: NEGATIVE
GLOBULIN SER CALC-MCNC: 3.8 G/DL (ref 1.9–3.5)
GLUCOSE BLD-MCNC: 134 MG/DL (ref 65–99)
GLUCOSE BLD-MCNC: 144 MG/DL (ref 65–99)
GLUCOSE BLD-MCNC: 147 MG/DL (ref 65–99)
GLUCOSE BLD-MCNC: 151 MG/DL (ref 65–99)
GLUCOSE BLD-MCNC: 152 MG/DL (ref 65–99)
GLUCOSE BLD-MCNC: 169 MG/DL (ref 65–99)
GLUCOSE BLD-MCNC: 185 MG/DL (ref 65–99)
GLUCOSE BLD-MCNC: 221 MG/DL (ref 65–99)
GLUCOSE BLD-MCNC: 270 MG/DL (ref 65–99)
GLUCOSE BLD-MCNC: 272 MG/DL (ref 65–99)
GLUCOSE BLD-MCNC: 308 MG/DL (ref 65–99)
GLUCOSE BLD-MCNC: 313 MG/DL (ref 65–99)
GLUCOSE SERPL-MCNC: 138 MG/DL (ref 65–99)
GLUCOSE SERPL-MCNC: 147 MG/DL (ref 65–99)
GLUCOSE SERPL-MCNC: 157 MG/DL (ref 65–99)
GLUCOSE SERPL-MCNC: 171 MG/DL (ref 65–99)
GLUCOSE UR STRIP.AUTO-MCNC: NEGATIVE MG/DL
HCT VFR BLD AUTO: 31.9 % (ref 37–47)
HCT VFR BLD AUTO: 32.6 % (ref 37–47)
HCT VFR BLD AUTO: 33.5 % (ref 37–47)
HCT VFR BLD AUTO: 37.2 % (ref 37–47)
HGB BLD-MCNC: 10 G/DL (ref 12–16)
HGB BLD-MCNC: 11 G/DL (ref 12–16)
HGB BLD-MCNC: 9.6 G/DL (ref 12–16)
HGB BLD-MCNC: 9.9 G/DL (ref 12–16)
HYALINE CASTS #/AREA URNS LPF: ABNORMAL /LPF
HYPOCHROMIA BLD QL SMEAR: ABNORMAL
IMM GRANULOCYTES # BLD AUTO: 0.05 K/UL (ref 0–0.11)
IMM GRANULOCYTES # BLD AUTO: 0.07 K/UL (ref 0–0.11)
IMM GRANULOCYTES # BLD AUTO: 0.13 K/UL (ref 0–0.11)
IMM GRANULOCYTES # BLD AUTO: 0.14 K/UL (ref 0–0.11)
IMM GRANULOCYTES NFR BLD AUTO: 0.5 % (ref 0–0.9)
IMM GRANULOCYTES NFR BLD AUTO: 0.5 % (ref 0–0.9)
IMM GRANULOCYTES NFR BLD AUTO: 0.7 % (ref 0–0.9)
IMM GRANULOCYTES NFR BLD AUTO: 0.7 % (ref 0–0.9)
IRON SATN MFR SERPL: 9 % (ref 15–55)
IRON SERPL-MCNC: 28 UG/DL (ref 40–170)
KETONES UR STRIP.AUTO-MCNC: NEGATIVE MG/DL
LACTATE BLD-SCNC: 1.7 MMOL/L (ref 0.5–2)
LEUKOCYTE ESTERASE UR QL STRIP.AUTO: ABNORMAL
LYMPHOCYTES # BLD AUTO: 1.11 K/UL (ref 1–4.8)
LYMPHOCYTES # BLD AUTO: 1.11 K/UL (ref 1–4.8)
LYMPHOCYTES # BLD AUTO: 1.77 K/UL (ref 1–4.8)
LYMPHOCYTES # BLD AUTO: 2.53 K/UL (ref 1–4.8)
LYMPHOCYTES NFR BLD: 12 % (ref 22–41)
LYMPHOCYTES NFR BLD: 12.1 % (ref 22–41)
LYMPHOCYTES NFR BLD: 12.7 % (ref 22–41)
LYMPHOCYTES NFR BLD: 6.1 % (ref 22–41)
MCH RBC QN AUTO: 24.8 PG (ref 27–33)
MCH RBC QN AUTO: 24.9 PG (ref 27–33)
MCH RBC QN AUTO: 25.1 PG (ref 27–33)
MCH RBC QN AUTO: 25.3 PG (ref 27–33)
MCHC RBC AUTO-ENTMCNC: 29.6 G/DL (ref 33.6–35)
MCHC RBC AUTO-ENTMCNC: 29.6 G/DL (ref 33.6–35)
MCHC RBC AUTO-ENTMCNC: 30.1 G/DL (ref 33.6–35)
MCHC RBC AUTO-ENTMCNC: 30.7 G/DL (ref 33.6–35)
MCV RBC AUTO: 81.9 FL (ref 81.4–97.8)
MCV RBC AUTO: 83.8 FL (ref 81.4–97.8)
MCV RBC AUTO: 83.9 FL (ref 81.4–97.8)
MCV RBC AUTO: 84.2 FL (ref 81.4–97.8)
MICRO URNS: ABNORMAL
MICROCYTES BLD QL SMEAR: ABNORMAL
MONOCYTES # BLD AUTO: 0.8 K/UL (ref 0–0.85)
MONOCYTES # BLD AUTO: 0.98 K/UL (ref 0–0.85)
MONOCYTES # BLD AUTO: 1.14 K/UL (ref 0–0.85)
MONOCYTES # BLD AUTO: 1.36 K/UL (ref 0–0.85)
MONOCYTES NFR BLD AUTO: 6.3 % (ref 0–13.4)
MONOCYTES NFR BLD AUTO: 6.5 % (ref 0–13.4)
MONOCYTES NFR BLD AUTO: 7 % (ref 0–13.4)
MONOCYTES NFR BLD AUTO: 8.7 % (ref 0–13.4)
MORPHOLOGY BLD-IMP: NORMAL
NEUTROPHILS # BLD AUTO: 10.7 K/UL (ref 2–7.15)
NEUTROPHILS # BLD AUTO: 15.71 K/UL (ref 2–7.15)
NEUTROPHILS # BLD AUTO: 16.84 K/UL (ref 2–7.15)
NEUTROPHILS # BLD AUTO: 6.72 K/UL (ref 2–7.15)
NEUTROPHILS NFR BLD: 73.6 % (ref 44–72)
NEUTROPHILS NFR BLD: 76.8 % (ref 44–72)
NEUTROPHILS NFR BLD: 79.8 % (ref 44–72)
NEUTROPHILS NFR BLD: 86.6 % (ref 44–72)
NITRITE UR QL STRIP.AUTO: POSITIVE
NRBC # BLD AUTO: 0 K/UL
NRBC BLD-RTO: 0 /100 WBC
PH UR STRIP.AUTO: 6 [PH] (ref 5–8)
PLATELET # BLD AUTO: 221 K/UL (ref 164–446)
PLATELET # BLD AUTO: 225 K/UL (ref 164–446)
PLATELET # BLD AUTO: 229 K/UL (ref 164–446)
PLATELET # BLD AUTO: 234 K/UL (ref 164–446)
PLATELET BLD QL SMEAR: NORMAL
PMV BLD AUTO: 9 FL (ref 9–12.9)
PMV BLD AUTO: 9 FL (ref 9–12.9)
PMV BLD AUTO: 9.5 FL (ref 9–12.9)
PMV BLD AUTO: 9.7 FL (ref 9–12.9)
POTASSIUM SERPL-SCNC: 4.3 MMOL/L (ref 3.6–5.5)
POTASSIUM SERPL-SCNC: 4.6 MMOL/L (ref 3.6–5.5)
POTASSIUM SERPL-SCNC: 4.7 MMOL/L (ref 3.6–5.5)
POTASSIUM SERPL-SCNC: 5 MMOL/L (ref 3.6–5.5)
PROCALCITONIN SERPL-MCNC: <0.05 NG/ML
PROT SERPL-MCNC: 7.6 G/DL (ref 6–8.2)
PROT UR QL STRIP: 100 MG/DL
RBC # BLD AUTO: 3.8 M/UL (ref 4.2–5.4)
RBC # BLD AUTO: 3.98 M/UL (ref 4.2–5.4)
RBC # BLD AUTO: 3.98 M/UL (ref 4.2–5.4)
RBC # BLD AUTO: 4.44 M/UL (ref 4.2–5.4)
RBC # URNS HPF: ABNORMAL /HPF
RBC BLD AUTO: PRESENT
RBC UR QL AUTO: ABNORMAL
RSV RNA SPEC QL NAA+PROBE: NEGATIVE
SARS-COV-2 RNA RESP QL NAA+PROBE: NOTDETECTED
SIGNIFICANT IND 70042: ABNORMAL
SIGNIFICANT IND 70042: NORMAL
SIGNIFICANT IND 70042: NORMAL
SITE SITE: ABNORMAL
SITE SITE: NORMAL
SITE SITE: NORMAL
SODIUM SERPL-SCNC: 136 MMOL/L (ref 135–145)
SODIUM SERPL-SCNC: 137 MMOL/L (ref 135–145)
SODIUM SERPL-SCNC: 138 MMOL/L (ref 135–145)
SODIUM SERPL-SCNC: 139 MMOL/L (ref 135–145)
SOURCE SOURCE: ABNORMAL
SOURCE SOURCE: NORMAL
SOURCE SOURCE: NORMAL
SP GR UR STRIP.AUTO: 1.02
SPECIMEN SOURCE: NORMAL
TIBC SERPL-MCNC: 296 UG/DL (ref 250–450)
TSH SERPL DL<=0.005 MIU/L-ACNC: 2.16 UIU/ML (ref 0.38–5.33)
UIBC SERPL-MCNC: 268 UG/DL (ref 110–370)
UROBILINOGEN UR STRIP.AUTO-MCNC: 0.2 MG/DL
VIT B12 SERPL-MCNC: 334 PG/ML (ref 211–911)
WBC # BLD AUTO: 13.9 K/UL (ref 4.8–10.8)
WBC # BLD AUTO: 18.1 K/UL (ref 4.8–10.8)
WBC # BLD AUTO: 21.1 K/UL (ref 4.8–10.8)
WBC # BLD AUTO: 9.2 K/UL (ref 4.8–10.8)
WBC #/AREA URNS HPF: ABNORMAL /HPF

## 2021-01-01 PROCEDURE — 87186 SC STD MICRODIL/AGAR DIL: CPT

## 2021-01-01 PROCEDURE — 96372 THER/PROPH/DIAG INJ SC/IM: CPT

## 2021-01-01 PROCEDURE — 87040 BLOOD CULTURE FOR BACTERIA: CPT | Mod: 91

## 2021-01-01 PROCEDURE — 85025 COMPLETE CBC W/AUTO DIFF WBC: CPT

## 2021-01-01 PROCEDURE — 80048 BASIC METABOLIC PNL TOTAL CA: CPT

## 2021-01-01 PROCEDURE — 94640 AIRWAY INHALATION TREATMENT: CPT

## 2021-01-01 PROCEDURE — 770006 HCHG ROOM/CARE - MED/SURG/GYN SEMI*

## 2021-01-01 PROCEDURE — 82962 GLUCOSE BLOOD TEST: CPT

## 2021-01-01 PROCEDURE — 700102 HCHG RX REV CODE 250 W/ 637 OVERRIDE(OP): Performed by: STUDENT IN AN ORGANIZED HEALTH CARE EDUCATION/TRAINING PROGRAM

## 2021-01-01 PROCEDURE — 700111 HCHG RX REV CODE 636 W/ 250 OVERRIDE (IP): Performed by: STUDENT IN AN ORGANIZED HEALTH CARE EDUCATION/TRAINING PROGRAM

## 2021-01-01 PROCEDURE — 700111 HCHG RX REV CODE 636 W/ 250 OVERRIDE (IP): Performed by: INTERNAL MEDICINE

## 2021-01-01 PROCEDURE — A9270 NON-COVERED ITEM OR SERVICE: HCPCS | Performed by: STUDENT IN AN ORGANIZED HEALTH CARE EDUCATION/TRAINING PROGRAM

## 2021-01-01 PROCEDURE — A9270 NON-COVERED ITEM OR SERVICE: HCPCS | Performed by: INTERNAL MEDICINE

## 2021-01-01 PROCEDURE — 84443 ASSAY THYROID STIM HORMONE: CPT

## 2021-01-01 PROCEDURE — 87077 CULTURE AEROBIC IDENTIFY: CPT

## 2021-01-01 PROCEDURE — 99232 SBSQ HOSP IP/OBS MODERATE 35: CPT | Performed by: INTERNAL MEDICINE

## 2021-01-01 PROCEDURE — 82962 GLUCOSE BLOOD TEST: CPT | Mod: 91

## 2021-01-01 PROCEDURE — 36415 COLL VENOUS BLD VENIPUNCTURE: CPT

## 2021-01-01 PROCEDURE — 700101 HCHG RX REV CODE 250: Performed by: INTERNAL MEDICINE

## 2021-01-01 PROCEDURE — 83550 IRON BINDING TEST: CPT

## 2021-01-01 PROCEDURE — 96375 TX/PRO/DX INJ NEW DRUG ADDON: CPT

## 2021-01-01 PROCEDURE — 700102 HCHG RX REV CODE 250 W/ 637 OVERRIDE(OP): Performed by: INTERNAL MEDICINE

## 2021-01-01 PROCEDURE — 96374 THER/PROPH/DIAG INJ IV PUSH: CPT

## 2021-01-01 PROCEDURE — 84145 PROCALCITONIN (PCT): CPT

## 2021-01-01 PROCEDURE — 700102 HCHG RX REV CODE 250 W/ 637 OVERRIDE(OP): Performed by: EMERGENCY MEDICINE

## 2021-01-01 PROCEDURE — 94760 N-INVAS EAR/PLS OXIMETRY 1: CPT

## 2021-01-01 PROCEDURE — 83540 ASSAY OF IRON: CPT

## 2021-01-01 PROCEDURE — 94660 CPAP INITIATION&MGMT: CPT

## 2021-01-01 PROCEDURE — 80053 COMPREHEN METABOLIC PANEL: CPT

## 2021-01-01 PROCEDURE — 82607 VITAMIN B-12: CPT

## 2021-01-01 PROCEDURE — 99233 SBSQ HOSP IP/OBS HIGH 50: CPT | Performed by: INTERNAL MEDICINE

## 2021-01-01 PROCEDURE — A9270 NON-COVERED ITEM OR SERVICE: HCPCS | Performed by: EMERGENCY MEDICINE

## 2021-01-01 PROCEDURE — 99285 EMERGENCY DEPT VISIT HI MDM: CPT

## 2021-01-01 PROCEDURE — 87086 URINE CULTURE/COLONY COUNT: CPT

## 2021-01-01 PROCEDURE — 81001 URINALYSIS AUTO W/SCOPE: CPT

## 2021-01-01 PROCEDURE — 76775 US EXAM ABDO BACK WALL LIM: CPT

## 2021-01-01 PROCEDURE — 83605 ASSAY OF LACTIC ACID: CPT

## 2021-01-01 PROCEDURE — 94669 MECHANICAL CHEST WALL OSCILL: CPT

## 2021-01-01 PROCEDURE — 99223 1ST HOSP IP/OBS HIGH 75: CPT | Mod: AI | Performed by: STUDENT IN AN ORGANIZED HEALTH CARE EDUCATION/TRAINING PROGRAM

## 2021-01-01 PROCEDURE — 93005 ELECTROCARDIOGRAM TRACING: CPT | Performed by: EMERGENCY MEDICINE

## 2021-01-01 PROCEDURE — 71045 X-RAY EXAM CHEST 1 VIEW: CPT

## 2021-01-01 PROCEDURE — RXMED WILLOW AMBULATORY MEDICATION CHARGE: Performed by: INTERNAL MEDICINE

## 2021-01-01 PROCEDURE — 94664 DEMO&/EVAL PT USE INHALER: CPT

## 2021-01-01 PROCEDURE — 0241U HCHG SARS-COV-2 COVID-19 NFCT DS RESP RNA 4 TRGT MIC: CPT

## 2021-01-01 PROCEDURE — 700101 HCHG RX REV CODE 250: Performed by: STUDENT IN AN ORGANIZED HEALTH CARE EDUCATION/TRAINING PROGRAM

## 2021-01-01 PROCEDURE — 700111 HCHG RX REV CODE 636 W/ 250 OVERRIDE (IP): Performed by: EMERGENCY MEDICINE

## 2021-01-01 PROCEDURE — 700101 HCHG RX REV CODE 250: Performed by: EMERGENCY MEDICINE

## 2021-01-01 PROCEDURE — C9803 HOPD COVID-19 SPEC COLLECT: HCPCS | Performed by: EMERGENCY MEDICINE

## 2021-01-01 PROCEDURE — 82728 ASSAY OF FERRITIN: CPT

## 2021-01-01 PROCEDURE — 99239 HOSP IP/OBS DSCHRG MGMT >30: CPT | Performed by: INTERNAL MEDICINE

## 2021-01-01 RX ORDER — LISINOPRIL 20 MG/1
20 TABLET ORAL DAILY
Status: DISCONTINUED | OUTPATIENT
Start: 2021-01-01 | End: 2021-01-01

## 2021-01-01 RX ORDER — GUAIFENESIN/DEXTROMETHORPHAN 100-10MG/5
10 SYRUP ORAL EVERY 6 HOURS PRN
Status: DISCONTINUED | OUTPATIENT
Start: 2021-01-01 | End: 2021-01-01 | Stop reason: HOSPADM

## 2021-01-01 RX ORDER — AMOXICILLIN 250 MG
2 CAPSULE ORAL 2 TIMES DAILY
Status: DISCONTINUED | OUTPATIENT
Start: 2021-01-01 | End: 2021-01-01 | Stop reason: HOSPADM

## 2021-01-01 RX ORDER — DOXYCYCLINE 100 MG/1
100 TABLET ORAL EVERY 12 HOURS
Status: DISCONTINUED | OUTPATIENT
Start: 2021-01-01 | End: 2021-01-01

## 2021-01-01 RX ORDER — LOPERAMIDE HYDROCHLORIDE 2 MG/1
2 CAPSULE ORAL 4 TIMES DAILY PRN
Qty: 20 CAPSULE | Refills: 0 | Status: SHIPPED | OUTPATIENT
Start: 2021-01-01 | End: 2021-01-01

## 2021-01-01 RX ORDER — LOPERAMIDE HYDROCHLORIDE 2 MG/1
2 CAPSULE ORAL 4 TIMES DAILY PRN
Status: DISCONTINUED | OUTPATIENT
Start: 2021-01-01 | End: 2021-01-01 | Stop reason: HOSPADM

## 2021-01-01 RX ORDER — LEVOTHYROXINE SODIUM 0.12 MG/1
125 TABLET ORAL EVERY EVENING
Status: DISCONTINUED | OUTPATIENT
Start: 2021-01-01 | End: 2021-01-01 | Stop reason: HOSPADM

## 2021-01-01 RX ORDER — METHYLPREDNISOLONE SODIUM SUCCINATE 125 MG/2ML
125 INJECTION, POWDER, LYOPHILIZED, FOR SOLUTION INTRAMUSCULAR; INTRAVENOUS ONCE
Status: COMPLETED | OUTPATIENT
Start: 2021-01-01 | End: 2021-01-01

## 2021-01-01 RX ORDER — LABETALOL HYDROCHLORIDE 5 MG/ML
10 INJECTION, SOLUTION INTRAVENOUS EVERY 4 HOURS PRN
Status: DISCONTINUED | OUTPATIENT
Start: 2021-01-01 | End: 2021-01-01 | Stop reason: HOSPADM

## 2021-01-01 RX ORDER — CARVEDILOL 6.25 MG/1
6.25 TABLET ORAL 2 TIMES DAILY WITH MEALS
Status: DISCONTINUED | OUTPATIENT
Start: 2021-01-01 | End: 2021-01-01

## 2021-01-01 RX ORDER — HEPARIN SODIUM 5000 [USP'U]/ML
5000 INJECTION, SOLUTION INTRAVENOUS; SUBCUTANEOUS EVERY 8 HOURS
Status: DISCONTINUED | OUTPATIENT
Start: 2021-01-01 | End: 2021-01-01

## 2021-01-01 RX ORDER — ONDANSETRON 4 MG/1
4 TABLET, ORALLY DISINTEGRATING ORAL EVERY 4 HOURS PRN
Status: DISCONTINUED | OUTPATIENT
Start: 2021-01-01 | End: 2021-01-01

## 2021-01-01 RX ORDER — ACETAMINOPHEN 325 MG/1
650 TABLET ORAL EVERY 6 HOURS PRN
Status: DISCONTINUED | OUTPATIENT
Start: 2021-01-01 | End: 2021-01-01 | Stop reason: HOSPADM

## 2021-01-01 RX ORDER — DEXTROSE MONOHYDRATE 25 G/50ML
50 INJECTION, SOLUTION INTRAVENOUS
Status: DISCONTINUED | OUTPATIENT
Start: 2021-01-01 | End: 2021-01-01 | Stop reason: HOSPADM

## 2021-01-01 RX ORDER — ALBUTEROL SULFATE 90 UG/1
2 AEROSOL, METERED RESPIRATORY (INHALATION) EVERY 4 HOURS PRN
Status: DISCONTINUED | OUTPATIENT
Start: 2021-01-01 | End: 2021-01-01 | Stop reason: HOSPADM

## 2021-01-01 RX ORDER — SULFAMETHOXAZOLE AND TRIMETHOPRIM 800; 160 MG/1; MG/1
1 TABLET ORAL EVERY 12 HOURS
Status: DISCONTINUED | OUTPATIENT
Start: 2021-01-01 | End: 2021-01-01 | Stop reason: HOSPADM

## 2021-01-01 RX ORDER — ASPIRIN 81 MG/1
81 TABLET, CHEWABLE ORAL DAILY
Status: DISCONTINUED | OUTPATIENT
Start: 2021-01-01 | End: 2021-01-01 | Stop reason: HOSPADM

## 2021-01-01 RX ORDER — POLYETHYLENE GLYCOL 3350 17 G/17G
1 POWDER, FOR SOLUTION ORAL
Status: DISCONTINUED | OUTPATIENT
Start: 2021-01-01 | End: 2021-01-01 | Stop reason: HOSPADM

## 2021-01-01 RX ORDER — IPRATROPIUM BROMIDE AND ALBUTEROL SULFATE 2.5; .5 MG/3ML; MG/3ML
3 SOLUTION RESPIRATORY (INHALATION)
Status: DISCONTINUED | OUTPATIENT
Start: 2021-01-01 | End: 2021-01-01

## 2021-01-01 RX ORDER — ONDANSETRON 2 MG/ML
4 INJECTION INTRAMUSCULAR; INTRAVENOUS EVERY 4 HOURS PRN
Status: DISCONTINUED | OUTPATIENT
Start: 2021-01-01 | End: 2021-01-01

## 2021-01-01 RX ORDER — ENALAPRILAT 1.25 MG/ML
1.25 INJECTION INTRAVENOUS EVERY 6 HOURS PRN
Status: DISCONTINUED | OUTPATIENT
Start: 2021-01-01 | End: 2021-01-01 | Stop reason: HOSPADM

## 2021-01-01 RX ORDER — IPRATROPIUM BROMIDE AND ALBUTEROL SULFATE 2.5; .5 MG/3ML; MG/3ML
3 SOLUTION RESPIRATORY (INHALATION)
Status: DISCONTINUED | OUTPATIENT
Start: 2021-01-01 | End: 2021-01-01 | Stop reason: HOSPADM

## 2021-01-01 RX ORDER — BISACODYL 10 MG
10 SUPPOSITORY, RECTAL RECTAL
Status: DISCONTINUED | OUTPATIENT
Start: 2021-01-01 | End: 2021-01-01 | Stop reason: HOSPADM

## 2021-01-01 RX ORDER — FLUTICASONE PROPIONATE 44 UG/1
2 AEROSOL, METERED RESPIRATORY (INHALATION)
Status: DISCONTINUED | OUTPATIENT
Start: 2021-01-01 | End: 2021-01-01 | Stop reason: HOSPADM

## 2021-01-01 RX ORDER — SULFAMETHOXAZOLE AND TRIMETHOPRIM 800; 160 MG/1; MG/1
1 TABLET ORAL EVERY 12 HOURS
Qty: 14 TABLET | Refills: 0 | Status: SHIPPED | OUTPATIENT
Start: 2021-01-01 | End: 2021-01-01

## 2021-01-01 RX ORDER — ACETAMINOPHEN 500 MG
1000 TABLET ORAL ONCE
Status: COMPLETED | OUTPATIENT
Start: 2021-01-01 | End: 2021-01-01

## 2021-01-01 RX ORDER — LACTOBACILLUS RHAMNOSUS GG 10B CELL
1 CAPSULE ORAL
Status: DISCONTINUED | OUTPATIENT
Start: 2021-01-01 | End: 2021-01-01 | Stop reason: HOSPADM

## 2021-01-01 RX ORDER — SIMVASTATIN 20 MG
10 TABLET ORAL EVERY EVENING
Status: DISCONTINUED | OUTPATIENT
Start: 2021-01-01 | End: 2021-01-01 | Stop reason: HOSPADM

## 2021-01-01 RX ADMIN — HEPARIN SODIUM 5000 UNITS: 5000 INJECTION, SOLUTION INTRAVENOUS; SUBCUTANEOUS at 17:14

## 2021-01-01 RX ADMIN — SIMVASTATIN 10 MG: 20 TABLET, FILM COATED ORAL at 17:13

## 2021-01-01 RX ADMIN — IPRATROPIUM BROMIDE AND ALBUTEROL SULFATE 3 ML: .5; 2.5 SOLUTION RESPIRATORY (INHALATION) at 07:37

## 2021-01-01 RX ADMIN — Medication 1 CAPSULE: at 08:51

## 2021-01-01 RX ADMIN — ENOXAPARIN SODIUM 40 MG: 40 INJECTION SUBCUTANEOUS at 05:38

## 2021-01-01 RX ADMIN — INSULIN HUMAN 4 UNITS: 100 INJECTION, SOLUTION PARENTERAL at 21:03

## 2021-01-01 RX ADMIN — INSULIN HUMAN 4 UNITS: 100 INJECTION, SOLUTION PARENTERAL at 13:00

## 2021-01-01 RX ADMIN — ASPIRIN 81 MG CHEWABLE TABLET 81 MG: 81 TABLET CHEWABLE at 06:06

## 2021-01-01 RX ADMIN — ENOXAPARIN SODIUM 40 MG: 40 INJECTION SUBCUTANEOUS at 05:28

## 2021-01-01 RX ADMIN — CEFTRIAXONE SODIUM 2 G: 10 INJECTION, POWDER, FOR SOLUTION INTRAVENOUS at 06:07

## 2021-01-01 RX ADMIN — IPRATROPIUM BROMIDE AND ALBUTEROL SULFATE 3 ML: .5; 2.5 SOLUTION RESPIRATORY (INHALATION) at 10:53

## 2021-01-01 RX ADMIN — METOPROLOL TARTRATE 25 MG: 25 TABLET, FILM COATED ORAL at 21:42

## 2021-01-01 RX ADMIN — HEPARIN SODIUM 5000 UNITS: 5000 INJECTION, SOLUTION INTRAVENOUS; SUBCUTANEOUS at 06:07

## 2021-01-01 RX ADMIN — ASPIRIN 81 MG CHEWABLE TABLET 81 MG: 81 TABLET CHEWABLE at 05:38

## 2021-01-01 RX ADMIN — LOPERAMIDE HYDROCHLORIDE 2 MG: 2 CAPSULE ORAL at 18:05

## 2021-01-01 RX ADMIN — IPRATROPIUM BROMIDE AND ALBUTEROL SULFATE 3 ML: .5; 2.5 SOLUTION RESPIRATORY (INHALATION) at 08:25

## 2021-01-01 RX ADMIN — ACETAMINOPHEN 1000 MG: 500 TABLET ORAL at 05:17

## 2021-01-01 RX ADMIN — DOCUSATE SODIUM 50 MG AND SENNOSIDES 8.6 MG 2 TABLET: 8.6; 5 TABLET, FILM COATED ORAL at 06:06

## 2021-01-01 RX ADMIN — INSULIN HUMAN 1 UNITS: 100 INJECTION, SOLUTION PARENTERAL at 08:50

## 2021-01-01 RX ADMIN — ENOXAPARIN SODIUM 40 MG: 40 INJECTION SUBCUTANEOUS at 18:05

## 2021-01-01 RX ADMIN — SULFAMETHOXAZOLE AND TRIMETHOPRIM 1 TABLET: 800; 160 TABLET ORAL at 18:05

## 2021-01-01 RX ADMIN — LEVOTHYROXINE SODIUM 125 MCG: 0.12 TABLET ORAL at 05:28

## 2021-01-01 RX ADMIN — METOPROLOL TARTRATE 25 MG: 25 TABLET, FILM COATED ORAL at 21:02

## 2021-01-01 RX ADMIN — LOPERAMIDE HYDROCHLORIDE 2 MG: 2 CAPSULE ORAL at 15:49

## 2021-01-01 RX ADMIN — ALBUTEROL SULFATE 2.5 MG: 2.5 SOLUTION RESPIRATORY (INHALATION) at 04:07

## 2021-01-01 RX ADMIN — Medication 1 CAPSULE: at 08:59

## 2021-01-01 RX ADMIN — HEPARIN SODIUM 5000 UNITS: 5000 INJECTION, SOLUTION INTRAVENOUS; SUBCUTANEOUS at 21:02

## 2021-01-01 RX ADMIN — SULFAMETHOXAZOLE AND TRIMETHOPRIM 1 TABLET: 800; 160 TABLET ORAL at 05:28

## 2021-01-01 RX ADMIN — METOPROLOL TARTRATE 25 MG: 25 TABLET, FILM COATED ORAL at 08:59

## 2021-01-01 RX ADMIN — IPRATROPIUM BROMIDE AND ALBUTEROL SULFATE 3 ML: .5; 2.5 SOLUTION RESPIRATORY (INHALATION) at 19:36

## 2021-01-01 RX ADMIN — DOXYCYCLINE 100 MG: 100 TABLET, FILM COATED ORAL at 06:06

## 2021-01-01 RX ADMIN — METOPROLOL TARTRATE 25 MG: 25 TABLET, FILM COATED ORAL at 09:00

## 2021-01-01 RX ADMIN — IPRATROPIUM BROMIDE AND ALBUTEROL SULFATE 3 ML: .5; 2.5 SOLUTION RESPIRATORY (INHALATION) at 14:48

## 2021-01-01 RX ADMIN — CEFTRIAXONE SODIUM 2 G: 10 INJECTION, POWDER, FOR SOLUTION INTRAVENOUS at 05:38

## 2021-01-01 RX ADMIN — SIMVASTATIN 10 MG: 20 TABLET, FILM COATED ORAL at 18:05

## 2021-01-01 RX ADMIN — Medication 1 CAPSULE: at 05:38

## 2021-01-01 RX ADMIN — METOPROLOL TARTRATE 25 MG: 25 TABLET, FILM COATED ORAL at 21:39

## 2021-01-01 RX ADMIN — ASPIRIN 81 MG CHEWABLE TABLET 81 MG: 81 TABLET CHEWABLE at 05:28

## 2021-01-01 RX ADMIN — IPRATROPIUM BROMIDE AND ALBUTEROL SULFATE 3 ML: .5; 2.5 SOLUTION RESPIRATORY (INHALATION) at 10:43

## 2021-01-01 RX ADMIN — LEVOTHYROXINE SODIUM 125 MCG: 0.12 TABLET ORAL at 05:40

## 2021-01-01 RX ADMIN — INSULIN HUMAN 1 UNITS: 100 INJECTION, SOLUTION PARENTERAL at 12:14

## 2021-01-01 RX ADMIN — DOXYCYCLINE 100 MG: 100 TABLET, FILM COATED ORAL at 17:13

## 2021-01-01 RX ADMIN — IPRATROPIUM BROMIDE AND ALBUTEROL SULFATE 3 ML: .5; 2.5 SOLUTION RESPIRATORY (INHALATION) at 08:33

## 2021-01-01 RX ADMIN — ENOXAPARIN SODIUM 40 MG: 40 INJECTION SUBCUTANEOUS at 17:17

## 2021-01-01 RX ADMIN — INSULIN HUMAN 2 UNITS: 100 INJECTION, SOLUTION PARENTERAL at 21:43

## 2021-01-01 RX ADMIN — INSULIN HUMAN 3 UNITS: 100 INJECTION, SOLUTION PARENTERAL at 12:40

## 2021-01-01 RX ADMIN — INSULIN HUMAN 1 UNITS: 100 INJECTION, SOLUTION PARENTERAL at 17:59

## 2021-01-01 RX ADMIN — INSULIN HUMAN 3 UNITS: 100 INJECTION, SOLUTION PARENTERAL at 17:23

## 2021-01-01 RX ADMIN — IPRATROPIUM BROMIDE AND ALBUTEROL SULFATE 3 ML: .5; 2.5 SOLUTION RESPIRATORY (INHALATION) at 14:49

## 2021-01-01 RX ADMIN — DOCUSATE SODIUM 50 MG AND SENNOSIDES 8.6 MG 2 TABLET: 8.6; 5 TABLET, FILM COATED ORAL at 05:37

## 2021-01-01 RX ADMIN — ASPIRIN 81 MG CHEWABLE TABLET 81 MG: 81 TABLET CHEWABLE at 06:45

## 2021-01-01 RX ADMIN — UMECLIDINIUM BROMIDE AND VILANTEROL TRIFENATATE 1 PUFF: 62.5; 25 POWDER RESPIRATORY (INHALATION) at 08:39

## 2021-01-01 RX ADMIN — ACETAMINOPHEN 650 MG: 325 TABLET, FILM COATED ORAL at 22:00

## 2021-01-01 RX ADMIN — ACETAMINOPHEN 650 MG: 325 TABLET, FILM COATED ORAL at 06:15

## 2021-01-01 RX ADMIN — LEVOTHYROXINE SODIUM 125 MCG: 0.12 TABLET ORAL at 06:06

## 2021-01-01 RX ADMIN — SIMVASTATIN 10 MG: 20 TABLET, FILM COATED ORAL at 17:15

## 2021-01-01 RX ADMIN — UMECLIDINIUM BROMIDE AND VILANTEROL TRIFENATATE 1 PUFF: 62.5; 25 POWDER RESPIRATORY (INHALATION) at 07:37

## 2021-01-01 RX ADMIN — METOPROLOL TARTRATE 25 MG: 25 TABLET, FILM COATED ORAL at 08:18

## 2021-01-01 RX ADMIN — METHYLPREDNISOLONE SODIUM SUCCINATE 125 MG: 125 INJECTION, POWDER, FOR SOLUTION INTRAMUSCULAR; INTRAVENOUS at 04:06

## 2021-01-01 RX ADMIN — UMECLIDINIUM BROMIDE AND VILANTEROL TRIFENATATE 1 PUFF: 62.5; 25 POWDER RESPIRATORY (INHALATION) at 08:26

## 2021-01-01 RX ADMIN — CEFTRIAXONE SODIUM 2 G: 10 INJECTION, POWDER, FOR SOLUTION INTRAVENOUS at 05:17

## 2021-01-01 RX ADMIN — INSULIN HUMAN 1 UNITS: 100 INJECTION, SOLUTION PARENTERAL at 06:09

## 2021-01-01 RX ADMIN — HEPARIN SODIUM 5000 UNITS: 5000 INJECTION, SOLUTION INTRAVENOUS; SUBCUTANEOUS at 06:45

## 2021-01-01 RX ADMIN — METOPROLOL TARTRATE 25 MG: 25 TABLET, FILM COATED ORAL at 08:51

## 2021-01-01 ASSESSMENT — PULMONARY FUNCTION TESTS
EPAP_CMH2O: 10
EPAP_CMH2O: 8

## 2021-01-01 ASSESSMENT — ENCOUNTER SYMPTOMS
BLURRED VISION: 0
HEARTBURN: 0
SHORTNESS OF BREATH: 0
SORE THROAT: 0
HEADACHES: 0
DIARRHEA: 0
HEARTBURN: 0
HEADACHES: 0
DIARRHEA: 0
PALPITATIONS: 0
SORE THROAT: 0
INSOMNIA: 0
BACK PAIN: 0
DIARRHEA: 0
FOCAL WEAKNESS: 0
ABDOMINAL PAIN: 0
CHILLS: 0
NAUSEA: 0
DEPRESSION: 0
WEAKNESS: 0
BLURRED VISION: 0
MYALGIAS: 0
VOMITING: 0
WEAKNESS: 0
FEVER: 0
DEPRESSION: 0
NAUSEA: 0
BLOOD IN STOOL: 0
CONSTIPATION: 0
DEPRESSION: 0
PALPITATIONS: 0
VOMITING: 0
ABDOMINAL PAIN: 0
FOCAL WEAKNESS: 0
BLOOD IN STOOL: 0
BLURRED VISION: 0
VOMITING: 0
FOCAL WEAKNESS: 0
CHILLS: 0
COUGH: 0
WEAKNESS: 0
WHEEZING: 0
SHORTNESS OF BREATH: 1
DIZZINESS: 0
CONSTIPATION: 0
BLOOD IN STOOL: 0
NAUSEA: 0
ABDOMINAL PAIN: 0
FEVER: 0
HEADACHES: 0
PALPITATIONS: 0
BRUISES/BLEEDS EASILY: 0
LOSS OF CONSCIOUSNESS: 0
DIZZINESS: 0
NECK PAIN: 0
HEARTBURN: 0
FEVER: 1
DOUBLE VISION: 0
MYALGIAS: 0
SHORTNESS OF BREATH: 0
CHILLS: 1
CONSTIPATION: 0
SORE THROAT: 0

## 2021-01-01 ASSESSMENT — LIFESTYLE VARIABLES
DOES PATIENT WANT TO STOP DRINKING: NO
TOTAL SCORE: 0
EVER HAD A DRINK FIRST THING IN THE MORNING TO STEADY YOUR NERVES TO GET RID OF A HANGOVER: NO
ON A TYPICAL DAY WHEN YOU DRINK ALCOHOL HOW MANY DRINKS DO YOU HAVE: 0
TOTAL SCORE: 0
EVER FELT BAD OR GUILTY ABOUT YOUR DRINKING: NO
AVERAGE NUMBER OF DAYS PER WEEK YOU HAVE A DRINK CONTAINING ALCOHOL: 0
TOTAL SCORE: 0
HAVE PEOPLE ANNOYED YOU BY CRITICIZING YOUR DRINKING: NO
HAVE YOU EVER FELT YOU SHOULD CUT DOWN ON YOUR DRINKING: NO
CONSUMPTION TOTAL: NEGATIVE
HOW MANY TIMES IN THE PAST YEAR HAVE YOU HAD 5 OR MORE DRINKS IN A DAY: 0
ALCOHOL_USE: NO

## 2021-01-01 ASSESSMENT — PAIN DESCRIPTION - PAIN TYPE
TYPE: ACUTE PAIN

## 2021-01-01 ASSESSMENT — COGNITIVE AND FUNCTIONAL STATUS - GENERAL
SUGGESTED CMS G CODE MODIFIER DAILY ACTIVITY: CH
SUGGESTED CMS G CODE MODIFIER MOBILITY: CH
MOBILITY SCORE: 24
DAILY ACTIVITIY SCORE: 24

## 2021-01-01 ASSESSMENT — PATIENT HEALTH QUESTIONNAIRE - PHQ9
1. LITTLE INTEREST OR PLEASURE IN DOING THINGS: NOT AT ALL
SUM OF ALL RESPONSES TO PHQ9 QUESTIONS 1 AND 2: 0
2. FEELING DOWN, DEPRESSED, IRRITABLE, OR HOPELESS: NOT AT ALL

## 2021-01-01 ASSESSMENT — FIBROSIS 4 INDEX
FIB4 SCORE: 2.24
FIB4 SCORE: 1.52

## 2021-01-07 ENCOUNTER — TELEMEDICINE (OUTPATIENT)
Dept: MEDICAL GROUP | Facility: PHYSICIAN GROUP | Age: 81
End: 2021-01-07
Payer: MEDICARE

## 2021-01-07 VITALS — BODY MASS INDEX: 42.51 KG/M2 | WEIGHT: 249 LBS | HEIGHT: 64 IN

## 2021-01-07 DIAGNOSIS — J42 CHRONIC BRONCHITIS, UNSPECIFIED CHRONIC BRONCHITIS TYPE (HCC): Chronic | ICD-10-CM

## 2021-01-07 DIAGNOSIS — E11.00 TYPE 2 DIABETES MELLITUS WITH HYPEROSMOLARITY WITHOUT COMA, WITHOUT LONG-TERM CURRENT USE OF INSULIN (HCC): ICD-10-CM

## 2021-01-07 DIAGNOSIS — J96.11 CHRONIC RESPIRATORY FAILURE WITH HYPOXIA (HCC): Chronic | ICD-10-CM

## 2021-01-07 DIAGNOSIS — I51.7 CARDIOMEGALY: ICD-10-CM

## 2021-01-07 DIAGNOSIS — E03.9 ACQUIRED HYPOTHYROIDISM: ICD-10-CM

## 2021-01-07 PROCEDURE — 99204 OFFICE O/P NEW MOD 45 MIN: CPT | Performed by: NURSE PRACTITIONER

## 2021-01-07 ASSESSMENT — ENCOUNTER SYMPTOMS
PALPITATIONS: 0
FALLS: 0
DIAPHORESIS: 0
COUGH: 1
DIARRHEA: 0
BLURRED VISION: 0
LOSS OF CONSCIOUSNESS: 0
CHILLS: 0
SHORTNESS OF BREATH: 1
MYALGIAS: 1
HEMOPTYSIS: 0
WEAKNESS: 1
NERVOUS/ANXIOUS: 0
FEVER: 0
FLANK PAIN: 0
SPUTUM PRODUCTION: 1
SINUS PAIN: 0
CONSTIPATION: 0
ABDOMINAL PAIN: 0
SENSORY CHANGE: 0

## 2021-01-07 ASSESSMENT — PATIENT HEALTH QUESTIONNAIRE - PHQ9: CLINICAL INTERPRETATION OF PHQ2 SCORE: 0

## 2021-01-07 ASSESSMENT — FIBROSIS 4 INDEX: FIB4 SCORE: 0.78

## 2021-01-07 NOTE — PROGRESS NOTES
Telemedicine: New Patient   This evaluation was conducted via Zoom using secure and encrypted videoconferencing technology. The patient was in a private location in the state of [place].    The patient's identity was confirmed and verbal consent was obtained for this virtual visit.    Subjective:     CC:   Chief Complaint   Patient presents with   • [facility]   • Hospital Follow-up       Camille Rodriguez is a 80 y.o. female presenting to establish care and to discuss the evaluation and management of:    COPD: Patient established with [facility].  Requesting refill of her Trelegy today.  She has had good overall control with this with the exception of the significant infectious viral pneumonia.  Requiring refills.  She is trying to get a follow-up appointment with her pulmonologist but has had difficulty scheduling and is struggling to leave the house due to overall weakness during recovery.  She is due for updated pulmonary function testing.    Diabetes: Updated labs ordered to evaluate A1c.  Fasting sugars within normal limits.  Utilizes sitagliptin-Metformin  mg twice daily with good control.  Denies episodes of hypoglycemia, numbness, tingling, LOC..    Hypothyroidism: Chronic, controlled.  On 125 mcg daily.  Updated TSH, T4 indicated.  She feels that this is under control however it is difficult to determine the cause of the significant fatigue post hospitalization and pneumonia.    Updated mammogram due, as well as monofilament exam.  Echocardiogram would be beneficial due to insufficient imaging during hospitalization and signs of cardiomegaly.  Dr. Muñoz ordered a BNP.    She was recently hospitalized with COVID-19 pneumonia, was discharged with continuous oxygen via nasal cannula.  [facility]  Getting weekly at home visits and feels that she is recovering gradually.  Feels very supportive with this care model.  Dr. Vargas  has recently ordered labs to be drawn by home health RN.    Review of Systems   Constitutional: Positive for malaise/fatigue. Negative for chills, diaphoresis and fever.   HENT: Negative for hearing loss and sinus pain.    Eyes: Negative for blurred vision.   Respiratory: Positive for cough, sputum production and shortness of breath. Negative for hemoptysis.    Cardiovascular: Negative for palpitations.   Gastrointestinal: Negative for abdominal pain, constipation and diarrhea.   Genitourinary: Negative for flank pain.   Musculoskeletal: Positive for myalgias. Negative for falls.   Skin: Negative for rash.   Neurological: Positive for weakness. Negative for sensory change and loss of consciousness.   Psychiatric/Behavioral: The patient is not nervous/anxious.          Allergies   Allergen Reactions   • Zithromax [Azithromycin] Diarrhea     Pt states severe diarrhea       Current medicines (including changes today)  Current Outpatient Medications   Medication Sig Dispense Refill   • Fluticasone-Umeclidin-Vilant (TRELEGY ELLIPTA) 100-62.5-25 MCG/INH AEROSOL POWDER, BREATH ACTIVATED Inhale 1 Puff every day. 3 Each 0   • atenolol (TENORMIN) 50 MG Tab Take 1 Tab by mouth every day. 100 Tab 0   • lisinopril (PRINIVIL) 20 MG Tab Take 1 Tab by mouth every day. 100 Tab 0   • simvastatin (ZOCOR) 10 MG Tab Take 1 Tab by mouth every evening. 100 Tab 0   • acetaminophen (TYLENOL) 325 MG Tab Take 2 Tabs by mouth every 6 hours as needed (Mild Pain; (Pain scale 1-3); Temp greater than 100.5 F). 30 Tab 0   • levothyroxine (SYNTHROID) 125 MCG Tab TAKE ONE TABLET BY MOUTH EVERY MORNING ON AN EMPTY STOMACH (Patient taking differently: Take 125 mcg by mouth every evening.) 90 Tab 0   • sitagliptan-metformin (JANUMET)  MG per tablet Take 1 Tab by mouth 2 times a day, with meals. 200 Tab 0   • albuterol 108 (90 Base) MCG/ACT Aero Soln inhalation aerosol Inhale 2 Puffs by mouth every four hours as needed for Shortness of Breath. 8.5  g 1   • Blood Glucose Monitoring Suppl Device Meter: Dispense Device of Insurance Preference. Sig. Use as directed for blood sugar monitoring. #1. NR. 1 Device 0   • Blood Glucose Test Strips Test strips order: Test strips for insurance-preferred meter. Sig: use every morning before breakfast and prn ssx high or low sugar 100 Strip 3   • Lancets Lancets order: Lancets for insurance-preferred meter. Sig: use every morning before breakfast and prn ssx high or low sugar. 100 Each 3   • Probiotic Product (TRUBIOTICS) Cap Take 1 Capsule by mouth every morning.     • Omega-3 Fatty Acids (FISH OIL) 1000 MG Cap capsule Take 1,000 mg by mouth every day.     • Calcium Carbonate-Vit D-Min (CALCIUM 1200 PO) Take 1 Tab by mouth 2 Times a Day.     • Multiple Vitamin (MULTIVITAMIN PO) Take 1 Tab by mouth every day.     • aspirin 81 MG tablet Take 81 mg by mouth every evening.       No current facility-administered medications for this visit.        She  has a past medical history of Chickenpox, Chronic airway obstruction, not elsewhere classified, Clotting disorder ([facility]), Diabetes, [place] measles, Mumps, and Respiratory failure ([facility]). She also has no past medical history of Breast cancer ([facility]).  She  has a past surgical history that includes pr remv 2nd cataract,corn-scler sectn.      Family History   Problem Relation Age of Onset   • Heart Attack Father      Family Status   Relation Name Status   • Fa     • Mo     • Sis  Alive       Patient Active Problem List    Diagnosis Date Noted   • Acute hypoxemic respiratory failure due to COVID-19 ([facility]) 2020     Priority: High   • UTI (urinary tract infection) 2017     Priority: High   • Chronic obstructive pulmonary disease ([facility]) 2017     Priority: Medium   • CKD (chronic kidney disease), stage III 2017     Priority: Medium   • DM2 (diabetes mellitus, type 2) ([facility]) 2017     Priority: Medium   • Peripheral edema 12/10/2020     Priority: Low  "  • Hypothyroidism 10/05/2018     Priority: Low   • Left bundle branch block 06/03/2017     Priority: Low   • HTN (hypertension) 06/03/2017     Priority: Low   • Cardiomegaly 12/15/2020   • DNR (do not resuscitate) 12/12/2020   • Hyponatremia 12/10/2020   • Morbid obesity with BMI of 40.0-44.9, adult (HCC) 03/06/2019   • Chronic respiratory failure with hypoxia ([facility]) 06/01/2018   • Former smoker 06/01/2018   • Anemia 06/03/2017          Objective:   Ht 1.626 m (5' 4\")   Wt 112.9 kg (249 lb)   BMI 42.74 kg/m²     Physical Exam   Constitutional: She is oriented to person, place, and time. No distress.   HENT:   Head: Normocephalic and atraumatic.   Right Ear: External ear normal.   Left Ear: External ear normal.   Eyes: Conjunctivae and EOM are normal. Right eye exhibits no discharge. Left eye exhibits no discharge.   Neck: Normal range of motion.   Pulmonary/Chest: Effort normal. She has wheezes.   Musculoskeletal:         General: No edema.   Neurological: She is alert and oriented to person, place, and time.   Skin: Skin is dry. She is not diaphoretic.   Psychiatric: Mood, memory, affect and judgment normal.         Assessment and Plan:   The following treatment plan was discussed:     1. Chronic bronchitis, unspecified chronic bronchitis type (HCC)  - Fluticasone-Umeclidin-Vilant (TRELEGY ELLIPTA) 100-62.5-25 MCG/INH AEROSOL POWDER, BREATH ACTIVATED; Inhale 1 Puff every day.  Dispense: 3 Each; Refill: 0    2. Chronic respiratory failure with hypoxia (HCC): Patient's daughter is trying to get her seen by pulmonology but having difficulty with scheduling and transportation due to patient's significant weakness post pneumonia and hospitalization.  Refills provided at this time.  - Fluticasone-Umeclidin-Vilant (TRELEGY ELLIPTA) 100-62.5-25 MCG/INH AEROSOL POWDER, BREATH ACTIVATED; Inhale 1 Puff every day.  Dispense: 3 Each; Refill: 0    3. Cardiomegaly: BNP pending, [place] by [facility]  Updated echocardiogram indicated. "  We will address this at follow-up appointment with patient.    4. Acquired hypothyroidism  Updated labs to be drawn by home health RN, currently managed by [facility]    5. Type 2 diabetes mellitus with hyperosmolarity without coma, without long-term current use of insulin (HCC)  Updated labs to be drawn by home health RN, currently managed by [facility]      Follow-up: Return in about 3 months (around 4/7/2021).

## 2021-01-09 ENCOUNTER — APPOINTMENT (OUTPATIENT)
Dept: RADIOLOGY | Facility: MEDICAL CENTER | Age: 81
End: 2021-01-09
Attending: EMERGENCY MEDICINE
Payer: MEDICARE

## 2021-01-09 ENCOUNTER — HOSPITAL ENCOUNTER (EMERGENCY)
Facility: MEDICAL CENTER | Age: 81
End: 2021-01-09
Attending: EMERGENCY MEDICINE
Payer: MEDICARE

## 2021-01-09 ENCOUNTER — TELEPHONE (OUTPATIENT)
Dept: SLEEP MEDICINE | Facility: MEDICAL CENTER | Age: 81
End: 2021-01-09

## 2021-01-09 VITALS
TEMPERATURE: 98.3 F | DIASTOLIC BLOOD PRESSURE: 69 MMHG | OXYGEN SATURATION: 97 % | RESPIRATION RATE: 30 BRPM | HEIGHT: 64 IN | SYSTOLIC BLOOD PRESSURE: 132 MMHG | HEART RATE: 110 BPM | WEIGHT: 250 LBS | BODY MASS INDEX: 42.68 KG/M2

## 2021-01-09 DIAGNOSIS — J44.1 ACUTE EXACERBATION OF CHRONIC OBSTRUCTIVE PULMONARY DISEASE (COPD) (HCC): ICD-10-CM

## 2021-01-09 DIAGNOSIS — R09.02 HYPOXIA: ICD-10-CM

## 2021-01-09 DIAGNOSIS — Z86.16 HISTORY OF 2019 NOVEL CORONAVIRUS DISEASE (COVID-19): ICD-10-CM

## 2021-01-09 LAB
ALBUMIN SERPL BCP-MCNC: 2.9 G/DL (ref 3.2–4.9)
ALBUMIN/GLOB SERPL: 0.9 G/DL
ALP SERPL-CCNC: 65 U/L (ref 30–99)
ALT SERPL-CCNC: 9 U/L (ref 2–50)
ANION GAP SERPL CALC-SCNC: 8 MMOL/L (ref 7–16)
AST SERPL-CCNC: 15 U/L (ref 12–45)
BASOPHILS # BLD AUTO: 0.6 % (ref 0–1.8)
BASOPHILS # BLD: 0.05 K/UL (ref 0–0.12)
BILIRUB SERPL-MCNC: 0.2 MG/DL (ref 0.1–1.5)
BLOOD CULTURE HOLD CXBCH: NORMAL
BUN SERPL-MCNC: 16 MG/DL (ref 8–22)
CALCIUM SERPL-MCNC: 9 MG/DL (ref 8.5–10.5)
CHLORIDE SERPL-SCNC: 101 MMOL/L (ref 96–112)
CO2 SERPL-SCNC: 26 MMOL/L (ref 20–33)
CREAT SERPL-MCNC: 0.97 MG/DL (ref 0.5–1.4)
EKG IMPRESSION: NORMAL
EOSINOPHIL # BLD AUTO: 0.15 K/UL (ref 0–0.51)
EOSINOPHIL NFR BLD: 1.8 % (ref 0–6.9)
ERYTHROCYTE [DISTWIDTH] IN BLOOD BY AUTOMATED COUNT: 52.8 FL (ref 35.9–50)
GLOBULIN SER CALC-MCNC: 3.4 G/DL (ref 1.9–3.5)
GLUCOSE SERPL-MCNC: 115 MG/DL (ref 65–99)
HCT VFR BLD AUTO: 32.2 % (ref 37–47)
HGB BLD-MCNC: 10 G/DL (ref 12–16)
IMM GRANULOCYTES # BLD AUTO: 0.1 K/UL (ref 0–0.11)
IMM GRANULOCYTES NFR BLD AUTO: 1.2 % (ref 0–0.9)
LYMPHOCYTES # BLD AUTO: 2.01 K/UL (ref 1–4.8)
LYMPHOCYTES NFR BLD: 23.7 % (ref 22–41)
MCH RBC QN AUTO: 27.1 PG (ref 27–33)
MCHC RBC AUTO-ENTMCNC: 31.1 G/DL (ref 33.6–35)
MCV RBC AUTO: 87.3 FL (ref 81.4–97.8)
MONOCYTES # BLD AUTO: 0.82 K/UL (ref 0–0.85)
MONOCYTES NFR BLD AUTO: 9.7 % (ref 0–13.4)
NEUTROPHILS # BLD AUTO: 5.35 K/UL (ref 2–7.15)
NEUTROPHILS NFR BLD: 63 % (ref 44–72)
NRBC # BLD AUTO: 0 K/UL
NRBC BLD-RTO: 0 /100 WBC
NT-PROBNP SERPL IA-MCNC: 423 PG/ML (ref 0–125)
PLATELET # BLD AUTO: 356 K/UL (ref 164–446)
PMV BLD AUTO: 8.8 FL (ref 9–12.9)
POTASSIUM SERPL-SCNC: 3.9 MMOL/L (ref 3.6–5.5)
PROT SERPL-MCNC: 6.3 G/DL (ref 6–8.2)
RBC # BLD AUTO: 3.69 M/UL (ref 4.2–5.4)
SODIUM SERPL-SCNC: 135 MMOL/L (ref 135–145)
TROPONIN T SERPL-MCNC: 37 NG/L (ref 6–19)
WBC # BLD AUTO: 8.5 K/UL (ref 4.8–10.8)

## 2021-01-09 PROCEDURE — 93005 ELECTROCARDIOGRAM TRACING: CPT

## 2021-01-09 PROCEDURE — 99285 EMERGENCY DEPT VISIT HI MDM: CPT

## 2021-01-09 PROCEDURE — 84484 ASSAY OF TROPONIN QUANT: CPT

## 2021-01-09 PROCEDURE — 80053 COMPREHEN METABOLIC PANEL: CPT

## 2021-01-09 PROCEDURE — 94640 AIRWAY INHALATION TREATMENT: CPT

## 2021-01-09 PROCEDURE — 93005 ELECTROCARDIOGRAM TRACING: CPT | Performed by: EMERGENCY MEDICINE

## 2021-01-09 PROCEDURE — 700101 HCHG RX REV CODE 250: Performed by: EMERGENCY MEDICINE

## 2021-01-09 PROCEDURE — 99283 EMERGENCY DEPT VISIT LOW MDM: CPT | Performed by: INTERNAL MEDICINE

## 2021-01-09 PROCEDURE — 85025 COMPLETE CBC W/AUTO DIFF WBC: CPT

## 2021-01-09 PROCEDURE — 71045 X-RAY EXAM CHEST 1 VIEW: CPT

## 2021-01-09 PROCEDURE — 94760 N-INVAS EAR/PLS OXIMETRY 1: CPT

## 2021-01-09 PROCEDURE — 700111 HCHG RX REV CODE 636 W/ 250 OVERRIDE (IP): Performed by: EMERGENCY MEDICINE

## 2021-01-09 PROCEDURE — 83880 ASSAY OF NATRIURETIC PEPTIDE: CPT

## 2021-01-09 PROCEDURE — 96374 THER/PROPH/DIAG INJ IV PUSH: CPT

## 2021-01-09 RX ORDER — ACETAMINOPHEN/DIPHENHYDRAMINE 500MG-25MG
2 TABLET ORAL
COMMUNITY
End: 2021-01-01

## 2021-01-09 RX ORDER — PREDNISONE 20 MG/1
40 TABLET ORAL DAILY
Qty: 12 TAB | Refills: 0 | Status: SHIPPED | OUTPATIENT
Start: 2021-01-09 | End: 2021-01-15

## 2021-01-09 RX ORDER — IPRATROPIUM BROMIDE AND ALBUTEROL SULFATE 2.5; .5 MG/3ML; MG/3ML
3 SOLUTION RESPIRATORY (INHALATION) ONCE
Status: COMPLETED | OUTPATIENT
Start: 2021-01-09 | End: 2021-01-09

## 2021-01-09 RX ORDER — DOXYCYCLINE 100 MG/1
100 CAPSULE ORAL 2 TIMES DAILY
Qty: 14 CAP | Refills: 0 | Status: SHIPPED | OUTPATIENT
Start: 2021-01-09 | End: 2021-01-16

## 2021-01-09 RX ORDER — METHYLPREDNISOLONE SODIUM SUCCINATE 125 MG/2ML
125 INJECTION, POWDER, LYOPHILIZED, FOR SOLUTION INTRAMUSCULAR; INTRAVENOUS ONCE
Status: COMPLETED | OUTPATIENT
Start: 2021-01-09 | End: 2021-01-09

## 2021-01-09 RX ORDER — ACETAMINOPHEN 500 MG
650 TABLET ORAL EVERY 6 HOURS PRN
COMMUNITY

## 2021-01-09 RX ORDER — MULTIVIT WITH MINERALS/LUTEIN
1 TABLET ORAL 2 TIMES DAILY
COMMUNITY
End: 2022-01-01

## 2021-01-09 RX ADMIN — METHYLPREDNISOLONE SODIUM SUCCINATE 125 MG: 125 INJECTION, POWDER, FOR SOLUTION INTRAMUSCULAR; INTRAVENOUS at 17:16

## 2021-01-09 RX ADMIN — IPRATROPIUM BROMIDE AND ALBUTEROL SULFATE 3 ML: .5; 3 SOLUTION RESPIRATORY (INHALATION) at 16:36

## 2021-01-09 ASSESSMENT — FIBROSIS 4 INDEX: FIB4 SCORE: 0.78

## 2021-01-09 NOTE — ED PROVIDER NOTES
ED Provider Note    Scribed for Yong Glover M.D. by Pao Ya. 2021  3:45 PM    Primary care provider: RODDY Maya  Means of arrival: Ambulance  History obtained from: Patient  History limited by: None    CHIEF COMPLAINT  Chief Complaint   Patient presents with   • Shortness of Breath     since this morning   • Hypoxemia     oxygen saturation 83% on home continuous 5L NC       HPI  Camille Rodriguez is a 80 y.o. female who presents to the Emergency Department for shortness of breath onset this morning. Patient is typically on 5L of oxygen at home and was found to have an O2 saturation of 83% by home health. Denies chest pain. Patient was on a dose of steroids but states this was a while ago. She as diagnosed with COVID over 2 months ago and was hospitalized for this. Patient has a history of COPD and diabetes. She is currently being worked up by cardiology.     REVIEW OF SYSTEMS  Pertinent negatives include no chest pain. As above, all other systems reviewed and are negative.   See HPI for further details.     PAST MEDICAL HISTORY   has a past medical history of Chickenpox, Chronic airway obstruction, not elsewhere classified, Clotting disorder (HCC), Diabetes, [place] measles, Mumps, On home oxygen therapy, and Respiratory failure (HCC).    SURGICAL HISTORY   has a past surgical history that includes remv 2nd cataract,corn-scler sectn.    SOCIAL HISTORY  Social History     Tobacco Use   • Smoking status: Former Smoker     Packs/day: 1.50     Years: 46.00     Pack years: 69.00     Types: Cigarettes     Quit date: 2007     Years since quittin.9   • Smokeless tobacco: Never Used   • Tobacco comment: Quit    Substance Use Topics   • Alcohol use: No   • Drug use: No      Social History     Substance and Sexual Activity   Drug Use No       FAMILY HISTORY  Family History   Problem Relation Age of Onset   • Heart Attack Father        CURRENT MEDICATIONS  Home Medications      "Reviewed by Chioma Tuttle R.N. (Registered Nurse) on 01/09/21 at 1511  Med List Status: Partial   Medication Last Dose Status   acetaminophen (TYLENOL) 325 MG Tab  Active   albuterol 108 (90 Base) MCG/ACT Aero Soln inhalation aerosol  Active   aspirin 81 MG tablet  Active   atenolol (TENORMIN) 50 MG Tab  Active   Blood Glucose Monitoring Suppl Device  Active   Blood Glucose Test Strips  Active   Calcium Carbonate-Vit D-Min (CALCIUM 1200 PO)  Active   Fluticasone-Umeclidin-Vilant (TRELEGY ELLIPTA) 100-62.5-25 MCG/INH AEROSOL POWDER, BREATH ACTIVATED  Active   Lancets  Active   levothyroxine (SYNTHROID) 125 MCG Tab  Active   lisinopril (PRINIVIL) 20 MG Tab  Active   Multiple Vitamin (MULTIVITAMIN PO)  Active   Omega-3 Fatty Acids (FISH OIL) 1000 MG Cap capsule  Active   Probiotic Product (TRUBIOTICS) Cap  Active   simvastatin (ZOCOR) 10 MG Tab  Active   sitagliptan-metformin (JANUMET)  MG per tablet  Active                ALLERGIES  Allergies   Allergen Reactions   • Zithromax [Azithromycin] Diarrhea     Pt states severe diarrhea       PHYSICAL EXAM  VITAL SIGNS: /56   Pulse 85   Temp 36.8 °C (98.3 °F) (Temporal)   Resp (!) 30   Ht 1.626 m (5' 4\")   Wt 113.4 kg (250 lb)   SpO2 99%   BMI 42.91 kg/m²     Constitutional: Well developed, Well nourished, No acute distress, Non-toxic appearance.   HENT: Normocephalic, Atraumatic, Bilateral external ears normal, Oropharynx is clear mucous membranes are moist. No oral exudates or nasal discharge.   Eyes: Pupils are equal round and reactive, EOMI, Conjunctiva normal, No discharge.   Neck: Normal range of motion, No tenderness, Supple, No stridor. No meningismus.  Lymphatic: No lymphadenopathy noted.   Cardiovascular: Regular rate and rhythm without murmur rub or gallop.  Thorax & Lungs: Mild increased work of breathing, inspiratory wheezing, no stridor. There is no chest wall tenderness.   Abdomen: Soft non-tender non-distended. There is no rebound or " guarding. No organomegaly is appreciated. Bowel sounds are normal.  Skin: Normal without rash.   Back: No CVA or spinal tenderness.   Extremities: Intact distal pulses, No edema, No tenderness, No cyanosis, No clubbing. Capillary refill is less than 2 seconds.  Musculoskeletal: Good range of motion in all major joints. No tenderness to palpation or major deformities noted.   Neurologic: Alert & oriented x 3, Normal motor function, Normal sensory function, No focal deficits noted. Reflexes are normal.  Psychiatric: Affect normal, Judgment normal, Mood normal. There is no suicidal ideation or patient reported hallucinations.    DIAGNOSTIC STUDIES / PROCEDURES    LABS  Labs Reviewed   CBC WITH DIFFERENTIAL - Abnormal; Notable for the following components:       Result Value    RBC 3.69 (*)     Hemoglobin 10.0 (*)     Hematocrit 32.2 (*)     MCHC 31.1 (*)     RDW 52.8 (*)     MPV 8.8 (*)     Immature Granulocytes 1.20 (*)     All other components within normal limits   COMP METABOLIC PANEL - Abnormal; Notable for the following components:    Glucose 115 (*)     Albumin 2.9 (*)     All other components within normal limits   TROPONIN - Abnormal; Notable for the following components:    Troponin T 37 (*)     All other components within normal limits   ESTIMATED GFR - Abnormal; Notable for the following components:    GFR If Non  55 (*)     All other components within normal limits   PROBRAIN NATRIURETIC PEPTIDE, NT   BLOOD CULTURE,HOLD      All labs reviewed by me.    EKG Interpretation:  Results for orders placed or performed during the hospital encounter of 21   EKG   Result Value Ref Range    Report       [facility] Emergency Dept.    Test Date:  2021  Pt Name:    LETICIA SINGH                 Department: ER  MRN:        [id number]                      Room:       RD 04  Gender:     Female                       Technician: [id number]  :        [id number]                    Requested By:ER TRIAGE PROTOCOL  Order #:    [id number]                    Reading MD: CAS HOLDER MD    Measurements  Intervals                                Axis  Rate:       82                           P:          62  NY:         132                          QRS:        36  QRSD:       150                          T:          160  QT:         424  QTc:        496    Interpretive Statements  SINUS RHYTHM  LEFT BUNDLE BRANCH BLOCK  Compared to ECG 12/09/2020 12:56:35  Sinus tachycardia no longer present  Electronically Signed On 1-9-2021 16:07:40 PST by CAS HOLDER MD        12 lead EKG interpreted by me    RADIOLOGY  DX-CHEST-PORTABLE (1 VIEW)   Final Result      Interval improvement in airspace opacities. Persistent prominent interstitial may represent residual infection/bronchitis. Pulmonary edema could have a similar appearance.        The radiologist's interpretation of all radiological studies have been reviewed by me.    COURSE & MEDICAL DECISION MAKING  Nursing notes, VS, PMSFHx reviewed in chart.    3:45 PM Patient seen and examined at bedside. I informed the patient that due to her significant in crease in oxygen demand she will likely need to be admitted to the hospital. Ordered for chest x-ray, labs, and EKG to evaluate. Patient was treated with duoneb and solu-medrol 125 mg for her symptoms.      EKG shows no evidence of acute ischemic changes or dysrhythmia.    4:40 PM Reviewed patients labs and chest x-ray.  Chest x-ray shows interval improvement in airspace opacities.  No evidence of edema, infiltrate, effusion.  At this point I paged hospitalist.    4:59 PM I discussed the patient's case and the above findings with Dr. Tipton (Hospitalist) who agreed to evaluate patient for hospitalization. Patients care will be transferred at this time.     I reevaluated the patient with Dr. Schreiber's and she appears to have improved significantly.  I think she is a good candidate for outpatient therapy with  steroids and doxycycline and follow-up to Dr. Simmons.  Patient is amenable to this plan of care and family will come pick her up    DISPOSITION:  Patient will be discharged and she is in stable condition on discharge    FINAL IMPRESSION  1. Hypoxia    2. Acute exacerbation of chronic obstructive pulmonary disease (COPD) (HCC)    3. History of 2019 novel coronavirus disease (COVID-19)          Pao PARKER (Cal), am scribing for, and in the presence of, Yong Glover M.D..    Electronically signed by: Pao Ya (Cal), 1/9/2021    Yong PARKER M.D. personally performed the services described in this documentation, as scribed by Pao Ya in my presence, and it is both accurate and complete. C    The note accurately reflects work and decisions made by me.  Yong Glover M.D.  1/9/2021  5:26 PM

## 2021-01-09 NOTE — ED TRIAGE NOTES
Camille Moreno Michael  80 y.o.  Chief Complaint   Patient presents with   • Shortness of Breath     since this morning   • Hypoxemia     oxygen saturation 83% on home continuous 5L NC     BIB EMS from home for above. A & O x 4, GCS 15.    Speech-limiting dyspnea noted at rest. Patient sitting at 90 degrees with 10L NRB mask in place. No cyanosis noted.    Took home albuterol nebulizer prior to EMS arrival.    Fingerstick for EMS = 147.    Per EMS pre-hospital BP 90s/40s - received IV fluids 100 mL PTA from EMS.    Patient was COVID POSITIVE 12/9/2020, was treated for COVID pneumonia at that time, subsequently improved back to baseline.

## 2021-01-09 NOTE — TELEPHONE ENCOUNTER
Pt with hx of COVID pna and COPD  She was sent home with 4 l with saturation in 90s  Today her sats in the 70s% with ambulation  Daughter and son in law called asking for a concentrator up to 10 l as was advised by their geriatrics provider    Emphasized that saturation in the 70s is a big change and she needs additional evaluation to ensure she does not have a secondary pneumonia    Daughter understands and will take pt to ER

## 2021-01-10 NOTE — ED NOTES
Pt discharged to home. Pt was given follow up instructions and is aware that prescriptions were sent to her pharmacy. Pt verbalized understanding of all instructions for discharge and is wheeled out of ED with daughter driving her home.

## 2021-01-10 NOTE — DISCHARGE PLANNING
Assessment copied from last admission on 12/9/2020        Received a call back from daughter Alexandra who provides 24 hr care for pt. They do not have any HH set up. They live in a 2 level home but pt's bedroom is on the first floor. Pt usually uses a walker at home. Pt still has a 's lisence but has not been driving.      Alexandra would like an update from BS RN or unit CM once pt is assigned a room . Her cellphone number is # [phone].     Care Transition Team Assessment     Information Source  Information Given By: Relative  Informant's Name: Daughter Alexandra([phone])     Elopement Risk  Legal Hold: No  Ambulatory or Self Mobile in Wheelchair: No-Not an Elopement Risk     Interdisciplinary Discharge Planning  Does Admitting Nurse Feel This Could be a Complex Discharge?: No  Primary Care Physician: [facility]  Lives with - Patient's Self Care Capacity: Adult Children  Support Systems: Children  Housing / Facility: 2 Story House  Do You Take your Prescribed Medications Regularly: Yes([address])  Able to Return to Previous ADL's: Yes  Mobility Issues: No  Prior Services: Continuous (24 Hour) Care Giving Family  Patient Prefers to be Discharged to:: home  Assistance Needed: Yes  Durable Medical Equipment: Home Oxygen, Walker(Preferred DME)     Discharge Preparedness  What is your plan after discharge?: Home with help(daughter provides 24 hr care. )  What are your discharge supports?: Child  Prior Functional Level: Ambulatory, Needs Assist with Activities of Daily Living, Needs Assist with Medication Management, Uses Walker  Difficulity with ADLs: Dressing, Toileting, Walking  Difficulity with IADLs: Cooking, Driving, Laundry, Shopping     Functional Assesment  Prior Functional Level: Ambulatory, Needs Assist with Activities of Daily Living, Needs Assist with Medication Management, Uses Walker     Finances  Financial Barriers to Discharge: No  Prescription  Coverage: Yes     Vision / Hearing Impairment  Vision Impairment : No  Hearing Impairment : Yes     Domestic Abuse  Have you ever been the victim of abuse or violence?: No        Discharge Risks or Barriers  Discharge risks or barriers?: No     Anticipated Discharge Information  Discharge Disposition: Still a Patient (30)

## 2021-01-10 NOTE — CONSULTS
[facility]INE ATTENDING NOTE:   Osmar Ball MD      Visit Time:   Attending/resident bedside rounds 9-11:30 AM     PATIENT ID  Name:             Camille Rodriguez   YOB: 1940  Age:                 80 y.o.  female   MRN:               [id number]  Admit:  1/9/2021     I saw and examined the patient and discussed the management with the resident staff.  I reviewed the resident's note and agree with the resident's findings and plan as documented in the resident's note except as documented in the attending note. Please reference resident daily note for complete information.    The chart was reviewed and summarized.  Available labs, imaging, O2 sats ,  EKGs were reviewed. Available nursing, consultant, and resident notes were reviewed. I am actively involved in the patient's care.                                                                            ______________________________________________________________________    80( Admit Jan 9 ,    )  INTERVAL:  Chart reviewed/summarized,       ER: on reassessment, back to baseline post steroids, B2A - asks to be discharged, has O2, nebulizer at home, has lots of help at home      Jan 9: AF, HR 80, , R30, 99%, 8L mask  admitted with acute/chronic SOB, known severe COPD, multiple admist for same, chronic venous stasis, DM, HTN  OPM: ASA, atenolol, CA, steroid /lABA inhaler, LT4, ACE 20mg, xocor 10mg, sitagliptan-metformin (JANuMET)      CORE:  Code Status (  DNR, DNI   --------------------------------------------------------------------------------------------------  Hospital Summary/ Patient System Review      NP:   *admit(  alert, nonfocal, no aggitation      EENT:   *admit(  High MP   Impression: BLANCA eval outpatietn      MSK/PAIN:   *admit(  neg     CVS:   *admit(  , HR 85,  no HF apparent, pulses/no edema, RRR, trop 37H, chronically high DDIMER  DEC 2020: limited,  echo dilated IVC , Dec 2020 : DVT study neg  2017: EF 60%,  "Rv, RA, LA , valves, pericadrium, root wnl   Impresison: nonischemic troponin elevation   Impression: stasis venous edema -- elevation, compression   Impression: chronic HTN, Ace, BBL      PULM:   *admit(  acute SOB, baseline 5L, 83% at home, remote smoker 46PY,  , pCXR: improved airspace opaciteis   Impression: acute/chronic hypoxemic resp failure, COPD, COVID 2 months PTA  --> diuoneb, steroids --> quick response, wants to go home, as supplies and good support at home, resume home meds , PRED x 5 days      CT chest w/o 2019:  emphysema, Upper lobes, no new nodules, ASVD, Cad, old granulomatous disease , VQ low probability      GI:   *admit(  BMI 42, neg exam   Impressin: high BMI > 40      :   *admit(   Kleella DEC 2020         \"CT w/o 2017: no pathology      RENAL:   *admit(  Na 135, K 3.9, CO2 26, , BUN 16, C r0.97, CA 9, ALB 2.9      HEME:   *admit(  WBC 8, BH 10 (12),    IMpressioN: clotting disorder ?  - no documenation     ENDO:   *admit(  ., TSH 2.2,   Impression: DM2   Impression: thyroid replacement      DERM/BREAST:   *admit(    Impression: mastectomy  2013      ID:   *admit(  COVID pos 2020 Dec  Impression: recurrent s/s, CXR less infiltrates -> repeat viral studies, imaging if not responding, droplet precautions                   "

## 2021-01-10 NOTE — DISCHARGE INSTRUCTIONS
Please take steroids as directed  Follow-up with Dr. Dunaway  Return if significant change in symptoms

## 2021-01-11 DIAGNOSIS — Z23 NEED FOR VACCINATION: ICD-10-CM

## 2021-01-18 ENCOUNTER — HOME HEALTH ADMISSION (OUTPATIENT)
Dept: HOME HEALTH SERVICES | Facility: HOME HEALTHCARE | Age: 81
End: 2021-01-18
Payer: MEDICARE

## 2021-01-20 ENCOUNTER — HOME CARE VISIT (OUTPATIENT)
Dept: HOME HEALTH SERVICES | Facility: HOME HEALTHCARE | Age: 81
End: 2021-01-20
Payer: MEDICARE

## 2021-01-20 VITALS
WEIGHT: 250 LBS | HEART RATE: 80 BPM | HEIGHT: 64 IN | DIASTOLIC BLOOD PRESSURE: 80 MMHG | SYSTOLIC BLOOD PRESSURE: 138 MMHG | OXYGEN SATURATION: 99 % | TEMPERATURE: 98.7 F | RESPIRATION RATE: 16 BRPM | BODY MASS INDEX: 42.68 KG/M2

## 2021-01-20 PROCEDURE — G0493 RN CARE EA 15 MIN HH/HOSPICE: HCPCS

## 2021-01-20 PROCEDURE — 665001 SOC-HOME HEALTH

## 2021-01-20 SDOH — ECONOMIC STABILITY: HOUSING INSECURITY: EVIDENCE OF SMOKING MATERIAL: 0

## 2021-01-20 ASSESSMENT — FIBROSIS 4 INDEX: FIB4 SCORE: 1.11

## 2021-01-20 ASSESSMENT — PATIENT HEALTH QUESTIONNAIRE - PHQ9
2. FEELING DOWN, DEPRESSED, IRRITABLE, OR HOPELESS: 01
5. POOR APPETITE OR OVEREATING: 1 - SEVERAL DAYS
SUM OF ALL RESPONSES TO PHQ QUESTIONS 1-9: 7
CLINICAL INTERPRETATION OF PHQ2 SCORE: 2
1. LITTLE INTEREST OR PLEASURE IN DOING THINGS: 01

## 2021-01-20 ASSESSMENT — ENCOUNTER SYMPTOMS
VOMITING: DENIES
SEVERE DYSPNEA: 1
NAUSEA: DENIES
DEPRESSED MOOD: 1

## 2021-01-21 ENCOUNTER — HOME CARE VISIT (OUTPATIENT)
Dept: HOME HEALTH SERVICES | Facility: HOME HEALTHCARE | Age: 81
End: 2021-01-21
Payer: MEDICARE

## 2021-01-21 NOTE — PROGRESS NOTES
Primary dx/Skilled need: Acute Hypoxemic, resp. failure due to COVID-19, COPD and Cardiomegaly. NIDDM. History: CKD, HTN, DM2, Obesity.  Skilled need for COVID/COPD, C/V and DM assessment/intervention and instruction. Home safety eval. ADL/gait assessment/training for strengthening and reconditioning. Cognitive assessment and intervention.  SN frequency.2wk1, 3wk2, 2wk3, 1wk2,   Zip code.[id number]  Disciplines ordered.SN, PT, OT, SLP  Insurance and authorization.[facility]  Certification period.1/20/21 to 03/20/2021  Special considerations.call Alexandra her daughter to schedule visit times @ [phone]

## 2021-01-22 ENCOUNTER — ANTICOAGULATION MONITORING (OUTPATIENT)
Dept: VASCULAR LAB | Facility: MEDICAL CENTER | Age: 81
End: 2021-01-22

## 2021-01-22 ENCOUNTER — HOSPITAL ENCOUNTER (OUTPATIENT)
Facility: MEDICAL CENTER | Age: 81
End: 2021-01-22
Attending: INTERNAL MEDICINE
Payer: MEDICARE

## 2021-01-22 ENCOUNTER — TELEPHONE (OUTPATIENT)
Dept: HEALTH INFORMATION MANAGEMENT | Facility: OTHER | Age: 81
End: 2021-01-22

## 2021-01-22 ENCOUNTER — HOME CARE VISIT (OUTPATIENT)
Dept: HOME HEALTH SERVICES | Facility: HOME HEALTHCARE | Age: 81
End: 2021-01-22
Payer: MEDICARE

## 2021-01-22 VITALS
DIASTOLIC BLOOD PRESSURE: 78 MMHG | SYSTOLIC BLOOD PRESSURE: 138 MMHG | TEMPERATURE: 99.2 F | HEART RATE: 76 BPM | RESPIRATION RATE: 18 BRPM | OXYGEN SATURATION: 95 %

## 2021-01-22 LAB
ALBUMIN SERPL BCP-MCNC: 3.1 G/DL (ref 3.2–4.9)
ALBUMIN/GLOB SERPL: 1.1 G/DL
ALP SERPL-CCNC: 53 U/L (ref 30–99)
ALT SERPL-CCNC: 13 U/L (ref 2–50)
ANION GAP SERPL CALC-SCNC: 9 MMOL/L (ref 7–16)
AST SERPL-CCNC: 16 U/L (ref 12–45)
BILIRUB SERPL-MCNC: 0.3 MG/DL (ref 0.1–1.5)
BUN SERPL-MCNC: 15 MG/DL (ref 8–22)
CALCIUM SERPL-MCNC: 9 MG/DL (ref 8.5–10.5)
CHLORIDE SERPL-SCNC: 101 MMOL/L (ref 96–112)
CO2 SERPL-SCNC: 30 MMOL/L (ref 20–33)
CREAT SERPL-MCNC: 0.91 MG/DL (ref 0.5–1.4)
GLOBULIN SER CALC-MCNC: 2.9 G/DL (ref 1.9–3.5)
GLUCOSE SERPL-MCNC: 85 MG/DL (ref 65–99)
NT-PROBNP SERPL IA-MCNC: 1277 PG/ML (ref 0–125)
POTASSIUM SERPL-SCNC: 4.3 MMOL/L (ref 3.6–5.5)
PROT SERPL-MCNC: 6 G/DL (ref 6–8.2)
SODIUM SERPL-SCNC: 140 MMOL/L (ref 135–145)

## 2021-01-22 PROCEDURE — G0495 RN CARE TRAIN/EDU IN HH: HCPCS

## 2021-01-22 PROCEDURE — 80053 COMPREHEN METABOLIC PANEL: CPT

## 2021-01-22 PROCEDURE — 83880 ASSAY OF NATRIURETIC PEPTIDE: CPT

## 2021-01-22 SDOH — ECONOMIC STABILITY: HOUSING INSECURITY: HOME SAFETY: DISCUSSED USE OF MASKS WITH COMPANY IN HOME

## 2021-01-22 SDOH — ECONOMIC STABILITY: HOUSING INSECURITY: EVIDENCE OF SMOKING MATERIAL: 0

## 2021-01-22 ASSESSMENT — ENCOUNTER SYMPTOMS
SEVERE DYSPNEA: 1
VOMITING: DENIES
MUSCLE WEAKNESS: 1
NAUSEA: DENIES
LIMITED RANGE OF MOTION: 1

## 2021-01-22 NOTE — TELEPHONE ENCOUNTER
Family had questions concerning coverage for post acute care in the home with [facility]. Contacted Alexandra Devika daughter and POA. She stated that her  was able to speak with customer service with [employer] and verify the benefits that her mother currently has for post acute care. Encouraged her to call [employer] with any further questions or concerns.

## 2021-01-22 NOTE — PROGRESS NOTES
Medication chart review for [facility] services    PCP:  RODDY Maya  [address]  [place] [address]  Fax: [phone]    Current medication list     Current Outpatient Medications:   •  ALBUTEROL INH, 2.5 Applicator, Inhalation, QDAY PRN  •  Loperamide HCl, 2 mg, Oral, PRN  •  Home Care Oxygen, 5 L/min, Inhalation, Continuous  •  acetaminophen, 1,000 mg, Oral, QAM  •  Calcium Carbonate, 1 Tab, Oral, BID  •  Vitamin C, 3 Tab, Oral, DAILY  •  AZO-CRANBERRY PO, 1 Tab, Oral, QAM  •  Tylenol PM Extra Strength, 2 Tab, Oral, QHS  •  Dextromethorphan Polistirex (ROBITUSSIN 12 HOUR COUGH PO), 1 Cap, Oral, QDAY PRN  •  Trelegy Ellipta, 1 Puff, Inhalation, DAILY  •  atenolol, 50 mg, Oral, DAILY  •  lisinopril, 20 mg, Oral, DAILY  •  simvastatin, 10 mg, Oral, Q EVENING  •  acetaminophen, 650 mg, Oral, Q6HRS PRN (Patient not taking: Reported on 1/20/2021)  •  levothyroxine, TAKE ONE TABLET BY MOUTH EVERY MORNING ON AN EMPTY STOMACH  •  Janumet, 1 Tab, Oral, BID WITH MEALS  •  albuterol, 2 Puff, Inhalation, Q4HRS PRN  •  Blood Glucose Monitoring Suppl, Meter: Dispense Device of Insurance Preference. Sig. Use as directed for blood sugar monitoring. #1. NR.  •  Blood Glucose Test Strips, Test strips order: Test strips for insurance-preferred meter. Sig: use every morning before breakfast and prn ssx high or low sugar  •  Lancets, Lancets order: Lancets for insurance-preferred meter. Sig: use every morning before breakfast and prn ssx high or low sugar.  •  TruBiotics, 1 Capsule, Oral, BID  •  fish oil, 1,000 mg, Oral, DAILY  •  Multiple Vitamin (MULTIVITAMIN PO), 1 Tab, Oral, DAILY  •  aspirin, 81 mg, Oral, Q EVENING    Allergies   Allergen Reactions   • Zithromax [Azithromycin] Diarrhea     Pt states severe diarrhea       Medications on discharge summary that are not on their home medication list (or the dosing interval differs from the discharge summary)     No recent hospital  discharge summary    Medications on home medication list not listed on their discharge summary     Not applicable    Labs / Images Reviewed:     Lab Results   Component Value Date/Time    SODIUM 135 01/09/2021 03:30 PM    POTASSIUM 3.9 01/09/2021 03:30 PM    CHLORIDE 101 01/09/2021 03:30 PM    CO2 26 01/09/2021 03:30 PM    GLUCOSE 115 (H) 01/09/2021 03:30 PM    BUN 16 01/09/2021 03:30 PM    CREATININE 0.97 01/09/2021 03:30 PM      Lab Results   Component Value Date/Time    ALKPHOSPHAT 65 01/09/2021 03:30 PM    ASTSGOT 15 01/09/2021 03:30 PM    ALTSGPT 9 01/09/2021 03:30 PM    TBILIRUBIN 0.2 01/09/2021 03:30 PM    ALBUMIN 2.9 (L) 01/09/2021 03:30 PM    INR 1.00 06/03/2017 12:15 AM          Potential drug interactions:         Assessment and Plan:   • Received referral from [facility]. Medications reviewed. No clinically significant interactions noted.             Kit Hernandez, PharmD, MS, BCACP, [facility]  Phone [phone] fax [phone]    This note was created using voice recognition software (Dragon). The accuracy of the dictation is limited by the abilities of the software. I have reviewed the note prior to signing, however some errors in grammar and context are still possible. If you have any questions related to this note please do not hesitate to contact our office.

## 2021-01-23 ENCOUNTER — HOME CARE VISIT (OUTPATIENT)
Dept: HOME HEALTH SERVICES | Facility: HOME HEALTHCARE | Age: 81
End: 2021-01-23
Payer: MEDICARE

## 2021-01-23 VITALS
RESPIRATION RATE: 18 BRPM | HEART RATE: 88 BPM | OXYGEN SATURATION: 93 % | DIASTOLIC BLOOD PRESSURE: 80 MMHG | SYSTOLIC BLOOD PRESSURE: 136 MMHG | TEMPERATURE: 98.7 F

## 2021-01-23 PROCEDURE — G0151 HHCP-SERV OF PT,EA 15 MIN: HCPCS

## 2021-01-23 SDOH — ECONOMIC STABILITY: HOUSING INSECURITY: EVIDENCE OF SMOKING MATERIAL: 0

## 2021-01-23 ASSESSMENT — ENCOUNTER SYMPTOMS
MUSCLE WEAKNESS: 1
SEVERE DYSPNEA: 1

## 2021-01-23 ASSESSMENT — ACTIVITIES OF DAILY LIVING (ADL)
AMBULATION_DISTANCE/DURATION_TOLERATED: 40'
CURRENT_FUNCTION: STAND BY ASSIST
PHYSICAL_TRANSFERS_DEVICES: 4WW
AMBULATION ASSISTANCE ON FLAT SURFACES: 1
AMBULATION ASSISTANCE: STAND BY ASSIST
AMBULATION ASSISTANCE: 1
PHYSICAL TRANSFERS ASSESSED: 1

## 2021-01-25 ENCOUNTER — HOME CARE VISIT (OUTPATIENT)
Dept: HOME HEALTH SERVICES | Facility: HOME HEALTHCARE | Age: 81
End: 2021-01-25
Payer: MEDICARE

## 2021-01-25 VITALS
OXYGEN SATURATION: 95 % | DIASTOLIC BLOOD PRESSURE: 68 MMHG | HEART RATE: 83 BPM | SYSTOLIC BLOOD PRESSURE: 118 MMHG | TEMPERATURE: 98.5 F

## 2021-01-25 PROCEDURE — G0493 RN CARE EA 15 MIN HH/HOSPICE: HCPCS

## 2021-01-25 SDOH — ECONOMIC STABILITY: HOUSING INSECURITY: EVIDENCE OF SMOKING MATERIAL: 0

## 2021-01-25 SDOH — ECONOMIC STABILITY: HOUSING INSECURITY
HOME SAFETY: FIRE ESCAPE PLAN DEVELOPED. PATIENT DOES NOT HAVE FLAMMABLE MATERIALS PRESENT IN THE HOME PRESENTING A FIRE HAZARD. NO EVIDENCE FOUND OF SMOKING MATERIALS PRESENT IN THE HOME.

## 2021-01-25 ASSESSMENT — ENCOUNTER SYMPTOMS
MUSCLE WEAKNESS: 1
LIMITED RANGE OF MOTION: 1
SHORTNESS OF BREATH: T
SEVERE DYSPNEA: 1

## 2021-01-26 ENCOUNTER — HOME CARE VISIT (OUTPATIENT)
Dept: HOME HEALTH SERVICES | Facility: HOME HEALTHCARE | Age: 81
End: 2021-01-26
Payer: MEDICARE

## 2021-01-26 PROCEDURE — G0152 HHCP-SERV OF OT,EA 15 MIN: HCPCS

## 2021-01-26 ASSESSMENT — ACTIVITIES OF DAILY LIVING (ADL): OASIS_M1830: 05

## 2021-01-27 ENCOUNTER — HOME CARE VISIT (OUTPATIENT)
Dept: HOME HEALTH SERVICES | Facility: HOME HEALTHCARE | Age: 81
End: 2021-01-27
Payer: MEDICARE

## 2021-01-27 VITALS
HEART RATE: 85 BPM | DIASTOLIC BLOOD PRESSURE: 79 MMHG | RESPIRATION RATE: 18 BRPM | TEMPERATURE: 97.7 F | SYSTOLIC BLOOD PRESSURE: 134 MMHG | OXYGEN SATURATION: 91 %

## 2021-01-27 PROCEDURE — G0153 HHCP-SVS OF S/L PATH,EA 15MN: HCPCS

## 2021-01-27 ASSESSMENT — ENCOUNTER SYMPTOMS: SEVERE DYSPNEA: 1

## 2021-01-28 ENCOUNTER — HOME CARE VISIT (OUTPATIENT)
Dept: HOME HEALTH SERVICES | Facility: HOME HEALTHCARE | Age: 81
End: 2021-01-28
Payer: MEDICARE

## 2021-01-28 ENCOUNTER — HOSPITAL ENCOUNTER (OUTPATIENT)
Dept: CARDIOLOGY | Facility: MEDICAL CENTER | Age: 81
End: 2021-01-28
Attending: INTERNAL MEDICINE
Payer: MEDICARE

## 2021-01-28 DIAGNOSIS — R06.00 DYSPNEA, UNSPECIFIED TYPE: ICD-10-CM

## 2021-01-28 DIAGNOSIS — R60.9 EDEMA, UNSPECIFIED TYPE: ICD-10-CM

## 2021-01-28 LAB — LV EJECT FRACT  99904: 55

## 2021-01-28 PROCEDURE — 93306 TTE W/DOPPLER COMPLETE: CPT

## 2021-01-28 PROCEDURE — 93306 TTE W/DOPPLER COMPLETE: CPT | Mod: 26 | Performed by: INTERNAL MEDICINE

## 2021-01-28 SDOH — ECONOMIC STABILITY: HOUSING INSECURITY
HOME SAFETY: VE A FIRE ESCAPE PLAN DEVELOPED. PATIENT DOES NOT HAVE FLAMMABLE MATERIALS PRESENT IN THE HOME PRESENTING A FIRE HAZARD. NO EVIDENCE FOUND OF SMOKING MATERIALS PRESENT IN THE HOME.

## 2021-01-28 SDOH — ECONOMIC STABILITY: HOUSING INSECURITY
HOME SAFETY: OXYGEN SAFETY RISK ASSESSMENT PERFORMED. PATIENT DOES NOT HAVE A NO SMOKING SIGN POSTED IN THE HOME. PATIENT DOES HAVE A WORKING FIRE EXTINGUISHER PRESENT IN THE HOME. SMOKE ALARMS ARE PRESENT AND FUNCTIONAL ON EACH LEVEL OF THE HOME. PATIENT DOES HA

## 2021-01-29 ENCOUNTER — HOME CARE VISIT (OUTPATIENT)
Dept: HOME HEALTH SERVICES | Facility: HOME HEALTHCARE | Age: 81
End: 2021-01-29
Payer: MEDICARE

## 2021-01-29 VITALS
HEART RATE: 88 BPM | SYSTOLIC BLOOD PRESSURE: 118 MMHG | RESPIRATION RATE: 18 BRPM | OXYGEN SATURATION: 94 % | DIASTOLIC BLOOD PRESSURE: 68 MMHG | TEMPERATURE: 98.4 F

## 2021-01-29 VITALS
DIASTOLIC BLOOD PRESSURE: 68 MMHG | OXYGEN SATURATION: 94 % | SYSTOLIC BLOOD PRESSURE: 118 MMHG | HEART RATE: 88 BPM | TEMPERATURE: 98.4 F

## 2021-01-29 PROCEDURE — G0495 RN CARE TRAIN/EDU IN HH: HCPCS

## 2021-01-29 PROCEDURE — G0157 HHC PT ASSISTANT EA 15: HCPCS | Mod: CQ

## 2021-01-29 SDOH — ECONOMIC STABILITY: HOUSING INSECURITY: EVIDENCE OF SMOKING MATERIAL: 0

## 2021-01-29 ASSESSMENT — ENCOUNTER SYMPTOMS
NAUSEA: DENIES
VOMITING: DENIES
MUSCLE WEAKNESS: 1
LIMITED RANGE OF MOTION: 1

## 2021-01-29 NOTE — Clinical Note
Thanks for the information GARRETT Leyva, if someone desats with 30ft challenge then for HEP I would recommend a distance less than that for her HEP.   I try to find the max distance she can go without desat'ing below 90% and then recommend that for the high frequency hourly walks.  Unless of course the desat was related to piling too many exertional challenges without sufficient rest between.  If that was the case maybe with plenty of rest between she won't desat with the 30ft distance.  Thanks, Eva    ----- Message -----  From: Autumn Blank PTA  Sent: 1/31/2021   9:18 AM PST  To: Eva Truong, PT       FYI - With act O2 stats drop with standing ther ex her stats drop to 82% on 5L sits then recovers with deep breathing Worked on instruction on deep breathing tech as she required instruction on how to take deep breathes Her O2 stats drop with amb x 30' to 80% on 5L

## 2021-01-29 NOTE — Clinical Note
Ok I will look at her again hopefully she builds up. not sure she will have acarry over at all  ----- Message -----  From: Eva Truong PT  Sent: 1/31/2021   5:05 PM PST  To: Autumn Blank PTA, Cheli Snell PT  Subject: RE:                                                  Thanks for the information GARRETT Leyva, if someone desats with 30ft challenge then for HEP I would recommend a distance less than that for her HEP.   I try to find the max distance she can go without desat'ing below 90% and then recommend that for the high frequency hourly walks.  Unless of course the desat was related to piling too many exertional challenges without sufficient rest between.  If that was the case maybe with plenty of rest between she won't desat with the 30ft distance.  Eva Sands    ----- Message -----  From: Autumn Blank PTA  Sent: 1/31/2021   9:18 AM PST  To: ROSALIO SalazarI - With act O2 stats drop with standing ther ex her stats drop to 82% on 5L sits then recovers with deep breathing Worked on instruction on deep breathing tech as she required instruction on how to take deep breathes Her O2 stats drop with amb x 30' to 80% on 5L

## 2021-01-31 NOTE — PROGRESS NOTES
FYI - With act O2 stats drop with standing ther ex her stats drop to 82% on 5L sits then recovers with deep breathing Worked on instruction on deep breathing tech as she required instruction on how to take deep breathes Her O2 stats drop with amb x 30' to 80% on 5L

## 2021-01-31 NOTE — PROGRESS NOTES
Nalini is on 5 L of O2 and stats are above 90% at rest with  act O2 stats drop with  standing ther ex her stats drop to 82% on 5L sits then recovers with deep breathing Worked on instruction on deep breathing tech as she required instruction on how to take deep breathes Her O2 stats drop with amb x 30' to 80% on 5L. Nursing arrived reported to her and nursing & PT surpervisor. She required some instruction on how to take deep breathes With cuiing she was able to perform. She is to work on 30' of amb at least every hour for at least 6 -8 times daily to increase her endurance. Will cont with POC to increase her functional mob and ind to prevent further decline in IND. S/B Efe Truong PT

## 2021-02-01 ENCOUNTER — HOME CARE VISIT (OUTPATIENT)
Dept: HOME HEALTH SERVICES | Facility: HOME HEALTHCARE | Age: 81
End: 2021-02-01
Payer: MEDICARE

## 2021-02-03 ENCOUNTER — HOME CARE VISIT (OUTPATIENT)
Dept: HOME HEALTH SERVICES | Facility: HOME HEALTHCARE | Age: 81
End: 2021-02-03
Payer: MEDICARE

## 2021-02-03 VITALS
RESPIRATION RATE: 20 BRPM | HEART RATE: 76 BPM | SYSTOLIC BLOOD PRESSURE: 138 MMHG | DIASTOLIC BLOOD PRESSURE: 72 MMHG | TEMPERATURE: 99.1 F | OXYGEN SATURATION: 94 %

## 2021-02-03 PROCEDURE — G0153 HHCP-SVS OF S/L PATH,EA 15MN: HCPCS

## 2021-02-03 PROCEDURE — G0495 RN CARE TRAIN/EDU IN HH: HCPCS

## 2021-02-03 ASSESSMENT — ENCOUNTER SYMPTOMS
NAUSEA: DENIES
MUSCLE WEAKNESS: 1
VOMITING: DENIES
SEVERE DYSPNEA: 1
LIMITED RANGE OF MOTION: 1

## 2021-02-05 ENCOUNTER — HOME CARE VISIT (OUTPATIENT)
Dept: HOME HEALTH SERVICES | Facility: HOME HEALTHCARE | Age: 81
End: 2021-02-05
Payer: MEDICARE

## 2021-02-05 ENCOUNTER — HOSPITAL ENCOUNTER (OUTPATIENT)
Facility: MEDICAL CENTER | Age: 81
End: 2021-02-05
Attending: INTERNAL MEDICINE
Payer: MEDICARE

## 2021-02-05 VITALS
TEMPERATURE: 99.6 F | HEART RATE: 86 BPM | OXYGEN SATURATION: 98 % | RESPIRATION RATE: 18 BRPM | DIASTOLIC BLOOD PRESSURE: 58 MMHG | SYSTOLIC BLOOD PRESSURE: 98 MMHG

## 2021-02-05 LAB
APPEARANCE UR: ABNORMAL
BACTERIA #/AREA URNS HPF: ABNORMAL /HPF
BILIRUB UR QL STRIP.AUTO: NEGATIVE
CAOX CRY #/AREA URNS HPF: ABNORMAL /HPF
COLOR UR: YELLOW
EPI CELLS #/AREA URNS HPF: ABNORMAL /HPF
GLUCOSE UR STRIP.AUTO-MCNC: NEGATIVE MG/DL
KETONES UR STRIP.AUTO-MCNC: NEGATIVE MG/DL
LEUKOCYTE ESTERASE UR QL STRIP.AUTO: ABNORMAL
MICRO URNS: ABNORMAL
NITRITE UR QL STRIP.AUTO: POSITIVE
PH UR STRIP.AUTO: 6 [PH] (ref 5–8)
PROT UR QL STRIP: NEGATIVE MG/DL
RBC # URNS HPF: ABNORMAL /HPF
RBC UR QL AUTO: NEGATIVE
SP GR UR STRIP.AUTO: >=1.03
UROBILINOGEN UR STRIP.AUTO-MCNC: 0.2 MG/DL
WBC #/AREA URNS HPF: ABNORMAL /HPF

## 2021-02-05 PROCEDURE — G0299 HHS/HOSPICE OF RN EA 15 MIN: HCPCS

## 2021-02-05 PROCEDURE — 81001 URINALYSIS AUTO W/SCOPE: CPT

## 2021-02-05 SDOH — ECONOMIC STABILITY: HOUSING INSECURITY: EVIDENCE OF SMOKING MATERIAL: 0

## 2021-02-05 ASSESSMENT — ACTIVITIES OF DAILY LIVING (ADL)
CURRENT_FUNCTION: ONE PERSON
CURRENT_FUNCTION: STAND BY ASSIST
AMBULATION ASSISTANCE: ONE PERSON
AMBULATION ASSISTANCE: STAND BY ASSIST

## 2021-02-05 ASSESSMENT — ENCOUNTER SYMPTOMS
MUSCLE WEAKNESS: 1
NAUSEA: DENIES
VOMITING: DENIES
LIMITED RANGE OF MOTION: 1

## 2021-02-06 SDOH — ECONOMIC STABILITY: HOUSING INSECURITY: HOME SAFETY: PT REPORTS NO CHANGES TO MEDICATIONS AND NO FALLS SINCE LAST HH VISIT.

## 2021-02-06 NOTE — PROGRESS NOTES
Pts daughter informed this nurse yesterday (2/4) evening stating that she is concerned pt has UTI. UA was obtained this morning and taken to lab. Daughter contacted this nurse this afternoon (1400) stating that pt is very weak and dizzy, especially upon standing. Encouraged pt to push fluids this AM during visit and to go to ER if symptoms worsened. However, this afternoon, pt refused to go and daughter was worried pt was getting worse and that BP was low ( systolic) throughout the day. Also, pt had started new medication this morning (Diltiazem). [facility] contact provided to daughter but encouraged to take pt to ER.

## 2021-02-09 ENCOUNTER — HOME CARE VISIT (OUTPATIENT)
Dept: HOME HEALTH SERVICES | Facility: HOME HEALTHCARE | Age: 81
End: 2021-02-09
Payer: MEDICARE

## 2021-02-09 VITALS
OXYGEN SATURATION: 95 % | HEART RATE: 78 BPM | DIASTOLIC BLOOD PRESSURE: 64 MMHG | RESPIRATION RATE: 18 BRPM | TEMPERATURE: 97.9 F | SYSTOLIC BLOOD PRESSURE: 118 MMHG

## 2021-02-09 PROCEDURE — G0151 HHCP-SERV OF PT,EA 15 MIN: HCPCS

## 2021-02-09 PROCEDURE — G0495 RN CARE TRAIN/EDU IN HH: HCPCS

## 2021-02-09 SDOH — ECONOMIC STABILITY: HOUSING INSECURITY: EVIDENCE OF SMOKING MATERIAL: 0

## 2021-02-09 ASSESSMENT — ENCOUNTER SYMPTOMS
NAUSEA: DENIED
VOMITING: DENIED

## 2021-02-09 ASSESSMENT — ACTIVITIES OF DAILY LIVING (ADL): TRANSPORTATION COMMENTS: YES

## 2021-02-10 VITALS
RESPIRATION RATE: 18 BRPM | SYSTOLIC BLOOD PRESSURE: 132 MMHG | TEMPERATURE: 99.6 F | DIASTOLIC BLOOD PRESSURE: 62 MMHG | HEART RATE: 88 BPM | OXYGEN SATURATION: 94 %

## 2021-02-10 ASSESSMENT — ENCOUNTER SYMPTOMS: SEVERE DYSPNEA: 1

## 2021-02-10 NOTE — PROGRESS NOTES
PT. A&OX4. PT./MITCHEL STATED DR. MADRIGAL HAD BEEN TO SEE PT. THIS MORNING. PT./MITCHEL, WEARING FACE SHIELDS. PT.DENIED ANY PAIN. DENIED ANY DEPRESSIVE FEELINGS OR SI. LUNGS CLEAR WITH DIMINSHED BS AT BILAT BASES. TEACHING DONE ON USE/TECHNIQUE/SCHEDULE FOR I.S. USE. PT. ABLE TO PULL 500CC ON RETURN DEMO, WITHOUT ADVERSE EFFECTS. INSTRUCTED TO DO EVERY HOUR, WITH 5 REPETITIONS. PT. WITH 1+ BILAT ANKLE EDEMA. TEACHING DONE ON NEED TO ELEVATE BLE AS MUCH AS POSSIBLE, DO NOT CROSS LEGS WHILE ELEVATED, DECREASE SODIUM IN DIET-AVOID HAM, FIELDS, SAUSAGE, PIZZA, CHINESE FOOD, READ LABELS TO KEEP SODIUM <30% PER SERVING, USE FRESH/FROZEN VEGETABLES WHEN POSSIBLE, USE LEAN CUISINE/HEALTHY CHOICE FOR TV DINNERS.PT. CHECKING FSBS EVERY AM, BUT JUST WRITING THEM DOWN AT RANDOM. TEACHING DONE ON NEED TO KEEP LOG OF BS, DECREASE CARBS IN DIET, AVOID CONCENTRATED SWEETS. CLAUDINE FOOT CHECKS DONE. NO S/SX BREAKDOWN. TEACHING DONE ON NEED TO WEAR SOX/SHOES AT ALL TIMES, TO AVOID ANY SKIN BREAKDOWN OR CUTS. TO DO DAILY FOOT CHECKS, NOTIFY HHRN/MD IF ANY RED, CRACKED OR OPEN AREAS DEVELOP, TO USE LOTION DAILY, BUT NOT BETWEEN TOES. PT. DENIED ANY FALLS. TEACHING DONE ON NEED TO LOCK 4WW BEFORE TRANSFERRING,USE OF NIGHT LIGHTS IN BATHROOM, BEDROOM, HALLWAY, IF ANY FALLS, CALL 911.TEACHING DONE ON O2 PRECAUTIONS-NO OPEN FLAMES, NO SMOKING, NO PETROLEUM PRODUCTS IN NOSE, HAVE E TANKS UPRIGHT IN RACK OR LYING DOWN ON FLOOR.MED RECONCILIATION DONE WITH PT./MITCHEL DENIED ANY NEW/CHANGED MEDS

## 2021-02-11 ENCOUNTER — HOME CARE VISIT (OUTPATIENT)
Dept: HOME HEALTH SERVICES | Facility: HOME HEALTHCARE | Age: 81
End: 2021-02-11
Payer: MEDICARE

## 2021-02-11 PROCEDURE — G0153 HHCP-SVS OF S/L PATH,EA 15MN: HCPCS

## 2021-02-12 ENCOUNTER — HOME CARE VISIT (OUTPATIENT)
Dept: HOME HEALTH SERVICES | Facility: HOME HEALTHCARE | Age: 81
End: 2021-02-12
Payer: MEDICARE

## 2021-02-12 VITALS
SYSTOLIC BLOOD PRESSURE: 110 MMHG | OXYGEN SATURATION: 94 % | HEART RATE: 68 BPM | DIASTOLIC BLOOD PRESSURE: 60 MMHG | TEMPERATURE: 99.3 F | RESPIRATION RATE: 20 BRPM

## 2021-02-12 PROCEDURE — G0495 RN CARE TRAIN/EDU IN HH: HCPCS

## 2021-02-12 SDOH — ECONOMIC STABILITY: HOUSING INSECURITY: EVIDENCE OF SMOKING MATERIAL: 0

## 2021-02-12 ASSESSMENT — ENCOUNTER SYMPTOMS
VOMITING: DENIES
MUSCLE WEAKNESS: 1
LIMITED RANGE OF MOTION: 1
SHORTNESS OF BREATH: T
SEVERE DYSPNEA: 1
NAUSEA: DENIES

## 2021-02-14 SDOH — ECONOMIC STABILITY: HOUSING INSECURITY: HOME SAFETY: NO FALLS SINCE LAST HH VISIT.

## 2021-02-16 ENCOUNTER — HOME CARE VISIT (OUTPATIENT)
Dept: HOME HEALTH SERVICES | Facility: HOME HEALTHCARE | Age: 81
End: 2021-02-16
Payer: MEDICARE

## 2021-02-16 VITALS
DIASTOLIC BLOOD PRESSURE: 60 MMHG | SYSTOLIC BLOOD PRESSURE: 110 MMHG | TEMPERATURE: 98 F | RESPIRATION RATE: 18 BRPM | OXYGEN SATURATION: 95 % | HEART RATE: 72 BPM

## 2021-02-16 PROCEDURE — G0495 RN CARE TRAIN/EDU IN HH: HCPCS

## 2021-02-16 SDOH — ECONOMIC STABILITY: HOUSING INSECURITY: EVIDENCE OF SMOKING MATERIAL: 0

## 2021-02-16 ASSESSMENT — ENCOUNTER SYMPTOMS
NAUSEA: DENIED
VOMITING: DENIED

## 2021-02-16 ASSESSMENT — ACTIVITIES OF DAILY LIVING (ADL): TRANSPORTATION COMMENTS: YES

## 2021-02-17 ENCOUNTER — HOME CARE VISIT (OUTPATIENT)
Dept: HOME HEALTH SERVICES | Facility: HOME HEALTHCARE | Age: 81
End: 2021-02-17
Payer: MEDICARE

## 2021-02-17 VITALS
TEMPERATURE: 98.6 F | RESPIRATION RATE: 18 BRPM | OXYGEN SATURATION: 98 % | SYSTOLIC BLOOD PRESSURE: 122 MMHG | DIASTOLIC BLOOD PRESSURE: 50 MMHG | HEART RATE: 100 BPM

## 2021-02-17 PROCEDURE — G0151 HHCP-SERV OF PT,EA 15 MIN: HCPCS

## 2021-02-17 SDOH — ECONOMIC STABILITY: HOUSING INSECURITY
HOME SAFETY: NICE RAILING AT STEPS AT ENTRY, PT UNALE TO USE MOD IND YET, UNABLE TO PRACTICE MUCH DUE TO WEATHER AND ALSO FACT SHE NEEDS A AND SIGN PACING FOR ANY ACTIVITY GREATER THAN 20FT OF WALKING DESATS.

## 2021-02-17 ASSESSMENT — ACTIVITIES OF DAILY LIVING (ADL)
TELEPHONE USE ASSESSED: 1
TOILETING: INDEPENDENT
PHYSICAL TRANSFERS ASSESSED: 1
USING THE TELPHONE: INDEPENDENT
AMBULATION ASSISTANCE: 1
TOILETING: 1
CURRENT_FUNCTION: INDEPENDENT
AMBULATION ASSISTANCE: INDEPENDENT

## 2021-02-17 ASSESSMENT — ENCOUNTER SYMPTOMS
MENTAL STATUS CHANGE: 0
SEVERE DYSPNEA: 1

## 2021-02-17 NOTE — PROGRESS NOTES
PT. A&OX4. PT.DENIED ANY PAIN. TEACHING DONE ON RELAXATION TECHNIQUES-CENTERING, MEDITATION, DEEP BREATHING, VISUALIZATION. PT.DENIED ANY DEPRESSIVE FEELINGS OR SI. LUNGS CLEAR WITH IMPROVED BS T/O, ONLY SLIGHTLY DIMINISHED AT RLL.  REINSTRUCTED ON TECHNIQUE/SCHEDULE FOR I.S. USE. PT. REINSTRUCTED TO DO EVERY 1-2 HOURS, WITH 5 REPETITIONS. PT. CONTINUES WITH 1-2+ BILAT ANKLE EDEMA. REINSTRUCTED ON NEED TO ELEVATE BLE AS MUCH AS POSSIBLE, ESPECIALLY IF SITTING AT KITCHEN TABLE-PLACE A SMALL STOOL/BOX UNDER TABLE, DECREASE SODIUM IN DIET-AVOID PIZZA, CHINESE FOOD, CHIPS, PRETZELS, READ LABELS TO KEEP SODIUM <30% PER SERVING. PT. CHECKING FSBS EVERY AM, AND NOW WRITING THEM DOWN IN A NOTEBOOK. REINSTRUCTED ON NEED DECREASE CARBS IN DIET, AVOID CONCENTRATED SWEETS. CLAUDINE FOOT CHECKS DONE. NO S/SX BREAKDOWN. PT. STILL NOT WEARINGSOX/SHOES. REINSTRUCTED ON RISKS OF SKIN BREAKDOWN OR CUTS. PT. STATED SHE IS DOING DAILY FOOT CHECKS.REINSTRUCTED TO NOTIFY HHRN/MD IF ANY RED, CRACKED OR OPEN AREAS DEVELOP, TO USE LOTION DAILY, BUT NOT BETWEEN TOES. TEACHING DONE ON NEED TO CHANGE POSITIONS EVERY HOUR AND WALK AT LEAST 50 FT. EVERY HOUR, NOTIFY HHRN/MD IF ANY SKIN BREAKDOWN DEVELOPS.PT. DENIED ANY FALLS. REINSTRUCTED ON NEED TO LOCK 4WW BEFORE TRANSFERRING,IF ANY FALLS, CALL 911.REINSTRUCTED ON O2 PRECAUTIONS-NO OPEN FLAMES, NO SMOKING, NO PETROLEUM PRODUCTS IN NOSE.MED RECONCILIATION DONE WITH PT. DENIED ANY NEW/CHANGED MEDS.

## 2021-02-18 ENCOUNTER — HOME CARE VISIT (OUTPATIENT)
Dept: HOME HEALTH SERVICES | Facility: HOME HEALTHCARE | Age: 81
End: 2021-02-18
Payer: MEDICARE

## 2021-02-18 VITALS
DIASTOLIC BLOOD PRESSURE: 60 MMHG | OXYGEN SATURATION: 96 % | TEMPERATURE: 97.9 F | HEART RATE: 88 BPM | SYSTOLIC BLOOD PRESSURE: 114 MMHG | RESPIRATION RATE: 18 BRPM

## 2021-02-18 PROCEDURE — G0495 RN CARE TRAIN/EDU IN HH: HCPCS

## 2021-02-18 PROCEDURE — G0153 HHCP-SVS OF S/L PATH,EA 15MN: HCPCS

## 2021-02-18 SDOH — ECONOMIC STABILITY: HOUSING INSECURITY: EVIDENCE OF SMOKING MATERIAL: 0

## 2021-02-18 ASSESSMENT — ACTIVITIES OF DAILY LIVING (ADL): TRANSPORTATION COMMENTS: YES

## 2021-02-18 ASSESSMENT — ENCOUNTER SYMPTOMS
NAUSEA: DENIED
VOMITING: DENIED

## 2021-02-19 NOTE — PROGRESS NOTES
PT. A&OX4. PT.DENIED ANY PAIN. PT.DENIED ANY DEPRESSIVE FEELINGS OR SI. REINSTRUCTED ON USE OF RELAXATION TECHNIQUES TO ALLEVIATE STRESS/ANXIETY-USING CENTERING, MEDITATION, DEEP BREATHING, VISUALIZATION TECHNIQUES.  VSS. HR WNL TODAY. PT. STATED THE APRN CHANGED HER DOSE OF COREG YESTERDAY, BUT HASN'T PICKED IT UP YETLUNGS CLEAR WITH IMPROVED BS T/O, STILL SLIGHTLY DIMINISHED AT RLL.  REINSTRUCTED ON TECHNIQUE/SCHEDULE FOR I.S. USE-PT. WAS LYING BACK IN RECLINER WHEN USING I.S. INSTRUCTED PT. TO BE SITTING FULLY UPRIGHT, TO ACHIEVE MAXIMUM BENEFITS. PT. REINSTRUCTED TO DO EVERY 1-2 HOURS, WITH 5 REPETITIONS. PT. CONTINUES WITH 1-2+ BILAT ANKLE EDEMA. REINSTRUCTED ON NEED TO ELEVATE BLE AS MUCH AS POSSIBLE, ESPECIALLY IF SITTING AT KITCHEN TABLE-PLACE A SMALL STOOL/BOX UNDER TABLE, NO TIGHT SHOES/SOX, CONTINUE TO DECREASE SODIUM IN DIET-AVOID PROCESSED MEATS/CHEESES, CHIPS, PRETZELS, HAVE  DAUGHTER READ LABELS TO KEEP SODIUM <30% PER SERVING.  CLAUDINE FOOT CHECKS DONE. NO S/SX BREAKDOWN. PT. STILL WILL NOT WEAR SOX/SHOES.  REINSTRUCTED ON RISKS OF SKIN BREAKDOWN OR CUTS. PT. STATED SHE IS DOING DAILY FOOT CHECKS.REINSTRUCTED TO NOTIFY HHRN/MD IF ANY RED, CRACKED OR OPEN AREAS DEVELOP, TO USE LOTION DAILY, BUT NOT BETWEEN TOES.REINSTRUCTED ON NEED TO DECREASE CARBS IN DIET, AVOID CONCENTRATED SWEETS. REINSTRUCTED ON NEED TO CHANGE POSITIONS EVERY HOUR AND WALK AT LEAST 50 FT. EVERY HOUR, NOTIFY HHRN/MD IF ANY SKIN BREAKDOWN DEVELOPS.PT. DENIED ANY FALLS. REINSTRUCTED ON NEED TO LOCK 4WW BEFORE TRANSFERRING,IF ANY FALLS, CALL 911.REINSTRUCTED ON O2 PRECAUTIONS-NO OPEN FLAMES, NO SMOKING, NO PETROLEUM PRODUCTS IN NOSE.MED RECONCILIATION DONE WITH PT. DENIED ANY NEW/CHANGED MEDS.PT. REQUESTING SNV BE DECREASED TO ONE VISIT PER WEEK. BROOKS DENTON RN/SUPERVISOR NOTIFIED.

## 2021-02-20 ENCOUNTER — HOME CARE VISIT (OUTPATIENT)
Dept: HOME HEALTH SERVICES | Facility: HOME HEALTHCARE | Age: 81
End: 2021-02-20
Payer: MEDICARE

## 2021-02-20 ENCOUNTER — HOSPITAL ENCOUNTER (OUTPATIENT)
Facility: MEDICAL CENTER | Age: 81
End: 2021-02-20
Attending: NURSE PRACTITIONER
Payer: MEDICARE

## 2021-02-20 VITALS
SYSTOLIC BLOOD PRESSURE: 128 MMHG | RESPIRATION RATE: 18 BRPM | TEMPERATURE: 99.2 F | DIASTOLIC BLOOD PRESSURE: 82 MMHG | HEART RATE: 96 BPM | OXYGEN SATURATION: 99 %

## 2021-02-20 LAB
APPEARANCE UR: CLEAR
BACTERIA #/AREA URNS HPF: ABNORMAL /HPF
BILIRUB UR QL STRIP.AUTO: NEGATIVE
CAOX CRY #/AREA URNS HPF: ABNORMAL /HPF
COLOR UR: YELLOW
EPI CELLS #/AREA URNS HPF: ABNORMAL /HPF
GLUCOSE UR STRIP.AUTO-MCNC: NEGATIVE MG/DL
KETONES UR STRIP.AUTO-MCNC: NEGATIVE MG/DL
LEUKOCYTE ESTERASE UR QL STRIP.AUTO: ABNORMAL
MICRO URNS: ABNORMAL
NITRITE UR QL STRIP.AUTO: POSITIVE
PH UR STRIP.AUTO: 6 [PH] (ref 5–8)
PROT UR QL STRIP: 30 MG/DL
RBC # URNS HPF: ABNORMAL /HPF
RBC UR QL AUTO: ABNORMAL
SP GR UR STRIP.AUTO: >=1.03
UROBILINOGEN UR STRIP.AUTO-MCNC: 0.2 MG/DL
WBC #/AREA URNS HPF: ABNORMAL /HPF

## 2021-02-20 PROCEDURE — 87186 SC STD MICRODIL/AGAR DIL: CPT

## 2021-02-20 PROCEDURE — G0299 HHS/HOSPICE OF RN EA 15 MIN: HCPCS

## 2021-02-20 PROCEDURE — 87086 URINE CULTURE/COLONY COUNT: CPT

## 2021-02-20 PROCEDURE — 81001 URINALYSIS AUTO W/SCOPE: CPT

## 2021-02-20 PROCEDURE — 665001 SOC-HOME HEALTH

## 2021-02-20 PROCEDURE — 87077 CULTURE AEROBIC IDENTIFY: CPT | Mod: 91

## 2021-02-20 SDOH — ECONOMIC STABILITY: HOUSING INSECURITY: EVIDENCE OF SMOKING MATERIAL: 0

## 2021-02-20 SDOH — ECONOMIC STABILITY: HOUSING INSECURITY
HOME SAFETY: OXYGEN SAFETY RISK ASSESSMENT PERFORMED. PATIENT DOES HAVE A NO SMOKING SIGN POSTED IN THE HOME. PATIENT DOES HAVE A WORKING FIRE EXTINGUISHER PRESENT IN THE HOME. SMOKE ALARMS ARE NOT PRESENT AND FUNCTIONAL ON EACH LEVEL OF THE HOME. PATIENT DOES HA

## 2021-02-20 ASSESSMENT — ENCOUNTER SYMPTOMS
VOMITING: DENIES
SEVERE DYSPNEA: 1
ADDITIONAL INFORMATION: WITH URINATION
SHORTNESS OF BREATH: T
MUSCLE WEAKNESS: 1
NAUSEA: DENIES

## 2021-02-20 ASSESSMENT — ACTIVITIES OF DAILY LIVING (ADL)
AMBULATION ASSISTANCE: STAND BY ASSIST
CURRENT_FUNCTION: STAND BY ASSIST

## 2021-02-20 NOTE — PROGRESS NOTES
Alexandra: Please send to [facility] for their records.    Received call from patient's daughter Alexandra this morning, states patient is concerned about recurrent UTI, complains of tingling, stinging, burning with urination, and requests a visit and UA obtained. SN spoke with Yamial BUENROSTRO with [facility]. Verbal orders obtained to complete a SN visit, and obtain a UA with C/S if indicated. Yamila requested SN to notify her if patient has any mental status changes. SN completed visit, obtained urine sample. Urine cloudy in clarity, and odorous. Patient is afebrile, A/OX4, without any increased fatigue, no mental status changes or lethargy. Paient and daughter instructed to report to ED if with T greater than 100.4, inability to urinate, lethargy, confusion, weakness, or worsening symtpoms. Patient and daughter state they know what s/sx to watch for as patient has experienced them in the past. UA dropped off at [facility]. Patient also reports Carvedilol increased by provider due to Tachycardia. Med list updated in Epic, and paper med list in patient's home.

## 2021-02-21 ENCOUNTER — HOME CARE VISIT (OUTPATIENT)
Dept: HOME HEALTH SERVICES | Facility: HOME HEALTHCARE | Age: 81
End: 2021-02-21
Payer: MEDICARE

## 2021-02-22 ENCOUNTER — HOME CARE VISIT (OUTPATIENT)
Dept: HOME HEALTH SERVICES | Facility: HOME HEALTHCARE | Age: 81
End: 2021-02-22
Payer: MEDICARE

## 2021-02-22 SDOH — ECONOMIC STABILITY: HOUSING INSECURITY: HOME SAFETY: PT REPORTS NO FALLS AND NO CHANGES TO MEDICATIONS.

## 2021-02-23 ENCOUNTER — HOME CARE VISIT (OUTPATIENT)
Dept: HOME HEALTH SERVICES | Facility: HOME HEALTHCARE | Age: 81
End: 2021-02-23
Payer: MEDICARE

## 2021-02-23 VITALS
RESPIRATION RATE: 18 BRPM | SYSTOLIC BLOOD PRESSURE: 118 MMHG | HEART RATE: 86 BPM | OXYGEN SATURATION: 96 % | TEMPERATURE: 98.1 F | DIASTOLIC BLOOD PRESSURE: 64 MMHG

## 2021-02-23 PROCEDURE — G0495 RN CARE TRAIN/EDU IN HH: HCPCS

## 2021-02-23 SDOH — ECONOMIC STABILITY: HOUSING INSECURITY: EVIDENCE OF SMOKING MATERIAL: 0

## 2021-02-23 ASSESSMENT — ACTIVITIES OF DAILY LIVING (ADL): TRANSPORTATION COMMENTS: YES

## 2021-02-23 ASSESSMENT — ENCOUNTER SYMPTOMS
VOMITING: DENIED
NAUSEA: DENIED

## 2021-02-24 ENCOUNTER — HOME CARE VISIT (OUTPATIENT)
Dept: HOME HEALTH SERVICES | Facility: HOME HEALTHCARE | Age: 81
End: 2021-02-24
Payer: MEDICARE

## 2021-02-24 PROCEDURE — G0153 HHCP-SVS OF S/L PATH,EA 15MN: HCPCS

## 2021-02-24 NOTE — PROGRESS NOTES
PT. A&OX4. DAUGHTERS PRESENT FOR VISIT. PT.DENIED ANY PAIN. PT. STARTED NEW ABO FOR UTI YESTERDAY. PT. DENIED ANY BURNNG OR PAIN SINCE STARTING ABO. TEACHING DONE ON NEED TO GO TO BR WHEN FIRST URGE FELT, WAIT A FEW MINUTES AFTER FIRST STREAM STOPS, TO SEE IF MORE URINE WILL COME.PT. C/O BLOATING FROM ABO. TEACHING DONE ON ACTIONS/SE OF MACRODANTIN, WHICH CAN INCLUDE COUGH. PT. STATED IT IS NOT A PRODUCTIVE COUGH. LUNGS ARE CLEAR. O2 TUBING CHECKED-NO KINKS OR LEAKS, CONNECTED CORRECTLY. O2 WAS FOUND TO BE AT 6L. TURNED DOWN TO 5L. TEACHING DONE WITH DAUGHTERS, ON NEED TO KEEP O2 AT 5L. REINSTRUCTED PT.ON TECHNIQUE/SCHEDULE FOR I.S. USE & REINSTRUCTED PT. TO BE SITTING FULLY UPRIGHT, TO ACHIEVE THE MAXIMUM BENEFIT. PT. REINSTRUCTED TO DO EVERY 1-2 HOURS, WITH 5 REPETITIONS. VSS. HR WNL TODAY. PT.DENIED ANY DEPRESSIVE FEELINGS OR SI. REINSTRUCTED ON USE OF RELAXATION TECHNIQUES TO ALLEVIATE STRESS/ANXIETY-USING CENTERING, MEDITATION, DEEP BREATHING, VISUALIZATION TECHNIQUES.  PT. CONTINUES WITH 1-2+ BILAT ANKLE EDEMA. REINSTRUCTED ON NEED TO ELEVATE BLE AS MUCH AS POSSIBLE,NO TIGHT SHOES/SOX, CONTINUE TO DECREASE SODIUM IN DIET. TEACHING DONE WITH DAUGHTER-AVOID PROCESSED MEATS/CHEESES, CHIPS, PRETZELS, READ LABELS TO KEEP SODIUM <30% PER SERVING.  CLAUDINE FOOT CHECKS DONE. NO S/SX BREAKDOWN. PT. WEARING SLIPPERS TODAY.PT. STATED SHE IS DOING DAILY FOOT CHECKS.REINSTRUCTED TO NOTIFY HHRN/MD IF ANY RED, CRACKED OR OPEN AREAS DEVELOP, TO USE LOTION DAILY, BUT NOT BETWEEN TOES.REINSTRUCTED ON NEED TO DECREASE CARBS IN DIET, AVOID CONCENTRATED SWEETS. REINSTRUCTED ON NEED TO CHANGE POSITIONS EVERY HOUR AND WALK AT LEAST 50 FT. EVERY HOUR, NOTIFY HHRN/MD IF ANY SKIN BREAKDOWN DEVELOPS.PT. DENIED ANY FALLS. REINSTRUCTED ON NEED TO LOCK 4WW BEFORE TRANSFERRING,IF ANY FALLS, CALL 911.REINSTRUCTED ON O2 PRECAUTIONS-NO OPEN FLAMES, NO SMOKING, NO PETROLEUM PRODUCTS IN NOSE.MED RECONCILIATION DONE WITH PT./DAUGHTER. NEW ABO  ENTERED.

## 2021-02-26 VITALS
DIASTOLIC BLOOD PRESSURE: 58 MMHG | OXYGEN SATURATION: 92 % | SYSTOLIC BLOOD PRESSURE: 100 MMHG | RESPIRATION RATE: 18 BRPM | TEMPERATURE: 97.7 F | HEART RATE: 93 BPM

## 2021-02-26 VITALS
RESPIRATION RATE: 18 BRPM | SYSTOLIC BLOOD PRESSURE: 112 MMHG | DIASTOLIC BLOOD PRESSURE: 72 MMHG | HEART RATE: 61 BPM | TEMPERATURE: 97.7 F | OXYGEN SATURATION: 92 %

## 2021-02-26 SDOH — ECONOMIC STABILITY: HOUSING INSECURITY: HOME SAFETY: PT REPORTS NO CHANGES TO MEDICATIONS AND NO FALLS SINCE LAST HH VISIT.

## 2021-02-26 SDOH — ECONOMIC STABILITY: HOUSING INSECURITY: EVIDENCE OF SMOKING MATERIAL: 0

## 2021-02-28 ENCOUNTER — HOME CARE VISIT (OUTPATIENT)
Dept: HOME HEALTH SERVICES | Facility: HOME HEALTHCARE | Age: 81
End: 2021-02-28
Payer: MEDICARE

## 2021-03-01 ENCOUNTER — HOME CARE VISIT (OUTPATIENT)
Dept: HOME HEALTH SERVICES | Facility: HOME HEALTHCARE | Age: 81
End: 2021-03-01
Payer: MEDICARE

## 2021-03-02 ENCOUNTER — HOME CARE VISIT (OUTPATIENT)
Dept: HOME HEALTH SERVICES | Facility: HOME HEALTHCARE | Age: 81
End: 2021-03-02
Payer: MEDICARE

## 2021-03-02 PROCEDURE — G0299 HHS/HOSPICE OF RN EA 15 MIN: HCPCS

## 2021-03-02 SDOH — ECONOMIC STABILITY: HOUSING INSECURITY: EVIDENCE OF SMOKING MATERIAL: 0

## 2021-03-02 ASSESSMENT — ENCOUNTER SYMPTOMS
VOMITING: DENIES
NAUSEA: DENIES

## 2021-03-02 ASSESSMENT — ACTIVITIES OF DAILY LIVING (ADL)
AMBULATION ASSISTANCE: STAND BY ASSIST
CURRENT_FUNCTION: STAND BY ASSIST

## 2021-03-03 ENCOUNTER — HOME CARE VISIT (OUTPATIENT)
Dept: HOME HEALTH SERVICES | Facility: HOME HEALTHCARE | Age: 81
End: 2021-03-03
Payer: MEDICARE

## 2021-03-03 VITALS
DIASTOLIC BLOOD PRESSURE: 60 MMHG | RESPIRATION RATE: 18 BRPM | TEMPERATURE: 97.8 F | SYSTOLIC BLOOD PRESSURE: 110 MMHG | HEART RATE: 73 BPM | OXYGEN SATURATION: 94 %

## 2021-03-04 ENCOUNTER — HOME CARE VISIT (OUTPATIENT)
Dept: HOME HEALTH SERVICES | Facility: HOME HEALTHCARE | Age: 81
End: 2021-03-04
Payer: MEDICARE

## 2021-03-04 ENCOUNTER — HOSPITAL ENCOUNTER (OUTPATIENT)
Dept: LAB | Facility: MEDICAL CENTER | Age: 81
End: 2021-03-04
Attending: FAMILY MEDICINE
Payer: MEDICARE

## 2021-03-04 LAB
ALBUMIN SERPL BCP-MCNC: 3.6 G/DL (ref 3.2–4.9)
ALBUMIN/GLOB SERPL: 1.1 G/DL
ALP SERPL-CCNC: 46 U/L (ref 30–99)
ALT SERPL-CCNC: 20 U/L (ref 2–50)
ANION GAP SERPL CALC-SCNC: 8 MMOL/L (ref 7–16)
ANISOCYTOSIS BLD QL SMEAR: ABNORMAL
AST SERPL-CCNC: 25 U/L (ref 12–45)
BASOPHILS # BLD AUTO: 0.5 % (ref 0–1.8)
BASOPHILS # BLD: 0.05 K/UL (ref 0–0.12)
BILIRUB SERPL-MCNC: 0.3 MG/DL (ref 0.1–1.5)
BUN SERPL-MCNC: 17 MG/DL (ref 8–22)
CALCIUM SERPL-MCNC: 9.1 MG/DL (ref 8.5–10.5)
CHLORIDE SERPL-SCNC: 101 MMOL/L (ref 96–112)
CO2 SERPL-SCNC: 28 MMOL/L (ref 20–33)
COMMENT 1642: NORMAL
CREAT SERPL-MCNC: 0.84 MG/DL (ref 0.5–1.4)
EOSINOPHIL # BLD AUTO: 0.25 K/UL (ref 0–0.51)
EOSINOPHIL NFR BLD: 2.5 % (ref 0–6.9)
ERYTHROCYTE [DISTWIDTH] IN BLOOD BY AUTOMATED COUNT: 58.3 FL (ref 35.9–50)
EST. AVERAGE GLUCOSE BLD GHB EST-MCNC: 120 MG/DL
GLOBULIN SER CALC-MCNC: 3.2 G/DL (ref 1.9–3.5)
GLUCOSE SERPL-MCNC: 128 MG/DL (ref 65–99)
HBA1C MFR BLD: 5.8 % (ref 4–5.6)
HCT VFR BLD AUTO: 35.7 % (ref 37–47)
HGB BLD-MCNC: 10.6 G/DL (ref 12–16)
IMM GRANULOCYTES # BLD AUTO: 0.03 K/UL (ref 0–0.11)
IMM GRANULOCYTES NFR BLD AUTO: 0.3 % (ref 0–0.9)
LYMPHOCYTES # BLD AUTO: 1.89 K/UL (ref 1–4.8)
LYMPHOCYTES NFR BLD: 19.1 % (ref 22–41)
MCH RBC QN AUTO: 27 PG (ref 27–33)
MCHC RBC AUTO-ENTMCNC: 29.7 G/DL (ref 33.6–35)
MCV RBC AUTO: 90.8 FL (ref 81.4–97.8)
MONOCYTES # BLD AUTO: 0.67 K/UL (ref 0–0.85)
MONOCYTES NFR BLD AUTO: 6.8 % (ref 0–13.4)
MORPHOLOGY BLD-IMP: NORMAL
NEUTROPHILS # BLD AUTO: 7.02 K/UL (ref 2–7.15)
NEUTROPHILS NFR BLD: 70.8 % (ref 44–72)
NRBC # BLD AUTO: 0 K/UL
NRBC BLD-RTO: 0 /100 WBC
PLATELET # BLD AUTO: 290 K/UL (ref 164–446)
PLATELET BLD QL SMEAR: NORMAL
PMV BLD AUTO: 9.7 FL (ref 9–12.9)
POTASSIUM SERPL-SCNC: 3.8 MMOL/L (ref 3.6–5.5)
PROT SERPL-MCNC: 6.8 G/DL (ref 6–8.2)
RBC # BLD AUTO: 3.93 M/UL (ref 4.2–5.4)
RBC BLD AUTO: PRESENT
SODIUM SERPL-SCNC: 137 MMOL/L (ref 135–145)
WBC # BLD AUTO: 9.9 K/UL (ref 4.8–10.8)

## 2021-03-04 PROCEDURE — 83036 HEMOGLOBIN GLYCOSYLATED A1C: CPT

## 2021-03-04 PROCEDURE — 80053 COMPREHEN METABOLIC PANEL: CPT

## 2021-03-04 PROCEDURE — 36415 COLL VENOUS BLD VENIPUNCTURE: CPT

## 2021-03-04 PROCEDURE — 85025 COMPLETE CBC W/AUTO DIFF WBC: CPT

## 2021-03-04 PROCEDURE — G0151 HHCP-SERV OF PT,EA 15 MIN: HCPCS

## 2021-03-05 VITALS
OXYGEN SATURATION: 95 % | RESPIRATION RATE: 18 BRPM | DIASTOLIC BLOOD PRESSURE: 70 MMHG | TEMPERATURE: 98.3 F | HEART RATE: 84 BPM | SYSTOLIC BLOOD PRESSURE: 120 MMHG

## 2021-03-09 ENCOUNTER — HOME CARE VISIT (OUTPATIENT)
Dept: HOME HEALTH SERVICES | Facility: HOME HEALTHCARE | Age: 81
End: 2021-03-09
Payer: MEDICARE

## 2021-03-09 VITALS
SYSTOLIC BLOOD PRESSURE: 124 MMHG | RESPIRATION RATE: 18 BRPM | HEART RATE: 76 BPM | DIASTOLIC BLOOD PRESSURE: 64 MMHG | TEMPERATURE: 98.9 F | OXYGEN SATURATION: 97 %

## 2021-03-09 PROCEDURE — G0151 HHCP-SERV OF PT,EA 15 MIN: HCPCS

## 2021-03-10 ENCOUNTER — HOME CARE VISIT (OUTPATIENT)
Dept: HOME HEALTH SERVICES | Facility: HOME HEALTHCARE | Age: 81
End: 2021-03-10
Payer: MEDICARE

## 2021-03-10 PROCEDURE — G0299 HHS/HOSPICE OF RN EA 15 MIN: HCPCS

## 2021-03-11 VITALS
OXYGEN SATURATION: 97 % | DIASTOLIC BLOOD PRESSURE: 72 MMHG | SYSTOLIC BLOOD PRESSURE: 110 MMHG | RESPIRATION RATE: 16 BRPM | HEART RATE: 83 BPM | TEMPERATURE: 98.4 F

## 2021-03-11 SDOH — ECONOMIC STABILITY: HOUSING INSECURITY: EVIDENCE OF SMOKING MATERIAL: 0

## 2021-03-11 ASSESSMENT — ENCOUNTER SYMPTOMS
NAUSEA: DENIES
VOMITING: DENIES
MUSCLE WEAKNESS: 1
LIMITED RANGE OF MOTION: 1

## 2021-03-11 NOTE — PROGRESS NOTES
Pt is going to continue with [facility].  She is being followed by BROOKS ZAVALETA for Dr. MERCED Ge.  She will not go back to seeing Vita Dunaway at [place].  Can we place make this change in this record to reflect this.  Thanks,  Erendira :-)

## 2021-03-12 ASSESSMENT — ENCOUNTER SYMPTOMS: SEVERE DYSPNEA: 1

## 2021-03-16 ENCOUNTER — HOME CARE VISIT (OUTPATIENT)
Dept: HOME HEALTH SERVICES | Facility: HOME HEALTHCARE | Age: 81
End: 2021-03-16
Payer: MEDICARE

## 2021-03-16 VITALS
OXYGEN SATURATION: 96 % | RESPIRATION RATE: 18 BRPM | SYSTOLIC BLOOD PRESSURE: 110 MMHG | TEMPERATURE: 97.9 F | DIASTOLIC BLOOD PRESSURE: 60 MMHG | HEART RATE: 74 BPM

## 2021-03-16 PROCEDURE — G0493 RN CARE EA 15 MIN HH/HOSPICE: HCPCS

## 2021-03-16 SDOH — ECONOMIC STABILITY: HOUSING INSECURITY: EVIDENCE OF SMOKING MATERIAL: 0

## 2021-03-16 ASSESSMENT — PATIENT HEALTH QUESTIONNAIRE - PHQ9: CLINICAL INTERPRETATION OF PHQ2 SCORE: 0

## 2021-03-16 ASSESSMENT — ACTIVITIES OF DAILY LIVING (ADL)
TRANSPORTATION COMMENTS: YES
OASIS_M1830: 01
HOME_HEALTH_OASIS: 00

## 2021-03-16 NOTE — PROGRESS NOTES
NILES, PLEASE FAX TO DR. KAITLIN SANTAMARIA. DISCHARGED FROM  SERVICES, EFFECTIVE 3/16/21, WITH GOALS MET.

## 2021-03-18 ENCOUNTER — DOCUMENTATION (OUTPATIENT)
Dept: HOME HEALTH SERVICES | Facility: HOME HEALTHCARE | Age: 81
End: 2021-03-18

## 2021-03-18 ENCOUNTER — HOME CARE VISIT (OUTPATIENT)
Dept: HOME HEALTH SERVICES | Facility: HOME HEALTHCARE | Age: 81
End: 2021-03-18
Payer: MEDICARE

## 2021-03-18 NOTE — LETTER
Type of Thirty (30) Day Referral: Outpatient    Diagnosis: COPD    Patient in Hospital? NO    Patient's Current Location (including Room if applicable): Home    Discharge Date (approximate if not known): 3/16/2021    Referral Source:   [facility]  [place] [address]  Phone:  [phone]  Fax:  [phone]    Referral Resource Name and Title: INGRID Boone RN    Patient Name: Camille Rodriguez    Patient Address:   [address] [address]         Patient Phone: [phone]    Patient : 1940    Patient MRN: [id number]    Patients Insurance: Payor: SENIOR CARE PLUS / Plan: SENIOR CARE PLUS / Product Type: *No Product type* /     Emergency Contact Relationship: Extended Emergency Contact Information  Primary Emergency Contact: Alexandra Wen (POA)  Address: [address] [place] [address] [address] [facility]  Home Phone: [phone]  Mobile Phone: [phone]  Relation: Daughter    Patients PCP: RODDY Maya        PCP's Phone Number: [phone]    Is the Primary Care Provider aware this referral is being made?: YES     Is the patient being referred to Home Health care?: NO    Is the patient and/or family aware this referral is being made?: YES   ~~~~~~~~~~~~~~~~~~~~~~~~~~~~~~~~~~~~~~~~~~~~~~~~~~~~~~~~~~~~~~~~

## 2021-03-18 NOTE — PROGRESS NOTES
FYI: Ms. Rodriguez has been referred to the [facility] Outreach program for continued care and follow up for COPD after discharge from [facility]. Thank you for allowing [facility] to serve your patients health care needs.

## 2021-05-28 NOTE — PROGRESS NOTES
Outcome: is not interstead on the  Comprehensive Geriatric Assessment    Please transfer to Patient Outreach Team at [phone] when patient returns call.      Attempt # 2

## 2021-07-01 PROBLEM — J44.1 ACUTE EXACERBATION OF CHRONIC OBSTRUCTIVE PULMONARY DISEASE (COPD) (HCC): Status: ACTIVE | Noted: 2017-06-20

## 2021-07-01 PROBLEM — R30.0 DYSURIA: Status: ACTIVE | Noted: 2021-01-01

## 2021-07-01 NOTE — PROGRESS NOTES
Attending Hospitalist today will be Dr. Perez starting at 0700.   Alisson Ivey RN  Hospitalist Service

## 2021-07-01 NOTE — CARE PLAN
The patient is Stable - Low risk of patient condition declining or worsening    Shift Goals  Clinical Goals: treat emperic UTI  Patient Goals: breathe easily    Progress made toward(s) clinical / shift goals:  maintained respiratory status on baseline O2    Patient is not progressing towards the following goals: resolution of infection      Problem: Knowledge Deficit - Standard  Goal: Patient and family/care givers will demonstrate understanding of plan of care, disease process/condition, diagnostic tests and medications  Outcome: Progressing  Note: Patient will be able express understanding of treatment regimen rationale.      Problem: Knowledge Deficit - COPD  Goal: Patient/significant other demonstrates understanding of disease process, utilization of the Action Plan, medications and discharge instruction  Outcome: Progressing  Note: Patient will be able express understanding of treatment regimen rationale.

## 2021-07-01 NOTE — ASSESSMENT & PLAN NOTE
Cont NEBs, O2.  I believe CFTX is enough, so discontinued doxycycline.  DME nebulizer ordered to replace broken one at home.

## 2021-07-01 NOTE — ASSESSMENT & PLAN NOTE
Urine cx E coli.  Past culture E coli.  Cont CFTX.  Leukocytosis resolving.  Negative renal US.  If she continues to improve tomorrow, plan DC home with PO abx.

## 2021-07-01 NOTE — PROGRESS NOTES
Patient admitted to unit this am. AOx4. Oriented patient to unit and hospital setting. Patient denies SOB and pain at this time. Will continue to monitor.

## 2021-07-01 NOTE — ASSESSMENT & PLAN NOTE
This is Sepsis Present on admission  SIRS criteria identified on my evaluation include: Fever, with temperature greater than 101 deg F, Tachycardia, with heart rate greater than 90 BPM, Tachypnea, with respirations greater than 20 per minute and Leukocytosis, with WBC greater than 12,000  Source is likely urinary  Sepsis protocol initiated  IV antibiotics as appropriate for source of sepsis  While organ dysfunction may be noted elsewhere in this problem list or in the chart, degree of organ dysfunction does not meet CMS criteria for severe sepsis    Resolved.

## 2021-07-01 NOTE — ED TRIAGE NOTES
BIBA for SOB. Pt with hx of COPD reports onset of SOB at approx 5-6pm last evening. Pt states she used breathing treatments at home without improvement. Upon EMS arrival, pt was noted to be in moderate to severe respiratory distress with wheeze and becoming lethargics. Received 2 albuterol and 2 duoneb breathing txs en route with minimal improvement and O2 sats in the 80%s. Pt was put on CPAP 15L and improved significantly, O2 sats 95%.     Pt wears 5.5L O2 NC at home. Pt reports recent cough and fevers as well. Pt states she received her Covid vaccinations

## 2021-07-01 NOTE — ED NOTES
Med Rec complete per Pt at bedside w/list provided.  Allergies reviewed.  No oral ABX in the last 14 days.

## 2021-07-01 NOTE — ED PROVIDER NOTES
ED Provider Note     2021  3:52 AM    Means of Arrival: EMS  History obtained by: patient and EMS  Limitations: On bipap  PCP: Hector Boykin  CODE STATUS: Full    CHIEF COMPLAINT  Respiratory Distress    HPI  Camille Rodriguez is a 80 y.o. female with history of COPD uses 5.5 L nasal cannula oxygen at home, diabetes, obesity who presents with concerns of shortness of breath.  Symptoms progressed over the past few days.  Denies cough.  Called EMS this evening because after several breathing treatments there was no improvement.  Initial saturations 82%.  Given 2 albuterol treatments, 2 DuoNeb treatments in route.  She was placed on CPAP.She believes she may have a UTI.  She has had a fever the past day.  No abdominal pain.  No fever.    REVIEW OF SYSTEMS  Review of Systems   All other systems reviewed and are negative.    See HPI for further details.    PAST MEDICAL HISTORY   has a past medical history of Chickenpox, Chronic airway obstruction, not elsewhere classified, Clotting disorder (HCC), Diabetes, Arabic measles, Mumps, On home oxygen therapy, and Respiratory failure (HCC).    SOCIAL HISTORY  Social History     Tobacco Use   • Smoking status: Former Smoker     Packs/day: 1.50     Years: 46.00     Pack years: 69.00     Types: Cigarettes     Quit date: 2007     Years since quittin.4   • Smokeless tobacco: Never Used   • Tobacco comment: Quit    Vaping Use   • Vaping Use: Never used   Substance and Sexual Activity   • Alcohol use: No   • Drug use: No   • Sexual activity: Never       SURGICAL HISTORY   has a past surgical history that includes remv 2nd cataract,corn-scler sectn.    CURRENT MEDICATIONS  Home Medications     Reviewed by Kae Gutierrez (Pharmacy Tech) on 21 at 0651  Med List Status: Complete   Medication Last Dose Status   acetaminophen (TYLENOL) 500 MG Tab 2021 Active   albuterol 108 (90 Base) MCG/ACT Aero Soln inhalation aerosol 2021 Active   Ascorbic  Acid (VITAMIN C) 1000 MG Tab 6/30/2021 Active   aspirin 81 MG tablet 6/30/2021 Active   AZO-CRANBERRY PO 6/30/2021 Active   Calcium Carbonate (CALCIUM 600) 1500 (600 Ca) MG Tab 6/30/2021 Active   Fluticasone-Umeclidin-Vilant (TRELEGY ELLIPTA) 100-62.5-25 MCG/INH AEROSOL POWDER, BREATH ACTIVATED 6/30/2021 Active   levothyroxine (SYNTHROID) 125 MCG Tab 6/30/2021 Active   metoprolol tartrate (LOPRESSOR) 25 MG Tab 6/30/2021 Active   Multiple Vitamin (MULTIVITAMIN PO) 6/30/2021 Active   Omega-3 Fatty Acids (FISH OIL) 1000 MG Cap capsule 6/30/2021 Active   Probiotic Product (TRUBIOTICS) Cap 6/30/2021 Active   simvastatin (ZOCOR) 10 MG Tab 6/30/2021 Active   sitagliptan-metformin (JANUMET)  MG per tablet 6/30/2021 Active                ALLERGIES  Allergies   Allergen Reactions   • Zithromax [Azithromycin] Diarrhea     Pt states severe diarrhea       PHYSICAL EXAM  VITAL SIGNS: /61   Pulse (!) 105   Temp 37.9 °C (100.2 °F) (Oral)   Resp (!) 31   Wt 113 kg (250 lb)   SpO2 89%   BMI 42.91 kg/m²       Constitutional: Ill-appearing 80-year-old woman wearing BiPAP  HENT: No signs of trauma, Bilateral external ears normal, Nose normal.  Moist mucous membranes.  Eyes: Pupils are equal, Conjunctiva normal, Non-icteric.   Neck: Normal range of motion, No tenderness, Supple, No stridor.  No JVD.  Lymphatic: No lymphadenopathy noted.   Cardiovascular: Increased rate and regular rhythm, no murmurs. Symmetric distal pulses. No cyanosis of extremities.  Trace pitting edema in the ankles.  Thorax & Lungs: Increased respiratory effort, diffuse wheezing.  Abdomen: Soft obese abdomen, no localized tenderness.  Skin: Warm, Dry, No erythema, No rash.   Back: No midline bony tenderness, No CVA tenderness.   Musculoskeletal: Good range of motion in all major joints. No tenderness to palpation or major deformities noted.   Neurologic: Alert , Normal motor function, Normal sensory function, No focal deficits noted.   Psychiatric:  Affect normal, Judgment normal, Mood normal.   Physical Exam      DIAGNOSTIC STUDIES / PROCEDURES    EKG  Results for orders placed or performed during the hospital encounter of 21   EKG   Result Value Ref Range    Report       [facility] Emergency Dept.    Test Date:  2021  Pt Name:    LETICIA SINGH                 Department: ER  MRN:        [id number]                      Room:        14  Gender:     Female                       Technician: [id number]  :        [id number]                   Requested By:BRIDGER GARCIA II  Order #:    [id number]                    Reading MD: Bridger Garcia II, MD    Measurements  Intervals                                Axis  Rate:       113                          P:          0  MI:         101                          QRS:        52  QRSD:       148                          T:          107  QT:         364  QTc:        500    Interpretive Statements  Sinus rhythm  Increased rate at 113  Intraventricular conduction delay, left bundle branch block.  No STEMI, scar Bosa criteria.  Impression: Sinus tachycardia with IVCD, LBBB.  Compared to ECG 2021 15:05:57  Electronically Signed On 2021 4:17:27 PDT by Bridger Garcia II, MD           LABS  Pertinent Labs & Imaging studies reviewed. (See chart for details)    RADIOLOGY  Pertinent Labs & Imaging studies reviewed. (See chart for details)    COURSE & MEDICAL DECISION MAKING  Pertinent Labs & Imaging studies reviewed. (See chart for details)    3:52 AM This is an emergent evaluation of a  80 y.o. female history of COPD, obesity, diabetes now presenting with respiratory distress and a fever.  Concerns for COPD exacerbation, pneumonia, CHF, UTI, sepsis.  Less likely MI given no chest pain, no EKG changes.  Sepsis work-up ordered.  She was placed on BiPAP immediately on arrival and given albuterol treatment.    I have ordered continuous pulse ox and cardiac monitoring. There are  abnormalities. Pulse ox shows a normal waveform with saturations of low 90s but requiring BiPAP. Cardiac monitors show increased heart rate in the 110's without ectopy    6:52 AM  She was able to be weaned off BiPAP and now is doing well on nasal cannula.  Vital signs within acceptable limits.  Fever improving.  Heart rate going down without IV fluids.  Lactic acid normal.  White count 21.  Urine returned consistent with UTI.  She was given dose of IV Rocephin.  Blood cultures are pending.  Chest x-ray did not show any signs of focal pneumonia.  Covid and flu testing negative.  Dr. Burns, hospitalist has arranged for hospitalization for sepsis and respiratory failure.        CRITICAL CARE  The very real possibilty of a deterioration of this patient's condition required the highest level of my preparedness for sudden, emergent intervention.  I provided critical care services, which included medication orders, frequent reevaluations of the patient's condition and response to treatment, ordering and reviewing test results, and discussing the case with various consultants.  The critical care time associated with the care of the patient was 40 minutes. Review chart for interventions. This time is exclusive of any other billable procedures.        FINAL IMPRESSION    ICD-10-CM   1. Acute respiratory failure with hypoxia (HCC)  J96.01   2. Acute exacerbation of chronic obstructive pulmonary disease (COPD) (HCC)  J44.1   3. Sepsis, due to unspecified organism, unspecified whether acute organ dysfunction present (HCC)  A41.9   4. Chronic respiratory failure with hypoxia (HCC)  J96.11   5. Chronic bronchitis, unspecified chronic bronchitis type ([facility])  J42            This dictation was created using voice recognition software. The accuracy of the dictation is limited to the abilities of the software. I expect there may be some errors of grammar and possibly content. The nursing notes were reviewed and certain aspects of  this information were incorporated into this note.    Electronically signed by: Robert Alcantar II, M.D., 7/1/2021 3:52 AM

## 2021-07-01 NOTE — ASSESSMENT & PLAN NOTE
Continue levothyroxine 125 mcg p.o. daily  TSH ordered and pending secondary to sinus tachycardia/multifocal atrial tachycardia

## 2021-07-01 NOTE — H&P
Hospital Medicine History & Physical Note    Date of Service  7/1/2021    Primary Care Physician  Hector Boykin P.A.-C.    Consultants  None    Code Status  Partial Code    Chief Complaint  Chief Complaint   Patient presents with   • Shortness of Breath       History of Presenting Illness  80 y.o. female who presented 7/1/2021 with complaints of fever, chills, shortness of breath without sputum production or cough and dysuria for the past few days.  Patient states she has end-stage COPD and quit smoking 40 years ago.  She mentions that she is on 5-1/2 L nasal cannula at rest and contacted EMS for acute dyspnea.  She was given breathing treatments in route and was placed on BiPAP initially by ERP.  Her respiratory status improved significantly and is able to tolerate 6 L nasal cannula in ED.  She did complain to ERP that she had dysuria and urinalysis ordered and currently pending work-up.  She otherwise denies the following ROS: Anosmia, ageusia, diaphoresis, cough with sputum production, hemoptysis, nausea, vomiting, constipation, melena, hematochezia, hematuria, incoordination, vertigo, syncope, visual changes.    Vital signs admission were as follows: 100.2, 118, 20, 175/85, 99% 6 L nasal cannula.    Chest x-ray was performed and revealed mild cardiomegaly and bilateral interstitial prominence which may be due to mild edema and underlying COPD.    Labs were obtained on admission including blood cultures, lactic acid resulted at 1.7 and unremarkable CMP.  CBC reveals neutrophilic predominant leukocytosis with WBC count of 21.1 with immature granulocytes and mild normocytic anemia otherwise relatively unremarkable.    Procalcitonin ordered and currently pending.  Urinalysis and urine culture ordered and currently pending.  Twelve-lead EKG reveals what appears to be multifocal atrial tachycardia with mildly elevated QTC of 500.    Patient was administered breathing treatments in ED, IV antibiotics for suspected  urinary tract infection.  She agrees to partial CODE STATUS with chest compressions and declines intubation.  She is alert and oriented x4 at time of evaluation and will be optimized in ED subsequently transferred to medical oliva floor for further optimization of medical management.    Review of Systems  Review of Systems   Constitutional: Positive for chills and fever.   HENT: Negative for congestion and sore throat.    Eyes: Negative for blurred vision and double vision.   Respiratory: Positive for shortness of breath. Negative for cough and wheezing.    Cardiovascular: Negative for chest pain and palpitations.   Gastrointestinal: Negative for abdominal pain, blood in stool, constipation, diarrhea, heartburn, melena, nausea and vomiting.   Genitourinary: Positive for dysuria. Negative for frequency.   Musculoskeletal: Negative for back pain and neck pain.   Skin: Negative for itching and rash.   Neurological: Negative for focal weakness, loss of consciousness, weakness and headaches.   Endo/Heme/Allergies: Negative for environmental allergies. Does not bruise/bleed easily.   Psychiatric/Behavioral: Negative for depression. The patient does not have insomnia.        Past Medical History   has a past medical history of Chickenpox, Chronic airway obstruction, not elsewhere classified, Clotting disorder ([facility]), Diabetes, [place] Islander measles, Mumps, On home oxygen therapy, and Respiratory failure ([facility]).    Surgical History   has a past surgical history that includes pr remv 2nd cataract,corn-scler sectn.     Family History  family history includes Heart Attack in her father.      Social History   reports that she quit smoking about 14 years ago. Her smoking use included cigarettes. She has a 69.00 pack-year smoking history. She has never used smokeless tobacco. She reports that she does not drink alcohol and does not use drugs.    Allergies  Allergies   Allergen Reactions   • Zithromax [Azithromycin] Diarrhea     Pt states  severe diarrhea       Medications  Prior to Admission Medications   Prescriptions Last Dose Informant Patient Reported? Taking?   ALBUTEROL INH   Yes No   Sig: Inhale 2.5 Applicators 1 time a day as needed (SOB).   AZO-CRANBERRY PO  Patient Yes No   Sig: Take 1 Tab by mouth every morning.   Ascorbic Acid (VITAMIN C) 1000 MG Tab  Patient Yes No   Sig: Take 3 Tabs by mouth every day.   Blood Glucose Monitoring Suppl Device  Patient No No   Sig: Meter: Dispense Device of Insurance Preference. Sig. Use as directed for blood sugar monitoring. #1. NR.   Patient not taking: Reported on 1/9/2021   Blood Glucose Test Strips  Patient No No   Sig: Test strips order: Test strips for insurance-preferred meter. Sig: use every morning before breakfast and prn ssx high or low sugar   Patient not taking: Reported on 1/9/2021   Calcium Carbonate (CALCIUM 600) 1500 (600 Ca) MG Tab  Patient Yes No   Sig: Take 1 Tab by mouth 2 (two) times a day.   Dextromethorphan Polistirex (ROBITUSSIN 12 HOUR COUGH PO)  Patient Yes No   Sig: Take 1 Cap by mouth 1 time a day as needed.   Fluticasone-Umeclidin-Vilant (TRELEGY ELLIPTA) 100-62.5-25 MCG/INH AEROSOL POWDER, BREATH ACTIVATED  Patient No No   Sig: Inhale 1 Puff every day.   Home Care Oxygen   Yes No   Sig: Inhale 5 L/min continuous. Pulmonary physician wants PO2 level at our around 93% and to lower O2 L to 4L/min if above 93.  Oxygen dose range: 4 to 5 L/min  Respiratory route via: Nasal Cannula   Oxygen supplier: Preferred         Lancets  Patient No No   Sig: Lancets order: Lancets for insurance-preferred meter. Sig: use every morning before breakfast and prn ssx high or low sugar.   Patient not taking: Reported on 1/9/2021   Loperamide HCl (IMODIUM A-D) 1 MG/7.5ML Liquid   Yes No   Sig: Take 2 mg by mouth as needed (diarrhea).   Multiple Vitamin (MULTIVITAMIN PO)  Patient Yes No   Sig: Take 1 Tab by mouth every day.   Omega-3 Fatty Acids (FISH OIL) 1000 MG Cap capsule  Patient Yes No    Sig: Take 1,000 mg by mouth every day.   Probiotic Product (TRUBIOTICS) Cap  Patient Yes No   Sig: Take 1 Capsule by mouth 2 Times a Day.   acetaminophen (TYLENOL) 325 MG Tab  Patient No No   Sig: Take 2 Tabs by mouth every 6 hours as needed (Mild Pain; (Pain scale 1-3); Temp greater than 100.5 F).   Patient not taking: Reported on 1/20/2021   acetaminophen (TYLENOL) 500 MG Tab  Patient Yes No   Sig: Take 1,000 mg by mouth every morning.   albuterol 108 (90 Base) MCG/ACT Aero Soln inhalation aerosol  Patient No No   Sig: Inhale 2 Puffs by mouth every four hours as needed for Shortness of Breath.   aspirin 81 MG tablet  Patient Yes No   Sig: Take 81 mg by mouth every evening.   atenolol (TENORMIN) 50 MG Tab  Patient No No   Sig: Take 1 Tab by mouth every day.   Patient not taking: Reported on 2/9/2021   carvedilol (COREG) 3.125 MG Tab   Yes No   Sig: Take 6.25 mg by mouth 2 times a day, with meals.   diphenhydrAMINE-APAP, sleep, (TYLENOL PM EXTRA STRENGTH)  MG Tab  Patient Yes No   Sig: Take 2 Tabs by mouth every bedtime.   levothyroxine (SYNTHROID) 125 MCG Tab  Patient No No   Sig: TAKE ONE TABLET BY MOUTH EVERY MORNING ON AN EMPTY STOMACH   Patient taking differently: Take 125 mcg by mouth every evening.   lisinopril (PRINIVIL) 20 MG Tab  Patient No No   Sig: Take 1 Tab by mouth every day.   metoprolol tartrate (LOPRESSOR) 25 MG Tab   No No   Sig: Take 1 tablet by mouth 2 times a day.   simvastatin (ZOCOR) 10 MG Tab  Patient No No   Sig: Take 1 Tab by mouth every evening.   sitagliptan-metformin (JANUMET)  MG per tablet  Patient No No   Sig: Take 1 Tab by mouth 2 times a day, with meals.      Facility-Administered Medications: None       Physical Exam  Temp:  [37.9 °C (100.2 °F)] 37.9 °C (100.2 °F)  Pulse:  [109-118] 109  Resp:  [20-21] 21  BP: (121-175)/(57-85) 121/57  SpO2:  [96 %-100 %] 96 %    Physical Exam  Constitutional:       General: She is not in acute distress.     Appearance: Normal  appearance. She is obese. She is ill-appearing. She is not toxic-appearing or diaphoretic.   HENT:      Head: Normocephalic and atraumatic.      Mouth/Throat:      Mouth: Mucous membranes are dry.      Pharynx: Oropharynx is clear. No posterior oropharyngeal erythema.      Comments: Upper and lower dentures appreciated, dry mucous membranes, no oral lesions or pharyngeal exudates.  Eyes:      General: No scleral icterus.     Extraocular Movements: Extraocular movements intact.      Pupils: Pupils are equal, round, and reactive to light.      Comments: Pale conjunctiva, bilateral arcus senilis   Neck:      Vascular: No carotid bruit.   Cardiovascular:      Rate and Rhythm: Regular rhythm. Tachycardia present.      Pulses: Normal pulses.      Heart sounds: Normal heart sounds. No murmur heard.   No friction rub. No gallop.    Pulmonary:      Effort: Pulmonary effort is normal.      Breath sounds: Normal breath sounds. No wheezing, rhonchi or rales.      Comments: Initially described as significant wheezing however clear to auscultation per my evaluation after breathing treatment x2.  Abdominal:      General: Abdomen is flat. Bowel sounds are normal. There is no distension.      Palpations: There is no mass.      Tenderness: There is no abdominal tenderness. There is no rebound.   Musculoskeletal:         General: No swelling. Normal range of motion.      Cervical back: Normal range of motion.      Right lower leg: No edema.      Left lower leg: No edema.   Lymphadenopathy:      Cervical: No cervical adenopathy.   Skin:     General: Skin is warm and dry.      Capillary Refill: Capillary refill takes less than 2 seconds.      Findings: No erythema or rash.   Neurological:      General: No focal deficit present.      Mental Status: She is alert and oriented to person, place, and time. Mental status is at baseline.      Cranial Nerves: No cranial nerve deficit.      Sensory: No sensory deficit.      Motor: No weakness.    Psychiatric:         Mood and Affect: Mood normal.         Behavior: Behavior normal.         Laboratory:  Recent Labs     07/01/21  0400   WBC 21.1*   RBC 4.44   HEMOGLOBIN 11.0*   HEMATOCRIT 37.2   MCV 83.8   MCH 24.8*   MCHC 29.6*   RDW 56.1*   PLATELETCT 234   MPV 9.0     Recent Labs     07/01/21  0400   SODIUM 137   POTASSIUM 4.6   CHLORIDE 101   CO2 25   GLUCOSE 171*   BUN 20   CREATININE 0.87   CALCIUM 9.6     Recent Labs     07/01/21  0400   ALTSGPT 11   ASTSGOT 22   ALKPHOSPHAT 74   TBILIRUBIN 0.3   GLUCOSE 171*         No results for input(s): NTPROBNP in the last 72 hours.      No results for input(s): TROPONINT in the last 72 hours.      I have reviewed and interpreted the above twelve-lead EKG and do appreciate sinus tachycardia and what appears to be multifocal atrial tachycardia with elevated QTC and without significant ischemic changes.    Imaging:  DX-CHEST-PORTABLE (1 VIEW)   Final Result      1.  Mild cardiomegaly and bilateral interstitial prominence, which may be due to mild edema.   2.  Underlying COPD.          I have reviewed and interpreted the above chest x-ray and do appreciate changes of COPD with flattened diaphragm, mild cardiomegaly without significant pulmonary edema appreciated.    Assessment/Plan:  I anticipate this patient will require at least two midnights for appropriate medical management, necessitating inpatient admission.    * Acute exacerbation of chronic obstructive pulmonary disease (COPD) (HCC)- (present on admission)  Assessment & Plan  Continue supplemental O2 to maintain SPO2 greater than 88%  Likely exacerbated from urinary tract infection  Follow-up outpatient pulmonology/PCP upon discharge  Consider doxycycline/azithromycin for anti-inflammatory effect however pending urinalysis, patient may be septic likely precipitating her COPD exacerbation.  We will also hold off on steroids at this time until urinalysis results as this is her likely source of  sepsis.    Sepsis (HCC)- (present on admission)  Assessment & Plan  This is Sepsis Present on admission  SIRS criteria identified on my evaluation include: Fever, with temperature greater than 101 deg F, Tachycardia, with heart rate greater than 90 BPM, Tachypnea, with respirations greater than 20 per minute and Leukocytosis, with WBC greater than 12,000  Source is likely urinary  Sepsis protocol initiated  IV antibiotics as appropriate for source of sepsis  While organ dysfunction may be noted elsewhere in this problem list or in the chart, degree of organ dysfunction does not meet CMS criteria for severe sepsis    Rocephin administered in ED.  Procalcitonin ordered and pending.  Urinalysis and urine culture ordered and pending.      Dysuria- (present on admission)  Assessment & Plan  Urinalysis/urine culture ordered and pending  CBC reveals  leukocytosis and IV antibiotics initiated in ED    Hypothyroidism- (present on admission)  Assessment & Plan  Continue levothyroxine 125 mcg p.o. daily  TSH ordered and pending secondary to sinus tachycardia/multifocal atrial tachycardia    Chronic respiratory failure with hypoxia ([facility])- (present on admission)  Assessment & Plan  Continue supplemental O2 to maintain SPO2 greater than 88%    DM2 (diabetes mellitus, type 2) ([facility])- (present on admission)  Assessment & Plan  Continue aspirin 81 mg p.o. daily  Continue low SSI  POC glucose QA [facility]  Last hemoglobin A1c of 5.8 as of March 2021  Recommend follow-up outpatient PCP and endocrinologist upon discharge    Anemia- (present on admission)  Assessment & Plan  Normocytic anemia, mild  CBC in a.m.  Folate unable to be ordered however B12, iron, TIBC, ferritin ordered and currently pending

## 2021-07-01 NOTE — ED NOTES
Pt weaned from bipap to 6L O2 NC by Respiratory therapy. Sats 88-92%. Pt states this is her baseline. Pt wears 5.5L NC at home.

## 2021-07-02 PROBLEM — N30.90 CYSTITIS: Status: ACTIVE | Noted: 2021-01-01

## 2021-07-02 NOTE — PROGRESS NOTES
HOSPITAL MEDICINE PROGRESS NOTE    Date of service  7/2/2021    Chief Complaint  Shortness of Breath      Hospital Course:     79 yo woman with h/o chronic hypoxic respiratory failure, COPD, p/w with SOB due to a COPD exacerbation with concurrent UTI.          Interval History Updates:  No event overnight.  Afebrile.    She feels slightly better.  WBC 18K    Consultants/Specialty  None    Review of Systems   Review of Systems   Constitutional: Negative for chills and fever.   HENT: Negative for ear pain and sore throat.    Eyes: Negative for blurred vision.   Respiratory: Negative for shortness of breath.    Cardiovascular: Negative for chest pain, palpitations and leg swelling.   Gastrointestinal: Negative for abdominal pain, blood in stool, constipation, diarrhea, heartburn, melena, nausea and vomiting.   Genitourinary: Negative for dysuria, hematuria and urgency.   Musculoskeletal: Negative for myalgias.   Skin: Negative for rash.   Neurological: Negative for dizziness, focal weakness, weakness and headaches.   Endo/Heme/Allergies: Negative.    Psychiatric/Behavioral: Negative for depression.   All other systems reviewed and are negative.       Medications:  • Respiratory Therapy Consult   Continuous RT   • ipratropium-albuterol  3 mL Q4HRS (RT)   • [START ON 7/3/2021] enoxaparin  40 mg DAILY   • albuterol  2 Puff Q4HRS PRN   • aspirin  81 mg DAILY   • levothyroxine  125 mcg Q EVENING   • simvastatin  10 mg Q EVENING   • senna-docusate  2 tablet BID    And   • polyethylene glycol/lytes  1 Packet QDAY PRN    And   • magnesium hydroxide  30 mL QDAY PRN    And   • bisacodyl  10 mg QDAY PRN   • acetaminophen  650 mg Q6HRS PRN   • cefTRIAXone (ROCEPHIN) IV  2 g Q24HRS   • enalaprilat  1.25 mg Q6HRS PRN   • labetalol  10 mg Q4HRS PRN   • guaiFENesin dextromethorphan  10 mL Q6HRS PRN   • insulin regular  1-6 Units 4X/DAY ACHS    And   • glucose  16 g Q15 MIN PRN    And   • dextrose 50%  50 mL Q15 MIN PRN   •  umeclidinium-vilanterol  1 Puff QDAILY (RT)    And   • fluticasone  2 Puff QDAILY (RT)   • metoprolol tartrate  25 mg BID   • lactobacillus rhamnosus  1 capsule QDAY with Breakfast   • ipratropium-albuterol  3 mL Q4H PRN (RT)       ipratropium-albuterol, 3 mL, Nebulization, Q4HRS (RT)  [START ON 7/3/2021] enoxaparin, 40 mg, Subcutaneous, DAILY  aspirin, 81 mg, Oral, DAILY  levothyroxine, 125 mcg, Oral, Q EVENING  simvastatin, 10 mg, Oral, Q EVENING  senna-docusate, 2 tablet, Oral, BID  cefTRIAXone (ROCEPHIN) IV, 2 g, Intravenous, Q24HRS  insulin regular, 1-6 Units, Subcutaneous, 4X/DAY ACHS  umeclidinium-vilanterol, 1 Puff, Inhalation, QDAILY (RT)   And  fluticasone, 2 Puff, Inhalation, QDAILY (RT)  metoprolol tartrate, 25 mg, Oral, BID  lactobacillus rhamnosus, 1 capsule, Oral, QDAY with Breakfast        Physical Exam   Vitals:    07/02/21 0410 07/02/21 0815 07/02/21 0834 07/02/21 0859   BP: 128/57 129/51     Pulse: 90 88  88   Resp: 20 (!) 22     Temp: 36 °C (96.8 °F) 36.3 °C (97.4 °F)     TempSrc: Temporal Temporal     SpO2: 97% 99% 99%    Weight:       Height:           Physical Exam  Vitals reviewed.   Constitutional:       General: She is not in acute distress.     Appearance: She is not diaphoretic.   Eyes:      General: No scleral icterus.  Cardiovascular:      Rate and Rhythm: Normal rate and regular rhythm.      Heart sounds: No gallop.    Pulmonary:      Effort: Pulmonary effort is normal.      Breath sounds: Normal breath sounds.   Abdominal:      General: Bowel sounds are normal.      Palpations: Abdomen is soft.   Neurological:      Mental Status: She is alert.            Laboratory   Recent Labs     07/01/21  0400 07/02/21  0335   WBC 21.1* 18.1*   RBC 4.44 3.80*   HEMOGLOBIN 11.0* 9.6*   HEMATOCRIT 37.2 31.9*   PLATELETCT 234 229     Recent Labs     07/01/21  0400 07/02/21  0335   SODIUM 137 139   POTASSIUM 4.6 5.0   CHLORIDE 101 102   CO2 25 28   GLUCOSE 171* 157*   BUN 20 21   CREATININE 0.87 0.86  "     Recent Labs     07/01/21 0400 07/02/21  0335   ALTSGPT 11  --    ASTSGOT 22  --    ALKPHOSPHAT 74  --    TBILIRUBIN 0.3  --    GLUCOSE 171* 157*                Microbiology  Results     Procedure Component Value Units Date/Time    URINE CULTURE(NEW) [[id number]]  (Abnormal) Collected: 07/01/21 0515    Order Status: Completed Specimen: Urine, Straight Cath Updated: 07/02/21 0950     Significant Indicator POS     Source UR     Site URINE, STRAIGHT CATH     Culture Result -      Lactose fermenting Gram negative shahram  ,000 cfu/mL      Narrative:      Indication for culture:->Evaluation for sepsis without a  clear source of infection  Have you been in close contact with a person who is suspected  or known to be positive for COVID-19 within the last 30 days  (e.g. last seen that person < 30 days ago)->Unknown  Indication for culture:->Evaluation for sepsis without a    BLOOD CULTURE [[id number]] Collected: 07/01/21 0405    Order Status: Completed Specimen: Blood from Peripheral Updated: 07/02/21 0720     Significant Indicator NEG     Source BLD     Site PERIPHERAL     Culture Result No Growth  Note: Blood cultures are incubated for 5 days and  are monitored continuously.Positive blood cultures  are called to the RN and reported as soon as  they are identified.      Narrative:      2 of 2 blood culture x2  Sites order. Per Hospital Policy:  Only change Specimen Src: to \"Line\" if specified by physician  order.  No site indicated    BLOOD CULTURE [[id number]] Collected: 07/01/21 0400    Order Status: Completed Specimen: Blood from Peripheral Updated: 07/02/21 0720     Significant Indicator NEG     Source BLD     Site PERIPHERAL     Culture Result No Growth  Note: Blood cultures are incubated for 5 days and  are monitored continuously.Positive blood cultures  are called to the RN and reported as soon as  they are identified.      Narrative:      1 of 2 for Blood Culture x 2 sites order. Per Hospital  Policy: Only " "change Specimen Src: to \"Line\" if specified by  physician order.  No site indicated    COV-2, FLU A/B, AND RSV BY PCR (2-4 HOURS [facility]): Collect NP swab in VTM [[id number]] Collected: 07/01/21 0406    Order Status: Completed Specimen: Respirate from Nasopharyngeal Updated: 07/01/21 0516     Influenza virus A RNA Negative     Influenza virus B, PCR Negative     RSV, PCR Negative     SARS-CoV-2 by PCR NotDetected     Comment: PATIENTS: Important information regarding your results and instructions can  be found at [email or web address]   \"After your  Covid-19 Test\"    [facility] providers: PLEASE REFER TO DE-ESCALATION AND RETESTING PROTOCOL  on [email or web address]    **The [employer] GeneXpert Xpress SARS-CoV-2 RT-PCR Test has been made  available for use under the Emergency Use Authorization (EUA) only.          SARS-CoV-2 Source NP Swab    Narrative:      Indication for culture:->Evaluation for sepsis without a  clear source of infection  Have you been in close contact with a person who is suspected  or known to be positive for COVID-19 within the last 30 days  (e.g. last seen that person < 30 days ago)->Unknown    URINALYSIS [[id number]]     Order Status: Sent Specimen: Urine, Cath              Labs reviewed by me and noted above.    Radiology  DX-CHEST-PORTABLE (1 VIEW)  Narrative: 7/1/2021 3:55 AM    HISTORY/REASON FOR EXAM:  COPD exacerbation, fever.    TECHNIQUE/EXAM DESCRIPTION AND NUMBER OF VIEWS:  Single portable view of the chest.    COMPARISON: 1/9/2021    FINDINGS:    LUNGS: Emphysema. Bilateral interstitial prominence. No effusions.  PNEUMOTHORAX: None.  LINES AND TUBES: None.  MEDIASTINUM: Mild cardiomegaly. Atherosclerosis.  MUSCULOSKELETAL STRUCTURES: No acute displaced fracture.  Impression: 1.  Mild cardiomegaly and bilateral interstitial prominence, which may be due to mild edema.  2.  Underlying COPD.      Results for orders placed or performed during the hospital encounter of " 06/02/17   ECHOCARDIOGRAM COMP W/O CONT   Result Value Ref Range    Eject.Frac. MOD BP 53.21     Eject.Frac. MOD 4C 57.01     Eject.Frac. MOD 2C 47.99     Left Ventrical Ejection Fraction 60           Assessment and Plan      * Acute exacerbation of chronic obstructive pulmonary disease (COPD) ([facility])- (present on admission)  Assessment & Plan  Cont NEBs, O2.  I believe CFTX is enough, so discontinue doxycycline.    Cystitis- (present on admission)  Assessment & Plan  Urine cx GNR.  Past culture E coli.  On CFTX.  Tailor per final cx ID/SENS.    Order renal US to assess any anatomical pathology/obstruction given recurrent UTIs with ?same organism.      Hypothyroidism- (present on admission)  Assessment & Plan  Continue levothyroxine 125 mcg p.o. daily  TSH ordered and pending secondary to sinus tachycardia/multifocal atrial tachycardia    Chronic respiratory failure with hypoxia ([facility])- (present on admission)  Assessment & Plan  Continue supplemental O2 to maintain SPO2 greater than 88%    DM2 (diabetes mellitus, type 2) ([facility])- (present on admission)  Assessment & Plan  A1c of 5.8 as of March 2021  Minimal requirement on ISS.      Anemia- (present on admission)  Assessment & Plan  Stable. Monitor.    Sepsis ([facility])- (present on admission)  Assessment & Plan  This is Sepsis Present on admission  SIRS criteria identified on my evaluation include: Fever, with temperature greater than 101 deg F, Tachycardia, with heart rate greater than 90 BPM, Tachypnea, with respirations greater than 20 per minute and Leukocytosis, with WBC greater than 12,000  Source is likely urinary  Sepsis protocol initiated  IV antibiotics as appropriate for source of sepsis  While organ dysfunction may be noted elsewhere in this problem list or in the chart, degree of organ dysfunction does not meet CMS criteria for severe sepsis    Improved.        Principal Problem:    Acute exacerbation of chronic obstructive pulmonary disease (COPD) ([facility]) POA:  Yes  Active Problems:    Cystitis POA: Yes    Sepsis ([facility]) POA: Yes    Anemia POA: Yes    DM2 (diabetes mellitus, type 2) ([facility]) POA: Yes    Chronic respiratory failure with hypoxia ([facility]) (Chronic) POA: Yes    Hypothyroidism POA: Yes  Resolved Problems:    * No resolved hospital problems. *        DVT prophylaxis: Lovenox    CODE STATUS:  CPR OK.  No Intubation.    Disposition: Anticipate home in 2 days.

## 2021-07-02 NOTE — DIETARY
NUTRITION SERVICES: BMI - Pt with BMI >40 (=Body mass index is 42.91 kg/m².), Class III obesity. Noted with 2+ edema (RLE,LLE) which may be affecting bed scale weight. Weight loss counseling not appropriate in acute care setting. RECOMMEND - If appropriate at DC please refer to outpatient nutrition services for weight management.

## 2021-07-02 NOTE — PROGRESS NOTES
Report received from ORQUIDEA Harvey.  Patient sitting up in bed relaxing.  POC gone over with the patient and all questions answered.  Pt is hard of hearing.  Family will bring her hearing aids today.  Patient A & O  X 4.  No apparent signs of distress.  Safety precautions in place.  Patient educated to call for assistance.  Will continue to monitor.

## 2021-07-02 NOTE — PROGRESS NOTES
Community Health Worker Intake    • Social determinates of health intake completed  • Identified barriers to: none  • Contact information provided to Camille Rodriguez  • Accepted Meds-To-Beds.  • Inpatient assessment completed.    CHW Emil spoke with pt at bedside to introduce [facility] & [facility] services. Pt mentioned having a PCP with [facility] and accepted that CHW notify of admission so that f/u appt is scheduled. Pt did not express transportation or financial barriers. She is still living at home with her daughter Alexandra, who is a great support to her. No additional resources/services needed at this time.    Plan: warm hand-off to [facility] 7/2.

## 2021-07-02 NOTE — CARE PLAN
Problem: Knowledge Deficit - Standard  Goal: Patient and family/care givers will demonstrate understanding of plan of care, disease process/condition, diagnostic tests and medications  Outcome: Progressing     Problem: Knowledge Deficit - COPD  Goal: Patient/significant other demonstrates understanding of disease process, utilization of the Action Plan, medications and discharge instruction  Outcome: Progressing     Problem: Risk for Infection - COPD  Goal: Patient will remain free from signs and symptoms of infection  Outcome: Progressing     Problem: Self Care  Goal: Patient will have the ability to perform ADLs independently or with assistance (bathe, groom, dress, toilet and feed)  Outcome: Progressing   The patient is Stable - Low risk of patient condition declining or worsening    Shift Goals  Clinical Goals: treat emperic UTI  Patient Goals: breathe easily    Progress made toward(s) clinical / shift goals:  Patient is showing improved breathing and oxygenation since admission.     Patient is not progressing towards the following goals:

## 2021-07-02 NOTE — CARE PLAN
Problem: Knowledge Deficit - Standard  Goal: Patient and family/care givers will demonstrate understanding of plan of care, disease process/condition, diagnostic tests and medications  Outcome: Progressing     Problem: Knowledge Deficit - COPD  Goal: Patient/significant other demonstrates understanding of disease process, utilization of the Action Plan, medications and discharge instruction  Outcome: Progressing     Problem: Risk for Infection - COPD  Goal: Patient will remain free from signs and symptoms of infection  Outcome: Progressing     Problem: Nutrition - Advanced  Goal: Patient will display progressive weight gain toward goal have adequate food and fluid intake  Outcome: Progressing     Problem: Ineffective Airway Clearance  Goal: Patient will maintain patent airway with clear/clearing breath sounds  Outcome: Progressing     Problem: Impaired Gas Exchange  Goal: Patient will demonstrate improved ventilation and adequate oxygenation and participate in treatment regimen within the level of ability/situation.  Outcome: Progressing     Problem: Risk for Aspiration  Goal: Patient's risk for aspiration will be absent or decrease  Outcome: Progressing     Problem: Self Care  Goal: Patient will have the ability to perform ADLs independently or with assistance (bathe, groom, dress, toilet and feed)  Outcome: Progressing     Problem: Hemodynamics  Goal: Patient's hemodynamics, fluid balance and neurologic status will be stable or improve  Outcome: Progressing     Problem: Fluid Volume  Goal: Fluid volume balance will be maintained  Outcome: Progressing     Problem: Urinary - Renal Perfusion  Goal: Ability to achieve and maintain adequate renal perfusion and functioning will improve  Outcome: Progressing     Problem: Respiratory  Goal: Patient will achieve/maintain optimum respiratory ventilation and gas exchange  Outcome: Progressing     Problem: Physical Regulation  Goal: Diagnostic test results will  improve  Outcome: Progressing  Goal: Signs and symptoms of infection will decrease  Outcome: Progressing     Problem: Skin Integrity  Goal: Skin integrity is maintained or improved  Outcome: Progressing   The patient is Stable - Low risk of patient condition declining or worsening    Shift Goals  Clinical Goals: treat emperic UTI  Patient Goals: breathe easily    Progress made toward(s) clinical / shift goals: Pt has no more pain.    Patient is not progressing towards the following goals: N/A

## 2021-07-02 NOTE — DISCHARGE PLANNING
Anticipated Discharge Disposition:   Home with home oxygen    Action:   Discussed discharge planning needs during rounds. Per MD, pt admitted with COPD exacerbation and UTI, not medically cleared at this time.    Barriers to Discharge:   Medical clearance    Plan:   Hospital Care Management will continue to follow and assist with discharge planning needs.

## 2021-07-02 NOTE — PROGRESS NOTES
Hospital Medicine Daily Progress Note    Date of Service  7/1/2021    Chief Complaint  Camille Rodriguez is a 80 y.o. female admitted 7/1/2021 with fever and shortness of breath    Hospital Course  No notes on file    Interval Problem Update  Patient endorses improved shortness of breath with nebulization treatment, patient back to his baseline oxygen of 6 L via nasal cannula after recovering BiPAP briefly in the ER, patient also reports dysuria but denies any flank pain  Labs reviewed with WBC of 21 with left shift, urine analysis with positive nitrate and leukocyte esterase with bacteria.  Lactate 1.7  Patient started on empiric Rocephin with doxycycline.    I have personally seen and examined the patient at bedside. I discussed the plan of care with patient and family.    Consultants/Specialty  none    Code Status  Partial Code    Disposition  Patient is not medically cleared.   Anticipate discharge to to home with close outpatient follow-up.  I have placed the appropriate orders for post-discharge needs.    Review of Systems  ROS     Physical Exam  Temp:  [36.2 °C (97.1 °F)-37.9 °C (100.2 °F)] 36.2 °C (97.1 °F)  Pulse:  [] 93  Resp:  [18-31] 18  BP: (104-175)/(48-85) 137/48  SpO2:  [88 %-100 %] 95 %    Physical Exam    Fluids    Intake/Output Summary (Last 24 hours) at 7/1/2021 1825  Last data filed at 7/1/2021 1738  Gross per 24 hour   Intake 720 ml   Output --   Net 720 ml       Laboratory  Recent Labs     07/01/21  0400   WBC 21.1*   RBC 4.44   HEMOGLOBIN 11.0*   HEMATOCRIT 37.2   MCV 83.8   MCH 24.8*   MCHC 29.6*   RDW 56.1*   PLATELETCT 234   MPV 9.0     Recent Labs     07/01/21  0400   SODIUM 137   POTASSIUM 4.6   CHLORIDE 101   CO2 25   GLUCOSE 171*   BUN 20   CREATININE 0.87   CALCIUM 9.6                   Imaging  DX-CHEST-PORTABLE (1 VIEW)   Final Result      1.  Mild cardiomegaly and bilateral interstitial prominence, which may be due to mild edema.   2.  Underlying COPD.            Assessment/Plan  * Acute exacerbation of chronic obstructive pulmonary disease (COPD) ([facility])- (present on admission)  Assessment & Plan  Continue supplemental O2 to maintain SPO2 greater than 88%  Likely exacerbated from urinary tract infection  Follow-up outpatient pulmonology/PCP upon discharge  Consider doxycycline/azithromycin for anti-inflammatory effect however pending urinalysis, patient may be septic likely precipitating her COPD exacerbation.  We will also hold off on steroids at this time until urinalysis results as this is her likely source of sepsis.    Dysuria- (present on admission)  Assessment & Plan  Urinalysis/urine culture ordered and pending  CBC reveals  leukocytosis and IV antibiotics initiated in ED    Hypothyroidism- (present on admission)  Assessment & Plan  Continue levothyroxine 125 mcg p.o. daily  TSH ordered and pending secondary to sinus tachycardia/multifocal atrial tachycardia    Chronic respiratory failure with hypoxia ([facility])- (present on admission)  Assessment & Plan  Continue supplemental O2 to maintain SPO2 greater than 88%    DM2 (diabetes mellitus, type 2) ([facility])- (present on admission)  Assessment & Plan  Continue aspirin 81 mg p.o. daily  Continue low SSI  POC glucose QA [facility]  Last hemoglobin A1c of 5.8 as of March 2021  Recommend follow-up outpatient PCP and endocrinologist upon discharge    Anemia- (present on admission)  Assessment & Plan  Normocytic anemia, mild  CBC in a.m.  Folate unable to be ordered however B12, iron, TIBC, ferritin ordered and currently pending    Sepsis ([facility])- (present on admission)  Assessment & Plan  This is Sepsis Present on admission  SIRS criteria identified on my evaluation include: Fever, with temperature greater than 101 deg F, Tachycardia, with heart rate greater than 90 BPM, Tachypnea, with respirations greater than 20 per minute and Leukocytosis, with WBC greater than 12,000  Source is likely urinary  Sepsis protocol initiated  IV  antibiotics as appropriate for source of sepsis  While organ dysfunction may be noted elsewhere in this problem list or in the chart, degree of organ dysfunction does not meet CMS criteria for severe sepsis    Rocephin administered in ED.  Procalcitonin ordered and pending.  Urinalysis and urine culture ordered and pending.           VTE prophylaxis: heparin ppx    I have performed a physical exam and reviewed and updated ROS and Plan today (7/1/2021). In review of yesterday's note (6/30/2021), there are no changes except as documented above.

## 2021-07-03 NOTE — FACE TO FACE
"Face to Face Note  -  Durable Medical Equipment    Annmarie Tejada M.D. - NPI: [id number]  I certify that this patient is under my care and that they had a durable medical equipment(DME)face to face encounter by myself that meets the physician DME face-to-face encounter requirements with this patient on:    Date of encounter:   Patient:                    MRN:                       YOB: 2020  Camille Rodriguez  [id number]  1940     The encounter with the patient was in whole, or in part, for the following medical condition, which is the primary reason for durable medical equipment:  COPD    I certify that, based on my findings, the following durable medical equipment is medically necessary:  Nebulizer.    HOME O2 Saturation Measurements:(Values must be present for Home Oxygen orders)         ,     ,         My Clinical findings support the need for the above equipment due to:  Hypoxia    Supporting Symptoms: The patient requires supplemental oxygen, as the following interventions have been tried with limited or no improvement: \"Bronchodilators and/or steroid inhalers, \"Oral and/or IV steroids, \"Ambulation with oximetry and \"Incentive spirometry    If patient feels more short of breath, they can go up to 6 liters per minute and contact healthcare provider.  "

## 2021-07-03 NOTE — PROGRESS NOTES
HOSPITAL MEDICINE PROGRESS NOTE    Date of service  7/3/2021    Chief Complaint  Shortness of Breath      Hospital Course:     79 yo woman with h/o chronic hypoxic respiratory failure, COPD, p/w with SOB due to a COPD exacerbation with concurrent UTI.          Interval History Updates:  No event overnight.  Afebrile.    She feels slightly better.  WBC 14K.    Consultants/Specialty  None    Review of Systems   Review of Systems   Constitutional: Negative for chills and fever.   HENT: Negative for ear pain and sore throat.    Eyes: Negative for blurred vision.   Respiratory: Negative for shortness of breath.    Cardiovascular: Negative for chest pain, palpitations and leg swelling.   Gastrointestinal: Negative for abdominal pain, blood in stool, constipation, diarrhea, heartburn, melena, nausea and vomiting.   Genitourinary: Negative for dysuria, hematuria and urgency.   Musculoskeletal: Negative for myalgias.   Skin: Negative for rash.   Neurological: Negative for dizziness, focal weakness, weakness and headaches.   Endo/Heme/Allergies: Negative.    Psychiatric/Behavioral: Negative for depression.   All other systems reviewed and are negative.       Medications:  • Respiratory Therapy Consult   Continuous RT   • enoxaparin  40 mg BID   • ipratropium-albuterol  3 mL 4X/DAY (RT)   • albuterol  2 Puff Q4HRS PRN   • aspirin  81 mg DAILY   • levothyroxine  125 mcg Q EVENING   • simvastatin  10 mg Q EVENING   • senna-docusate  2 tablet BID    And   • polyethylene glycol/lytes  1 Packet QDAY PRN    And   • magnesium hydroxide  30 mL QDAY PRN    And   • bisacodyl  10 mg QDAY PRN   • acetaminophen  650 mg Q6HRS PRN   • cefTRIAXone (ROCEPHIN) IV  2 g Q24HRS   • enalaprilat  1.25 mg Q6HRS PRN   • labetalol  10 mg Q4HRS PRN   • guaiFENesin dextromethorphan  10 mL Q6HRS PRN   • insulin regular  1-6 Units 4X/DAY ACHS    And   • glucose  16 g Q15 MIN PRN    And   • dextrose 50%  50 mL Q15 MIN PRN   • umeclidinium-vilanterol  1 Puff  QDAILY (RT)    And   • fluticasone  2 Puff QDAILY (RT)   • metoprolol tartrate  25 mg BID   • lactobacillus rhamnosus  1 capsule QDAY with Breakfast   • ipratropium-albuterol  3 mL Q4H PRN (RT)       enoxaparin, 40 mg, Subcutaneous, BID  ipratropium-albuterol, 3 mL, Nebulization, 4X/DAY (RT)  aspirin, 81 mg, Oral, DAILY  levothyroxine, 125 mcg, Oral, Q EVENING  simvastatin, 10 mg, Oral, Q EVENING  senna-docusate, 2 tablet, Oral, BID  cefTRIAXone (ROCEPHIN) IV, 2 g, Intravenous, Q24HRS  insulin regular, 1-6 Units, Subcutaneous, 4X/DAY ACHS  umeclidinium-vilanterol, 1 Puff, Inhalation, QDAILY (RT)   And  fluticasone, 2 Puff, Inhalation, QDAILY (RT)  metoprolol tartrate, 25 mg, Oral, BID  lactobacillus rhamnosus, 1 capsule, Oral, QDAY with Breakfast        Physical Exam   Vitals:    07/02/21 2031 07/03/21 0411 07/03/21 0735 07/03/21 0900   BP: 130/57 106/62  117/46   Pulse: 82 90 95 85   Resp: 19 19  18   Temp: 36.4 °C (97.5 °F) 36.6 °C (97.8 °F)  36.1 °C (96.9 °F)   TempSrc: Temporal Temporal  Temporal   SpO2: 93% 95% 94% 92%   Weight:       Height:           Physical Exam  Vitals reviewed.   Constitutional:       General: She is not in acute distress.     Appearance: She is not diaphoretic.   Eyes:      General: No scleral icterus.  Cardiovascular:      Rate and Rhythm: Normal rate and regular rhythm.      Heart sounds: No gallop.    Pulmonary:      Effort: Pulmonary effort is normal.      Breath sounds: Normal breath sounds.   Abdominal:      General: Bowel sounds are normal.      Palpations: Abdomen is soft.   Neurological:      Mental Status: She is alert.            Laboratory   Recent Labs     07/01/21  0400 07/02/21  0335 07/03/21  0144   WBC 21.1* 18.1* 13.9*   RBC 4.44 3.80* 3.98*   HEMOGLOBIN 11.0* 9.6* 9.9*   HEMATOCRIT 37.2 31.9* 33.5*   PLATELETCT 234 229 225     Recent Labs     07/01/21  0400 07/02/21  0335 07/03/21  0144   SODIUM 137 139 138   POTASSIUM 4.6 5.0 4.7   CHLORIDE 101 102 101   CO2 25 28 27    GLUCOSE 171* 157* 138*   BUN 20 21 24*   CREATININE 0.87 0.86 1.01      Recent Labs     07/01/21  0400 07/02/21  0335 07/03/21  0144   ALTSGPT 11  --   --    ASTSGOT 22  --   --    ALKPHOSPHAT 74  --   --    TBILIRUBIN 0.3  --   --    GLUCOSE 171* 157* 138*                Microbiology  Results     Procedure Component Value Units Date/Time    URINE CULTURE(NEW) [[id number]]  (Abnormal)  (Susceptibility) Collected: 07/01/21 0515    Order Status: Completed Specimen: Urine, Straight Cath Updated: 07/03/21 0851     Significant Indicator POS     Source UR     Site URINE, STRAIGHT CATH     Culture Result Usual skin ez 10-50,000 cfu/mL      Escherichia coli  ,000 cfu/mL      Narrative:      Indication for culture:->Evaluation for sepsis without a  clear source of infection  Have you been in close contact with a person who is suspected  or known to be positive for COVID-19 within the last 30 days  (e.g. last seen that person < 30 days ago)->Unknown  Indication for culture:->Evaluation for sepsis without a    Susceptibility     Escherichia coli (1)     Antibiotic Interpretation Microscan Method Status    Ampicillin Sensitive <=8 mcg/mL DEJAN Final    Ceftriaxone Sensitive <=1 mcg/mL DEJAN Final    Cefazolin Sensitive <=2 mcg/mL DEJAN Final    Ciprofloxacin Sensitive <=0.25 mcg/mL DEJAN Final    Cefepime Sensitive <=2 mcg/mL DEJAN Final    Cefuroxime Sensitive <=4 mcg/mL DEJAN Final    Ampicillin/sulbactam Sensitive <=4/2 mcg/mL DEJAN Final    Tobramycin Sensitive <=2 mcg/mL DEJAN Final    Nitrofurantoin Sensitive <=32 mcg/mL DEJAN Final    Gentamicin Sensitive <=2 mcg/mL DEJAN Final    Levofloxacin Sensitive <=0.5 mcg/mL DEJAN Final    Pip/Tazobactam Sensitive <=8 mcg/mL DEJAN Final    Trimeth/Sulfa Sensitive <=0.5/9.5 mcg/mL DEJAN Final    Tigecycline Sensitive <=2 mcg/mL DEJAN Final                   BLOOD CULTURE [[id number]] Collected: 07/01/21 0405    Order Status: Completed Specimen: Blood from Peripheral Updated: 07/02/21 0720      "Significant Indicator NEG     Source BLD     Site PERIPHERAL     Culture Result No Growth  Note: Blood cultures are incubated for 5 days and  are monitored continuously.Positive blood cultures  are called to the RN and reported as soon as  they are identified.      Narrative:      2 of 2 blood culture x2  Sites order. Per Hospital Policy:  Only change Specimen Src: to \"Line\" if specified by physician  order.  No site indicated    BLOOD CULTURE [[id number]] Collected: 07/01/21 0400    Order Status: Completed Specimen: Blood from Peripheral Updated: 07/02/21 0720     Significant Indicator NEG     Source BLD     Site PERIPHERAL     Culture Result No Growth  Note: Blood cultures are incubated for 5 days and  are monitored continuously.Positive blood cultures  are called to the RN and reported as soon as  they are identified.      Narrative:      1 of 2 for Blood Culture x 2 sites order. Per Hospital  Policy: Only change Specimen Src: to \"Line\" if specified by  physician order.  No site indicated    COV-2, FLU A/B, AND RSV BY PCR (2-4 HOURS CEPHEID): Collect NP swab in VTM [[id number]] Collected: 07/01/21 0406    Order Status: Completed Specimen: Respirate from Nasopharyngeal Updated: 07/01/21 0516     Influenza virus A RNA Negative     Influenza virus B, PCR Negative     RSV, PCR Negative     SARS-CoV-2 by PCR NotDetected     Comment: PATIENTS: Important information regarding your results and instructions can  be found at [email or web address]   \"After your  Covid-19 Test\"    [facility] providers: PLEASE REFER TO DE-ESCALATION AND RETESTING PROTOCOL  on [email or web address]    **The [employer] GeneXpert Xpress SARS-CoV-2 RT-PCR Test has been made  available for use under the Emergency Use Authorization (EUA) only.          SARS-CoV-2 Source NP Swab    Narrative:      Indication for culture:->Evaluation for sepsis without a  clear source of infection  Have you been in close contact with a person who is " suspected  or known to be positive for COVID-19 within the last 30 days  (e.g. last seen that person < 30 days ago)->Unknown    URINALYSIS [[id number]]     Order Status: Canceled Specimen: Urine, Cath              Labs reviewed by me and noted above.    Radiology  US-RENAL  Narrative: 7/2/2021 12:46 PM    HISTORY/REASON FOR EXAM:  frequent UTIs    TECHNIQUE/EXAM DESCRIPTION:  Renal ultrasound.    COMPARISON:  CT abdomen and pelvis 6/3/2017    FINDINGS:  The right kidney measures 8.6 cm.  The left kidney measures 10.4 cm.  Lower pole of both kidneys partially obscured.    There is no hydronephrosis.  There are no abnormal calcifications.    The bladder demonstrates no focal wall abnormality.  Impression: 1.  Limited exam due to body habitus.  2.  Kidneys are grossly unremarkable.  3.  No hydronephrosis.      Results for orders placed or performed during the hospital encounter of 06/02/17   ECHOCARDIOGRAM COMP W/O CONT   Result Value Ref Range    Eject.Frac. MOD BP 53.21     Eject.Frac. MOD 4C 57.01     Eject.Frac. MOD 2C 47.99     Left Ventrical Ejection Fraction 60           Assessment and Plan      * Acute exacerbation of chronic obstructive pulmonary disease (COPD) (HCC)- (present on admission)  Assessment & Plan  Cont NEBs, O2.  I believe CFTX is enough, so discontinued doxycycline.  DME nebulizer ordered to replace broken one at home.    Cystitis- (present on admission)  Assessment & Plan  Urine cx E coli.  Past culture E coli.  Cont CFTX.  Leukocytosis resolving.  Negative renal US.  If she continues to improve tomorrow, plan DC home with PO abx.      Hypothyroidism- (present on admission)  Assessment & Plan  Continue levothyroxine 125 mcg p.o. daily  TSH ordered and pending secondary to sinus tachycardia/multifocal atrial tachycardia    Chronic respiratory failure with hypoxia (HCC)- (present on admission)  Assessment & Plan  Continue supplemental O2 to maintain SPO2 greater than 88%    DM2 (diabetes mellitus,  type 2) (HCC)- (present on admission)  Assessment & Plan  A1c of 5.8 as of March 2021  Minimal requirement on ISS.      Anemia- (present on admission)  Assessment & Plan  Stable. Monitor.    Sepsis (HCC)- (present on admission)  Assessment & Plan  This is Sepsis Present on admission  SIRS criteria identified on my evaluation include: Fever, with temperature greater than 101 deg F, Tachycardia, with heart rate greater than 90 BPM, Tachypnea, with respirations greater than 20 per minute and Leukocytosis, with WBC greater than 12,000  Source is likely urinary  Sepsis protocol initiated  IV antibiotics as appropriate for source of sepsis  While organ dysfunction may be noted elsewhere in this problem list or in the chart, degree of organ dysfunction does not meet CMS criteria for severe sepsis    Resolved.        Principal Problem:    Acute exacerbation of chronic obstructive pulmonary disease (COPD) ([facility]) POA: Yes  Active Problems:    Cystitis POA: Yes    Sepsis ([facility]) POA: Yes    Anemia POA: Yes    DM2 (diabetes mellitus, type 2) ([facility]) POA: Yes    Chronic respiratory failure with hypoxia ([facility]) (Chronic) POA: Yes    Hypothyroidism POA: Yes  Resolved Problems:    * No resolved hospital problems. *        DVT prophylaxis: Lovenox    CODE STATUS:  CPR OK.  No Intubation.    Disposition: Anticipate home in 1 days.

## 2021-07-03 NOTE — CARE PLAN
Problem: Bronchoconstriction  Goal: Improve in air movement and diminished wheezing  Description: 1.  Implement inhaled treatments  2.  Evaluate and manage medication effects  Flowsheets (Taken 7/2/2021 1500)  Bronchodilator Goals/Outcome: Patient at Stable Baseline  Bronchodilator Indications: History / Diagnosis  Note:     Respiratory Update    Treatment modality: svn  Frequency: qid    Pt tolerating current treatments well with no adverse reactions.

## 2021-07-03 NOTE — DISCHARGE PLANNING
Agency/Facility Name: [facility]  Spoke To: Yoel  Outcome: Nebulizer approved. To be delivered to patients home.

## 2021-07-03 NOTE — DISCHARGE PLANNING
Received Choice form at [phone]  Agency/Facility Name: [facility]  Referral sent per Choice form @ [phone]

## 2021-07-03 NOTE — RESPIRATORY CARE
COPD EDUCATION by COPD CLINICAL EDUCATOR  7/3/2021  at  9:08 AM by Dary Bailey, RRT     Patient interviewed by COPD education team.  Patient unable to participate in full program.  Provided and reviewed our comprehensive packet about COPD, treatments and safe Oxygen use together at bedside. She see's [facility]. She lives with her daughter. We discussed her treatment plan and completed a COPD Action Plan. We reviewed her symptoms that lead to this admission. She states she has an Oxygen Concentrator that goes to 10 lpm. She feels as though since having Covid her overall health has just not returned to her prior baseline. She also states her nebulizer is not working right. Requested her hospital team to replace and was told by Dr Tejada he will send request.    COPD Screen  COPD Risk Screening  Do you have a history of COPD?: Yes  Do you have a Pulmonologist?: Yes    COPD Assessment  COPD Clinical Specialists ONLY  COPD Education Initiated: Yes--Short Intervention  DME Company: [facility]  DME Equipment Type: Oxygen  Physician Name: [facility] [place]  Pulmonologist Name: Called [id number] for appt -she's Established with [facility]  Referrals Initiated: Yes  Pulmonary Rehab: Yes  Smoking Cessation: N/A (quit 14 years ago)  Palliative Care:  (Requested Consult to Dr Tejada he graciously REFUSED)  Hospice: N/A  Home Health Care: Yes (was on service with [facility] prior requested continuing)  [facility]: Yes sent  [facility]: Yes (on service)  [facility]: N/A  Private In-Home Care Agency: N/A  Is this a COPD exacerbation patient?: Yes  $ Demo/Eval of SVN's, MDI's and Aerosols: Yes  (OP) Pulmonary Function Testing: Yes (Last PFT in ENR 7/2019 FEV1 69 FEV1/FVC Ratio 70 rshe has another requested with Pulmonary)    Meds to Beds  Would the patient like to opt in for Bedside Medication Delivery at Discharge?: Yes, interested     MY COPD ACTION PLAN     It is recommended that patients  "and physicians /healthcare providers complete this action plan together. This plan should be discussed at each physician visit and updated as needed.    The green, yellow and red zones show groups of symptoms of COPD. This list of symptoms is not comprehensive, and you may experience other symptoms. In the \"Actions\" column, your healthcare provider has recommended actions for you to take based on your symptoms.    Patient Name: Camille Rodriguez   YOB: 1940   Last Updated on:     Green Zone:  I am doing well today Actions   •  Usual activitiy and exercise level •  Take daily medications   •  Usual amounts of cough and phlegm/mucus •  Use oxygen as prescribed   •  Sleep well at night •  Continue regular exercise/diet plan   •  Appetite is good •  At all times avoid cigarette smoke, inhaled irritants     Daily Medications (these medications are taken every day):   Fluticasone and Umeclidinium and Vilanterol (Trelogy) 1 Puff Once daily     Additional Information:  Remember to Rinse Mouth After Using    Yellow Zone:  I am having a bad day or a COPD flare Actions   •  More breathless than usual •  Continue daily medications   •  I have less energy for my daily activities •  Use quick relief inhaler as ordered   •  Increased or thicker phlegm/mucus •  Use oxygen as prescribed   •  Using quick relief inhaler/nebulizer more often •  Get plenty of rest   •  Swelling of ankles more than usual •  Use pursed lip breathing   •  More coughing than usual •  At all times avoid cigarette smoke, inhaled irritants   •  I feel like I have a \"chest cold\"   •  Poor sleep and my symptoms woke me up   •  My appetite is not good   •  My medicine is not helping    •  Call provider immediately if symptoms don’t improve     Continue daily medications, add rescue medications:   Albuterol/Ipratropium (Combivent, Duoneb) 3mL via nebulizer         Medications to be used during a flare up, (as Discussed with Provider):       "     Additional Information:  Use as directed by your Doctor    Red Zone:  I need urgent medical care Actions   •  Severe shortness of breath even at rest •  Call 911 or seek medical care immediately   •  Not able to do any activity because of breathing    •  Fever or shaking chills    •  Feeling confused or very drowsy     •  Chest pains    •  Coughing up blood

## 2021-07-03 NOTE — CARE PLAN
Problem: Ineffective Airway Clearance  Goal: Patient will maintain patent airway with clear/clearing breath sounds  Outcome: Progressing     Problem: Skin Integrity  Goal: Skin integrity is maintained or improved  Outcome: Progressing   The patient is Stable - Low risk of patient condition declining or worsening    Shift Goals  Clinical Goals: treat emperic UTI  Patient Goals: breathe easily    Progress made toward(s) clinical / shift goals:  Patient moving air and lungs are more clear. Patient is incontinent but no longer wearing briefs, decreasing chances of prolonged moisture exposure     Patient is not progressing towards the following goals:

## 2021-07-03 NOTE — CARE PLAN
The patient is Stable - Low risk of patient condition declining or worsening    Shift Goals  Clinical Goals: recieve IV antibiotics  Patient Goals: breath easy    Progress made toward(s) clinical / shift goals:    Problem: Knowledge Deficit - Standard  Goal: Patient and family/care givers will demonstrate understanding of plan of care, disease process/condition, diagnostic tests and medications  Outcome: Progressing     Problem: Impaired Gas Exchange  Goal: Patient will demonstrate improved ventilation and adequate oxygenation and participate in treatment regimen within the level of ability/situation.  Outcome: Progressing     Problem: Self Care  Goal: Patient will have the ability to perform ADLs independently or with assistance (bathe, groom, dress, toilet and feed)  Outcome: Progressing       Patient is not progressing towards the following goals:

## 2021-07-04 PROBLEM — A41.9 SEPSIS (HCC): Status: RESOLVED | Noted: 2017-06-03 | Resolved: 2021-01-01

## 2021-07-04 PROBLEM — N30.90 CYSTITIS: Status: RESOLVED | Noted: 2021-01-01 | Resolved: 2021-01-01

## 2021-07-04 PROBLEM — J44.1 ACUTE EXACERBATION OF CHRONIC OBSTRUCTIVE PULMONARY DISEASE (COPD) (HCC): Status: RESOLVED | Noted: 2017-06-20 | Resolved: 2021-01-01

## 2021-07-04 NOTE — DISCHARGE SUMMARY
HOSPITAL MEDICINE DISCHARGE SUMMARY    DATE OF ADMISSION:  7/1/2021    DATE OF DISCHARGE:  7/4/2021    CHIEF COMPLAINT ON ADMISSION  Chief Complaint   Patient presents with   • Shortness of Breath       CODE STATUS  Partial Code    Allergies   Allergen Reactions   • Zithromax [Azithromycin] Diarrhea     Pt states severe diarrhea       DISCHARGE PROBLEM LIST  Principal Problem (Resolved):    Acute exacerbation of chronic obstructive pulmonary disease (COPD) ([facility]) POA: Yes  Active Problems:    DM2 (diabetes mellitus, type 2) ([facility]) POA: Yes    Chronic respiratory failure with hypoxia ([facility]) (Chronic) POA: Yes    Hypothyroidism POA: Yes  Resolved Problems:    Cystitis POA: Yes    Sepsis ([facility]) POA: Yes      MEDICATIONS ON DISCHARGE     Medication List      START taking these medications      Instructions   sulfamethoxazole-trimethoprim 800-160 MG tablet  Commonly known as: BACTRIM DS   Take 1 tablet by mouth every 12 hours for 7 days.  Dose: 1 tablet        CONTINUE taking these medications      Instructions   acetaminophen 500 MG Tabs  Commonly known as: TYLENOL   Take 1,000 mg by mouth every morning.  Dose: 1,000 mg     albuterol 108 (90 Base) MCG/ACT Aers inhalation aerosol   Inhale 2 Puffs by mouth every four hours as needed for Shortness of Breath.  Dose: 2 Puff     aspirin 81 MG tablet   Take 81 mg by mouth every evening.  Dose: 81 mg     AZO-CRANBERRY PO   Take 1 Tab by mouth every morning.  Dose: 1 tablet     Calcium 600 1500 (600 Ca) MG Tabs  Generic drug: Calcium Carbonate   Take 1 Tab by mouth 2 (two) times a day.  Dose: 1 tablet     fish oil 1000 MG Caps capsule   Take 1,000 mg by mouth every day.  Dose: 1,000 mg     Janumet  MG per tablet  Generic drug: sitagliptan-metformin   Doctor's comments: Insurance will pay for 100 day supply, please cancel previous orders that have 30 or 90 day supply  Take 1 Tab by mouth 2 times a day, with meals.  Dose: 1 tablet     levothyroxine 125 MCG Tabs  Commonly known  as: SYNTHROID   TAKE ONE TABLET BY MOUTH EVERY MORNING ON AN EMPTY STOMACH     metoprolol tartrate 25 MG Tabs  Commonly known as: LOPRESSOR   Take 1 tablet by mouth 2 times a day.  Dose: 25 mg     MULTIVITAMIN PO   Take 1 Tab by mouth every day.  Dose: 1 tablet     simvastatin 10 MG Tabs  Commonly known as: ZOCOR   Doctor's comments: Insurance will pay for 100 day supply  Take 1 Tab by mouth every evening.  Dose: 10 mg     Trelegy Ellipta 100-62.5-25 MCG/INH Aepb  Generic drug: Fluticasone-Umeclidin-Vilant   Inhale 1 Puff every day.  Dose: 1 Puff     TruBiotics Caps   Take 1 Capsule by mouth 2 Times a Day.  Dose: 1 Capsule      Vitamin C 1000 MG Tabs   Take 1 tablet by mouth 2 times a day.  Dose: 1 tablet            CONSULTATIONS  None    HPI & HOSPITAL COURSE  79 yo woman with h/o chronic hypoxic respiratory failure, COPD, p/w with SOB due to a COPD exacerbation with concurrent UTI.  After admission, patient was placed on ceftriaxone and doxycycline.  I believe her sepsis was more from a urinary source and based on past urine cultures, discontinue doxycycline in favor of ceftriaxone alone.  Urine culture ultimately grew E. coli, pansensitive.  She also responded well to COPD treatment with routine protocol.  Her nebulizer machine at home was broken, therefore a replacement DME was ordered.  Based on urine culture sensitivity, patient was discharged home with Bactrim double strength, which was started during her hospitalization without any ill effect.    From a COPD point of view, the patient respite status returned to baseline with baseline oxygen requirement at approximately 5 to 5.5 L/min.  She was discharged home to resume her outpatient management of her chronic COPD.  Referral to pulmonary rehab was placed at the time of discharge.    On discharge date, patient feels well, requesting to go home.  Therefore, she is discharged in good and stable condition to home with close outpatient follow-up.    The patient  met 2-midnight criteria for an inpatient stay at the time of discharge.    SPECIFIC OUTPATIENT FOLLOW-UP RECOMMENDATIONS  Follow up with PCP    FOLLOW UP  Hector Boykin P.A.-C.  [address]  [place] [id number] [phone]    Schedule an appointment as soon as possible for a visit in 1 week        DIET  Resume home diet previous to admission    ACTIVITY  Resume previous level of activities.    PROCEDURES  No surgery found  No surgery found    PERTINENT RESULTS  Recent Labs     07/02/21 0335 07/03/21  0144 07/04/21  0401   WBC 18.1* 13.9* 9.2   RBC 3.80* 3.98* 3.98*   HEMOGLOBIN 9.6* 9.9* 10.0*   HEMATOCRIT 31.9* 33.5* 32.6*   MCV 83.9 84.2 81.9   MCH 25.3* 24.9* 25.1*   MCHC 30.1* 29.6* 30.7*   RDW 55.2* 55.8* 55.6*   PLATELETCT 229 225 221   MPV 9.7 9.5 9.0    and   Recent Labs     07/02/21  0335 07/03/21  0144 07/04/21  0401   SODIUM 139 138 136   POTASSIUM 5.0 4.7 4.3   CHLORIDE 102 101 99   CO2 28 27 29   GLUCOSE 157* 138* 147*   BUN 21 24* 21   CREATININE 0.86 1.01 0.95   CALCIUM 9.1 8.6 9.0       Total time of the discharge process exceeds 38 minutes.

## 2021-07-04 NOTE — CARE PLAN
The patient is Stable - Low risk of patient condition declining or worsening    Shift Goals  Clinical Goals: decrease SOB  Patient Goals: breath easy    Progress made toward(s) clinical / shift goals:  Patient's SOB has stayed the same and has not increased.    Patient is not progressing towards the following goals: N/A      Problem: Knowledge Deficit - Standard  Goal: Patient and family/care givers will demonstrate understanding of plan of care, disease process/condition, diagnostic tests and medications  Outcome: Progressing     Problem: Knowledge Deficit - COPD  Goal: Patient/significant other demonstrates understanding of disease process, utilization of the Action Plan, medications and discharge instruction  Outcome: Progressing     Problem: Risk for Infection - COPD  Goal: Patient will remain free from signs and symptoms of infection  Outcome: Progressing     Problem: Ineffective Airway Clearance  Goal: Patient will maintain patent airway with clear/clearing breath sounds  Outcome: Progressing     Problem: Risk for Aspiration  Goal: Patient's risk for aspiration will be absent or decrease  Outcome: Progressing     Problem: Respiratory  Goal: Patient will achieve/maintain optimum respiratory ventilation and gas exchange  Outcome: Progressing     Problem: Skin Integrity  Goal: Skin integrity is maintained or improved  Outcome: Progressing     Problem: Pain - Standard  Goal: Alleviation of pain or a reduction in pain to the patient’s comfort goal  Outcome: Progressing     Problem: Fall Risk  Goal: Patient will remain free from falls  Outcome: Progressing

## 2021-07-04 NOTE — DISCHARGE PLANNING
Anticipated Discharge Disposition:   Home  DME:  Nebulizer    Action:    Pt admitted with COPD exacerbation and cystitis.  Uses home oxygen at 4LN with [facility].    RN CM spoke to patient at bedside.  Pt receiving O2 5 1/2 LNC.  She stated she lives in a two story house in [place] with her daughter and son-in-law.  Her bedroom is on the first floor and doesn't go upstairs.  Discussed nebulizer and patient agreeable to referral to [facility].  Choice form faxed to [facility].    Barriers to Discharge:    Medical clearance    Plan:    Provide transitional care coordination as needed.    Care Transition Team Assessment    Information Source  Orientation Level: Oriented X4  Information Given By: Patient  Who is responsible for making decisions for patient? : Patient         Elopement Risk  Legal Hold: No  Ambulatory or Self Mobile in Wheelchair: Yes  Disoriented: No  Psychiatric Symptoms: None  History of Wandering: No  Elopement this Admit: No  Vocalizing Wanting to Leave: No  Displays Behaviors, Body Language Wanting to Leave: No-Not at Risk for Elopement  Elopement Risk: Not at Risk for Elopement    Interdisciplinary Discharge Planning  Lives with - Patient's Self Care Capacity: Adult Children  Patient or legal guardian wants to designate a caregiver: Yes  Caregiver name: Emmanuel Cordon  Caregiver contact info: [phone]  ([facility]) Authorization for Release of Health Information has been completed: Yes  Support Systems: Family Member(s)  Housing / Facility: 1 Story House  Durable Medical Equipment: Walker, Home Oxygen, Other - Specify (scooter, wheelchair)    Discharge Preparedness  What is your plan after discharge?: Home with help  What are your discharge supports?: Child, Other (comment)  Prior Functional Level: Ambulatory, Independent with Activities of Daily Living, Independent with Medication Management, Uses Walker, Uses Wheelchair  Difficulity with ADLs: Walking  Difficulity with IADLs: None    Functional  Assesment  Prior Functional Level: Ambulatory, Independent with Activities of Daily Living, Independent with Medication Management, Uses Walker, Uses Wheelchair    Finances  Financial Barriers to Discharge: No  Prescription Coverage: Yes    Vision / Hearing Impairment  Vision Impairment : No  Hearing Impairment : Yes  Hearing Impairment: Both Ears, Hearing Device(s) Available  Does Pt Need Special Equipment for the Hearing Impaired?: Yes-But Does not Need for Facility to Arrange Equipment         Advance Directive  Advance Directive?: DPOA for Health Care  Durable Power of  Name and Contact : Alexandra [phone]    Domestic Abuse  Have you ever been the victim of abuse or violence?: No  Physical Abuse or Sexual Abuse: No  Verbal Abuse or Emotional Abuse: No  Possible Abuse/Neglect Reported to:: Not Applicable         Discharge Risks or Barriers  Discharge risks or barriers?: No    Anticipated Discharge Information  Discharge Disposition: Discharged to home/self care (01)  Discharge Address: [address] [place] [address] [place]  [address]  Discharge Contact Phone Number: [phone]

## 2021-07-04 NOTE — DISCHARGE INSTRUCTIONS
Discharge Instructions    Discharged to home by car with relative. Discharged via wheelchair, hospital escort: Yes.  Special equipment needed: Not Applicable    Be sure to schedule a follow-up appointment with your primary care doctor or any specialists as instructed.     Discharge Plan:   Diet Plan: Discussed  Activity Level: Discussed  Confirmed Follow up Appointment: Appointment Scheduled  Confirmed Symptoms Management: Discussed  Medication Reconciliation Updated: Yes    I understand that a diet low in cholesterol, fat, and sodium is recommended for good health. Unless I have been given specific instructions below for another diet, I accept this instruction as my diet prescription.   Other diet:     Special Instructions: Chronic Obstructive Pulmonary Disease (COPD) is a long-term, progressive lung disease that makes it harder to breathe. It includes chronic bronchitis, emphysema, and refractory (non-reversible) asthma. With COPD, the airways in your lungs become inflamed and thicken, and the tissue where oxygen is exchanged is destroyed. The flow of air in and out of your lungs decreases. When that happens, less oxygen gets into your body tissues, and it becomes harder to get rid of the waste gas carbon dioxide. As the disease gets worse, shortness of breath makes it harder to remain active. There is no cure for COPD, but it is preventable and treatable.    COPD Patient Discharge Instructions    • Diet  o Follow a low fat, low cholesterol, low salt diet unless instructed otherwise by your Doctor. Read the labels on the back of food products and track your intake of fat, cholesterol and salt.  • No smoking  o Discontinuing smoking will have the biggest impact on preventing progression of disease.  o To participate in [employer]’s Quit Tobacco Program, call 814-9050 or visit [email or web address]/QuitTobacco  • Oxygen  o If your doctor has order that you wear oxygen at home, it is important to wear it as ordered.  o Do  not smoke, vape, or use e-cigarettes with oxygen on.  o Store in an appropriate location: upright in its tay or laid flat down, away from open flames and stoves.   o Do not use oil-based creams and moisturizers (ie: petroleum products, oil-based lip moisturizers) or aerosol sprays (ie: hair sprays or deodorants) when using your oxygen equipment.  o Be careful with tubing placement to prevent yourself and others from tripping.  • Medications  o Refer to your personalized Action Plan to manage your symptoms.  • Warning signs of an exacerbation  o Breathing fast and shallow, worsening shortness of breath (like you just finished exercising)  o Chest tightness  o Increases in sputum production  o Changes in sputum color  o Lower oxygen levels than baseline  • When to call your doctor  o If the warning signs of an exacerbation do not improve  o Refer to your personalized Action Plan   • Pulmonary Rehab  o Your doctor has ordered you a referral to Pulmonary Rehab. Call [phone] to schedule an appointment  • Attend your follow-up appointment with your PCP and/or Pulmonologist  • Remote Monitoring: At the direction of the remote monitoring on-call provider, you may increase your oxygen by 2 liters above your baseline.     See the educational handout provided by your COPD Navigator for more information. This also explains more about COPD, symptoms of an exacerbation, and some of the tests that you have undergone. and None    · Is patient discharged on Warfarin / Coumadin?   No   Chronic Obstructive Pulmonary Disease  Chronic obstructive pulmonary disease (COPD) is a long-term (chronic) lung problem. When you have COPD, it is hard for air to get in and out of your lungs. Usually the condition gets worse over time, and your lungs will never return to normal. There are things you can do to keep yourself as healthy as possible.  · Your doctor may treat your condition with:  ? Medicines.  ? Oxygen.  ? Lung surgery.  · Your  doctor may also recommend:  ? Rehabilitation. This includes steps to make your body work better. It may involve a team of specialists.  ? Quitting smoking, if you smoke.  ? Exercise and changes to your diet.  ? Comfort measures (palliative care).  Follow these instructions at home:  Medicines  · Take over-the-counter and prescription medicines only as told by your doctor.  · Talk to your doctor before taking any cough or allergy medicines. You may need to avoid medicines that cause your lungs to be dry.  Lifestyle  · If you smoke, stop. Smoking makes the problem worse. If you need help quitting, ask your doctor.  · Avoid being around things that make your breathing worse. This may include smoke, chemicals, and fumes.  · Stay active, but remember to rest as well.  · Learn and use tips on how to relax.  · Make sure you get enough sleep. Most adults need at least 7 hours of sleep every night.  · Eat healthy foods. Eat smaller meals more often. Rest before meals.  Controlled breathing  Learn and use tips on how to control your breathing as told by your doctor. Try:  · Breathing in (inhaling) through your nose for 1 second. Then, pucker your lips and breath out (exhale) through your lips for 2 seconds.  · Putting one hand on your belly (abdomen). Breathe in slowly through your nose for 1 second. Your hand on your belly should move out. Pucker your lips and breathe out slowly through your lips. Your hand on your belly should move in as you breathe out.    Controlled coughing  Learn and use controlled coughing to clear mucus from your lungs. Follow these steps:  1. Lean your head a little forward.  2. Breathe in deeply.  3. Try to hold your breath for 3 seconds.  4. Keep your mouth slightly open while coughing 2 times.  5. Spit any mucus out into a tissue.  6. Rest and do the steps again 1 or 2 times as needed.  General instructions  · Make sure you get all the shots (vaccines) that your doctor recommends. Ask your doctor  about a flu shot and a pneumonia shot.  · Use oxygen therapy and pulmonary rehabilitation if told by your doctor. If you need home oxygen therapy, ask your doctor if you should buy a tool to measure your oxygen level (oximeter).  · Make a COPD action plan with your doctor. This helps you to know what to do if you feel worse than usual.  · Manage any other conditions you have as told by your doctor.  · Avoid going outside when it is very hot, cold, or humid.  · Avoid people who have a sickness you can catch (contagious).  · Keep all follow-up visits as told by your doctor. This is important.  Contact a doctor if:  · You cough up more mucus than usual.  · There is a change in the color or thickness of the mucus.  · It is harder to breathe than usual.  · Your breathing is faster than usual.  · You have trouble sleeping.  · You need to use your medicines more often than usual.  · You have trouble doing your normal activities such as getting dressed or walking around the house.  Get help right away if:  · You have shortness of breath while resting.  · You have shortness of breath that stops you from:  ? Being able to talk.  ? Doing normal activities.  · Your chest hurts for longer than 5 minutes.  · Your skin color is more blue than usual.  · Your pulse oximeter shows that you have low oxygen for longer than 5 minutes.  · You have a fever.  · You feel too tired to breathe normally.  Summary  · Chronic obstructive pulmonary disease (COPD) is a long-term lung problem.  · The way your lungs work will never return to normal. Usually the condition gets worse over time. There are things you can do to keep yourself as healthy as possible.  · Take over-the-counter and prescription medicines only as told by your doctor.  · If you smoke, stop. Smoking makes the problem worse.  This information is not intended to replace advice given to you by your health care provider. Make sure you discuss any questions you have with your health  care provider.  Document Released: 06/05/2009 Document Revised: 11/30/2018 Document Reviewed: 01/22/2018  [employer] Patient Education © 2020 [employer]      Depression / Suicide Risk    As you are discharged from this [facility] facility, it is important to learn how to keep safe from harming yourself.    Recognize the warning signs:  · Abrupt changes in personality, positive or negative- including increase in energy   · Giving away possessions  · Change in eating patterns- significant weight changes-  positive or negative  · Change in sleeping patterns- unable to sleep or sleeping all the time   · Unwillingness or inability to communicate  · Depression  · Unusual sadness, discouragement and loneliness  · Talk of wanting to die  · Neglect of personal appearance   · Rebelliousness- reckless behavior  · Withdrawal from people/activities they love  · Confusion- inability to concentrate     If you or a loved one observes any of these behaviors or has concerns about self-harm, here's what you can do:  · Talk about it- your feelings and reasons for harming yourself  · Remove any means that you might use to hurt yourself (examples: pills, rope, extension cords, firearm)  · Get professional help from the community (Mental Health, Substance Abuse, psychological counseling)  · Do not be alone:Call your Safe Contact- someone whom you trust who will be there for you.  · Call your local CRISIS HOTLINE 960-1980 or 282-791-2913  · Call your local Children's Mobile Crisis Response Team [place] (172) 682-8624 or www.[employer]  · Call the toll free National Suicide Prevention Hotlines   · National Suicide Prevention Lifeline 548-876-GMUE (5651)  · National Hope Line Network 800-SUICIDE (674-6194)    Dr. Tejada's discharge instructions:    If you have worsening diarrhea after finishing antibiotic, you need to notify your PCP.  Occasionally, individual develops Clostridium difficile diarrhea when taking antibiotic for  other infection.    DIET: Resume home diet previous to admission    ACTIVITY: Resume previous level of activities.    DIAGNOSIS: Cystitis, COPD exacerbation    Return to ER if fever greater than 38 degree Celsius or 100.4 degree Fahrenheit, worsening pain, chest pain at rest or associated with exertion, worsening shortness of breath, bloody coughs, bloody bowel movement, severe unrelenting abdominal pain, focal weakness.    Annmarie Tejada M.D.

## 2021-07-04 NOTE — DISCHARGE PLANNING
Meds-to-Beds: Discharge prescription orders listed below delivered to patient's bedside. RN notified. Patient counseled.       Nalini Rodriguez   Home Medication Instructions ANDIE:[id number]    Printed on:07/04/21 1242   Medication Information                      loperamide (IMODIUM) 2 MG Cap  Take 1 capsule by mouth 4 times a day as needed for Diarrhea for up to 5 days.             sulfamethoxazole-trimethoprim (BACTRIM DS) 800-160 MG tablet  Take 1 tablet by mouth every 12 hours for 7 days.                 Damaris Bradley, Pharmacy Intern

## 2021-07-04 NOTE — CARE PLAN
The patient is Stable - Low risk of patient condition declining or worsening    Shift Goals  Clinical Goals: discharge home  Patient Goals: breath easy    Progress made toward(s) clinical / shift goals:    Problem: Knowledge Deficit - Standard  Goal: Patient and family/care givers will demonstrate understanding of plan of care, disease process/condition, diagnostic tests and medications  Outcome: Progressing     Problem: Knowledge Deficit - COPD  Goal: Patient/significant other demonstrates understanding of disease process, utilization of the Action Plan, medications and discharge instruction  Outcome: Progressing     Problem: Impaired Gas Exchange  Goal: Patient will demonstrate improved ventilation and adequate oxygenation and participate in treatment regimen within the level of ability/situation.  Outcome: Progressing       Patient is not progressing towards the following goals:

## 2021-09-29 PROBLEM — R05.9 COUGH: Status: ACTIVE | Noted: 2021-01-01

## 2021-09-29 PROBLEM — J18.9 PNEUMONIA DUE TO INFECTIOUS ORGANISM: Status: ACTIVE | Noted: 2021-01-01

## 2021-09-29 PROBLEM — R05.9 COUGH: Status: RESOLVED | Noted: 2021-01-01 | Resolved: 2021-01-01

## 2021-11-17 PROBLEM — E55.9 VITAMIN D DEFICIENCY: Status: ACTIVE | Noted: 2021-01-01

## 2021-11-17 PROBLEM — J96.01 ACUTE HYPOXEMIC RESPIRATORY FAILURE DUE TO COVID-19 (HCC): Status: RESOLVED | Noted: 2020-12-03 | Resolved: 2021-01-01

## 2021-11-17 PROBLEM — U07.1 ACUTE HYPOXEMIC RESPIRATORY FAILURE DUE TO COVID-19 (HCC): Status: RESOLVED | Noted: 2020-12-03 | Resolved: 2021-01-01

## 2021-12-01 NOTE — ED NOTES
Med rec complete via interview with pt at bedside. Allergies reviewed. Pt denies antibiotic use in past 14 days.     Nasal Turnover Hinge Flap Text: The defect edges were debeveled with a #15 scalpel blade.  Given the size, depth, location of the defect and the defect being full thickness a nasal turnover hinge flap was deemed most appropriate.  Using a sterile surgical marker, an appropriate hinge flap was drawn incorporating the defect. The area thus outlined was incised with a #15 scalpel blade. The flap was designed to recreate the nasal mucosal lining and the alar rim. The skin margins were undermined to an appropriate distance in all directions utilizing iris scissors.

## 2021-12-15 PROBLEM — G47.09 OTHER INSOMNIA: Status: ACTIVE | Noted: 2021-01-01

## 2022-01-01 ENCOUNTER — HOME CARE VISIT (OUTPATIENT)
Dept: HOME HEALTH SERVICES | Facility: HOME HEALTHCARE | Age: 82
End: 2022-01-01
Payer: MEDICARE

## 2022-01-01 ENCOUNTER — OFFICE VISIT (OUTPATIENT)
Dept: CARDIOLOGY | Facility: MEDICAL CENTER | Age: 82
End: 2022-01-01
Payer: MEDICARE

## 2022-01-01 ENCOUNTER — HOME CARE VISIT (OUTPATIENT)
Dept: HOSPICE | Facility: HOSPICE | Age: 82
End: 2022-01-01
Payer: MEDICARE

## 2022-01-01 ENCOUNTER — APPOINTMENT (OUTPATIENT)
Dept: RADIOLOGY | Facility: MEDICAL CENTER | Age: 82
DRG: 871 | End: 2022-01-01
Attending: EMERGENCY MEDICINE
Payer: MEDICARE

## 2022-01-01 ENCOUNTER — APPOINTMENT (OUTPATIENT)
Dept: SLEEP MEDICINE | Facility: MEDICAL CENTER | Age: 82
End: 2022-01-01
Payer: MEDICARE

## 2022-01-01 ENCOUNTER — APPOINTMENT (OUTPATIENT)
Dept: RADIOLOGY | Facility: MEDICAL CENTER | Age: 82
DRG: 871 | End: 2022-01-01
Attending: HOSPITALIST
Payer: MEDICARE

## 2022-01-01 ENCOUNTER — APPOINTMENT (OUTPATIENT)
Dept: CARDIOLOGY | Facility: MEDICAL CENTER | Age: 82
DRG: 871 | End: 2022-01-01
Attending: STUDENT IN AN ORGANIZED HEALTH CARE EDUCATION/TRAINING PROGRAM
Payer: MEDICARE

## 2022-01-01 ENCOUNTER — PHARMACY VISIT (OUTPATIENT)
Dept: PHARMACY | Facility: MEDICAL CENTER | Age: 82
End: 2022-01-01
Payer: COMMERCIAL

## 2022-01-01 ENCOUNTER — HOME HEALTH ADMISSION (OUTPATIENT)
Dept: HOME HEALTH SERVICES | Facility: HOME HEALTHCARE | Age: 82
End: 2022-01-01
Payer: MEDICARE

## 2022-01-01 ENCOUNTER — HOSPITAL ENCOUNTER (INPATIENT)
Facility: MEDICAL CENTER | Age: 82
LOS: 3 days | DRG: 871 | End: 2022-01-29
Attending: EMERGENCY MEDICINE | Admitting: STUDENT IN AN ORGANIZED HEALTH CARE EDUCATION/TRAINING PROGRAM
Payer: MEDICARE

## 2022-01-01 ENCOUNTER — OFFICE VISIT (OUTPATIENT)
Dept: SLEEP MEDICINE | Facility: MEDICAL CENTER | Age: 82
End: 2022-01-01
Payer: MEDICARE

## 2022-01-01 ENCOUNTER — DOCUMENTATION (OUTPATIENT)
Dept: MEDICAL GROUP | Facility: PHYSICIAN GROUP | Age: 82
End: 2022-01-01

## 2022-01-01 ENCOUNTER — APPOINTMENT (OUTPATIENT)
Dept: RADIOLOGY | Facility: MEDICAL CENTER | Age: 82
End: 2022-01-01
Attending: EMERGENCY MEDICINE
Payer: MEDICARE

## 2022-01-01 ENCOUNTER — APPOINTMENT (OUTPATIENT)
Dept: RADIOLOGY | Facility: MEDICAL CENTER | Age: 82
DRG: 871 | End: 2022-01-01
Attending: STUDENT IN AN ORGANIZED HEALTH CARE EDUCATION/TRAINING PROGRAM
Payer: MEDICARE

## 2022-01-01 ENCOUNTER — HOSPITAL ENCOUNTER (EMERGENCY)
Facility: MEDICAL CENTER | Age: 82
End: 2022-02-08
Attending: EMERGENCY MEDICINE
Payer: MEDICARE

## 2022-01-01 ENCOUNTER — DOCUMENTATION (OUTPATIENT)
Dept: HOME HEALTH SERVICES | Facility: HOME HEALTHCARE | Age: 82
End: 2022-01-01
Payer: MEDICARE

## 2022-01-01 ENCOUNTER — HOSPICE ADMISSION (OUTPATIENT)
Dept: HOSPICE | Facility: HOSPICE | Age: 82
End: 2022-01-01
Payer: MEDICARE

## 2022-01-01 VITALS
DIASTOLIC BLOOD PRESSURE: 58 MMHG | HEART RATE: 93 BPM | SYSTOLIC BLOOD PRESSURE: 102 MMHG | RESPIRATION RATE: 20 BRPM | WEIGHT: 230.38 LBS | BODY MASS INDEX: 39.54 KG/M2

## 2022-01-01 VITALS
DIASTOLIC BLOOD PRESSURE: 78 MMHG | SYSTOLIC BLOOD PRESSURE: 146 MMHG | TEMPERATURE: 98.5 F | OXYGEN SATURATION: 96 % | HEART RATE: 78 BPM | RESPIRATION RATE: 18 BRPM

## 2022-01-01 VITALS — DIASTOLIC BLOOD PRESSURE: 64 MMHG | RESPIRATION RATE: 20 BRPM | SYSTOLIC BLOOD PRESSURE: 124 MMHG | HEART RATE: 78 BPM

## 2022-01-01 VITALS
OXYGEN SATURATION: 88 % | HEIGHT: 64 IN | DIASTOLIC BLOOD PRESSURE: 76 MMHG | BODY MASS INDEX: 40.8 KG/M2 | RESPIRATION RATE: 16 BRPM | WEIGHT: 239 LBS | SYSTOLIC BLOOD PRESSURE: 138 MMHG | HEART RATE: 93 BPM

## 2022-01-01 VITALS — DIASTOLIC BLOOD PRESSURE: 62 MMHG | HEART RATE: 84 BPM | SYSTOLIC BLOOD PRESSURE: 110 MMHG

## 2022-01-01 VITALS
TEMPERATURE: 99.7 F | DIASTOLIC BLOOD PRESSURE: 63 MMHG | RESPIRATION RATE: 19 BRPM | HEIGHT: 64 IN | OXYGEN SATURATION: 94 % | SYSTOLIC BLOOD PRESSURE: 138 MMHG | BODY MASS INDEX: 39.09 KG/M2 | WEIGHT: 229 LBS | HEART RATE: 99 BPM

## 2022-01-01 VITALS
BODY MASS INDEX: 38.79 KG/M2 | HEART RATE: 70 BPM | SYSTOLIC BLOOD PRESSURE: 114 MMHG | DIASTOLIC BLOOD PRESSURE: 80 MMHG | TEMPERATURE: 97.8 F | OXYGEN SATURATION: 96 % | WEIGHT: 226 LBS | RESPIRATION RATE: 20 BRPM

## 2022-01-01 VITALS
SYSTOLIC BLOOD PRESSURE: 154 MMHG | HEART RATE: 70 BPM | BODY MASS INDEX: 38.64 KG/M2 | DIASTOLIC BLOOD PRESSURE: 84 MMHG | WEIGHT: 232.2 LBS | TEMPERATURE: 98.6 F | RESPIRATION RATE: 18 BRPM | OXYGEN SATURATION: 99 %

## 2022-01-01 VITALS
SYSTOLIC BLOOD PRESSURE: 118 MMHG | WEIGHT: 228.2 LBS | OXYGEN SATURATION: 90 % | DIASTOLIC BLOOD PRESSURE: 72 MMHG | TEMPERATURE: 97.8 F | HEART RATE: 88 BPM | BODY MASS INDEX: 39.17 KG/M2 | RESPIRATION RATE: 20 BRPM

## 2022-01-01 VITALS
OXYGEN SATURATION: 95 % | RESPIRATION RATE: 18 BRPM | HEART RATE: 77 BPM | SYSTOLIC BLOOD PRESSURE: 140 MMHG | DIASTOLIC BLOOD PRESSURE: 84 MMHG | TEMPERATURE: 98.6 F

## 2022-01-01 VITALS
RESPIRATION RATE: 18 BRPM | BODY MASS INDEX: 39.07 KG/M2 | SYSTOLIC BLOOD PRESSURE: 142 MMHG | WEIGHT: 234.8 LBS | HEART RATE: 75 BPM | DIASTOLIC BLOOD PRESSURE: 84 MMHG | TEMPERATURE: 97.3 F | OXYGEN SATURATION: 91 %

## 2022-01-01 VITALS
HEIGHT: 65 IN | TEMPERATURE: 98.2 F | OXYGEN SATURATION: 99 % | WEIGHT: 237.8 LBS | SYSTOLIC BLOOD PRESSURE: 98 MMHG | DIASTOLIC BLOOD PRESSURE: 62 MMHG | BODY MASS INDEX: 39.62 KG/M2 | HEART RATE: 77 BPM | RESPIRATION RATE: 16 BRPM

## 2022-01-01 VITALS
DIASTOLIC BLOOD PRESSURE: 62 MMHG | WEIGHT: 228.2 LBS | BODY MASS INDEX: 39.17 KG/M2 | OXYGEN SATURATION: 93 % | HEART RATE: 88 BPM | TEMPERATURE: 98.1 F | RESPIRATION RATE: 20 BRPM | SYSTOLIC BLOOD PRESSURE: 102 MMHG

## 2022-01-01 VITALS — HEART RATE: 80 BPM | DIASTOLIC BLOOD PRESSURE: 60 MMHG | RESPIRATION RATE: 24 BRPM | SYSTOLIC BLOOD PRESSURE: 106 MMHG

## 2022-01-01 VITALS
DIASTOLIC BLOOD PRESSURE: 80 MMHG | BODY MASS INDEX: 39.17 KG/M2 | SYSTOLIC BLOOD PRESSURE: 112 MMHG | TEMPERATURE: 97.5 F | RESPIRATION RATE: 18 BRPM | WEIGHT: 228.2 LBS | HEART RATE: 80 BPM | OXYGEN SATURATION: 92 %

## 2022-01-01 VITALS
WEIGHT: 228.2 LBS | RESPIRATION RATE: 22 BRPM | OXYGEN SATURATION: 93 % | DIASTOLIC BLOOD PRESSURE: 82 MMHG | BODY MASS INDEX: 39.17 KG/M2 | TEMPERATURE: 98.3 F | SYSTOLIC BLOOD PRESSURE: 124 MMHG | HEART RATE: 83 BPM

## 2022-01-01 VITALS
SYSTOLIC BLOOD PRESSURE: 128 MMHG | BODY MASS INDEX: 39.09 KG/M2 | HEART RATE: 76 BPM | HEIGHT: 64 IN | DIASTOLIC BLOOD PRESSURE: 68 MMHG | OXYGEN SATURATION: 91 % | RESPIRATION RATE: 20 BRPM | WEIGHT: 229 LBS

## 2022-01-01 VITALS
RESPIRATION RATE: 16 BRPM | HEART RATE: 75 BPM | OXYGEN SATURATION: 96 % | SYSTOLIC BLOOD PRESSURE: 137 MMHG | WEIGHT: 250.44 LBS | HEIGHT: 65 IN | TEMPERATURE: 97.6 F | BODY MASS INDEX: 41.73 KG/M2 | DIASTOLIC BLOOD PRESSURE: 93 MMHG

## 2022-01-01 VITALS — SYSTOLIC BLOOD PRESSURE: 108 MMHG | DIASTOLIC BLOOD PRESSURE: 68 MMHG | RESPIRATION RATE: 18 BRPM

## 2022-01-01 VITALS — RESPIRATION RATE: 20 BRPM | DIASTOLIC BLOOD PRESSURE: 68 MMHG | HEART RATE: 94 BPM | SYSTOLIC BLOOD PRESSURE: 110 MMHG

## 2022-01-01 VITALS
BODY MASS INDEX: 38.24 KG/M2 | HEART RATE: 78 BPM | OXYGEN SATURATION: 96 % | DIASTOLIC BLOOD PRESSURE: 78 MMHG | SYSTOLIC BLOOD PRESSURE: 132 MMHG | RESPIRATION RATE: 18 BRPM | WEIGHT: 229.8 LBS | TEMPERATURE: 98.5 F

## 2022-01-01 VITALS
TEMPERATURE: 97.8 F | HEART RATE: 86 BPM | DIASTOLIC BLOOD PRESSURE: 64 MMHG | OXYGEN SATURATION: 90 % | BODY MASS INDEX: 39.31 KG/M2 | WEIGHT: 229 LBS | RESPIRATION RATE: 20 BRPM | SYSTOLIC BLOOD PRESSURE: 100 MMHG

## 2022-01-01 VITALS
RESPIRATION RATE: 20 BRPM | TEMPERATURE: 98.2 F | HEART RATE: 85 BPM | OXYGEN SATURATION: 92 % | DIASTOLIC BLOOD PRESSURE: 76 MMHG | SYSTOLIC BLOOD PRESSURE: 112 MMHG

## 2022-01-01 VITALS
BODY MASS INDEX: 38.79 KG/M2 | WEIGHT: 226 LBS | DIASTOLIC BLOOD PRESSURE: 60 MMHG | HEART RATE: 78 BPM | RESPIRATION RATE: 18 BRPM | SYSTOLIC BLOOD PRESSURE: 102 MMHG

## 2022-01-01 VITALS
RESPIRATION RATE: 16 BRPM | SYSTOLIC BLOOD PRESSURE: 110 MMHG | WEIGHT: 229 LBS | BODY MASS INDEX: 39.31 KG/M2 | DIASTOLIC BLOOD PRESSURE: 68 MMHG | HEART RATE: 85 BPM | TEMPERATURE: 97.6 F | OXYGEN SATURATION: 92 %

## 2022-01-01 VITALS
RESPIRATION RATE: 20 BRPM | BODY MASS INDEX: 39.24 KG/M2 | DIASTOLIC BLOOD PRESSURE: 82 MMHG | WEIGHT: 228.6 LBS | SYSTOLIC BLOOD PRESSURE: 121 MMHG | TEMPERATURE: 97.4 F | HEART RATE: 74 BPM

## 2022-01-01 VITALS — HEART RATE: 78 BPM | RESPIRATION RATE: 20 BRPM

## 2022-01-01 DIAGNOSIS — J42 CHRONIC BRONCHITIS, UNSPECIFIED CHRONIC BRONCHITIS TYPE (HCC): Chronic | ICD-10-CM

## 2022-01-01 DIAGNOSIS — I10 PRIMARY HYPERTENSION: ICD-10-CM

## 2022-01-01 DIAGNOSIS — J44.9 CHRONIC OBSTRUCTIVE PULMONARY DISEASE, UNSPECIFIED COPD TYPE (HCC): ICD-10-CM

## 2022-01-01 DIAGNOSIS — Z86.16 HISTORY OF COVID-19: ICD-10-CM

## 2022-01-01 DIAGNOSIS — Z87.891 FORMER SMOKER: ICD-10-CM

## 2022-01-01 DIAGNOSIS — I50.23 ACUTE ON CHRONIC SYSTOLIC CONGESTIVE HEART FAILURE (HCC): ICD-10-CM

## 2022-01-01 DIAGNOSIS — E66.01 MORBID OBESITY WITH BMI OF 40.0-44.9, ADULT (HCC): ICD-10-CM

## 2022-01-01 DIAGNOSIS — N18.31 STAGE 3A CHRONIC KIDNEY DISEASE: ICD-10-CM

## 2022-01-01 DIAGNOSIS — J43.2 CENTRILOBULAR EMPHYSEMA (HCC): Chronic | ICD-10-CM

## 2022-01-01 DIAGNOSIS — R06.00 DYSPNEA, UNSPECIFIED TYPE: ICD-10-CM

## 2022-01-01 DIAGNOSIS — D72.829 LEUKOCYTOSIS, UNSPECIFIED TYPE: ICD-10-CM

## 2022-01-01 DIAGNOSIS — Z87.09 HISTORY OF COPD: ICD-10-CM

## 2022-01-01 DIAGNOSIS — I44.7 LEFT BUNDLE BRANCH BLOCK: Primary | ICD-10-CM

## 2022-01-01 DIAGNOSIS — Z23 NEED FOR VACCINATION: ICD-10-CM

## 2022-01-01 DIAGNOSIS — J96.11 CHRONIC RESPIRATORY FAILURE WITH HYPOXIA (HCC): Chronic | ICD-10-CM

## 2022-01-01 DIAGNOSIS — R79.89 ELEVATED TROPONIN: ICD-10-CM

## 2022-01-01 DIAGNOSIS — E11.00 TYPE 2 DIABETES MELLITUS WITH HYPEROSMOLARITY WITHOUT COMA, WITHOUT LONG-TERM CURRENT USE OF INSULIN (HCC): ICD-10-CM

## 2022-01-01 LAB
ALBUMIN SERPL BCP-MCNC: 3.7 G/DL (ref 3.2–4.9)
ALBUMIN SERPL BCP-MCNC: 3.7 G/DL (ref 3.2–4.9)
ALBUMIN SERPL BCP-MCNC: 4 G/DL (ref 3.2–4.9)
ALBUMIN/GLOB SERPL: 1 G/DL
ALBUMIN/GLOB SERPL: 1.1 G/DL
ALBUMIN/GLOB SERPL: 1.2 G/DL
ALP SERPL-CCNC: 52 U/L (ref 30–99)
ALP SERPL-CCNC: 57 U/L (ref 30–99)
ALP SERPL-CCNC: 68 U/L (ref 30–99)
ALT SERPL-CCNC: 11 U/L (ref 2–50)
ALT SERPL-CCNC: 13 U/L (ref 2–50)
ALT SERPL-CCNC: 14 U/L (ref 2–50)
ANION GAP SERPL CALC-SCNC: 10 MMOL/L (ref 7–16)
ANION GAP SERPL CALC-SCNC: 11 MMOL/L (ref 7–16)
ANION GAP SERPL CALC-SCNC: 13 MMOL/L (ref 7–16)
ANION GAP SERPL CALC-SCNC: 13 MMOL/L (ref 7–16)
ANION GAP SERPL CALC-SCNC: 17 MMOL/L (ref 7–16)
ANISOCYTOSIS BLD QL SMEAR: ABNORMAL
APPEARANCE UR: CLEAR
APTT PPP: 28.2 SEC (ref 24.7–36)
AST SERPL-CCNC: 19 U/L (ref 12–45)
AST SERPL-CCNC: 22 U/L (ref 12–45)
AST SERPL-CCNC: 25 U/L (ref 12–45)
BACTERIA #/AREA URNS HPF: ABNORMAL /HPF
BACTERIA BLD CULT: NORMAL
BASE EXCESS BLDA CALC-SCNC: 0 MMOL/L (ref -4–3)
BASOPHILS # BLD AUTO: 0.2 % (ref 0–1.8)
BASOPHILS # BLD AUTO: 0.3 % (ref 0–1.8)
BASOPHILS # BLD AUTO: 0.5 % (ref 0–1.8)
BASOPHILS # BLD: 0.02 K/UL (ref 0–0.12)
BASOPHILS # BLD: 0.02 K/UL (ref 0–0.12)
BASOPHILS # BLD: 0.04 K/UL (ref 0–0.12)
BASOPHILS # BLD: 0.06 K/UL (ref 0–0.12)
BASOPHILS # BLD: 0.06 K/UL (ref 0–0.12)
BILIRUB SERPL-MCNC: 0.3 MG/DL (ref 0.1–1.5)
BILIRUB SERPL-MCNC: 0.4 MG/DL (ref 0.1–1.5)
BILIRUB SERPL-MCNC: 0.6 MG/DL (ref 0.1–1.5)
BILIRUB UR QL STRIP.AUTO: NEGATIVE
BODY TEMPERATURE: ABNORMAL CENTIGRADE
BUN SERPL-MCNC: 20 MG/DL (ref 8–22)
BUN SERPL-MCNC: 22 MG/DL (ref 8–22)
BUN SERPL-MCNC: 26 MG/DL (ref 8–22)
BUN SERPL-MCNC: 28 MG/DL (ref 8–22)
BUN SERPL-MCNC: 31 MG/DL (ref 8–22)
CALCIUM SERPL-MCNC: 8.6 MG/DL (ref 8.5–10.5)
CALCIUM SERPL-MCNC: 8.6 MG/DL (ref 8.5–10.5)
CALCIUM SERPL-MCNC: 8.7 MG/DL (ref 8.5–10.5)
CALCIUM SERPL-MCNC: 9.5 MG/DL (ref 8.5–10.5)
CALCIUM SERPL-MCNC: 9.6 MG/DL (ref 8.5–10.5)
CHLORIDE SERPL-SCNC: 102 MMOL/L (ref 96–112)
CHLORIDE SERPL-SCNC: 95 MMOL/L (ref 96–112)
CHLORIDE SERPL-SCNC: 96 MMOL/L (ref 96–112)
CHLORIDE SERPL-SCNC: 96 MMOL/L (ref 96–112)
CHLORIDE SERPL-SCNC: 97 MMOL/L (ref 96–112)
CHOLEST SERPL-MCNC: 131 MG/DL (ref 100–199)
CO2 SERPL-SCNC: 21 MMOL/L (ref 20–33)
CO2 SERPL-SCNC: 25 MMOL/L (ref 20–33)
CO2 SERPL-SCNC: 26 MMOL/L (ref 20–33)
CO2 SERPL-SCNC: 30 MMOL/L (ref 20–33)
CO2 SERPL-SCNC: 31 MMOL/L (ref 20–33)
COLOR UR: YELLOW
COMMENT 1642: NORMAL
COMMENT 1642: NORMAL
CREAT SERPL-MCNC: 0.96 MG/DL (ref 0.5–1.4)
CREAT SERPL-MCNC: 1.02 MG/DL (ref 0.5–1.4)
CREAT SERPL-MCNC: 1.04 MG/DL (ref 0.5–1.4)
CREAT SERPL-MCNC: 1.14 MG/DL (ref 0.5–1.4)
CREAT SERPL-MCNC: 1.25 MG/DL (ref 0.5–1.4)
CRP SERPL HS-MCNC: 1.7 MG/DL (ref 0–0.75)
D DIMER PPP IA.FEU-MCNC: 1.97 UG/ML (FEU) (ref 0–0.5)
EKG IMPRESSION: NORMAL
EOSINOPHIL # BLD AUTO: 0 K/UL (ref 0–0.51)
EOSINOPHIL # BLD AUTO: 0.01 K/UL (ref 0–0.51)
EOSINOPHIL # BLD AUTO: 0.02 K/UL (ref 0–0.51)
EOSINOPHIL # BLD AUTO: 0.05 K/UL (ref 0–0.51)
EOSINOPHIL # BLD AUTO: 0.1 K/UL (ref 0–0.51)
EOSINOPHIL NFR BLD: 0 % (ref 0–6.9)
EOSINOPHIL NFR BLD: 0.1 % (ref 0–6.9)
EOSINOPHIL NFR BLD: 0.2 % (ref 0–6.9)
EOSINOPHIL NFR BLD: 0.2 % (ref 0–6.9)
EOSINOPHIL NFR BLD: 0.8 % (ref 0–6.9)
EPI CELLS #/AREA URNS HPF: NEGATIVE /HPF
ERYTHROCYTE [DISTWIDTH] IN BLOOD BY AUTOMATED COUNT: 56.2 FL (ref 35.9–50)
ERYTHROCYTE [DISTWIDTH] IN BLOOD BY AUTOMATED COUNT: 57.8 FL (ref 35.9–50)
ERYTHROCYTE [DISTWIDTH] IN BLOOD BY AUTOMATED COUNT: 58.9 FL (ref 35.9–50)
ERYTHROCYTE [DISTWIDTH] IN BLOOD BY AUTOMATED COUNT: 59.1 FL (ref 35.9–50)
ERYTHROCYTE [DISTWIDTH] IN BLOOD BY AUTOMATED COUNT: 59.3 FL (ref 35.9–50)
EST. AVERAGE GLUCOSE BLD GHB EST-MCNC: 146 MG/DL
FLUAV RNA SPEC QL NAA+PROBE: NEGATIVE
FLUAV RNA SPEC QL NAA+PROBE: NEGATIVE
FLUBV RNA SPEC QL NAA+PROBE: NEGATIVE
FLUBV RNA SPEC QL NAA+PROBE: NEGATIVE
GLOBULIN SER CALC-MCNC: 3.3 G/DL (ref 1.9–3.5)
GLOBULIN SER CALC-MCNC: 3.4 G/DL (ref 1.9–3.5)
GLOBULIN SER CALC-MCNC: 3.6 G/DL (ref 1.9–3.5)
GLUCOSE BLD-MCNC: 129 MG/DL (ref 65–99)
GLUCOSE BLD-MCNC: 144 MG/DL (ref 65–99)
GLUCOSE BLD-MCNC: 147 MG/DL (ref 65–99)
GLUCOSE BLD-MCNC: 163 MG/DL (ref 65–99)
GLUCOSE BLD-MCNC: 175 MG/DL (ref 65–99)
GLUCOSE BLD-MCNC: 199 MG/DL (ref 65–99)
GLUCOSE BLD-MCNC: 202 MG/DL (ref 65–99)
GLUCOSE BLD-MCNC: 225 MG/DL (ref 65–99)
GLUCOSE BLD-MCNC: 237 MG/DL (ref 65–99)
GLUCOSE BLD-MCNC: 248 MG/DL (ref 65–99)
GLUCOSE BLD-MCNC: 280 MG/DL (ref 65–99)
GLUCOSE BLD-MCNC: 347 MG/DL (ref 65–99)
GLUCOSE BLD-MCNC: 367 MG/DL (ref 65–99)
GLUCOSE SERPL-MCNC: 135 MG/DL (ref 65–99)
GLUCOSE SERPL-MCNC: 157 MG/DL (ref 65–99)
GLUCOSE SERPL-MCNC: 163 MG/DL (ref 65–99)
GLUCOSE SERPL-MCNC: 253 MG/DL (ref 65–99)
GLUCOSE SERPL-MCNC: 272 MG/DL (ref 65–99)
GLUCOSE UR STRIP.AUTO-MCNC: NEGATIVE MG/DL
HBA1C MFR BLD: 6.7 % (ref 4–5.6)
HCO3 BLDA-SCNC: 25 MMOL/L (ref 17–25)
HCT VFR BLD AUTO: 39.4 % (ref 37–47)
HCT VFR BLD AUTO: 40.9 % (ref 37–47)
HCT VFR BLD AUTO: 41 % (ref 37–47)
HCT VFR BLD AUTO: 41.8 % (ref 37–47)
HCT VFR BLD AUTO: 42.3 % (ref 37–47)
HDLC SERPL-MCNC: 85 MG/DL
HGB BLD-MCNC: 11.6 G/DL (ref 12–16)
HGB BLD-MCNC: 12.2 G/DL (ref 12–16)
HGB BLD-MCNC: 12.2 G/DL (ref 12–16)
HGB BLD-MCNC: 12.7 G/DL (ref 12–16)
HGB BLD-MCNC: 13.4 G/DL (ref 12–16)
HYALINE CASTS #/AREA URNS LPF: ABNORMAL /LPF
HYPOCHROMIA BLD QL SMEAR: ABNORMAL
IMM GRANULOCYTES # BLD AUTO: 0.04 K/UL (ref 0–0.11)
IMM GRANULOCYTES # BLD AUTO: 0.05 K/UL (ref 0–0.11)
IMM GRANULOCYTES # BLD AUTO: 0.06 K/UL (ref 0–0.11)
IMM GRANULOCYTES # BLD AUTO: 0.07 K/UL (ref 0–0.11)
IMM GRANULOCYTES # BLD AUTO: 0.17 K/UL (ref 0–0.11)
IMM GRANULOCYTES NFR BLD AUTO: 0.3 % (ref 0–0.9)
IMM GRANULOCYTES NFR BLD AUTO: 0.5 % (ref 0–0.9)
IMM GRANULOCYTES NFR BLD AUTO: 0.5 % (ref 0–0.9)
IMM GRANULOCYTES NFR BLD AUTO: 0.7 % (ref 0–0.9)
IMM GRANULOCYTES NFR BLD AUTO: 0.7 % (ref 0–0.9)
INR PPP: 1.12 (ref 0.87–1.13)
KETONES UR STRIP.AUTO-MCNC: NEGATIVE MG/DL
LACTATE BLD-SCNC: 1 MMOL/L (ref 0.5–2)
LACTATE BLD-SCNC: 1.2 MMOL/L (ref 0.5–2)
LACTATE BLD-SCNC: 1.6 MMOL/L (ref 0.5–2)
LACTATE BLD-SCNC: 1.9 MMOL/L (ref 0.5–2)
LDLC SERPL CALC-MCNC: 29 MG/DL
LEUKOCYTE ESTERASE UR QL STRIP.AUTO: ABNORMAL
LV EJECT FRACT  99904: 65
LV EJECT FRACT MOD 2C 99903: 62.13
LV EJECT FRACT MOD 4C 99902: 66.65
LV EJECT FRACT MOD BP 99901: 65.09
LYMPHOCYTES # BLD AUTO: 1.38 K/UL (ref 1–4.8)
LYMPHOCYTES # BLD AUTO: 1.55 K/UL (ref 1–4.8)
LYMPHOCYTES # BLD AUTO: 1.56 K/UL (ref 1–4.8)
LYMPHOCYTES # BLD AUTO: 1.7 K/UL (ref 1–4.8)
LYMPHOCYTES # BLD AUTO: 1.9 K/UL (ref 1–4.8)
LYMPHOCYTES NFR BLD: 12.6 % (ref 22–41)
LYMPHOCYTES NFR BLD: 12.6 % (ref 22–41)
LYMPHOCYTES NFR BLD: 13 % (ref 22–41)
LYMPHOCYTES NFR BLD: 18.7 % (ref 22–41)
LYMPHOCYTES NFR BLD: 6.3 % (ref 22–41)
MAGNESIUM SERPL-MCNC: 1.4 MG/DL (ref 1.5–2.5)
MCH RBC QN AUTO: 26.2 PG (ref 27–33)
MCH RBC QN AUTO: 26.7 PG (ref 27–33)
MCH RBC QN AUTO: 26.8 PG (ref 27–33)
MCH RBC QN AUTO: 26.9 PG (ref 27–33)
MCH RBC QN AUTO: 27 PG (ref 27–33)
MCHC RBC AUTO-ENTMCNC: 29.4 G/DL (ref 33.6–35)
MCHC RBC AUTO-ENTMCNC: 29.8 G/DL (ref 33.6–35)
MCHC RBC AUTO-ENTMCNC: 29.8 G/DL (ref 33.6–35)
MCHC RBC AUTO-ENTMCNC: 30.4 G/DL (ref 33.6–35)
MCHC RBC AUTO-ENTMCNC: 31.7 G/DL (ref 33.6–35)
MCV RBC AUTO: 85.1 FL (ref 81.4–97.8)
MCV RBC AUTO: 88.2 FL (ref 81.4–97.8)
MCV RBC AUTO: 89.1 FL (ref 81.4–97.8)
MCV RBC AUTO: 89.5 FL (ref 81.4–97.8)
MCV RBC AUTO: 90.5 FL (ref 81.4–97.8)
MICRO URNS: ABNORMAL
MICROCYTES BLD QL SMEAR: ABNORMAL
MONOCYTES # BLD AUTO: 0.51 K/UL (ref 0–0.85)
MONOCYTES # BLD AUTO: 0.79 K/UL (ref 0–0.85)
MONOCYTES # BLD AUTO: 0.89 K/UL (ref 0–0.85)
MONOCYTES # BLD AUTO: 0.89 K/UL (ref 0–0.85)
MONOCYTES # BLD AUTO: 1.63 K/UL (ref 0–0.85)
MONOCYTES NFR BLD AUTO: 4.1 % (ref 0–13.4)
MONOCYTES NFR BLD AUTO: 6.7 % (ref 0–13.4)
MONOCYTES NFR BLD AUTO: 6.8 % (ref 0–13.4)
MONOCYTES NFR BLD AUTO: 7.2 % (ref 0–13.4)
MONOCYTES NFR BLD AUTO: 8.7 % (ref 0–13.4)
MORPHOLOGY BLD-IMP: NORMAL
MORPHOLOGY BLD-IMP: NORMAL
NEUTROPHILS # BLD AUTO: 10.16 K/UL (ref 2–7.15)
NEUTROPHILS # BLD AUTO: 10.33 K/UL (ref 2–7.15)
NEUTROPHILS # BLD AUTO: 20.99 K/UL (ref 2–7.15)
NEUTROPHILS # BLD AUTO: 7.29 K/UL (ref 2–7.15)
NEUTROPHILS # BLD AUTO: 8.71 K/UL (ref 2–7.15)
NEUTROPHILS NFR BLD: 71.6 % (ref 44–72)
NEUTROPHILS NFR BLD: 79.2 % (ref 44–72)
NEUTROPHILS NFR BLD: 79.5 % (ref 44–72)
NEUTROPHILS NFR BLD: 81.7 % (ref 44–72)
NEUTROPHILS NFR BLD: 85.9 % (ref 44–72)
NITRITE UR QL STRIP.AUTO: POSITIVE
NRBC # BLD AUTO: 0 K/UL
NRBC BLD-RTO: 0 /100 WBC
NT-PROBNP SERPL IA-MCNC: 3280 PG/ML (ref 0–125)
NT-PROBNP SERPL IA-MCNC: 8799 PG/ML (ref 0–125)
PCO2 BLDA: 42.7 MMHG (ref 26–37)
PH BLDA: 7.39 [PH] (ref 7.4–7.5)
PH UR STRIP.AUTO: 5.5 [PH] (ref 5–8)
PLATELET # BLD AUTO: 185 K/UL (ref 164–446)
PLATELET # BLD AUTO: 185 K/UL (ref 164–446)
PLATELET # BLD AUTO: 191 K/UL (ref 164–446)
PLATELET # BLD AUTO: 191 K/UL (ref 164–446)
PLATELET # BLD AUTO: 220 K/UL (ref 164–446)
PLATELET BLD QL SMEAR: NORMAL
PMV BLD AUTO: 10.3 FL (ref 9–12.9)
PMV BLD AUTO: 10.5 FL (ref 9–12.9)
PMV BLD AUTO: 10.7 FL (ref 9–12.9)
PMV BLD AUTO: 9.5 FL (ref 9–12.9)
PMV BLD AUTO: 9.9 FL (ref 9–12.9)
PO2 BLDA: 82.9 MMHG (ref 64–87)
POIKILOCYTOSIS BLD QL SMEAR: NORMAL
POTASSIUM SERPL-SCNC: 4.4 MMOL/L (ref 3.6–5.5)
POTASSIUM SERPL-SCNC: 4.4 MMOL/L (ref 3.6–5.5)
POTASSIUM SERPL-SCNC: 4.9 MMOL/L (ref 3.6–5.5)
POTASSIUM SERPL-SCNC: 5.1 MMOL/L (ref 3.6–5.5)
POTASSIUM SERPL-SCNC: 5.9 MMOL/L (ref 3.6–5.5)
PROCALCITONIN SERPL-MCNC: 0.19 NG/ML
PROCALCITONIN SERPL-MCNC: 0.25 NG/ML
PROT SERPL-MCNC: 7.1 G/DL (ref 6–8.2)
PROT SERPL-MCNC: 7.3 G/DL (ref 6–8.2)
PROT SERPL-MCNC: 7.3 G/DL (ref 6–8.2)
PROT UR QL STRIP: NEGATIVE MG/DL
PROTHROMBIN TIME: 14.1 SEC (ref 12–14.6)
RBC # BLD AUTO: 4.42 M/UL (ref 4.2–5.4)
RBC # BLD AUTO: 4.53 M/UL (ref 4.2–5.4)
RBC # BLD AUTO: 4.57 M/UL (ref 4.2–5.4)
RBC # BLD AUTO: 4.74 M/UL (ref 4.2–5.4)
RBC # BLD AUTO: 4.97 M/UL (ref 4.2–5.4)
RBC # URNS HPF: ABNORMAL /HPF
RBC BLD AUTO: PRESENT
RBC UR QL AUTO: ABNORMAL
RSV RNA SPEC QL NAA+PROBE: NEGATIVE
RSV RNA SPEC QL NAA+PROBE: NEGATIVE
SAO2 % BLDA: 95 % (ref 93–99)
SARS-COV-2 RNA RESP QL NAA+PROBE: NOTDETECTED
SARS-COV-2 RNA RESP QL NAA+PROBE: NOTDETECTED
SCHISTOCYTES BLD QL SMEAR: NORMAL
SIGNIFICANT IND 70042: NORMAL
SITE SITE: NORMAL
SODIUM SERPL-SCNC: 134 MMOL/L (ref 135–145)
SODIUM SERPL-SCNC: 134 MMOL/L (ref 135–145)
SODIUM SERPL-SCNC: 136 MMOL/L (ref 135–145)
SODIUM SERPL-SCNC: 138 MMOL/L (ref 135–145)
SODIUM SERPL-SCNC: 141 MMOL/L (ref 135–145)
SOURCE SOURCE: NORMAL
SP GR UR STRIP.AUTO: 1.01
SPECIMEN SOURCE: NORMAL
SPECIMEN SOURCE: NORMAL
TRIGL SERPL-MCNC: 84 MG/DL (ref 0–149)
TROPONIN T SERPL-MCNC: 25 NG/L (ref 6–19)
TROPONIN T SERPL-MCNC: 34 NG/L (ref 6–19)
TROPONIN T SERPL-MCNC: 52 NG/L (ref 6–19)
TROPONIN T SERPL-MCNC: 61 NG/L (ref 6–19)
TSH SERPL DL<=0.005 MIU/L-ACNC: 4.11 UIU/ML (ref 0.38–5.33)
UFH PPP CHRO-ACNC: 0.41 IU/ML
UFH PPP CHRO-ACNC: 0.42 IU/ML
UFH PPP CHRO-ACNC: <0.1 IU/ML
UFH PPP CHRO-ACNC: >1.1 IU/ML
UROBILINOGEN UR STRIP.AUTO-MCNC: 0.2 MG/DL
WBC # BLD AUTO: 10.2 K/UL (ref 4.8–10.8)
WBC # BLD AUTO: 11 K/UL (ref 4.8–10.8)
WBC # BLD AUTO: 12.4 K/UL (ref 4.8–10.8)
WBC # BLD AUTO: 13 K/UL (ref 4.8–10.8)
WBC # BLD AUTO: 24.5 K/UL (ref 4.8–10.8)
WBC #/AREA URNS HPF: ABNORMAL /HPF

## 2022-01-01 PROCEDURE — 83735 ASSAY OF MAGNESIUM: CPT

## 2022-01-01 PROCEDURE — 84145 PROCALCITONIN (PCT): CPT

## 2022-01-01 PROCEDURE — 96365 THER/PROPH/DIAG IV INF INIT: CPT

## 2022-01-01 PROCEDURE — 700111 HCHG RX REV CODE 636 W/ 250 OVERRIDE (IP): Performed by: EMERGENCY MEDICINE

## 2022-01-01 PROCEDURE — G0299 HHS/HOSPICE OF RN EA 15 MIN: HCPCS

## 2022-01-01 PROCEDURE — 83605 ASSAY OF LACTIC ACID: CPT

## 2022-01-01 PROCEDURE — 700117 HCHG RX CONTRAST REV CODE 255: Performed by: STUDENT IN AN ORGANIZED HEALTH CARE EDUCATION/TRAINING PROGRAM

## 2022-01-01 PROCEDURE — S9126 HOSPICE CARE, IN THE HOME, P: HCPCS

## 2022-01-01 PROCEDURE — 36415 COLL VENOUS BLD VENIPUNCTURE: CPT

## 2022-01-01 PROCEDURE — 99223 1ST HOSP IP/OBS HIGH 75: CPT | Mod: AI | Performed by: STUDENT IN AN ORGANIZED HEALTH CARE EDUCATION/TRAINING PROGRAM

## 2022-01-01 PROCEDURE — 700102 HCHG RX REV CODE 250 W/ 637 OVERRIDE(OP): Performed by: INTERNAL MEDICINE

## 2022-01-01 PROCEDURE — 71045 X-RAY EXAM CHEST 1 VIEW: CPT

## 2022-01-01 PROCEDURE — 80048 BASIC METABOLIC PNL TOTAL CA: CPT

## 2022-01-01 PROCEDURE — 90732 PPSV23 VACC 2 YRS+ SUBQ/IM: CPT | Performed by: NURSE PRACTITIONER

## 2022-01-01 PROCEDURE — 700105 HCHG RX REV CODE 258: Performed by: EMERGENCY MEDICINE

## 2022-01-01 PROCEDURE — 85520 HEPARIN ASSAY: CPT

## 2022-01-01 PROCEDURE — C9803 HOPD COVID-19 SPEC COLLECT: HCPCS | Performed by: EMERGENCY MEDICINE

## 2022-01-01 PROCEDURE — 84484 ASSAY OF TROPONIN QUANT: CPT

## 2022-01-01 PROCEDURE — 99214 OFFICE O/P EST MOD 30 MIN: CPT | Mod: 25 | Performed by: NURSE PRACTITIONER

## 2022-01-01 PROCEDURE — 97162 PT EVAL MOD COMPLEX 30 MIN: CPT

## 2022-01-01 PROCEDURE — G0152 HHCP-SERV OF OT,EA 15 MIN: HCPCS

## 2022-01-01 PROCEDURE — 83036 HEMOGLOBIN GLYCOSYLATED A1C: CPT

## 2022-01-01 PROCEDURE — 85025 COMPLETE CBC W/AUTO DIFF WBC: CPT

## 2022-01-01 PROCEDURE — G0493 RN CARE EA 15 MIN HH/HOSPICE: HCPCS

## 2022-01-01 PROCEDURE — A9567 TECHNETIUM TC-99M AEROSOL: HCPCS

## 2022-01-01 PROCEDURE — 700111 HCHG RX REV CODE 636 W/ 250 OVERRIDE (IP): Performed by: STUDENT IN AN ORGANIZED HEALTH CARE EDUCATION/TRAINING PROGRAM

## 2022-01-01 PROCEDURE — 85730 THROMBOPLASTIN TIME PARTIAL: CPT

## 2022-01-01 PROCEDURE — 700111 HCHG RX REV CODE 636 W/ 250 OVERRIDE (IP): Performed by: INTERNAL MEDICINE

## 2022-01-01 PROCEDURE — 99214 OFFICE O/P EST MOD 30 MIN: CPT | Mod: 25 | Performed by: INTERNAL MEDICINE

## 2022-01-01 PROCEDURE — 96366 THER/PROPH/DIAG IV INF ADDON: CPT

## 2022-01-01 PROCEDURE — 700102 HCHG RX REV CODE 250 W/ 637 OVERRIDE(OP): Performed by: STUDENT IN AN ORGANIZED HEALTH CARE EDUCATION/TRAINING PROGRAM

## 2022-01-01 PROCEDURE — A9270 NON-COVERED ITEM OR SERVICE: HCPCS | Performed by: STUDENT IN AN ORGANIZED HEALTH CARE EDUCATION/TRAINING PROGRAM

## 2022-01-01 PROCEDURE — A9270 NON-COVERED ITEM OR SERVICE: HCPCS | Performed by: INTERNAL MEDICINE

## 2022-01-01 PROCEDURE — 93970 EXTREMITY STUDY: CPT

## 2022-01-01 PROCEDURE — A9270 NON-COVERED ITEM OR SERVICE: HCPCS | Performed by: EMERGENCY MEDICINE

## 2022-01-01 PROCEDURE — G0495 RN CARE TRAIN/EDU IN HH: HCPCS

## 2022-01-01 PROCEDURE — 700105 HCHG RX REV CODE 258: Performed by: STUDENT IN AN ORGANIZED HEALTH CARE EDUCATION/TRAINING PROGRAM

## 2022-01-01 PROCEDURE — 96375 TX/PRO/DX INJ NEW DRUG ADDON: CPT

## 2022-01-01 PROCEDURE — 700102 HCHG RX REV CODE 250 W/ 637 OVERRIDE(OP): Performed by: EMERGENCY MEDICINE

## 2022-01-01 PROCEDURE — 770020 HCHG ROOM/CARE - TELE (206)

## 2022-01-01 PROCEDURE — 87040 BLOOD CULTURE FOR BACTERIA: CPT

## 2022-01-01 PROCEDURE — 83880 ASSAY OF NATRIURETIC PEPTIDE: CPT

## 2022-01-01 PROCEDURE — 99232 SBSQ HOSP IP/OBS MODERATE 35: CPT | Performed by: INTERNAL MEDICINE

## 2022-01-01 PROCEDURE — 80053 COMPREHEN METABOLIC PANEL: CPT

## 2022-01-01 PROCEDURE — 93306 TTE W/DOPPLER COMPLETE: CPT

## 2022-01-01 PROCEDURE — 99285 EMERGENCY DEPT VISIT HI MDM: CPT

## 2022-01-01 PROCEDURE — G0009 ADMIN PNEUMOCOCCAL VACCINE: HCPCS | Performed by: NURSE PRACTITIONER

## 2022-01-01 PROCEDURE — G0153 HHCP-SVS OF S/L PATH,EA 15MN: HCPCS

## 2022-01-01 PROCEDURE — G0151 HHCP-SERV OF PT,EA 15 MIN: HCPCS

## 2022-01-01 PROCEDURE — 82962 GLUCOSE BLOOD TEST: CPT | Mod: 91

## 2022-01-01 PROCEDURE — 94761 N-INVAS EAR/PLS OXIMETRY MLT: CPT | Performed by: NURSE PRACTITIONER

## 2022-01-01 PROCEDURE — 96372 THER/PROPH/DIAG INJ SC/IM: CPT

## 2022-01-01 PROCEDURE — RXMED WILLOW AMBULATORY MEDICATION CHARGE: Performed by: NURSE PRACTITIONER

## 2022-01-01 PROCEDURE — 99239 HOSP IP/OBS DSCHRG MGMT >30: CPT | Performed by: INTERNAL MEDICINE

## 2022-01-01 PROCEDURE — 94664 DEMO&/EVAL PT USE INHALER: CPT

## 2022-01-01 PROCEDURE — 82962 GLUCOSE BLOOD TEST: CPT

## 2022-01-01 PROCEDURE — 84484 ASSAY OF TROPONIN QUANT: CPT | Mod: 91

## 2022-01-01 PROCEDURE — 86140 C-REACTIVE PROTEIN: CPT

## 2022-01-01 PROCEDURE — 81001 URINALYSIS AUTO W/SCOPE: CPT

## 2022-01-01 PROCEDURE — 665001 SOC-HOME HEALTH

## 2022-01-01 PROCEDURE — 96367 TX/PROPH/DG ADDL SEQ IV INF: CPT

## 2022-01-01 PROCEDURE — RXMED WILLOW AMBULATORY MEDICATION CHARGE: Performed by: INTERNAL MEDICINE

## 2022-01-01 PROCEDURE — 0241U HCHG SARS-COV-2 COVID-19 NFCT DS RESP RNA 4 TRGT MIC: CPT

## 2022-01-01 PROCEDURE — 85379 FIBRIN DEGRADATION QUANT: CPT

## 2022-01-01 PROCEDURE — 87040 BLOOD CULTURE FOR BACTERIA: CPT | Mod: 91

## 2022-01-01 PROCEDURE — 93005 ELECTROCARDIOGRAM TRACING: CPT | Performed by: EMERGENCY MEDICINE

## 2022-01-01 PROCEDURE — 85610 PROTHROMBIN TIME: CPT

## 2022-01-01 PROCEDURE — 80061 LIPID PANEL: CPT

## 2022-01-01 PROCEDURE — 82803 BLOOD GASES ANY COMBINATION: CPT

## 2022-01-01 PROCEDURE — 665036 HSPC NOTICE OF ELECTION NOE

## 2022-01-01 PROCEDURE — 93306 TTE W/DOPPLER COMPLETE: CPT | Mod: 26 | Performed by: INTERNAL MEDICINE

## 2022-01-01 PROCEDURE — 96368 THER/DIAG CONCURRENT INF: CPT

## 2022-01-01 PROCEDURE — 84443 ASSAY THYROID STIM HORMONE: CPT

## 2022-01-01 PROCEDURE — 93000 ELECTROCARDIOGRAM COMPLETE: CPT | Performed by: INTERNAL MEDICINE

## 2022-01-01 RX ORDER — FLUTICASONE PROPIONATE 44 UG/1
2 AEROSOL, METERED RESPIRATORY (INHALATION)
Status: DISCONTINUED | OUTPATIENT
Start: 2022-01-01 | End: 2022-01-01 | Stop reason: HOSPADM

## 2022-01-01 RX ORDER — HEPARIN SODIUM 5000 [USP'U]/100ML
0-30 INJECTION, SOLUTION INTRAVENOUS CONTINUOUS
Status: DISCONTINUED | OUTPATIENT
Start: 2022-01-01 | End: 2022-01-01

## 2022-01-01 RX ORDER — TRAZODONE HYDROCHLORIDE 50 MG/1
50 TABLET ORAL
Status: DISCONTINUED | OUTPATIENT
Start: 2022-01-01 | End: 2022-01-01 | Stop reason: HOSPADM

## 2022-01-01 RX ORDER — GUAIFENESIN/DEXTROMETHORPHAN 100-10MG/5
10 SYRUP ORAL EVERY 6 HOURS PRN
Status: DISCONTINUED | OUTPATIENT
Start: 2022-01-01 | End: 2022-01-01 | Stop reason: HOSPADM

## 2022-01-01 RX ORDER — LORATADINE 10 MG/1
10 TABLET ORAL DAILY
Qty: 14 TABLET | Refills: 6 | OUTPATIENT
Start: 2022-01-01

## 2022-01-01 RX ORDER — HYDRALAZINE HYDROCHLORIDE 20 MG/ML
10 INJECTION INTRAMUSCULAR; INTRAVENOUS EVERY 4 HOURS PRN
Status: DISCONTINUED | OUTPATIENT
Start: 2022-01-01 | End: 2022-01-01 | Stop reason: HOSPADM

## 2022-01-01 RX ORDER — AMOXICILLIN 250 MG
2 CAPSULE ORAL 2 TIMES DAILY
Status: DISCONTINUED | OUTPATIENT
Start: 2022-01-01 | End: 2022-01-01 | Stop reason: HOSPADM

## 2022-01-01 RX ORDER — POLYETHYLENE GLYCOL 3350 17 G/17G
1 POWDER, FOR SOLUTION ORAL
Status: DISCONTINUED | OUTPATIENT
Start: 2022-01-01 | End: 2022-01-01 | Stop reason: HOSPADM

## 2022-01-01 RX ORDER — ONDANSETRON 2 MG/ML
4 INJECTION INTRAMUSCULAR; INTRAVENOUS EVERY 4 HOURS PRN
Status: DISCONTINUED | OUTPATIENT
Start: 2022-01-01 | End: 2022-01-01 | Stop reason: HOSPADM

## 2022-01-01 RX ORDER — ASPIRIN 81 MG/1
81 TABLET, CHEWABLE ORAL DAILY
Status: DISCONTINUED | OUTPATIENT
Start: 2022-01-01 | End: 2022-01-01 | Stop reason: HOSPADM

## 2022-01-01 RX ORDER — FUROSEMIDE 10 MG/ML
40 INJECTION INTRAMUSCULAR; INTRAVENOUS
Status: DISCONTINUED | OUTPATIENT
Start: 2022-01-01 | End: 2022-01-01

## 2022-01-01 RX ORDER — ASPIRIN 81 MG/1
81 TABLET, CHEWABLE ORAL DAILY
Qty: 100 TABLET | Refills: 0 | Status: SHIPPED | OUTPATIENT
Start: 2022-01-01 | End: 2022-01-01

## 2022-01-01 RX ORDER — IPRATROPIUM BROMIDE AND ALBUTEROL SULFATE 2.5; .5 MG/3ML; MG/3ML
SOLUTION RESPIRATORY (INHALATION)
Qty: 180 ML | Refills: 6 | OUTPATIENT
Start: 2022-01-01

## 2022-01-01 RX ORDER — DOXYCYCLINE 100 MG/1
100 TABLET ORAL ONCE
Status: COMPLETED | OUTPATIENT
Start: 2022-01-01 | End: 2022-01-01

## 2022-01-01 RX ORDER — ONDANSETRON 2 MG/ML
4 INJECTION INTRAMUSCULAR; INTRAVENOUS ONCE
Status: COMPLETED | OUTPATIENT
Start: 2022-01-01 | End: 2022-01-01

## 2022-01-01 RX ORDER — POTASSIUM CHLORIDE 20 MEQ/1
40 TABLET, EXTENDED RELEASE ORAL 2 TIMES DAILY
Status: DISCONTINUED | OUTPATIENT
Start: 2022-01-01 | End: 2022-01-01

## 2022-01-01 RX ORDER — MAGNESIUM SULFATE HEPTAHYDRATE 40 MG/ML
2 INJECTION, SOLUTION INTRAVENOUS ONCE
Status: COMPLETED | OUTPATIENT
Start: 2022-01-01 | End: 2022-01-01

## 2022-01-01 RX ORDER — FERROUS SULFATE 325(65) MG
325 TABLET ORAL DAILY
Status: DISCONTINUED | OUTPATIENT
Start: 2022-01-01 | End: 2022-01-01 | Stop reason: HOSPADM

## 2022-01-01 RX ORDER — SODIUM CHLORIDE, SODIUM LACTATE, POTASSIUM CHLORIDE, AND CALCIUM CHLORIDE .6; .31; .03; .02 G/100ML; G/100ML; G/100ML; G/100ML
30 INJECTION, SOLUTION INTRAVENOUS ONCE
Status: DISCONTINUED | OUTPATIENT
Start: 2022-01-01 | End: 2022-01-01 | Stop reason: HOSPADM

## 2022-01-01 RX ORDER — FUROSEMIDE 10 MG/ML
20 INJECTION INTRAMUSCULAR; INTRAVENOUS ONCE
Status: COMPLETED | OUTPATIENT
Start: 2022-01-01 | End: 2022-01-01

## 2022-01-01 RX ORDER — SULFAMETHOXAZOLE AND TRIMETHOPRIM 800; 160 MG/1; MG/1
1 TABLET ORAL 2 TIMES DAILY
Qty: 6 TABLET | Refills: 0 | OUTPATIENT
Start: 2022-01-01 | End: 2022-01-01

## 2022-01-01 RX ORDER — MORPHINE SULFATE 100 MG/5ML
10 SOLUTION ORAL
Qty: 120 ML | Refills: 0 | Status: SHIPPED | OUTPATIENT
Start: 2022-01-01 | End: 2023-04-25

## 2022-01-01 RX ORDER — OXYBUTYNIN CHLORIDE 5 MG/1
TABLET ORAL
Qty: 28 TABLET | Refills: 6 | OUTPATIENT
Start: 2022-01-01

## 2022-01-01 RX ORDER — SIMVASTATIN 10 MG
10 TABLET ORAL EVERY EVENING
Status: DISCONTINUED | OUTPATIENT
Start: 2022-01-01 | End: 2022-01-01 | Stop reason: HOSPADM

## 2022-01-01 RX ORDER — LORAZEPAM 2 MG/ML
0.5 CONCENTRATE ORAL EVERY 6 HOURS PRN
Qty: 360 ML | Refills: 0 | Status: SHIPPED | OUTPATIENT
Start: 2022-01-01 | End: 2023-03-25

## 2022-01-01 RX ORDER — METHYLPREDNISOLONE SODIUM SUCCINATE 125 MG/2ML
40 INJECTION, POWDER, LYOPHILIZED, FOR SOLUTION INTRAMUSCULAR; INTRAVENOUS ONCE
Status: COMPLETED | OUTPATIENT
Start: 2022-01-01 | End: 2022-01-01

## 2022-01-01 RX ORDER — DEXTROSE MONOHYDRATE 25 G/50ML
50 INJECTION, SOLUTION INTRAVENOUS
Status: DISCONTINUED | OUTPATIENT
Start: 2022-01-01 | End: 2022-01-01 | Stop reason: HOSPADM

## 2022-01-01 RX ORDER — METHYLPREDNISOLONE 4 MG/1
TABLET ORAL
Qty: 21 TABLET | Refills: 0 | Status: SHIPPED | OUTPATIENT
Start: 2022-01-01 | End: 2022-01-01

## 2022-01-01 RX ORDER — FUROSEMIDE 20 MG/1
20 TABLET ORAL DAILY
Qty: 30 TABLET | Refills: 0 | Status: SHIPPED | OUTPATIENT
Start: 2022-01-01 | End: 2022-01-01 | Stop reason: SDUPTHER

## 2022-01-01 RX ORDER — BISACODYL 10 MG
10 SUPPOSITORY, RECTAL RECTAL
Status: DISCONTINUED | OUTPATIENT
Start: 2022-01-01 | End: 2022-01-01 | Stop reason: HOSPADM

## 2022-01-01 RX ORDER — HEPARIN SODIUM 1000 [USP'U]/ML
80 INJECTION, SOLUTION INTRAVENOUS; SUBCUTANEOUS ONCE
Status: COMPLETED | OUTPATIENT
Start: 2022-01-01 | End: 2022-01-01

## 2022-01-01 RX ORDER — IPRATROPIUM BROMIDE AND ALBUTEROL SULFATE 2.5; .5 MG/3ML; MG/3ML
3 SOLUTION RESPIRATORY (INHALATION) 4 TIMES DAILY
Status: DISCONTINUED | OUTPATIENT
Start: 2022-01-01 | End: 2022-01-01

## 2022-01-01 RX ORDER — LEVOTHYROXINE SODIUM 0.12 MG/1
125 TABLET ORAL
Status: DISCONTINUED | OUTPATIENT
Start: 2022-01-01 | End: 2022-01-01 | Stop reason: HOSPADM

## 2022-01-01 RX ORDER — MORPHINE SULFATE 100 MG/5ML
5 SOLUTION ORAL
Qty: 30 ML | Refills: 0 | Status: SHIPPED | OUTPATIENT
Start: 2022-01-01 | End: 2022-01-01 | Stop reason: SDUPTHER

## 2022-01-01 RX ORDER — DOXYCYCLINE 100 MG/1
100 CAPSULE ORAL 2 TIMES DAILY
Qty: 14 CAPSULE | Refills: 0 | Status: SHIPPED | OUTPATIENT
Start: 2022-01-01 | End: 2022-01-01

## 2022-01-01 RX ORDER — FUROSEMIDE 20 MG/1
20 TABLET ORAL
Status: DISCONTINUED | OUTPATIENT
Start: 2022-01-01 | End: 2022-01-01 | Stop reason: HOSPADM

## 2022-01-01 RX ORDER — IPRATROPIUM BROMIDE AND ALBUTEROL SULFATE 2.5; .5 MG/3ML; MG/3ML
3 SOLUTION RESPIRATORY (INHALATION)
Status: DISCONTINUED | OUTPATIENT
Start: 2022-01-01 | End: 2022-01-01 | Stop reason: HOSPADM

## 2022-01-01 RX ORDER — DEXTROMETHORPHAN HBR. AND GUAIFENESIN 10; 100 MG/5ML; MG/5ML
SOLUTION ORAL
COMMUNITY
End: 2022-01-01

## 2022-01-01 RX ORDER — DEXTROMETHORPHAN HBR. AND GUAIFENESIN 10; 100 MG/5ML; MG/5ML
10 SOLUTION ORAL EVERY 6 HOURS PRN
Qty: 118 ML | Refills: 6 | Status: SHIPPED | OUTPATIENT
Start: 2022-01-01 | End: 2022-01-01

## 2022-01-01 RX ORDER — GUAIFENESIN/DEXTROMETHORPHAN 100-10MG/5
10 SYRUP ORAL EVERY 6 HOURS PRN
Qty: 840 ML | Refills: 0 | Status: SHIPPED | OUTPATIENT
Start: 2022-01-01

## 2022-01-01 RX ORDER — ASPIRIN 81 MG/1
81 TABLET, CHEWABLE ORAL DAILY
Status: DISCONTINUED | OUTPATIENT
Start: 2022-01-01 | End: 2022-01-01

## 2022-01-01 RX ORDER — FUROSEMIDE 20 MG/1
20 TABLET ORAL
Status: DISCONTINUED | OUTPATIENT
Start: 2022-01-01 | End: 2022-01-01

## 2022-01-01 RX ORDER — ONDANSETRON 4 MG/1
4 TABLET, ORALLY DISINTEGRATING ORAL EVERY 4 HOURS PRN
Status: DISCONTINUED | OUTPATIENT
Start: 2022-01-01 | End: 2022-01-01 | Stop reason: HOSPADM

## 2022-01-01 RX ORDER — ALBUTEROL SULFATE 90 UG/1
2 AEROSOL, METERED RESPIRATORY (INHALATION) EVERY 4 HOURS PRN
Status: DISCONTINUED | OUTPATIENT
Start: 2022-01-01 | End: 2022-01-01 | Stop reason: HOSPADM

## 2022-01-01 RX ORDER — HEPARIN SODIUM 1000 [USP'U]/ML
40 INJECTION, SOLUTION INTRAVENOUS; SUBCUTANEOUS PRN
Status: DISCONTINUED | OUTPATIENT
Start: 2022-01-01 | End: 2022-01-01

## 2022-01-01 RX ORDER — FUROSEMIDE 20 MG/1
20 TABLET ORAL EVERY MORNING
Qty: 14 TABLET | Refills: 6 | OUTPATIENT
Start: 2022-01-01

## 2022-01-01 RX ORDER — BUDESONIDE 0.25 MG/2ML
250 INHALANT ORAL 2 TIMES DAILY
Qty: 120 ML | Refills: 3 | Status: SHIPPED | OUTPATIENT
Start: 2022-01-01

## 2022-01-01 RX ORDER — PREDNISONE 50 MG/1
50 TABLET ORAL DAILY
Status: DISCONTINUED | OUTPATIENT
Start: 2022-01-01 | End: 2022-01-01 | Stop reason: HOSPADM

## 2022-01-01 RX ORDER — ACETAMINOPHEN/DIPHENHYDRAMINE 500MG-25MG
1 TABLET ORAL
COMMUNITY

## 2022-01-01 RX ORDER — ACETAMINOPHEN 325 MG/1
650 TABLET ORAL EVERY 6 HOURS PRN
Status: DISCONTINUED | OUTPATIENT
Start: 2022-01-01 | End: 2022-01-01 | Stop reason: HOSPADM

## 2022-01-01 RX ADMIN — FUROSEMIDE 40 MG: 10 INJECTION, SOLUTION INTRAMUSCULAR; INTRAVENOUS at 06:42

## 2022-01-01 RX ADMIN — ASPIRIN 81 MG CHEWABLE TABLET 81 MG: 81 TABLET CHEWABLE at 08:30

## 2022-01-01 RX ADMIN — UMECLIDINIUM BROMIDE AND VILANTEROL TRIFENATATE 1 PUFF: 62.5; 25 POWDER RESPIRATORY (INHALATION) at 08:51

## 2022-01-01 RX ADMIN — FERROUS SULFATE TAB 325 MG (65 MG ELEMENTAL FE) 325 MG: 325 (65 FE) TAB at 05:49

## 2022-01-01 RX ADMIN — FLUTICASONE PROPIONATE 88 MCG: 44 AEROSOL, METERED RESPIRATORY (INHALATION) at 06:46

## 2022-01-01 RX ADMIN — METOPROLOL TARTRATE 25 MG: 25 TABLET, FILM COATED ORAL at 17:25

## 2022-01-01 RX ADMIN — SODIUM CHLORIDE 3 G: 900 INJECTION INTRAVENOUS at 13:12

## 2022-01-01 RX ADMIN — INSULIN HUMAN 2 UNITS: 100 INJECTION, SOLUTION PARENTERAL at 11:39

## 2022-01-01 RX ADMIN — DOCUSATE SODIUM 50 MG AND SENNOSIDES 8.6 MG 2 TABLET: 8.6; 5 TABLET, FILM COATED ORAL at 04:46

## 2022-01-01 RX ADMIN — SODIUM CHLORIDE 3 G: 900 INJECTION INTRAVENOUS at 05:50

## 2022-01-01 RX ADMIN — HEPARIN SODIUM 18 UNITS/KG/HR: 5000 INJECTION, SOLUTION INTRAVENOUS at 04:33

## 2022-01-01 RX ADMIN — AMPICILLIN SODIUM AND SULBACTAM SODIUM 3 G: 2; 1 INJECTION, POWDER, FOR SOLUTION INTRAMUSCULAR; INTRAVENOUS at 20:56

## 2022-01-01 RX ADMIN — INSULIN HUMAN 1 UNITS: 100 INJECTION, SOLUTION PARENTERAL at 08:04

## 2022-01-01 RX ADMIN — ONDANSETRON 4 MG: 2 INJECTION INTRAMUSCULAR; INTRAVENOUS at 20:51

## 2022-01-01 RX ADMIN — TRAZODONE HYDROCHLORIDE 50 MG: 50 TABLET ORAL at 20:40

## 2022-01-01 RX ADMIN — LEVOTHYROXINE SODIUM 125 MCG: 0.12 TABLET ORAL at 05:49

## 2022-01-01 RX ADMIN — FUROSEMIDE 20 MG: 20 TABLET ORAL at 05:01

## 2022-01-01 RX ADMIN — METOPROLOL TARTRATE 25 MG: 25 TABLET, FILM COATED ORAL at 04:46

## 2022-01-01 RX ADMIN — HEPARIN SODIUM 15 UNITS/KG/HR: 5000 INJECTION, SOLUTION INTRAVENOUS at 02:31

## 2022-01-01 RX ADMIN — PREDNISONE 50 MG: 50 TABLET ORAL at 05:49

## 2022-01-01 RX ADMIN — INSULIN HUMAN 1 UNITS: 100 INJECTION, SOLUTION PARENTERAL at 11:52

## 2022-01-01 RX ADMIN — INSULIN HUMAN 2 UNITS: 100 INJECTION, SOLUTION PARENTERAL at 08:00

## 2022-01-01 RX ADMIN — METOPROLOL TARTRATE 25 MG: 25 TABLET, FILM COATED ORAL at 08:15

## 2022-01-01 RX ADMIN — BENZONATATE 100 MG: 100 CAPSULE ORAL at 15:46

## 2022-01-01 RX ADMIN — SODIUM CHLORIDE 3 G: 900 INJECTION INTRAVENOUS at 03:32

## 2022-01-01 RX ADMIN — DOXYCYCLINE 100 MG: 100 TABLET, FILM COATED ORAL at 00:53

## 2022-01-01 RX ADMIN — INSULIN HUMAN 2 UNITS: 100 INJECTION, SOLUTION PARENTERAL at 17:16

## 2022-01-01 RX ADMIN — HEPARIN SODIUM 6200 UNITS: 1000 INJECTION, SOLUTION INTRAVENOUS; SUBCUTANEOUS at 04:33

## 2022-01-01 RX ADMIN — FUROSEMIDE 40 MG: 10 INJECTION, SOLUTION INTRAMUSCULAR; INTRAVENOUS at 17:23

## 2022-01-01 RX ADMIN — ASPIRIN 81 MG CHEWABLE TABLET 81 MG: 81 TABLET CHEWABLE at 04:45

## 2022-01-01 RX ADMIN — SODIUM CHLORIDE 3 G: 900 INJECTION INTRAVENOUS at 23:56

## 2022-01-01 RX ADMIN — POTASSIUM CHLORIDE 40 MEQ: 1500 TABLET, EXTENDED RELEASE ORAL at 04:52

## 2022-01-01 RX ADMIN — FERROUS SULFATE TAB 325 MG (65 MG ELEMENTAL FE) 325 MG: 325 (65 FE) TAB at 06:43

## 2022-01-01 RX ADMIN — FLUTICASONE PROPIONATE 88 MCG: 44 AEROSOL, METERED RESPIRATORY (INHALATION) at 08:01

## 2022-01-01 RX ADMIN — SIMVASTATIN 10 MG: 10 TABLET, FILM COATED ORAL at 17:25

## 2022-01-01 RX ADMIN — UMECLIDINIUM BROMIDE AND VILANTEROL TRIFENATATE 1 PUFF: 62.5; 25 POWDER RESPIRATORY (INHALATION) at 06:46

## 2022-01-01 RX ADMIN — INSULIN HUMAN 5 UNITS: 100 INJECTION, SOLUTION PARENTERAL at 17:41

## 2022-01-01 RX ADMIN — METOPROLOL TARTRATE 25 MG: 25 TABLET, FILM COATED ORAL at 05:49

## 2022-01-01 RX ADMIN — FUROSEMIDE 20 MG: 20 TABLET ORAL at 17:27

## 2022-01-01 RX ADMIN — METOPROLOL TARTRATE 25 MG: 25 TABLET, FILM COATED ORAL at 17:24

## 2022-01-01 RX ADMIN — METOPROLOL TARTRATE 25 MG: 25 TABLET, FILM COATED ORAL at 17:37

## 2022-01-01 RX ADMIN — SODIUM CHLORIDE 3 G: 900 INJECTION INTRAVENOUS at 11:55

## 2022-01-01 RX ADMIN — SODIUM CHLORIDE 3 G: 900 INJECTION INTRAVENOUS at 17:24

## 2022-01-01 RX ADMIN — FUROSEMIDE 40 MG: 10 INJECTION, SOLUTION INTRAMUSCULAR; INTRAVENOUS at 04:52

## 2022-01-01 RX ADMIN — LEVOTHYROXINE SODIUM 125 MCG: 0.12 TABLET ORAL at 06:44

## 2022-01-01 RX ADMIN — INSULIN HUMAN 4 UNITS: 100 INJECTION, SOLUTION PARENTERAL at 21:38

## 2022-01-01 RX ADMIN — INSULIN HUMAN 3 UNITS: 100 INJECTION, SOLUTION PARENTERAL at 20:42

## 2022-01-01 RX ADMIN — DOCUSATE SODIUM 50 MG AND SENNOSIDES 8.6 MG 2 TABLET: 8.6; 5 TABLET, FILM COATED ORAL at 17:38

## 2022-01-01 RX ADMIN — FERROUS SULFATE TAB 325 MG (65 MG ELEMENTAL FE) 325 MG: 325 (65 FE) TAB at 04:52

## 2022-01-01 RX ADMIN — LEVOTHYROXINE SODIUM 125 MCG: 0.12 TABLET ORAL at 04:52

## 2022-01-01 RX ADMIN — SODIUM CHLORIDE 3 G: 900 INJECTION INTRAVENOUS at 17:40

## 2022-01-01 RX ADMIN — POTASSIUM CHLORIDE 40 MEQ: 1500 TABLET, EXTENDED RELEASE ORAL at 06:42

## 2022-01-01 RX ADMIN — SODIUM CHLORIDE 3 G: 900 INJECTION INTRAVENOUS at 00:00

## 2022-01-01 RX ADMIN — SODIUM CHLORIDE 3 G: 900 INJECTION INTRAVENOUS at 04:52

## 2022-01-01 RX ADMIN — FLUTICASONE PROPIONATE 88 MCG: 44 AEROSOL, METERED RESPIRATORY (INHALATION) at 08:51

## 2022-01-01 RX ADMIN — MAGNESIUM SULFATE HEPTAHYDRATE 2 G: 40 INJECTION, SOLUTION INTRAVENOUS at 05:10

## 2022-01-01 RX ADMIN — SODIUM CHLORIDE 3 G: 900 INJECTION INTRAVENOUS at 10:24

## 2022-01-01 RX ADMIN — POTASSIUM CHLORIDE 40 MEQ: 1500 TABLET, EXTENDED RELEASE ORAL at 17:24

## 2022-01-01 RX ADMIN — TRAZODONE HYDROCHLORIDE 50 MG: 50 TABLET ORAL at 19:28

## 2022-01-01 RX ADMIN — POTASSIUM CHLORIDE 40 MEQ: 1500 TABLET, EXTENDED RELEASE ORAL at 17:25

## 2022-01-01 RX ADMIN — PREDNISONE 50 MG: 50 TABLET ORAL at 08:50

## 2022-01-01 RX ADMIN — FUROSEMIDE 20 MG: 10 INJECTION, SOLUTION INTRAMUSCULAR; INTRAVENOUS at 00:54

## 2022-01-01 RX ADMIN — TRAZODONE HYDROCHLORIDE 50 MG: 50 TABLET ORAL at 21:38

## 2022-01-01 RX ADMIN — MORPHINE SULFATE 5 MG: 100 SOLUTION ORAL at 16:20

## 2022-01-01 RX ADMIN — HEPARIN SODIUM 15 UNITS/KG/HR: 5000 INJECTION, SOLUTION INTRAVENOUS at 13:09

## 2022-01-01 RX ADMIN — LEVOTHYROXINE SODIUM 125 MCG: 0.12 TABLET ORAL at 04:45

## 2022-01-01 RX ADMIN — METHYLPREDNISOLONE SODIUM SUCCINATE 40 MG: 125 INJECTION, POWDER, FOR SOLUTION INTRAMUSCULAR; INTRAVENOUS at 20:55

## 2022-01-01 RX ADMIN — FLUTICASONE PROPIONATE 88 MCG: 44 AEROSOL, METERED RESPIRATORY (INHALATION) at 08:00

## 2022-01-01 RX ADMIN — UMECLIDINIUM BROMIDE AND VILANTEROL TRIFENATATE 1 PUFF: 62.5; 25 POWDER RESPIRATORY (INHALATION) at 08:02

## 2022-01-01 RX ADMIN — SIMVASTATIN 10 MG: 10 TABLET, FILM COATED ORAL at 17:38

## 2022-01-01 RX ADMIN — ACETAMINOPHEN 650 MG: 325 TABLET, FILM COATED ORAL at 01:49

## 2022-01-01 RX ADMIN — METOPROLOL TARTRATE 25 MG: 25 TABLET, FILM COATED ORAL at 04:52

## 2022-01-01 RX ADMIN — HUMAN ALBUMIN MICROSPHERES AND PERFLUTREN 3 ML: 10; .22 INJECTION, SOLUTION INTRAVENOUS at 05:47

## 2022-01-01 RX ADMIN — SIMVASTATIN 10 MG: 10 TABLET, FILM COATED ORAL at 17:24

## 2022-01-01 RX ADMIN — HEPARIN SODIUM 15 UNITS/KG/HR: 5000 INJECTION, SOLUTION INTRAVENOUS at 16:27

## 2022-01-01 RX ADMIN — PREDNISONE 50 MG: 50 TABLET ORAL at 04:45

## 2022-01-01 RX ADMIN — INSULIN HUMAN 1 UNITS: 100 INJECTION, SOLUTION PARENTERAL at 09:49

## 2022-01-01 RX ADMIN — INSULIN HUMAN 2 UNITS: 100 INJECTION, SOLUTION PARENTERAL at 13:03

## 2022-01-01 RX ADMIN — FERROUS SULFATE TAB 325 MG (65 MG ELEMENTAL FE) 325 MG: 325 (65 FE) TAB at 04:45

## 2022-01-01 RX ADMIN — UMECLIDINIUM BROMIDE AND VILANTEROL TRIFENATATE 1 PUFF: 62.5; 25 POWDER RESPIRATORY (INHALATION) at 08:00

## 2022-01-01 SDOH — ECONOMIC STABILITY: GENERAL

## 2022-01-01 SDOH — ECONOMIC STABILITY: HOUSING INSECURITY: EVIDENCE OF SMOKING MATERIAL: 0

## 2022-01-01 SDOH — ECONOMIC STABILITY: HOUSING INSECURITY: HOME SAFETY: PT REPORTS NO CHANGES TO MEDICATIONS AND NO FALLS SINCE LAST HH VISIT.

## 2022-01-01 ASSESSMENT — FIBROSIS 4 INDEX
FIB4 SCORE: 2.43
FIB4 SCORE: 2.43
FIB4 SCORE: 2.4
FIB4 SCORE: 2.43
FIB4 SCORE: 2.43
FIB4 SCORE: 2.4
FIB4 SCORE: 2.4
FIB4 SCORE: 2.43
FIB4 SCORE: 2.4
FIB4 SCORE: 2.64
FIB4 SCORE: 2.43
FIB4 SCORE: 2.4
FIB4 SCORE: 2.43
FIB4 SCORE: 2.48
FIB4 SCORE: 2.43
FIB4 SCORE: 2.4
FIB4 SCORE: 2.43

## 2022-01-01 ASSESSMENT — PAIN SCALES - PAIN ASSESSMENT IN ADVANCED DEMENTIA (PAINAD)
NEGVOCALIZATION: 1 - OCCASIONAL MOAN OR GROAN. LOW-LEVEL SPEECH WITH A NEGATIVE OR DISAPPROVING QUALITY.
BODYLANGUAGE: 0 - RELAXED.
NEGVOCALIZATION: 0 - NONE.
CONSOLABILITY: 0 - NO NEED TO CONSOLE.
TOTALSCORE: 0
BODYLANGUAGE: 0 - RELAXED.
CONSOLABILITY: 0 - NO NEED TO CONSOLE.
CONSOLABILITY: 0 - NO NEED TO CONSOLE.
TOTALSCORE: 0
TOTALSCORE: 0
NEGVOCALIZATION: 0 - NONE.
FACIALEXPRESSION: 0 - SMILING OR INEXPRESSIVE.
BODYLANGUAGE: 0 - RELAXED.
FACIALEXPRESSION: 0 - SMILING OR INEXPRESSIVE.
CONSOLABILITY: 0 - NO NEED TO CONSOLE.
FACIALEXPRESSION: 0 - SMILING OR INEXPRESSIVE.
TOTALSCORE: 2
FACIALEXPRESSION: 0 - SMILING OR INEXPRESSIVE.
NEGVOCALIZATION: 0 - NONE.
BODYLANGUAGE: 0 - RELAXED.
FACIALEXPRESSION: 0 - SMILING OR INEXPRESSIVE.
CONSOLABILITY: 0 - NO NEED TO CONSOLE.
NEGVOCALIZATION: 0 - NONE.
TOTALSCORE: 0
BODYLANGUAGE: 0 - RELAXED.

## 2022-01-01 ASSESSMENT — LIFESTYLE VARIABLES
EVER_SMOKED: YES
ON A TYPICAL DAY WHEN YOU DRINK ALCOHOL HOW MANY DRINKS DO YOU HAVE: 0
EVER FELT BAD OR GUILTY ABOUT YOUR DRINKING: NO
TOTAL SCORE: 0
EVER HAD A DRINK FIRST THING IN THE MORNING TO STEADY YOUR NERVES TO GET RID OF A HANGOVER: NO
HAVE YOU EVER FELT YOU SHOULD CUT DOWN ON YOUR DRINKING: NO
CONSUMPTION TOTAL: NEGATIVE
DOES PATIENT WANT TO STOP DRINKING: NO
HAVE PEOPLE ANNOYED YOU BY CRITICIZING YOUR DRINKING: NO
AVERAGE NUMBER OF DAYS PER WEEK YOU HAVE A DRINK CONTAINING ALCOHOL: 0
TOTAL SCORE: 0
TOTAL SCORE: 0
ALCOHOL_USE: NO
HOW MANY TIMES IN THE PAST YEAR HAVE YOU HAD 5 OR MORE DRINKS IN A DAY: 0

## 2022-01-01 ASSESSMENT — ACTIVITIES OF DAILY LIVING (ADL)
LAUNDRY ASSESSED: 1
PHYSICAL TRANSFERS ASSESSED: 1
CURRENT_FUNCTION: STAND BY ASSIST
AMBULATION ASSISTANCE: ONE PERSON
AMBULATION ASSISTANCE: STAND BY ASSIST
CURRENT_FUNCTION: ONE PERSON
CURRENT_FUNCTION: STAND BY ASSIST
CURRENT_FUNCTION: STAND BY ASSIST
AMBULATION ASSISTANCE: INDEPENDENT
BATHING_WITHIN_DEFINED_LIMITS: 1
AMBULATION ASSISTANCE: STAND BY ASSIST
AMBULATION ASSISTANCE: STAND BY ASSIST
CURRENT_FUNCTION: INDEPENDENT
AMBULATION_REQUIRES_ASSISTANCE: 1
HOME_HEALTH_OASIS: 01
AMBULATION ASSISTANCE: 1
EATING_REQUIRES_ASSISTANCE: 1
AMBULATION ASSISTANCE: STAND BY ASSIST
AMBULATION ASSISTANCE: 1
GROOMING_COMMENTS: NT
CURRENT_FUNCTION: ONE PERSON
MONEY MANAGEMENT (EXPENSES/BILLS): NEEDS ASSISTANCE
PREPARING MEALS: INDEPENDENT
AMBULATION ASSISTANCE: STAND BY ASSIST
FEEDING_WITHIN_DEFINED_LIMITS: 1
OASIS_M1830: 05
DRESSING_UB_CURRENT_FUNCTION: INDEPENDENT
BATHING_COMMENTS: NT
CURRENT_FUNCTION: STAND BY ASSIST
TOILETING: 1
BATHING ASSESSED: 1
MONEY MANAGEMENT (EXPENSES/BILLS): NEEDS ASSISTANCE
LAUNDRY: DEPENDENT
TOILETING: INDEPENDENT
AMBULATION ASSISTANCE: INDEPENDENT
DRESSING_REQUIRES_ASSISTANCE: 1
GROOMING_WITHIN_DEFINED_LIMITS: 1
CURRENT_FUNCTION: STAND BY ASSIST
DRESSING_LB_CURRENT_FUNCTION: INDEPENDENT
GROOMING ASSESSED: 1
AMBULATION ASSISTANCE: STAND BY ASSIST
PHYSICAL TRANSFERS ASSESSED: 1
BATHING_CURRENT_FUNCTION: INDEPENDENT
EATING_REQUIRES_ASSISTANCE: 1
AMBULATION ASSISTANCE: ONE PERSON
GROOMING_CURRENT_FUNCTION: INDEPENDENT
CURRENT_FUNCTION: STAND BY ASSIST
AMBULATION ASSISTANCE ON FLAT SURFACES: 1
CURRENT_FUNCTION: INDEPENDENT
OASIS_M1830: 03

## 2022-01-01 ASSESSMENT — GAIT ASSESSMENTS
DISTANCE (FEET): 100
GAIT LEVEL OF ASSIST: SUPERVISED
ASSISTIVE DEVICE: FRONT WHEEL WALKER
DEVIATION: BRADYKINETIC;DECREASED HEEL STRIKE;DECREASED TOE OFF

## 2022-01-01 ASSESSMENT — ENCOUNTER SYMPTOMS
PERSON REPORTING PAIN: PATIENT
COUGH: 1
COUGH CHARACTERISTICS: PRODUCTIVE
FORGETFULNESS: 1
FOCAL WEAKNESS: 0
VOMITING: DENIES
BACK PAIN: 0
LOWEST PAIN SEVERITY IN PAST 24 HOURS: 0/10
ASSOCIATED SYMPTOMS: DENIES
PAIN: 1
SPUTUM PRODUCTION: 0
DENIES PAIN: 1
FATIGUES EASILY: 1
VOMITING: DENIES
PERSON REPORTING PAIN: PATIENT
PALPITATIONS: 0
DENIES PAIN: 1
LOWER EXTREMITY EDEMA: 1
FLANK PAIN: 0
MYALGIAS: 0
SHORTNESS OF BREATH: 1
DENIES PAIN: 1
DEPRESSION: 0
ABDOMINAL PAIN: 0
PAIN LOCATION - RELIEVING FACTORS: TYLENOL
HIGHEST PAIN SEVERITY IN PAST 24 HOURS: 0/10
SHORTNESS OF BREATH: 1
LOWEST PAIN SEVERITY IN PAST 24 HOURS: 0/10
HEARTBURN: 0
COUGH CHARACTERISTICS: MOIST
SUBJECTIVE PAIN PROGRESSION: UNCHANGED
VOMITING: 0
FLANK PAIN: 0
LIMITED RANGE OF MOTION: 1
DYSPNEA ACTIVITY LEVEL: AT REST
SLEEP QUALITY: FAIR
ORTHOPNEA: 0
LOWER EXTREMITY EDEMA: 1
LOWEST PAIN SEVERITY IN PAST 24 HOURS: 0/10
COUGH: 1
SUBJECTIVE PAIN PROGRESSION: UNCHANGED
DYSPNEA ACTIVITY LEVEL: AFTER AMBULATING LESS THAN 10 FT
MYALGIAS: 0
SHORTNESS OF BREATH: 1
NERVOUS/ANXIOUS: 0
COUGH CHARACTERISTICS: PRODUCTIVE
SHORTNESS OF BREATH: 1
SYNCOPE: 0
CHILLS: 0
COUGH CHARACTERISTICS: NON-PRODUCTIVE
HEADACHES: 0
PAIN LOCATION - PAIN QUALITY: ACHY, GNAWING
FEVER: 0
FEVER: 0
PERSON REPORTING PAIN: PATIENT
HIGHEST PAIN SEVERITY IN PAST 24 HOURS: 0/10
BACK PAIN: 0
DENIES PAIN: 1
DYSPNEA ACTIVITY LEVEL: AFTER AMBULATING LESS THAN 10 FT
FORGETFULNESS: 1
NAUSEA: DENIES
VOMITING: DENIES
FATIGUES EASILY: 1
NAUSEA: DENIES
SUBJECTIVE PAIN PROGRESSION: UNCHANGED
DIZZINESS: 0
MEMORY LOSS: 0
PAIN SEVERITY GOAL: 0/10
LAST BOWEL MOVEMENT: 66197
PERSON REPORTING PAIN: PATIENT
WEAKNESS: 1
NAUSEA: 0
SUBJECTIVE PAIN PROGRESSION: UNCHANGED
PERSON REPORTING PAIN: PATIENT
VOMITING: 0
LOWEST PAIN SEVERITY IN PAST 24 HOURS: 0/10
LOWEST PAIN SEVERITY IN PAST 24 HOURS: 0/10
COUGH: 1
PERSON REPORTING PAIN: PATIENT
VOMITING: DENIES
LOWEST PAIN SEVERITY IN PAST 24 HOURS: 0/10
NAUSEA: 0
DENIES PAIN: 1
NERVOUS/ANXIOUS: 0
PAIN LOCATION - PAIN FREQUENCY: CONSTANT
LAST BOWEL MOVEMENT: 66219
PERSON REPORTING PAIN: PATIENT
ALTERED MENTAL STATUS: 0
FATIGUE: 1
SPEECH CHANGE: 0
COUGH: 1
VOMITING: DENIES
PAIN SEVERITY GOAL: 0/10
FLANK PAIN: 0
COUGH: 1
MUSCLE WEAKNESS: 1
PERSON REPORTING PAIN: PATIENT
FOCAL WEAKNESS: 0
DYSPNEA ON EXERTION: 0
SEVERE DYSPNEA: 1
NAUSEA: DENIES
HEADACHES: 0
PAIN LOCATION - PAIN QUALITY: ACHE
DIZZINESS: 0
PALPITATIONS: 0
FATIGUE: 1
FORGETFULNESS: 1
SLEEP QUALITY: FAIR
DYSPNEA ON EXERTION: 1
PAIN LOCATION - RELIEVING FACTORS: MEDICATION, MOVEMENT
PAIN: 1
SHORTNESS OF BREATH: 1
SUBJECTIVE PAIN PROGRESSION: UNCHANGED
LIMITED RANGE OF MOTION: 1
SUBJECTIVE PAIN PROGRESSION: UNCHANGED
CONSTIPATION: 0
NAUSEA: DENIES
LOWEST PAIN SEVERITY IN PAST 24 HOURS: 0/10
PERSON REPORTING PAIN: PATIENT
LOWER EXTREMITY EDEMA: 1
HIGHEST PAIN SEVERITY IN PAST 24 HOURS: 0/10
IRREGULAR HEARTBEAT: 0
COUGH: 1
FORGETFULNESS: 1
PAIN SEVERITY GOAL: 0/10
COUGH CHARACTERISTICS: NON-PRODUCTIVE
COUGH: 1
HIGHEST PAIN SEVERITY IN PAST 24 HOURS: 0/10
MYALGIAS: 0
SHORTNESS OF BREATH: 1
PAIN LOCATION - PAIN DURATION: 24 HRS
MEMORY LOSS: 0
DIARRHEA: 0
ABDOMINAL PAIN: 0
SUBJECTIVE PAIN PROGRESSION: WAXING AND WANING
SHORTNESS OF BREATH: 1
DYSPNEA ACTIVITY LEVEL: AT REST
DIAPHORESIS: 0
NEAR-SYNCOPE: 0
PAIN SEVERITY GOAL: 0/10
MUSCLE WEAKNESS: 1
LOWER EXTREMITY EDEMA: 1
HIGHEST PAIN SEVERITY IN PAST 24 HOURS: 0/10
BACK PAIN: 0
DYSPNEA ON EXERTION: 1
DESCRIPTION OF MEMORY LOSS: SHORT TERM
DENIES PAIN: 1
LAST BOWEL MOVEMENT: 66205
BACK PAIN: 0
FATIGUES EASILY: 1
DEPRESSION: 0
PAIN LOCATION - EXACERBATING FACTORS: CHRONIC
LOWEST PAIN SEVERITY IN PAST 24 HOURS: 4/10
NAUSEA: 0
NAUSEA: DENIES
PAIN LOCATION - PAIN SEVERITY: 4/10
FORGETFULNESS: 1
WEIGHT LOSS: 0
VOMITING: 0
PERSON REPORTING PAIN: PATIENT
SHORTNESS OF BREATH: T
WHEEZING: 0
HIGHEST PAIN SEVERITY IN PAST 24 HOURS: 4/10
COUGH: 1
PERSON REPORTING PAIN: PATIENT
WEIGHT GAIN: 0
DENIES PAIN: 1
SLEEP QUALITY: FAIR
DENIES PAIN: 1
NAUSEA: 0
DESCRIPTION OF MEMORY LOSS: IMMEDIATE
SLEEP QUALITY: FAIR
PAIN SEVERITY GOAL: 0/10
PAIN LOCATION - PAIN FREQUENCY: CONSTANT
SHORTNESS OF BREATH: 1
SEVERE DYSPNEA: 1
FORGETFULNESS: 1
COUGH: 1
DENIES PAIN: 1
VOMITING: DENIES
FATIGUES EASILY: 1
FATIGUE: 1
SHORTNESS OF BREATH: 1
MUSCLE WEAKNESS: 1
DENIES PAIN: 1
WEAKNESS: 1
COUGH: 1
FATIGUES EASILY: 1
BLURRED VISION: 0
PERSON REPORTING PAIN: PATIENT
NERVOUS/ANXIOUS: 0
DENIES PAIN: 1
NAUSEA: DENIES
SHORTNESS OF BREATH: 1
HIGHEST PAIN SEVERITY IN PAST 24 HOURS: 0/10
PERSON REPORTING PAIN: PATIENT
LIMITED RANGE OF MOTION: 1
DYSPNEA ON EXERTION: 1
PAIN SEVERITY GOAL: 0/10
DENIES PAIN: 1
LAST BOWEL MOVEMENT: 66212
COUGH CHARACTERISTICS: NON-PRODUCTIVE
HEARTBURN: 0
SPUTUM PRODUCTION: 0
MUSCLE WEAKNESS: 1
NAUSEA: DENIES
FEVER: 0
DIAPHORESIS: 0
ABDOMINAL PAIN: 0
PERSON REPORTING PAIN: PATIENT
LOWER EXTREMITY EDEMA: 1
WHEEZING: 0
WEAKNESS: 1
DEPRESSION: 1
COUGH CHARACTERISTICS: NON-PRODUCTIVE
DESCRIPTION OF MEMORY LOSS: LONG TERM
DYSPNEA ON EXERTION: 1
PERSON REPORTING PAIN: PATIENT
COUGH CHARACTERISTICS: NON-PRODUCTIVE
PERSON REPORTING PAIN: PATIENT
MUSCLE WEAKNESS: 1
SUBJECTIVE PAIN PROGRESSION: UNCHANGED
LIMITED RANGE OF MOTION: 1
LAST BOWEL MOVEMENT: 66192
PAIN LOCATION: BACK
ABDOMINAL PAIN: 0
LIMITED RANGE OF MOTION: 1
FLANK PAIN: 0
DENIES PAIN: 1
DENIES PAIN: 1
HIGHEST PAIN SEVERITY IN PAST 24 HOURS: 3/10
VOMITING: DENIES
MUSCLE WEAKNESS: 1
PND: 0
FOCAL WEAKNESS: 0
DENIES PAIN: 1
SUBJECTIVE PAIN PROGRESSION: UNCHANGED
HEADACHES: 0
FORGETFULNESS: 1
DYSPNEA ACTIVITY LEVEL: AFTER AMBULATING 10 - 20 FT
WHEEZING: 0
CLAUDICATION: 0
PAIN SEVERITY GOAL: 4/10
NAUSEA: DENIES
PAIN SEVERITY GOAL: 0/10
DYSPNEA ON EXERTION: 1
PAIN LOCATION - PAIN DURATION: SHORT TO LONG
SENSORY CHANGE: 0
LAST BOWEL MOVEMENT: 66189
PALPITATIONS: 0
DIZZINESS: 0
PAIN LOCATION - PAIN SEVERITY: 3/10
CHILLS: 0
DENIES PAIN: 1
FEVER: 0
PAIN: 1
LOWEST PAIN SEVERITY IN PAST 24 HOURS: 0/10
SLEEP QUALITY: FAIR
SLEEP QUALITY: FAIR
DECREASED APPETITE: 0
DENIES PAIN: 1
PERSON REPORTING PAIN: PATIENT
COUGH: 1
SPUTUM PRODUCTION: 0
PAIN SEVERITY GOAL: 0/10
DENIES PAIN: 1
PAIN: 1
PAIN LOCATION: GENERALIZED
COUGH: 0
MUSCLE WEAKNESS: 1
VOMITING: DENIES
NAUSEA: DENIES
HIGHEST PAIN SEVERITY IN PAST 24 HOURS: 0/10

## 2022-01-01 ASSESSMENT — COGNITIVE AND FUNCTIONAL STATUS - GENERAL
STANDING UP FROM CHAIR USING ARMS: A LITTLE
CLIMB 3 TO 5 STEPS WITH RAILING: A LITTLE
CLIMB 3 TO 5 STEPS WITH RAILING: A LOT
WALKING IN HOSPITAL ROOM: A LITTLE
TURNING FROM BACK TO SIDE WHILE IN FLAT BAD: A LITTLE
TURNING FROM BACK TO SIDE WHILE IN FLAT BAD: A LITTLE
STANDING UP FROM CHAIR USING ARMS: A LITTLE
MOBILITY SCORE: 17
MOVING TO AND FROM BED TO CHAIR: A LITTLE
MOVING FROM LYING ON BACK TO SITTING ON SIDE OF FLAT BED: A LITTLE
DAILY ACTIVITIY SCORE: 24
MOVING TO AND FROM BED TO CHAIR: A LITTLE
SUGGESTED CMS G CODE MODIFIER DAILY ACTIVITY: CH
SUGGESTED CMS G CODE MODIFIER MOBILITY: CK
WALKING IN HOSPITAL ROOM: A LITTLE
MOVING FROM LYING ON BACK TO SITTING ON SIDE OF FLAT BED: A LITTLE
MOBILITY SCORE: 18
SUGGESTED CMS G CODE MODIFIER MOBILITY: CK

## 2022-01-01 ASSESSMENT — SOCIAL DETERMINANTS OF HEALTH (SDOH)
ACTIVE STRESSOR - HEALTH CHANGES: 1

## 2022-01-01 ASSESSMENT — PATIENT HEALTH QUESTIONNAIRE - PHQ9
5. POOR APPETITE OR OVEREATING: 0 - NOT AT ALL
CLINICAL INTERPRETATION OF PHQ2 SCORE: 0
SUM OF ALL RESPONSES TO PHQ9 QUESTIONS 1 AND 2: 0
SUM OF ALL RESPONSES TO PHQ QUESTIONS 1-9: 3
CLINICAL INTERPRETATION OF PHQ2 SCORE: 0
CLINICAL INTERPRETATION OF PHQ2 SCORE: 0
1. LITTLE INTEREST OR PLEASURE IN DOING THINGS: 00
CLINICAL INTERPRETATION OF PHQ2 SCORE: 1
1. LITTLE INTEREST OR PLEASURE IN DOING THINGS: NOT AT ALL
2. FEELING DOWN, DEPRESSED, IRRITABLE, OR HOPELESS: 00
2. FEELING DOWN, DEPRESSED, IRRITABLE, OR HOPELESS: NOT AT ALL

## 2022-01-01 ASSESSMENT — PAIN DESCRIPTION - PAIN TYPE
TYPE: ACUTE PAIN
TYPE: ACUTE PAIN

## 2022-01-01 ASSESSMENT — COPD QUESTIONNAIRES
HAVE YOU SMOKED AT LEAST 100 CIGARETTES IN YOUR ENTIRE LIFE: YES
DO YOU EVER COUGH UP ANY MUCUS OR PHLEGM?: NO/ONLY WITH OCCASIONAL COLDS OR INFECTIONS
COPD: 1
COPD: 1
DURING THE PAST 4 WEEKS HOW MUCH DID YOU FEEL SHORT OF BREATH: SOME OF THE TIME
COPD SCREENING SCORE: 5

## 2022-01-01 ASSESSMENT — NEW YORK HEART ASSOCIATION (NYHA) CLASSIFICATION: PATIENT MEETS NYHA AND SIGNIFICANT SYMPTOMS CRITERIA: 1

## 2022-01-17 PROBLEM — J43.2 CENTRILOBULAR EMPHYSEMA (HCC): Status: ACTIVE | Noted: 2017-06-03

## 2022-01-17 PROBLEM — J43.2 CENTRILOBULAR EMPHYSEMA (HCC): Chronic | Status: ACTIVE | Noted: 2017-06-03

## 2022-01-17 PROBLEM — Z86.16 HISTORY OF COVID-19: Status: ACTIVE | Noted: 2022-01-01

## 2022-01-17 NOTE — PROCEDURES
Multi-Ox Readings  Multi Ox #1 5 LPM   O2 sat % at rest 86   O2 sat % on exertion     O2 sat average on exertion     Multi Ox #2 6 LPM   O2 sat % at rest 91   O2 sat % on exertion 90   O2 sat average on exertion       Oxygen Use 5   Oxygen Frequency 24/7   Duration of need     Is the patient mobile within the home?     CPAP Use?     BIPAP Use?     Servo Titration

## 2022-01-17 NOTE — PROGRESS NOTES
Chief Complaint   Patient presents with   • Follow-Up     Chronic obstructive pulmonary disease // last seen 7/12/2019   • Hospital Follow-up     D/C 7/4/2021        HPI:  Camille Rodriguez is a 81 y.o. year old female here today for follow-up on COPD with chronic respiratory failure.  Last office visit 7/12/2019  She presents with a family member who assist her in care    Since last office visit she was hospitalized December 2020 for COVID-19 pneumonia.  Her oxygen demands 4 L/min at that time.  She was treated with Decadron and remdesivir.  She was started on Trelegy 100 mg 1 puff daily.    She was then hospitalized July 2021 for COPD exacerbation and UTI.  She was treated antibiotics and symptoms resolved.  Her oxygen demand at that time 5 to 5.5 L/min.    She presents today noting shortness of breath with any type of activity.  She is quite sedentary and usually sits at home most days.  She notes that cough starting September 2021 that was initially treated by primary care with Augmentin and doxycycline.  His symptoms continued into October and then prescribed Medrol Dosepak with albuterol HFA inhaler.  She then again followed up with January 5 for ongoing cough patient was treated with doxycycline, Medrol Dosepak and Robitussin-AC.  Chest x-ray was ordered and chest x-ray January 10, 2022 noted Bilateral lower lobe pulmonary infiltrates.  No change from prior chest x-ray 9/29/2021.  Prior CBC during hospitalization July 2021 noted anemia.  Reviewed all testing findings with patient.  She is currently using nebulizer 4 times daily.  She does have Shyann but notes not caring it with her since she does not usually leave the house.  She feels her symptoms have improved since 5 January with very minimal cough at this point.  She denies chest pain, chest tightness or wheezing.  Cough is dry.  Multi ox in office indicated desaturation of 5 L/min and 6 L/min was needed to obtain greater than 90% oxygen saturation.   Reviewed with patient.  She currently sleeps in a recliner and feels she does sleep poorly in general.  She does use an oxygen mask at night due to mouth breathing.  No prior sleep study on file.      [facility] rdGrdrrdarddrderd:rd rd3rd Pulmonary history:  Patient has a 46 year history of smoking 1.5-2 packs daily and quit in 2007. PFT 8/16/2017 indicated FEV1 1.46 L or 69%, FEV1/FVC ratio 74%, and DLCO 57% predicted.   PFT 7/12/2019 indicates postbronchodilator readings due to taking Spiriva at 7:30 AM and albuterol this morning.  FEV1 to 1.4 L or 69%, FEV1/FVC ratio 70, %, TLC 89%, DLCO 45% predicted.     Patient is a former smoker, and at high risk for lung nodules.  CT scan was obtained.  CT scan 6/3/2017 indicated emphysema, mild atelectasis and CAD. Repeat CT scan 5/18/2018 shows changes consistent with emphysema, no suspicious lung nodules, evidence of previous granulomatous disease and CAD.   CT chest 5/31/19:  1.  Emphysematous changes of the upper lobes.  2.  No evidence of new lung nodules  3.  Atherosclerotic changes of the abdominal aorta with evidence of diffuse coronary artery calcifications.  4.  Old granulomatous disease noted in several pretracheal lymph nodes and right hilum.      ROS: As per HPI and otherwise negative if not stated.    Past Medical History:   Diagnosis Date   • Cardiomegaly    • Chickenpox    • Chronic airway obstruction, not elsewhere classified    • Clotting disorder (HCC)    • Diabetes    • Diastolic dysfunction    • Thai measles    • Mumps    • On home oxygen therapy    • Respiratory failure (HCC)        Past Surgical History:   Procedure Laterality Date   • AK REMV 2ND CATARACT,CORN-SCLER SECTN         Family History   Problem Relation Age of Onset   • Heart Attack Father        Social History     Socioeconomic History   • Marital status:      Spouse name: Not on file   • Number of children: Not on file   • Years of education: Not on file   • Highest education level: Not on file    Occupational History   • Not on file   Tobacco Use   • Smoking status: Former Smoker     Packs/day: 1.50     Years: 46.00     Pack years: 69.00     Types: Cigarettes     Quit date: 2007     Years since quittin.9   • Smokeless tobacco: Never Used   • Tobacco comment: Quit    Vaping Use   • Vaping Use: Never used   Substance and Sexual Activity   • Alcohol use: No   • Drug use: No   • Sexual activity: Never   Other Topics Concern   • Not on file   Social History Narrative   • Not on file     Social Determinants of Health     Financial Resource Strain:    • Difficulty of Paying Living Expenses: Not on file   Food Insecurity:    • Worried About Running Out of Food in the Last Year: Not on file   • Ran Out of Food in the Last Year: Not on file   Transportation Needs:    • Lack of Transportation (Medical): Not on file   • Lack of Transportation (Non-Medical): Not on file   Physical Activity:    • Days of Exercise per Week: Not on file   • Minutes of Exercise per Session: Not on file   Stress:    • Feeling of Stress : Not on file   Social Connections:    • Frequency of Communication with Friends and Family: Not on file   • Frequency of Social Gatherings with Friends and Family: Not on file   • Attends Jain Services: Not on file   • Active Member of Clubs or Organizations: Not on file   • Attends Club or Organization Meetings: Not on file   • Marital Status: Not on file   Intimate Partner Violence:    • Fear of Current or Ex-Partner: Not on file   • Emotionally Abused: Not on file   • Physically Abused: Not on file   • Sexually Abused: Not on file   Housing Stability:    • Unable to Pay for Housing in the Last Year: Not on file   • Number of Places Lived in the Last Year: Not on file   • Unstable Housing in the Last Year: Not on file       Allergies as of 2022 - Reviewed 2022   Allergen Reaction Noted   • Azithromycin Diarrhea 2017        Vitals:  /76 (BP Location: Left arm,  "Patient Position: Sitting, BP Cuff Size: Adult)   Pulse 93   Resp 16   Ht 1.626 m (5' 4\")   Wt 108 kg (239 lb)   SpO2 88%     Current medications as of today   Current Outpatient Medications   Medication Sig Dispense Refill   • Fluticasone-Umeclidin-Vilant (TRELEGY ELLIPTA) 100-62.5-25 MCG/INH AEROSOL POWDER, BREATH ACTIVATED inhalation Inhale 1 Inhalation every day. 3 Each 3   • doxycycline (VIBRAMYCIN) 100 MG Tab Take 1 Tablet by mouth 2 times a day. 14 Tablet 0   • methylPREDNISolone (MEDROL DOSEPAK) 4 MG Tablet Therapy Pack As directed on the packaging label. 21 Tablet 0   • traZODone (DESYREL) 50 MG Tab Take 1 Tablet by mouth at bedtime. 30 Tablet 3   • ferrous sulfate 325 (65 Fe) MG tablet Take 1 Tablet by mouth every day. 90 Tablet 3   • Cholecalciferol 2000 UNIT Cap Take 1 Capsule by mouth every day. 90 Capsule 3   • D-Mannose 500 MG Cap Take 2,000 mg by mouth.     • Mirabegron ER (MYRBETRIQ) 50 MG TABLET SR 24 HR Take 50 mg by mouth.     • nitrofurantoin (MACROBID) 100 MG Cap Take 1 Capsule by mouth 2 times a day. 14 Capsule 0   • ipratropium-albuterol (DUONEB) 0.5-2.5 (3) MG/3ML nebulizer solution Take 3 mL by nebulization 4 times a day.     • albuterol 108 (90 Base) MCG/ACT Aero Soln inhalation aerosol Inhale 2 Puffs every four hours as needed for Shortness of Breath. 1 Each 6   • benzonatate (TESSALON) 100 MG Cap Take 1 Capsule by mouth 3 times a day as needed for Cough. 60 Capsule 0   • metoprolol tartrate (LOPRESSOR) 25 MG Tab Take 1 tablet by mouth 2 times a day. 180 tablet 3   • acetaminophen (TYLENOL) 500 MG Tab Take 1,000 mg by mouth every morning.     • Calcium Carbonate (CALCIUM 600) 1500 (600 Ca) MG Tab Take 1 Tab by mouth 2 (two) times a day.     • Ascorbic Acid (VITAMIN C) 1000 MG Tab Take 1 tablet by mouth 2 times a day.     • AZO-CRANBERRY PO Take 1 Tab by mouth every morning.     • simvastatin (ZOCOR) 10 MG Tab Take 1 Tab by mouth every evening. 100 Tab 0   • levothyroxine (SYNTHROID) " 125 MCG Tab TAKE ONE TABLET BY MOUTH EVERY MORNING ON AN EMPTY STOMACH 90 Tab 0   • sitagliptan-metformin (JANUMET)  MG per tablet Take 1 Tab by mouth 2 times a day, with meals. 200 Tab 0   • Probiotic Product (TRUBIOTICS) Cap Take 1 Capsule by mouth 2 Times a Day.     • Omega-3 Fatty Acids (FISH OIL) 1000 MG Cap capsule Take 1,000 mg by mouth every day.     • Multiple Vitamin (MULTIVITAMIN PO) Take 1 Tab by mouth every day.     • aspirin 81 MG tablet Take 81 mg by mouth every evening.       No current facility-administered medications for this visit.         Physical Exam:   Gen:           Alert and oriented, No apparent distress. Mood and affect appropriate, normal interaction with examiner.  Eyes:          PERRL, EOM intact, sclere white, conjunctive moist.  Ears:          Not examined.   Hearing:     Grossly intact.  Nose:          Normal, no lesions or deformities.  Dentition:     Mask.  Oropharynx:   Mask.  Mallampati Classification: mask  Neck:        Supple, trachea midline, no masses.  Respiratory Effort: No intercostal retractions or use of accessory muscles.   Lung Auscultation:      Diminished t/o with trace RLL expiratory rale; no rhonchi or wheezing.  CV:            Regular rate and rhythm. No murmurs, rubs or gallops.  Abd:           Not examined.   Lymphadenopathy: Not examined.  Gait and Station: In wheelchair.  Digits and Nails: No clubbing, cyanosis, petechiae, or nodes.   Cranial Nerves: II-XII grossly intact.  Skin:        No rashes, lesions or ulcers noted.               Ext:           No cyanosis or edema.      Assessment:  1. Centrilobular emphysema (HCC)  Multiple Oximetry    PULMONARY FUNCTION TESTS -Test requested: Complete Pulmonary Function Test; Include MIPS/MEPS? No    DME O2 New Set Up    Multiple Oximetry    CT-CHEST (THORAX) W/O    DME CNOX By DME Co    DME Nebulizer   2. Chronic respiratory failure with hypoxia (HCC)  Multiple Oximetry    PULMONARY FUNCTION TESTS -Test  requested: Complete Pulmonary Function Test; Include MIPS/MEPS? No    DME O2 New Set Up    Multiple Oximetry    Fluticasone-Umeclidin-Vilant (TRELEGY ELLIPTA) 100-62.5-25 MCG/INH AEROSOL POWDER, BREATH ACTIVATED inhalation    CT-CHEST (THORAX) W/O    DME CNOX By DME Co    DME Nebulizer   3. History of COVID-19  CT-CHEST (THORAX) W/O   4. Primary hypertension     5. Morbid obesity with BMI of 40.0-44.9, adult ([facility])  Height And Weight   6. Former smoker  PULMONARY FUNCTION TESTS -Test requested: Complete Pulmonary Function Test; Include MIPS/MEPS? No   7. Chronic bronchitis, unspecified chronic bronchitis type (HCC)  Fluticasone-Umeclidin-Vilant (TRELEGY ELLIPTA) 100-62.5-25 MCG/INH AEROSOL POWDER, BREATH ACTIVATED inhalation   8. Need for vaccination  Pneumococal Polysaccharide Vaccine 23-Valent =>1YO SQ/IM       Immunizations:    Flu:recommend  Pneumovax 23:updated today  Prevnar 13:2016  COVID-19: 5/4/21, 41/3/21, booster pending    Plan:  1. Patient's symptoms and oxygen demand has progressed since having COVID-19 pneumonia July 2021.  She previously to this carried a diagnosis of moderate COPD.  Based on her symptoms this has progressed.  We will update PFT and CT scan of chest without contrast to reevaluate her lung function and parenchymal changes since COVID-19.  Suspicion of ILD due to rales noted in right lower lobe.  This could also be resolving pneumonia based on recent chest x-rays.  Unfortunately her last 2 chest x-rays are showing similar findings which could indicate chronic changes and not acute pneumonia.  Continue Trelegy 100 mg 1 puff daily, nebulizer or albuterol HFA inhaler every 4-6 hours as needed.  CT scan of chest without contrast in 1 month  PFT in 1 month; pending results will place referral to pulmonary rehab  2.  Continue oxygen 24/7.  Patient will continue to benefit from O2 therapy.  DME O2  DME CNOX on 5.5 L/min O2 at night; pending results we will make adjustments in therapy including  initiating NIV therapy with bleed in O2 versus testing for sleep apnea  3.  Discussed respiratory and sleep hygiene  4.  Encourage weight loss through dietary means and gentle walking  5.  Follow-up in 2 months to review testing including CT scan of chest/PFT results/CNOX results, sooner if needed.    Please note that this dictation was created using voice recognition software. I have made every reasonable attempt to correct obvious errors, but it is possible there are errors of grammar and possibly content that I did not discover before finalizing the note.

## 2022-01-17 NOTE — PATIENT INSTRUCTIONS
In 1 month, complete CT scan of chest w/o contrast and lung function test    Continue current inhalers; refills given    Recommend 6L of oxygen if walking, continue O2 at night with o2 mask if possible    Pneumovax 23 given today    [facility] [phone]

## 2022-01-17 NOTE — LETTER
RODDY Edmonds  [facility] Pulmonary Medicine   [address] [place] [address]  Phone: [phone] - Fax:             Encounter Date: 1/17/2022  Provider: RODDY Edmonds  Location of Care: [facility] PULMONARY MEDICINE  [address] [facility] [address]      Patient:   Camille Rodriguez   MR Number: [id number]   YOB: 1940     PROGRESS NOTE:  Chief Complaint   Patient presents with   • Follow-Up     Chronic obstructive pulmonary disease // last seen 7/12/2019   • Hospital Follow-up     D/C 7/4/2021        HPI:  Camille Rodriguez is a 81 y.o. year old female here today for follow-up on COPD with chronic respiratory failure.  Last office visit 7/12/2019  She presents with a family member who assist her in care    Since last office visit she was hospitalized December 2020 for COVID-19 pneumonia.  Her oxygen demands 4 L/min at that time.  She was treated with Decadron and remdesivir.  She was started on Trelegy 100 mg 1 puff daily.    She was then hospitalized July 2021 for COPD exacerbation and UTI.  She was treated antibiotics and symptoms resolved.  Her oxygen demand at that time 5 to 5.5 L/min.    She presents today noting shortness of breath with any type of activity.  She is quite sedentary and usually sits at home most days.  She notes that cough starting September 2021 that was initially treated by primary care with Augmentin and doxycycline.  His symptoms continued into October and then prescribed Medrol Dosepak with albuterol HFA inhaler.  She then again followed up with January 5 for ongoing cough patient was treated with doxycycline, Medrol Dosepak and Robitussin-AC.  Chest x-ray was ordered and chest x-ray January 10, 2022 noted Bilateral lower lobe pulmonary infiltrates.  No change from prior chest x-ray 9/29/2021.  Prior CBC during hospitalization July 2021 noted anemia.  Reviewed all testing  findings with patient.  She is currently using nebulizer 4 times daily.  She does have Shyann but notes not caring it with her since she does not usually leave the house.  She feels her symptoms have improved since 5 January with very minimal cough at this point.  She denies chest pain, chest tightness or wheezing.  Cough is dry.  Multi ox in office indicated desaturation of 5 L/min and 6 L/min was needed to obtain greater than 90% oxygen saturation.  Reviewed with patient.  She currently sleeps in a recliner and feels she does sleep poorly in general.  She does use an oxygen mask at night due to mouth breathing.  No prior sleep study on file.      [facility] thGthrthathdtheth:th th5th Pulmonary history:  Patient has a 46 year history of smoking 1.5-2 packs daily and quit in 2007. PFT 8/16/2017 indicated FEV1 1.46 L or 69%, FEV1/FVC ratio 74%, and DLCO 57% predicted.   PFT 7/12/2019 indicates postbronchodilator readings due to taking Spiriva at 7:30 AM and albuterol this morning.  FEV1 to 1.4 L or 69%, FEV1/FVC ratio 70, %, TLC 89%, DLCO 45% predicted.     Patient is a former smoker, and at high risk for lung nodules.  CT scan was obtained.  CT scan 6/3/2017 indicated emphysema, mild atelectasis and CAD. Repeat CT scan 5/18/2018 shows changes consistent with emphysema, no suspicious lung nodules, evidence of previous granulomatous disease and CAD.   CT chest 5/31/19:  1.  Emphysematous changes of the upper lobes.  2.  No evidence of new lung nodules  3.  Atherosclerotic changes of the abdominal aorta with evidence of diffuse coronary artery calcifications.  4.  Old granulomatous disease noted in several pretracheal lymph nodes and right hilum.      ROS: As per HPI and otherwise negative if not stated.    Past Medical History:   Diagnosis Date   • Cardiomegaly    • Chickenpox    • Chronic airway obstruction, not elsewhere classified    • Clotting disorder (HCC)    • Diabetes    • Diastolic dysfunction    • Azeri measles    • Mumps     • On home oxygen therapy    • Respiratory failure (HCC)        Past Surgical History:   Procedure Laterality Date   • TX REMV 2ND CATARACT,CORN-SCLER SECTN         Family History   Problem Relation Age of Onset   • Heart Attack Father        Social History     Socioeconomic History   • Marital status:      Spouse name: Not on file   • Number of children: Not on file   • Years of education: Not on file   • Highest education level: Not on file   Occupational History   • Not on file   Tobacco Use   • Smoking status: Former Smoker     Packs/day: 1.50     Years: 46.00     Pack years: 69.00     Types: Cigarettes     Quit date: 2007     Years since quittin.9   • Smokeless tobacco: Never Used   • Tobacco comment: Quit    Vaping Use   • Vaping Use: Never used   Substance and Sexual Activity   • Alcohol use: No   • Drug use: No   • Sexual activity: Never   Other Topics Concern   • Not on file   Social History Narrative   • Not on file     Social Determinants of Health     Financial Resource Strain:    • Difficulty of Paying Living Expenses: Not on file   Food Insecurity:    • Worried About Running Out of Food in the Last Year: Not on file   • Ran Out of Food in the Last Year: Not on file   Transportation Needs:    • Lack of Transportation (Medical): Not on file   • Lack of Transportation (Non-Medical): Not on file   Physical Activity:    • Days of Exercise per Week: Not on file   • Minutes of Exercise per Session: Not on file   Stress:    • Feeling of Stress : Not on file   Social Connections:    • Frequency of Communication with Friends and Family: Not on file   • Frequency of Social Gatherings with Friends and Family: Not on file   • Attends Yarsanism Services: Not on file   • Active Member of Clubs or Organizations: Not on file   • Attends Club or Organization Meetings: Not on file   • Marital Status: Not on file   Intimate Partner Violence:    • Fear of Current or Ex-Partner: Not on file   •  "Emotionally Abused: Not on file   • Physically Abused: Not on file   • Sexually Abused: Not on file   Housing Stability:    • Unable to Pay for Housing in the Last Year: Not on file   • Number of Places Lived in the Last Year: Not on file   • Unstable Housing in the Last Year: Not on file       Allergies as of 01/17/2022 - Reviewed 01/17/2022   Allergen Reaction Noted   • Azithromycin Diarrhea 06/03/2017        Vitals:  /76 (BP Location: Left arm, Patient Position: Sitting, BP Cuff Size: Adult)   Pulse 93   Resp 16   Ht 1.626 m (5' 4\")   Wt 108 kg (239 lb)   SpO2 88%     Current medications as of today   Current Outpatient Medications   Medication Sig Dispense Refill   • Fluticasone-Umeclidin-Vilant (TRELEGY ELLIPTA) 100-62.5-25 MCG/INH AEROSOL POWDER, BREATH ACTIVATED inhalation Inhale 1 Inhalation every day. 3 Each 3   • doxycycline (VIBRAMYCIN) 100 MG Tab Take 1 Tablet by mouth 2 times a day. 14 Tablet 0   • methylPREDNISolone (MEDROL DOSEPAK) 4 MG Tablet Therapy Pack As directed on the packaging label. 21 Tablet 0   • traZODone (DESYREL) 50 MG Tab Take 1 Tablet by mouth at bedtime. 30 Tablet 3   • ferrous sulfate 325 (65 Fe) MG tablet Take 1 Tablet by mouth every day. 90 Tablet 3   • Cholecalciferol 2000 UNIT Cap Take 1 Capsule by mouth every day. 90 Capsule 3   • D-Mannose 500 MG Cap Take 2,000 mg by mouth.     • Mirabegron ER (MYRBETRIQ) 50 MG TABLET SR 24 HR Take 50 mg by mouth.     • nitrofurantoin (MACROBID) 100 MG Cap Take 1 Capsule by mouth 2 times a day. 14 Capsule 0   • ipratropium-albuterol (DUONEB) 0.5-2.5 (3) MG/3ML nebulizer solution Take 3 mL by nebulization 4 times a day.     • albuterol 108 (90 Base) MCG/ACT Aero Soln inhalation aerosol Inhale 2 Puffs every four hours as needed for Shortness of Breath. 1 Each 6   • benzonatate (TESSALON) 100 MG Cap Take 1 Capsule by mouth 3 times a day as needed for Cough. 60 Capsule 0   • metoprolol tartrate (LOPRESSOR) 25 MG Tab Take 1 tablet by " mouth 2 times a day. 180 tablet 3   • acetaminophen (TYLENOL) 500 MG Tab Take 1,000 mg by mouth every morning.     • Calcium Carbonate (CALCIUM 600) 1500 (600 Ca) MG Tab Take 1 Tab by mouth 2 (two) times a day.     • Ascorbic Acid (VITAMIN C) 1000 MG Tab Take 1 tablet by mouth 2 times a day.     • AZO-CRANBERRY PO Take 1 Tab by mouth every morning.     • simvastatin (ZOCOR) 10 MG Tab Take 1 Tab by mouth every evening. 100 Tab 0   • levothyroxine (SYNTHROID) 125 MCG Tab TAKE ONE TABLET BY MOUTH EVERY MORNING ON AN EMPTY STOMACH 90 Tab 0   • sitagliptan-metformin (JANUMET)  MG per tablet Take 1 Tab by mouth 2 times a day, with meals. 200 Tab 0   • Probiotic Product (TRUBIOTICS) Cap Take 1 Capsule by mouth 2 Times a Day.     • Omega-3 Fatty Acids (FISH OIL) 1000 MG Cap capsule Take 1,000 mg by mouth every day.     • Multiple Vitamin (MULTIVITAMIN PO) Take 1 Tab by mouth every day.     • aspirin 81 MG tablet Take 81 mg by mouth every evening.       No current facility-administered medications for this visit.         Physical Exam:   Gen:           Alert and oriented, No apparent distress. Mood and affect appropriate, normal interaction with examiner.  Eyes:          PERRL, EOM intact, sclere white, conjunctive moist.  Ears:          Not examined.   Hearing:     Grossly intact.  Nose:          Normal, no lesions or deformities.  Dentition:     Mask.  Oropharynx:   Mask.  Mallampati Classification: mask  Neck:        Supple, trachea midline, no masses.  Respiratory Effort: No intercostal retractions or use of accessory muscles.   Lung Auscultation:      Diminished t/o with trace RLL expiratory rale; no rhonchi or wheezing.  CV:            Regular rate and rhythm. No murmurs, rubs or gallops.  Abd:           Not examined.   Lymphadenopathy: Not examined.  Gait and Station: In wheelchair.  Digits and Nails: No clubbing, cyanosis, petechiae, or nodes.   Cranial Nerves: II-XII grossly intact.  Skin:        No rashes,  lesions or ulcers noted.               Ext:           No cyanosis or edema.      Assessment:  1. Centrilobular emphysema (HCC)  Multiple Oximetry    PULMONARY FUNCTION TESTS -Test requested: Complete Pulmonary Function Test; Include MIPS/MEPS? No    DME O2 New Set Up    Multiple Oximetry    CT-CHEST (THORAX) W/O    DME CNOX By DME Co    DME Nebulizer   2. Chronic respiratory failure with hypoxia (HCC)  Multiple Oximetry    PULMONARY FUNCTION TESTS -Test requested: Complete Pulmonary Function Test; Include MIPS/MEPS? No    DME O2 New Set Up    Multiple Oximetry    Fluticasone-Umeclidin-Vilant (TRELEGY ELLIPTA) 100-62.5-25 MCG/INH AEROSOL POWDER, BREATH ACTIVATED inhalation    CT-CHEST (THORAX) W/O    DME CNOX By DME Co    DME Nebulizer   3. History of COVID-19  CT-CHEST (THORAX) W/O   4. Primary hypertension     5. Morbid obesity with BMI of 40.0-44.9, adult (HCC)  Height And Weight   6. Former smoker  PULMONARY FUNCTION TESTS -Test requested: Complete Pulmonary Function Test; Include MIPS/MEPS? No   7. Chronic bronchitis, unspecified chronic bronchitis type (HCC)  Fluticasone-Umeclidin-Vilant (TRELEGY ELLIPTA) 100-62.5-25 MCG/INH AEROSOL POWDER, BREATH ACTIVATED inhalation   8. Need for vaccination  Pneumococal Polysaccharide Vaccine 23-Valent =>1YO SQ/IM       Immunizations:    Flu:recommend  Pneumovax 23:updated today  Prevnar 13:2016  COVID-19: 5/4/21, 41/3/21, booster pending    Plan:  1. Patient's symptoms and oxygen demand has progressed since having COVID-19 pneumonia July 2021.  She previously to this carried a diagnosis of moderate COPD.  Based on her symptoms this has progressed.  We will update PFT and CT scan of chest without contrast to reevaluate her lung function and parenchymal changes since COVID-19.  Suspicion of ILD due to rales noted in right lower lobe.  This could also be resolving pneumonia based on recent chest x-rays.  Unfortunately her last 2 chest x-rays are showing similar findings which  could indicate chronic changes and not acute pneumonia.  Continue Trelegy 100 mg 1 puff daily, nebulizer or albuterol HFA inhaler every 4-6 hours as needed.  CT scan of chest without contrast in 1 month  PFT in 1 month; pending results will place referral to pulmonary rehab  2.  Continue oxygen 24/7.  Patient will continue to benefit from O2 therapy.  DME O2  DME CNOX on 5.5 L/min O2 at night; pending results we will make adjustments in therapy including initiating NIV therapy with bleed in O2 versus testing for sleep apnea  3.  Discussed respiratory and sleep hygiene  4.  Encourage weight loss through dietary means and gentle walking  5.  Follow-up in 2 months to review testing including CT scan of chest/PFT results/CNOX results, sooner if needed.    Please note that this dictation was created using voice recognition software. I have made every reasonable attempt to correct obvious errors, but it is possible there are errors of grammar and possibly content that I did not discover before finalizing the note.          Electronically signed by ISHAN Edmonds.R.N.  on 01/17/22    ULI Greene  [address] [facility] [address]  Via In Basket

## 2022-01-26 PROBLEM — I50.9 ACUTE EXACERBATION OF CHF (CONGESTIVE HEART FAILURE) (HCC): Status: ACTIVE | Noted: 2022-01-01

## 2022-01-26 PROBLEM — J96.00 ACUTE RESPIRATORY FAILURE (HCC): Status: ACTIVE | Noted: 2022-01-01

## 2022-01-26 PROBLEM — I26.99 PULMONARY EMBOLISM (HCC): Status: ACTIVE | Noted: 2022-01-01

## 2022-01-26 PROBLEM — R79.89 ELEVATED TROPONIN: Status: ACTIVE | Noted: 2022-01-01

## 2022-01-26 PROBLEM — J96.20 ACUTE ON CHRONIC RESPIRATORY FAILURE (HCC): Status: ACTIVE | Noted: 2022-01-01

## 2022-01-26 NOTE — DISCHARGE PLANNING
HTH/SCP TCN chart review completed. Collaborated with ED  Bindu Parker and Karen prior to meeting with the pt. The most current review of medical record, knowledge of pt's PLOF and social support, LACE+ score of 72, 6 clicks scores of ( none entered) mobility --were considered.      Pt seen at bedside. Introduced TCN program. Provided education regarding differences in post acute resources including IRF, SNF and HH . Discussed HTH/SCP plan benefits including [facility]eds and [facility] transitional care services. Pt verbalizes understanding. Mbr currently with [facility]  services, will notify [facility] services of this admission. Mbr states she lives with family, currently on O2 5.5 Liters at home, uses [facility] and denies additional assistance with food/housing and transportation. Choice forms proactively obtained for HH and DME preferred and given to Elena. TCN will continue to follow and collaborate with discharge planning team as additional post acute needs arise. Thank you.     Choice forms obtained; HH, O2 DME ( Preferred)  [facility] introduced (Y) Referral sent ( Y) * currently with [facility] services

## 2022-01-26 NOTE — ED TRIAGE NOTES
"Chief Complaint   Patient presents with   • Shortness of Breath     x2 hours        BIB [facility] for SOB x2 hours with fevers and chills. Pt has hx of COPD wears 5L NC.  Prior to arrival EMS found pt to be 60% on 5L. Pt was placed on CPAP which she did not tolerate. Pt received duo-neb treatment prior to arrival. Pt is DNR/DNI. ERP at bedside.    /79   Pulse (!) 110   Temp 37.1 °C (98.8 °F) (Temporal)   Resp (!) 24   Ht 1.651 m (5' 5\")   Wt 108 kg (239 lb)   SpO2 94%   BMI 39.77 kg/m²     "

## 2022-01-26 NOTE — ASSESSMENT & PLAN NOTE
Patient on 5 L of home oxygen at baseline  1/26 now Requiring 10L oxygen  RT  duonebs  Oxygen per protocol  2nd to pulm edema with copd component  - cont steroids    1/27 improving, cont steroids  procal neg,   covid neg    1/28 cont prednisone, dc on steroid taper  Dc abx

## 2022-01-26 NOTE — ASSESSMENT & PLAN NOTE
BNP 8799 on arrival  1/26 Lasix twice daily  F/u with family or pharmacy regarding home rx  Pt forgetful    1/27 change to po lasix  CR increasing to 1.2, monitor  Improving sob, 10L to 6L    1/28 improving, baseline home O2 at 5.5L  Order home health, pt/ot eval  Cont lasix  - started asa, cont statin, bb, lasix  If renal fxn, and bp tolerates consider adding acei  outpt cardiology f/u  Echo ef 65%

## 2022-01-26 NOTE — H&P
Hospital Medicine History & Physical Note    Date of Service  1/26/2022    Primary Care Physician  ULI Greene    Consultants      Code Status  Full Code    Chief Complaint  Chief Complaint   Patient presents with   • Shortness of Breath     x2 hours        History of Presenting Illness  Camille Rodriguez is a 81 y.o. female with oxygen dependent COPD, baseline at home on 5L oxygen, HTN, hypothyroidism, DM2, who presented 1/25/2022 with worsening  shortness of breath.  Patient reports associated fevers, chills cough sore throat and body aches.  Her granddaughter was recently diagnosed with COVID-19.  Patient reports she has had symptoms for approximately 24 hours.  Patient states that she was vaccinated for Covid with 2 vaccines approximately 8 to 10 months ago.  Patient is requiring 10 L oxygen upon arrival to the ED.     Notable findings include: , RR 44, /79, requiring 10L oxygen oxymask, WBC 12.4, Troponin 25, glucose 163, BNP 8799, GFR 56    Chest xray showing: Cardiac silhouette enlargement with some increased reticular opacity suspicious for interstitial edema Hyperinflation compatible with chronic obstructive pulmonary disease     EKG showing: RBBB, sinus tachycardia    I discussed the plan of care with patient.    Review of Systems  Review of Systems   Respiratory: Positive for shortness of breath.    All other systems reviewed and are negative.      Past Medical History   has a past medical history of Cardiomegaly, Chickenpox, Chronic airway obstruction, not elsewhere classified, Clotting disorder (HCC), Diabetes, Diastolic dysfunction, [place] measles, Mumps, On home oxygen therapy, and Respiratory failure (HCC).    Surgical History   has a past surgical history that includes pr remv 2nd cataract,corn-scler sectn.     Family History  family history includes Heart Attack in her father.   Family history reviewed with patient. There is no family history that is pertinent to the chief  complaint.     Social History   reports that she quit smoking about 14 years ago. Her smoking use included cigarettes. She has a 69.00 pack-year smoking history. She has never used smokeless tobacco. She reports that she does not drink alcohol and does not use drugs.    Allergies  Allergies   Allergen Reactions   • Azithromycin Diarrhea     Pt states severe diarrhea       Medications  Prior to Admission Medications   Prescriptions Last Dose Informant Patient Reported? Taking?   AZO-CRANBERRY PO  Patient Yes No   Sig: Take 1 Tab by mouth every morning.   Ascorbic Acid (VITAMIN C) 1000 MG Tab  Patient Yes No   Sig: Take 1 tablet by mouth 2 times a day.   Calcium Carbonate (CALCIUM 600) 1500 (600 Ca) MG Tab  Patient Yes No   Sig: Take 1 Tab by mouth 2 (two) times a day.   Cholecalciferol 2000 UNIT Cap   No No   Sig: Take 1 Capsule by mouth every day.   D-Mannose 500 MG Cap   Yes No   Sig: Take 2,000 mg by mouth.   Fluticasone-Umeclidin-Vilant (TRELEGY ELLIPTA) 100-62.5-25 MCG/INH AEROSOL POWDER, BREATH ACTIVATED inhalation   No No   Sig: Inhale 1 Inhalation every day.   JANUMET  MG per tablet   No No   Sig: TAKE ONE TABLET BY MOUTH TWICE A DAY   Mirabegron ER (MYRBETRIQ) 50 MG TABLET SR 24 HR   Yes No   Sig: Take 50 mg by mouth.   Multiple Vitamin (MULTIVITAMIN PO)  Patient Yes No   Sig: Take 1 Tab by mouth every day.   Omega-3 Fatty Acids (FISH OIL) 1000 MG Cap capsule  Patient Yes No   Sig: Take 1,000 mg by mouth every day.   Probiotic Product (TRUBIOTICS) Cap  Patient Yes No   Sig: Take 1 Capsule by mouth 2 Times a Day.   acetaminophen (TYLENOL) 500 MG Tab  Patient Yes No   Sig: Take 1,000 mg by mouth every morning.   albuterol 108 (90 Base) MCG/ACT Aero Soln inhalation aerosol   No No   Sig: Inhale 2 Puffs every four hours as needed for Shortness of Breath.   aspirin 81 MG tablet  Patient Yes No   Sig: Take 81 mg by mouth every evening.   benzonatate (TESSALON) 100 MG Cap   No No   Sig: Take 1 Capsule by  mouth 3 times a day as needed for Cough.   doxycycline (VIBRAMYCIN) 100 MG Tab   No No   Sig: Take 1 Tablet by mouth 2 times a day.   ferrous sulfate 325 (65 Fe) MG tablet   No No   Sig: Take 1 Tablet by mouth every day.   ipratropium-albuterol (DUONEB) 0.5-2.5 (3) MG/3ML nebulizer solution   Yes No   Sig: Take 3 mL by nebulization 4 times a day.   levothyroxine (SYNTHROID) 125 MCG Tab  Patient No No   Sig: TAKE ONE TABLET BY MOUTH EVERY MORNING ON AN EMPTY STOMACH   methylPREDNISolone (MEDROL DOSEPAK) 4 MG Tablet Therapy Pack   No No   Sig: As directed on the packaging label.   metoprolol tartrate (LOPRESSOR) 25 MG Tab  Patient No No   Sig: Take 1 tablet by mouth 2 times a day.   nitrofurantoin (MACROBID) 100 MG Cap   No No   Sig: Take 1 Capsule by mouth 2 times a day.   simvastatin (ZOCOR) 10 MG Tab  Patient No No   Sig: Take 1 Tab by mouth every evening.   traZODone (DESYREL) 50 MG Tab   No No   Sig: Take 1 Tablet by mouth at bedtime.      Facility-Administered Medications: None       Physical Exam  Temp:  [37.1 °C (98.8 °F)] 37.1 °C (98.8 °F)  Pulse:  [] 93  Resp:  [17-44] 17  BP: (103-149)/(55-79) 149/72  SpO2:  [75 %-94 %] 91 %  Blood Pressure : 130/60   Temperature: 37.1 °C (98.8 °F)   Pulse: (!) 107   Respiration: (!) 25   Pulse Oximetry: 88 %       Physical Exam     Constitutional:  Acute respiratory distress  HENT: Normocephalic, no obvious evidence of acute trauma.  Eyes: No scleral icterus. Normal conjunctiva   Neck: Comfortable movement without any obvious restriction in the range of motion.  Cardiovascular:  Tachycardia  Thorax & Lungs:  Acute respiratory distress.  Coarse breath sounds heard in bilateral upper lung fields. there is no obvious chest wall tenderness. I appreciate  reduced  air movement throughout.   Abdomen: The abdomen is not visibly distended. Upon palpation, I find it to be without tenderness.  No mass appreciated.  Skin: The exposed portions of skin reveal no obvious rash or  other abnormalities.  Extremities/Musculoskeletal: no lower extremity edema with no asymmetry.  Neurologic: Alert & oriented. No focal deficits observed.   Psychiatric: Normal affect appropriate for the clinical situation.    Laboratory:  Recent Labs     01/25/22 2050   WBC 12.4*   RBC 4.74   HEMOGLOBIN 12.7   HEMATOCRIT 41.8   MCV 88.2   MCH 26.8*   MCHC 30.4*   RDW 59.3*   PLATELETCT 185   MPV 9.5     Recent Labs     01/25/22 2050   SODIUM 141   POTASSIUM 4.4   CHLORIDE 102   CO2 26   GLUCOSE 163*   BUN 20   CREATININE 0.96   CALCIUM 9.5     Recent Labs     01/25/22 2050   ALTSGPT 13   ASTSGOT 22   ALKPHOSPHAT 57   TBILIRUBIN 0.4   GLUCOSE 163*         Recent Labs     01/25/22  2233   NTPROBNP 8799*         Recent Labs     01/25/22 2050 01/26/22  0000   TROPONINT 25* 34*       Imaging:  DX-CHEST-PORTABLE (1 VIEW)   Final Result      Cardiac silhouette enlargement with some increased reticular opacity suspicious for interstitial edema      Hyperinflation compatible with chronic obstructive pulmonary disease      EC-ECHOCARDIOGRAM COMPLETE W/O CONT    (Results Pending)   NM-LUNG PERFUSION IMAGING    (Results Pending)       Assessment/Plan:  I anticipate this patient will require at least two midnights for appropriate medical management, necessitating inpatient admission.    * Acute on chronic respiratory failure (HCC)- (present on admission)  Assessment & Plan  Patient on 5 L of home oxygen at baseline  Requiring 10L oxygen on arrival to ED  RT  duonebs  Oxygen per protocol    Pulmonary embolism (HCC)  Assessment & Plan  Patient with severe allergy to contrast dye unable to perform CT scan  D-dimer 1.97  Recent exposure to Covid, however negative Covid test in ED  Ordered VQ scan  High pretest probability of PE, heparin drip ordered    Elevated troponin- (present on admission)  Assessment & Plan  Echo ordered  Repeat troponin and EKGs  Continue to monitor    Acute exacerbation of CHF (congestive heart failure)  ([facility])- (present on admission)  Assessment & Plan  BNP 8799 on arrival  Lasix twice daily  Patient states that she is not sure what her home medications are and has a hard time keeping track of all of them    Hypothyroidism- (present on admission)  Assessment & Plan  Continue home meds  TSH/T4 ordered to assess    COPD (chronic obstructive pulmonary disease) ([facility])  Assessment & Plan  Patient states that she quit smoking 15 years prior  RT  DuoNebs  Continue home inhalers  Oxygen per protocol  Consider prednisone twice daily    DM2 (diabetes mellitus, type 2) ([facility])- (present on admission)  Assessment & Plan  ISS  accuchecks  A1C    HTN (hypertension)- (present on admission)  Assessment & Plan  Continue home meds  Admitted with telemetry  Continue to monitor    Sepsis ([facility])  Assessment & Plan  This is Sepsis Present on admission  SIRS criteria identified on my evaluation include: Leukocytosis, with WBC greater than 12,000  Source is unk.  Sepsis protocol initiated  Fluid resuscitation ordered per protocol  IV antibiotics as appropriate for source of sepsis  While organ dysfunction may be noted elsewhere in this problem list or in the chart, degree of organ dysfunction does not meet CMS criteria for severe sepsis            VTE prophylaxis: SCDs/TEDs

## 2022-01-26 NOTE — ED NOTES
Med Rec complete per Pt at bedside w/list provided.  Allergies reviewed.    Home Pharmacy:  [facility]

## 2022-01-26 NOTE — ASSESSMENT & PLAN NOTE
Continue home meds  Admitted with telemetry  Continue to monitor    1/26 uncontrolled, add prn  1/27 controlled    1/28 borderline hyptoension, place holding parameters  Improving rnal   Fxn, f/u am labx

## 2022-01-26 NOTE — ASSESSMENT & PLAN NOTE
Patient states that she quit smoking 15 years prior  RT  DuoNebs  Continue home inhalers  Oxygen per protocol    1/27 Cont prednisone

## 2022-01-26 NOTE — PROGRESS NOTES
[facility] Medicine Daily Progress Note    Date of Service  1/26/2022    Chief Complaint  Camille Rodriguez is a 81 y.o. female admitted 1/25/2022 with SOB.    Hospital Course    Camille Rodriguez is a 81 y.o. female with oxygen dependent COPD, baseline at home on 5L oxygen, HTN, hypothyroidism, DM2, who presented 1/25/2022 with worsening  shortness of breath.  Patient reports associated fevers, chills cough sore throat and body aches.  Her granddaughter was recently diagnosed with COVID-19.  Patient reports she has had symptoms for approximately 24 hours.  Patient states that she was vaccinated for Covid with 2 vaccines approximately 8 to 10 months ago.  Patient is requiring 10 L oxygen upon arrival to the ED.       Chest xray showing: Cardiac silhouette enlargement with some increased reticular opacity suspicious for interstitial edema Hyperinflation compatible with chronic obstructive pulmonary disease     Interval Problem Update  Sob and cough, feels better, on 10L O2    I have personally seen and examined the patient at bedside. I discussed the plan of care with patient.    Consultants/Specialty  none    Code Status  Full Code    Disposition  Patient is not medically cleared for discharge.   Anticipate discharge to to home with close outpatient follow-up.  I have placed the appropriate orders for post-discharge needs.    Review of Systems  Review of Systems   Constitutional: Negative for chills, diaphoresis, fever and malaise/fatigue.   HENT: Negative for congestion and hearing loss.    Respiratory: Positive for cough and shortness of breath. Negative for sputum production and wheezing.    Cardiovascular: Negative for chest pain, palpitations and leg swelling.   Gastrointestinal: Negative for abdominal pain, nausea and vomiting.   Genitourinary: Negative for dysuria and flank pain.   Musculoskeletal: Negative for back pain, joint pain and myalgias.   Neurological: Positive for weakness. Negative for  dizziness, sensory change, speech change, focal weakness and headaches.   Psychiatric/Behavioral: Negative for depression and memory loss. The patient is not nervous/anxious.         Physical Exam  Temp:  [37.1 °C (98.8 °F)] 37.1 °C (98.8 °F)  Pulse:  [] 92  Resp:  [17-66] 66  BP: (103-160)/(54-79) 135/61  SpO2:  [56 %-94 %] 56 %    Physical Exam  Vitals and nursing note reviewed.   Constitutional:       General: She is not in acute distress.     Appearance: She is ill-appearing. She is not diaphoretic.   HENT:      Head: Normocephalic and atraumatic.      Nose: Nose normal.   Eyes:      General:         Right eye: No discharge.         Left eye: No discharge.      Pupils: Pupils are equal, round, and reactive to light.   Neck:      Thyroid: No thyromegaly.      Vascular: No JVD.   Cardiovascular:      Rate and Rhythm: Normal rate.      Heart sounds: No murmur heard.      Pulmonary:      Effort: No respiratory distress.      Breath sounds: Rhonchi present. No wheezing or rales.   Chest:      Chest wall: No tenderness.   Abdominal:      General: Bowel sounds are normal. There is no distension.      Palpations: Abdomen is soft.      Tenderness: There is no abdominal tenderness.   Musculoskeletal:         General: No swelling or tenderness.      Cervical back: Neck supple.   Skin:     General: Skin is warm and dry.      Coloration: Skin is not pale.      Findings: No erythema or rash.   Neurological:      Mental Status: She is alert and oriented to person, place, and time.      Cranial Nerves: No cranial nerve deficit.   Psychiatric:         Behavior: Behavior normal.         Thought Content: Thought content normal.         Fluids    Intake/Output Summary (Last 24 hours) at 1/26/2022 1419  Last data filed at 1/26/2022 1106  Gross per 24 hour   Intake 200 ml   Output --   Net 200 ml       Laboratory  Recent Labs     01/25/22 2050   WBC 12.4*   RBC 4.74   HEMOGLOBIN 12.7   HEMATOCRIT 41.8   MCV 88.2   MCH 26.8*    MCHC 30.4*   RDW 59.3*   PLATELETCT 185   MPV 9.5     Recent Labs     01/25/22  2050   SODIUM 141   POTASSIUM 4.4   CHLORIDE 102   CO2 26   GLUCOSE 163*   BUN 20   CREATININE 0.96   CALCIUM 9.5     Recent Labs     01/26/22  0417   APTT 28.2   INR 1.12               Imaging  EC-ECHOCARDIOGRAM COMPLETE W/ CONT   Final Result      DX-CHEST-PORTABLE (1 VIEW)   Final Result      Cardiac silhouette enlargement with some increased reticular opacity suspicious for interstitial edema      Hyperinflation compatible with chronic obstructive pulmonary disease      NM-LUNG PERFUSION IMAGING    (Results Pending)        Assessment/Plan  * Acute on chronic respiratory failure (HCC)- (present on admission)  Assessment & Plan  Patient on 5 L of home oxygen at baseline  1/26 now Requiring 10L oxygen  RT  duonebs  Oxygen per protocol  2nd to pulm edema with copd component  - cont steroids    Pulmonary embolism (HCC)  Assessment & Plan  Patient with severe allergy to contrast dye unable to perform CT scan  D-dimer 1.97  Recent exposure to Covid, however negative Covid test in ED    1/26 f/u VQ scan  High pretest probability of PE, heparin drip ordered    Elevated troponin- (present on admission)  Assessment & Plan  Echo ef 65%  Repeat troponin and EKGs  Continue to monitor    Acute exacerbation of CHF (congestive heart failure) ([facility])- (present on admission)  Assessment & Plan  BNP 8799 on arrival  1/26 Lasix twice daily  F/u with family or pharmacy regarding home rx  Pt forgetful    Hypothyroidism- (present on admission)  Assessment & Plan  Continue home meds  TSH/T4 ordered to assess    COPD (chronic obstructive pulmonary disease) ([facility])  Assessment & Plan  Patient states that she quit smoking 15 years prior  RT  DuoNebs  Continue home inhalers  Oxygen per protocol  Consider prednisone twice daily    DM2 (diabetes mellitus, type 2) ([facility])- (present on admission)  Assessment & Plan  ISS  accuchecks  A1C    HTN (hypertension)- (present  on admission)  Assessment & Plan  Continue home meds  Admitted with telemetry  Continue to monitor    1/26 uncontrolled, add prn    Sepsis (HCC)  Assessment & Plan  This is Sepsis Present on admission  SIRS criteria identified on my evaluation include: Leukocytosis, with WBC greater than 12,000  Source is unk.  Sepsis protocol initiated  Fluid resuscitation ordered per protocol  IV antibiotics as appropriate for source of sepsis  While organ dysfunction may be noted elsewhere in this problem list or in the chart, degree of organ dysfunction does not meet CMS criteria for severe sepsis    1/26 + UA on abx, f/u cx             VTE prophylaxis: therapeutic anticoagulation with heparin gtt    I have performed a physical exam and reviewed and updated ROS and Plan today (1/26/2022). In review of yesterday's note (1/25/2022), there are no changes except as documented above.

## 2022-01-26 NOTE — ED PROVIDER NOTES
ED Provider Note    CHIEF COMPLAINT  Chief Complaint   Patient presents with   • Shortness of Breath     x2 hours        HPI  Camille Rodriguez is a 81 y.o. female who presents with chief complaint of shortness of breath.  Patient reports a longstanding history of COPD.  Patient reports associated fevers, chills cough sore throat and body aches.  Her granddaughter was recently diagnosed with COVID-19.  Patient reports she has had symptoms for approximately 24 hours.  Patient is vaccinated for COVID and boosters.  Patient does have a history of diabetes.  She denies any associated chest pain.  She denies associated lower extremity edema.  Patient reports cough is nonproductive.  Patient was brought in by EMS, who gave her albuterol nebulization.  She is feeling better after this.  Patient was hypoxic at 60% on her baseline 5 L upon EMS arrival.      REVIEW OF SYSTEMS  ROS    See HPI for further details. All other systems are negative.     PAST MEDICAL HISTORY   has a past medical history of Cardiomegaly, Chickenpox, Chronic airway obstruction, not elsewhere classified, Clotting disorder (HCC), Diabetes, Diastolic dysfunction, [place] measles, Mumps, On home oxygen therapy, and Respiratory failure (HCC).    SOCIAL HISTORY  Social History     Tobacco Use   • Smoking status: Former Smoker     Packs/day: 1.50     Years: 46.00     Pack years: 69.00     Types: Cigarettes     Quit date: 2007     Years since quittin.9   • Smokeless tobacco: Never Used   • Tobacco comment: Quit    Vaping Use   • Vaping Use: Never used   Substance and Sexual Activity   • Alcohol use: No   • Drug use: No   • Sexual activity: Never       SURGICAL HISTORY   has a past surgical history that includes remv 2nd cataract,corn-scler sectn.    CURRENT MEDICATIONS  Home Medications    **Home medications have not yet been reviewed for this encounter**         ALLERGIES  Allergies   Allergen Reactions   • Azithromycin Diarrhea     Pt states  severe diarrhea       PHYSICAL EXAM  Vitals:    01/25/22 2017   BP: 135/79   Pulse: (!) 110   Resp: (!) 24   Temp: 37.1 °C (98.8 °F)   SpO2: 94%       Physical Exam  Constitutional:       Appearance: She is well-developed.   HENT:      Head: Normocephalic and atraumatic.   Eyes:      Conjunctiva/sclera: Conjunctivae normal.   Cardiovascular:      Rate and Rhythm: Normal rate and regular rhythm.   Pulmonary:      Effort: Tachypnea present.      Comments: Scattered crackles throughout  Abdominal:      General: Bowel sounds are normal. There is no distension.      Palpations: Abdomen is soft.      Tenderness: There is no abdominal tenderness. There is no rebound.   Musculoskeletal:      Cervical back: Normal range of motion and neck supple.   Skin:     General: Skin is warm and dry.      Findings: No rash.   Neurological:      Mental Status: She is alert and oriented to person, place, and time.   Psychiatric:         Behavior: Behavior normal.           DIAGNOSTIC STUDIES / PROCEDURES    EKG  See below    LABS  Results for orders placed or performed during the hospital encounter of 01/25/22   CBC WITH DIFFERENTIAL   Result Value Ref Range    WBC 12.4 (H) 4.8 - 10.8 K/uL    RBC 4.74 4.20 - 5.40 M/uL    Hemoglobin 12.7 12.0 - 16.0 g/dL    Hematocrit 41.8 37.0 - 47.0 %    MCV 88.2 81.4 - 97.8 fL    MCH 26.8 (L) 27.0 - 33.0 pg    MCHC 30.4 (L) 33.6 - 35.0 g/dL    RDW 59.3 (H) 35.9 - 50.0 fL    Platelet Count 185 164 - 446 K/uL    MPV 9.5 9.0 - 12.9 fL    Neutrophils-Polys 81.70 (H) 44.00 - 72.00 %    Lymphocytes 12.60 (L) 22.00 - 41.00 %    Monocytes 4.10 0.00 - 13.40 %    Eosinophils 0.80 0.00 - 6.90 %    Basophils 0.50 0.00 - 1.80 %    Immature Granulocytes 0.30 0.00 - 0.90 %    Nucleated RBC 0.00 /100 WBC    Neutrophils (Absolute) 10.16 (H) 2.00 - 7.15 K/uL    Lymphs (Absolute) 1.56 1.00 - 4.80 K/uL    Monos (Absolute) 0.51 0.00 - 0.85 K/uL    Eos (Absolute) 0.10 0.00 - 0.51 K/uL    Baso (Absolute) 0.06 0.00 - 0.12 K/uL     Immature Granulocytes (abs) 0.04 0.00 - 0.11 K/uL    NRBC (Absolute) 0.00 K/uL   CMP   Result Value Ref Range    Sodium 141 135 - 145 mmol/L    Potassium 4.4 3.6 - 5.5 mmol/L    Chloride 102 96 - 112 mmol/L    Co2 26 20 - 33 mmol/L    Anion Gap 13.0 7.0 - 16.0    Glucose 163 (H) 65 - 99 mg/dL    Bun 20 8 - 22 mg/dL    Creatinine 0.96 0.50 - 1.40 mg/dL    Calcium 9.5 8.5 - 10.5 mg/dL    AST(SGOT) 22 12 - 45 U/L    ALT(SGPT) 13 2 - 50 U/L    Alkaline Phosphatase 57 30 - 99 U/L    Total Bilirubin 0.4 0.1 - 1.5 mg/dL    Albumin 4.0 3.2 - 4.9 g/dL    Total Protein 7.3 6.0 - 8.2 g/dL    Globulin 3.3 1.9 - 3.5 g/dL    A-G Ratio 1.2 g/dL   COV-2, FLU A/B, AND RSV BY PCR (2-4 HOURS CEPHEID): Collect NP swab in VTM    Specimen: Respirate   Result Value Ref Range    Influenza virus A RNA Negative Negative    Influenza virus B, PCR Negative Negative    RSV, PCR Negative Negative    SARS-CoV-2 by PCR NotDetected     SARS-CoV-2 Source NP Swab    LACTIC ACID   Result Value Ref Range    Lactic Acid 1.9 0.5 - 2.0 mmol/L   TROPONIN   Result Value Ref Range    Troponin T 25 (H) 6 - 19 ng/L   ESTIMATED GFR   Result Value Ref Range    GFR If African American >60 >60 mL/min/1.73 m 2    GFR If Non  56 (A) >60 mL/min/1.73 m 2   proBrain Natriuretic Peptide, NT   Result Value Ref Range    NT-proBNP 8799 (H) 0 - 125 pg/mL   TROPONIN   Result Value Ref Range    Troponin T 34 (H) 6 - 19 ng/L   PROCALCITONIN   Result Value Ref Range    Procalcitonin 0.19 <0.25 ng/mL   EKG   Result Value Ref Range    Report       [facility] Emergency Dept.    Test Date:  2022  Pt Name:    LETICIA SINGH                 Department: ER  MRN:        [id number]                      Room:        06  Gender:     Female                       Technician: [id number]  :        [id number]                   Requested By:RIAN BHATIA  Order #:    [id number]                    eSlvin LEONG:    Measurements  Intervals                                 Axis  Rate:       108                          P:          11  DE:         140                          QRS:        -12  QRSD:       150                          T:          97  QT:         364  QTc:        488    Interpretive Statements  SINUS TACHYCARDIA  LEFT BUNDLE BRANCH BLOCK  Compared to ECG 07/01/2021 04:05:18  Sinus rhythm no longer present  Intraventricular conduction delay no longer present           RADIOLOGY  DX-CHEST-PORTABLE (1 VIEW)   Final Result      Cardiac silhouette enlargement with some increased reticular opacity suspicious for interstitial edema      Hyperinflation compatible with chronic obstructive pulmonary disease      EC-ECHOCARDIOGRAM COMPLETE W/O CONT    (Results Pending)       35 minutes of critical care spent with this patient    COURSE & MEDICAL DECISION MAKING  Pertinent Labs & Imaging studies reviewed. (See chart for details)    Patient here with symptoms most consistent with Covid-19 pneumonia.  Patient with subjective fevers and chills body aches and sore throat all suggesting infectious etiology.  Flu is also possible though given the current state of our COVID surge this is the most likely diagnosis.  Patient with an associated chest pain.  Will check screening EKG and troponin though my suspicion of ACS as the cause of patient's symptoms remains very low.  Given patient's infectious symptoms by suspicion of vascular etiology is low.  Check chest x-ray.  Given patient's consistently worsening hypoxia I do believe she will likely need admission.  Patient does not have significant wheezing at this point, though she may have initially presented as a COPD exacerbation to EMS and this is why they started albuterol her lungs are now clear.  Will give methylprednisolone but do not believe the patient needs ongoing inline nebulization.  Troponin is mildly elevated, will trend.  Repeat troponin is minimally elevated, consider ongoing trending of troponins when patient is  admitted.  Patient COVID is actually negative, this could be a spurious result however part of etiology is also possible.  Will check BNP.  Patient BNP is significant elevated, likely heart failure exacerbation.  I initially ordered a CTA to evaluate for possible pulmonary embolism though my suspicion of this remains low especially given patient's associated infectious symptoms.  Unfortunately patient unable to get CTA given her questionable allergy to contrast.  I discussed this with hospitalist as patient remains hypoxic on her home 5 L to around 70%, she will need to be admitted for ongoing oxygen therapy and further treatment.  Patient given Lasix for likely heart failure exacerbation.  Patient case discussed with hospitalist who has agreed to admit, I discussed starting heparin however they would like to hold on this.  Patient admitted for further care.    FINAL IMPRESSION  1.  Hypoxic respiratory failure, pulmonary edema    Electronically signed by: Bill Nair M.D., 1/25/2022 8:26 PM

## 2022-01-26 NOTE — DISCHARGE PLANNING
Choice forms for HH and DME received from [employer] [facility] and faxed to media tab for dc planning use.

## 2022-01-26 NOTE — ED NOTES
Received report from ORQUIDEA Nelson. Confirmed that heparin is stopped. Patient is on 10 liter oxy mask. Patient updated on POC. All needs met.

## 2022-01-26 NOTE — ASSESSMENT & PLAN NOTE
This is Sepsis Present on admission  SIRS criteria identified on my evaluation include: Leukocytosis, with WBC greater than 12,000  Source is unk.  Sepsis protocol initiated  Fluid resuscitation ordered per protocol  IV antibiotics as appropriate for source of sepsis  While organ dysfunction may be noted elsewhere in this problem list or in the chart, degree of organ dysfunction does not meet CMS criteria for severe sepsis    1/26 + UA on abx, f/u cx    1/27 +UTI, on unasyn, cx neg  - deescalate abx as appropriate    1/28 completed abx x 3 days  Improving wbc

## 2022-01-26 NOTE — ASSESSMENT & PLAN NOTE
Patient with severe allergy to contrast dye unable to perform CT scan  D-dimer 1.97  Recent exposure to Covid, however negative Covid test in ED    1/26 f/u VQ scan  High pretest probability of PE, heparin drip ordered    1/27 f/u VQ, cont hep     1/28 VQ low prob,dopplers neg  Off hep gtt

## 2022-01-27 NOTE — PROGRESS NOTES
4 Eyes Skin Assessment Completed by ORQUIDEA Hendricks and ORQUIDEA Hawthorne.    Head WDL  Ears Redness and Non-Blanching on right ear, left ear red but slow to jerald         Nose WDL  Mouth WDL  Neck WDL  Breast/Chest WDL  Shoulder Blades WDL  Spine WDL  (R) Arm/Elbow/Hand Bruising  (L) Arm/Elbow/Hand Bruising  Abdomen WDL  Groin WDL  Scrotum/Coccyx/Buttocks Redness related to moisture, blanchable  (R) Leg Bruising  (L) Leg Bruising  (R) Heel/Foot/Toe WDL  (L) Heel/Foot/Toe WDL          Devices In Places Tele Box, Blood Pressure Cuff and Oxy Mask      Interventions In Place Heels Loaded W/Pillows and Pressure Redistribution Mattress    Possible Skin Injury Yes    Pictures Uploaded Into Epic Yes  Wound Consult Placed Yes  RN Wound Prevention Protocol Ordered Yes

## 2022-01-27 NOTE — CARE PLAN
The patient is Stable - Low risk of patient condition declining or worsening    Shift Goals  Clinical Goals: VQ scan 1/27  Patient Goals: sleep/food  Family Goals: na    Progress made toward(s) clinical / shift goals:  Yes    Problem: Knowledge Deficit - Standard  Goal: Patient and family/care givers will demonstrate understanding of plan of care, disease process/condition, diagnostic tests and medications  Outcome: Progressing     Problem: Hemodynamics  Goal: Patient's hemodynamics, fluid balance and neurologic status will be stable or improve  Outcome: Progressing     Problem: Respiratory  Goal: Patient will achieve/maintain optimum respiratory ventilation and gas exchange  Outcome: Progressing     Problem: Fall Risk  Goal: Patient will remain free from falls  Outcome: Progressing       Patient is not progressing towards the following goals:

## 2022-01-27 NOTE — PROGRESS NOTES
Labs today  Vaccines - up to date (works for [facility])  PAP - per GYN  - has f/u apt scheduled in few months  Colonoscopy - discussed and advised - declined now, but will reconsider next year  Mammogram - ordered     Monitor Summary:  SR 86-95  (R)PAC, (R)PVC  .14/.13/.42

## 2022-01-27 NOTE — PROGRESS NOTES
Pt arrived to unit via bed from RED pod. Pt and daughter oriented to room, unit, and plan of care. Tele-monitor placed and monitor room notified, Patient SR on monitor. Patient currently A & O x 4; on 10 L O2 oxy mask; up x1 with FWW; without complaints of acute pain. Heparin drip rate verified with ER Rn Neida. Call light within reach. Whiteboard updated. Fall precautions in place. Bed locked and in lowest position. All questions answered. No other needs indicated at this time.    Patient states she wishes to be DNR/DNI, daughter at bedside confirms this wish. Patient has legal documentation at bedside. MD Nicholson updated on patient wishes.

## 2022-01-27 NOTE — DISCHARGE PLANNING
HTH/SCP TCN chart review completed. Collaborated with , Chica Briceno RN. Appreciate patient 6 clicks for mobility now present with score of 17 present. Will appreciate results of PT consult once completed to further assist in discharge planning. TCN met with patient at bedside. Patient verbalized understanding to current discharge plan including home with HH and oxygen needs. Patient with no additional questions, verbalizing appreciation for TCN visit this day. TCN will continue to follow and collaborate with discharge planning team as additional post acute needs arise. Thank you.    Previously completed:  - Appreciate recommendations for PT consult  - Choice forms obtained; HH, O2 DME (Preferred)  -[facility] introduced (Y) Referral sent ( Y) * currently with [facility] services

## 2022-01-27 NOTE — PROGRESS NOTES
[facility] Medicine Daily Progress Note    Date of Service  1/27/2022    Chief Complaint  Camille Rodriguez is a 81 y.o. female admitted 1/25/2022 with SOB.    Hospital Course    Camille Rodriguez is a 81 y.o. female with oxygen dependent COPD, baseline at home on 5L oxygen, HTN, hypothyroidism, DM2, who presented 1/25/2022 with worsening  shortness of breath.  Patient reports associated fevers, chills cough sore throat and body aches.  Her granddaughter was recently diagnosed with COVID-19.  Patient reports she has had symptoms for approximately 24 hours.  Patient states that she was vaccinated for Covid with 2 vaccines approximately 8 to 10 months ago.  Patient is requiring 10 L oxygen upon arrival to the ED.       Chest xray showing: Cardiac silhouette enlargement with some increased reticular opacity suspicious for interstitial edema Hyperinflation compatible with chronic obstructive pulmonary disease     Interval Problem Update  1/26 Sob and cough, feels better, on 10L O2  1/27 on 6L improving sob, feels better    I have personally seen and examined the patient at bedside. I discussed the plan of care with patient.    Consultants/Specialty  none    Code Status  DNAR/DNI    Disposition  Patient is not medically cleared for discharge.   Anticipate discharge to to home with close outpatient follow-up.  I have placed the appropriate orders for post-discharge needs.    Review of Systems  Review of Systems   Constitutional: Negative for chills and fever.   HENT: Negative for congestion.    Respiratory: Positive for cough and shortness of breath. Negative for sputum production and wheezing.    Cardiovascular: Negative for chest pain and leg swelling.   Gastrointestinal: Negative for abdominal pain, nausea and vomiting.   Genitourinary: Negative for dysuria and flank pain.   Musculoskeletal: Negative for back pain and myalgias.   Neurological: Positive for weakness. Negative for dizziness, focal weakness and  headaches.   Psychiatric/Behavioral: Negative for memory loss. The patient is not nervous/anxious.         Physical Exam  Temp:  [36.2 °C (97.2 °F)-36.7 °C (98 °F)] 36.3 °C (97.3 °F)  Pulse:  [83-97] 85  Resp:  [15-20] 16  BP: (123-137)/(71-99) 127/81  SpO2:  [91 %-97 %] 91 %    Physical Exam  Vitals and nursing note reviewed.   Constitutional:       General: She is not in acute distress.     Appearance: She is ill-appearing.   HENT:      Head: Normocephalic and atraumatic.      Nose: Nose normal.   Eyes:      General:         Right eye: No discharge.         Left eye: No discharge.      Pupils: Pupils are equal, round, and reactive to light.   Neck:      Thyroid: No thyromegaly.      Vascular: No JVD.   Cardiovascular:      Rate and Rhythm: Normal rate.      Heart sounds: No murmur heard.      Pulmonary:      Effort: No respiratory distress.      Breath sounds: Rhonchi present. No wheezing.   Abdominal:      General: Bowel sounds are normal. There is no distension.      Palpations: Abdomen is soft.      Tenderness: There is no abdominal tenderness.   Musculoskeletal:         General: No swelling or tenderness.      Cervical back: Neck supple.   Skin:     General: Skin is warm and dry.      Coloration: Skin is not pale.      Findings: No erythema.   Neurological:      Mental Status: She is alert and oriented to person, place, and time.      Cranial Nerves: No cranial nerve deficit.   Psychiatric:         Behavior: Behavior normal.         Thought Content: Thought content normal.         Fluids    Intake/Output Summary (Last 24 hours) at 1/27/2022 1340  Last data filed at 1/27/2022 0717  Gross per 24 hour   Intake --   Output 1200 ml   Net -1200 ml       Laboratory  Recent Labs     01/25/22 2050 01/27/22  0229   WBC 12.4* 13.0*   RBC 4.74 4.42   HEMOGLOBIN 12.7 11.6*   HEMATOCRIT 41.8 39.4   MCV 88.2 89.1   MCH 26.8* 26.2*   MCHC 30.4* 29.4*   RDW 59.3* 59.1*   PLATELETCT 185 185   MPV 9.5 10.7     Recent Labs      01/25/22 2050 01/27/22  0229   SODIUM 141 138   POTASSIUM 4.4 5.1   CHLORIDE 102 97   CO2 26 31   GLUCOSE 163* 157*   BUN 20 26*   CREATININE 0.96 1.25   CALCIUM 9.5 8.6     Recent Labs     01/26/22  0417   APTT 28.2   INR 1.12         Recent Labs     01/27/22  0229   TRIGLYCERIDE 84   HDL 85   LDL 29       Imaging  EC-ECHOCARDIOGRAM COMPLETE W/ CONT   Final Result      DX-CHEST-PORTABLE (1 VIEW)   Final Result      Cardiac silhouette enlargement with some increased reticular opacity suspicious for interstitial edema      Hyperinflation compatible with chronic obstructive pulmonary disease      US-EXTREMITY VENOUS LOWER BILAT    (Results Pending)   NM-LUNG VENT/PERF IMAGING    (Results Pending)   DX-CHEST-2 VIEWS    (Results Pending)        Assessment/Plan  * Acute on chronic respiratory failure (HCC)- (present on admission)  Assessment & Plan  Patient on 5 L of home oxygen at baseline  1/26 now Requiring 10L oxygen  RT  duonebs  Oxygen per protocol  2nd to pulm edema with copd component  - cont steroids    1/27 improving, cont steroids  procal neg,   covid neg    Pulmonary embolism (HCC)  Assessment & Plan  Patient with severe allergy to contrast dye unable to perform CT scan  D-dimer 1.97  Recent exposure to Covid, however negative Covid test in ED    1/26 f/u VQ scan  High pretest probability of PE, heparin drip ordered    1/27 f/u VQ, cont hep     Elevated troponin- (present on admission)  Assessment & Plan  Echo ef 65%  Repeat troponin and EKGs  Continue to monitor    Acute exacerbation of CHF (congestive heart failure) (HCC)- (present on admission)  Assessment & Plan  BNP 8799 on arrival  1/26 Lasix twice daily  F/u with family or pharmacy regarding home rx  Pt forgetful    1/27 change to po lasix  CR increasing to 1.2, monitor  Improving sob, 10L to 6L    Hypothyroidism- (present on admission)  Assessment & Plan  Continue home meds  TSH/T4 ordered to assess    COPD (chronic obstructive pulmonary disease)  ([facility])  Assessment & Plan  Patient states that she quit smoking 15 years prior  RT  DuoNebs  Continue home inhalers  Oxygen per protocol    1/27 Cont prednisone     DM2 (diabetes mellitus, type 2) ([facility])- (present on admission)  Assessment & Plan  ISS  accuchecks  A1C    HTN (hypertension)- (present on admission)  Assessment & Plan  Continue home meds  Admitted with telemetry  Continue to monitor    1/26 uncontrolled, add prn  1/27 controlled    CKD (chronic kidney disease), stage III ([facility])- (present on admission)  Assessment & Plan  1/27 monitor, decrease lasix dose    Sepsis ([facility])  Assessment & Plan  This is Sepsis Present on admission  SIRS criteria identified on my evaluation include: Leukocytosis, with WBC greater than 12,000  Source is unk.  Sepsis protocol initiated  Fluid resuscitation ordered per protocol  IV antibiotics as appropriate for source of sepsis  While organ dysfunction may be noted elsewhere in this problem list or in the chart, degree of organ dysfunction does not meet CMS criteria for severe sepsis    1/26 + UA on abx, f/u cx    1/27 +UTI, on unasyn, cx neg  - deescalate abx as appropriate             VTE prophylaxis: therapeutic anticoagulation with heparin gtt    I have performed a physical exam and reviewed and updated ROS and Plan today (1/27/2022). In review of yesterday's note (1/26/2022), there are no changes except as documented above.

## 2022-01-27 NOTE — DIETARY
NUTRITION SERVICES: BMI - Pt with BMI >40 (=Body mass index is 41.38 kg/m².), Class III obesity. Weight loss counseling not appropriate in acute care setting. RECOMMEND - If appropriate at DC please refer to outpatient nutrition services for weight management.

## 2022-01-27 NOTE — PROGRESS NOTES
Monitor Summary  Rhythm: SR  Rate: 80-90  Ectopy: None  0.14 / 0.11 / 0.42    Monitor strip reviewed.

## 2022-01-27 NOTE — THERAPY
Physical Therapy Contact Note    Patient Name: Camille Rodriguez  Age:  81 y.o., Sex:  female  Medical Record #: [id number]  Today's Date: 1/27/2022    PT consult received. Pt currently pending BLE US to r/o DVT given edema and tenderness. Will hold and follow up as appropriate.     Reuben Manuel PT, DPT [id number]

## 2022-01-28 NOTE — HEART FAILURE PROGRAM
Documentation Clarification:    Please note, this patient was identified as having a Heart Failure (HF) diagnosis by our HF Report to Web tool which is based upon problems lists and/or provider notes.     This patient has one or more of the below exclusion criteria for Heart Failure with Preserved Ejection Fraction (HFpEF) and therefore does not qualify for this diagnosis at this time, we ask that these confounding conditions be controlled before HFpEF be diagnosed.    Please remove HF from the problem list as well as the progress notes going forward.    For questions, please contact Mayte Mancia RN, [facility] or Charanjit Gómez MD through [employer]. Thanks for your cooperation.    • COPD: O2 Dependent or on Chronic Steroids    • Restrictive lung disease  • BMI > 40  • Acute PE  • PAH  • Active PNA  • GFR < 30  • Uncontrolled HTN  • Anemia with Hg <10  • Hemodynamically significant valve disease: Moderate or greater  • Uncontrolled AF  • Cirrhosis  • Right sided heart failure

## 2022-01-28 NOTE — THERAPY
Physical Therapy   Initial Evaluation     Patient Name: Camille Rodriguez  Age:  81 y.o., Sex:  female  Medical Record #: [id number]  Today's Date: 1/28/2022     Precautions  Precautions: Fall Risk (supplemental O2 )    Assessment  Patient is an 81 y.o. female with a diagnosis of acute on chronic respiratory failure.  Pt has PMHx of COPD, baseline  5.5 L oxygen, HTN, hypothyroidism, DM2.   Pt resides with her daughter and family who are able to assist as needed.  Pt was able to ambulate for functional  home distances w/ FWW, Spo2 decreased to 84 % after 100 ft, pt required 3 min rest break and O2 at 6 l/m to resat > 90 %. Pt demonstrates slow but steady gait. Pt has all required DME for home. Recommend home health services.     Plan    Recommend Physical Therapy for Evaluation only . Patient will not be actively followed for physical therapy services at this time, however may be seen if requested by physician for 1 more visit within 30 days to address any discharge or equipment needs    DC Equipment Recommendations: None  Discharge Recommendations: Recommend home health for continued physical therapy services          Objective       01/28/22 1106   Vitals   Pulse Oximetry 92 %  (84% with activity, resats to > 88 % in 3 min on 6 l/m)   O2 (LPM) 6   O2 Delivery Device Silicone Nasal Cannula   Vitals Comments RN updated on O2 findings with activity   Pain   Intervention Declines   Prior Living Situation   Prior Services   (home with daughter and MITCHEL )   Housing / Facility 2 Story House  (does not go upstairs)   Steps Into Home 3   Steps In Home   (FOS)   Rail Right Rail (Steps into Home)   Equipment Owned 4-Wheel Walker;Oxygen;Wheelchair   Lives with - Patient's Self Care Capacity Adult Children   Comments family is able to assist as needed. Provides transportation and assistance in and out of home. WC in community    Prior Level of Functional Mobility   Bed Mobility Independent   Transfer Status Independent    Ambulation Independent   Distance Ambulation (Feet)   (household with 4WW  O2 via NC at 5.5)   Assistive Devices Used 4-Wheel Walker   Stairs Required Assist   Comments family assists with stairs.    History of Falls   History of Falls No   Cognition    Level of Consciousness Alert   Active ROM Lower Body    Active ROM Lower Body  WDL   Strength Lower Body   Lower Body Strength  WDL   Neurological Concerns   Neurological Concerns No   Vision   Vision Comments no c/o    Balance Assessment   Sitting Balance (Static) Good   Sitting Balance (Dynamic) Good   Standing Balance (Static) Fair +   Standing Balance (Dynamic) Fair   Weight Shift Sitting Good   Weight Shift Standing Fair   Comments w/ FWW   Gait Analysis   Gait Level Of Assist Supervised   Assistive Device Front Wheel Walker   Distance (Feet) 100   # of Times Distance was Traveled 1   Deviation Bradykinetic;Decreased Heel Strike;Decreased Toe Off  (heavy reliance on FWW)   # of Stairs Climbed 0   Weight Bearing Status no restrictions.    Comments c/o SOB, desat to 84 % on 6 l/m   Bed Mobility    Supine to Sit Independent   Sit to Supine Independent   Scooting Independent   Rolling Independent   Functional Mobility   Sit to Stand Supervised   Bed, Chair, Wheelchair Transfer Supervised   Toilet Transfers Supervised   Transfer Method Stand Step   Mobility EOB>toilet> hallway> chair.    Comments extended time required for activity and rest    Activity Tolerance   Sitting in Chair > 30   Sitting Edge of Bed 15   Standing 5   Education Group   Role of Physical Therapist Patient Response Patient;Acceptance;Explanation;Verbal Demonstration   Gait Training Patient Response Patient;Acceptance;Explanation;Action Demonstration   Additional Comments Pt educated on pursed lip breathing, pacing, and walker sequencing.    Problem List    Problems Decreased Activity Tolerance   Anticipated Discharge Equipment and Recommendations   Discharge Recommendations Recommend home  [facility] for continued physical therapy services

## 2022-01-28 NOTE — FACE TO FACE
Face to Face Supporting Documentation - Home Health    The encounter with this patient was in whole or in part the primary reason for home health admission.    Date of encounter:   Patient:                    MRN:                       YOB: 2021  Camille Rodriguez  [id number]  1940     Home health to see patient for:  Skilled Nursing care for assessment, interventions & education, Physical Therapy evaluation and treatment and Occupational therapy evaluation and treatment    Skilled need for:  Exacerbation of Chronic Disease State copd and New Onset Medical Diagnosis acute respiratory failure with hypoxia, uti    Skilled nursing interventions to include:  Comment: pt/ot    Homebound status evidenced by:  Need the aid of supportive devices such as crutches, canes, wheelchairs or walkers or Needs the assistance of another person in order to leave the home. Leaving home requires a considerable and taxing effort. There is a normal inability to leave the home.    Community Physician to provide follow up care: [facility]     Optional Interventions? No      I certify the face to face encounter for this home health care referral meets the CMS requirements and the encounter/clinical assessment with the patient was, in whole, or in part, for the medical condition(s) listed above, which is the primary reason for home health care. Based on my clinical findings: the service(s) are medically necessary, support the need for home health care, and the homebound criteria are met.  I certify that this patient has had a face to face encounter by myself.  Luli Muñoz D.O. - TRACII: [id number]

## 2022-01-28 NOTE — DISCHARGE PLANNING
HTH/SCP TCN chart review completed. Collaborated with , Oswald Kruse RN. TCN met with patient at bedside. Appreciate PT consult with recommendations for home with HH. Discharge plan reviewed with patient with patient verbalizing understanding to current plan of home with HH. [facility] referral previously sent. No additional TCN needs identified this day. TCN will continue to follow and collaborate with discharge planning team as additional post acute needs arise. Thank you.

## 2022-01-28 NOTE — PROGRESS NOTES
[facility] Medicine Daily Progress Note    Date of Service  1/28/2022    Chief Complaint  Camille Rodriguez is a 81 y.o. female admitted 1/25/2022 with SOB.    Hospital Course    Camille Rodriguez is a 81 y.o. female with oxygen dependent COPD, baseline at home on 5L oxygen, HTN, hypothyroidism, DM2, who presented 1/25/2022 with worsening  shortness of breath.  Patient reports associated fevers, chills cough sore throat and body aches.  Her granddaughter was recently diagnosed with COVID-19.  Patient reports she has had symptoms for approximately 24 hours.  Patient states that she was vaccinated for Covid with 2 vaccines approximately 8 to 10 months ago.  Patient is requiring 10 L oxygen upon arrival to the ED.       Chest xray showing: Cardiac silhouette enlargement with some increased reticular opacity suspicious for interstitial edema Hyperinflation compatible with chronic obstructive pulmonary disease     Interval Problem Update  1/26 Sob and cough, feels better, on 10L O2  1/27 on 6L improving sob, feels better  1/28 improving, now on 5.5L, baseline, bp low, only uses lasix prn at [facility],pt/ot    I have personally seen and examined the patient at bedside. I discussed the plan of care with patient.    Consultants/Specialty  none    Code Status  DNAR/DNI    Disposition  Patient is not medically cleared for discharge.   Anticipate discharge to to home with close outpatient follow-up.  I have placed the appropriate orders for post-discharge needs.    Review of Systems  Review of Systems   Constitutional: Negative for diaphoresis, fever and malaise/fatigue.   HENT: Negative for congestion and hearing loss.    Respiratory: Positive for cough and shortness of breath. Negative for sputum production and wheezing.    Cardiovascular: Negative for palpitations and leg swelling.   Gastrointestinal: Negative for abdominal pain, heartburn and nausea.   Genitourinary: Negative for flank pain.   Musculoskeletal: Negative for  back pain and myalgias.   Neurological: Positive for weakness. Negative for focal weakness and headaches.   Psychiatric/Behavioral: The patient is not nervous/anxious.         Physical Exam  Temp:  [36.5 °C (97.7 °F)-37.1 °C (98.7 °F)] 36.8 °C (98.2 °F)  Pulse:  [66-91] 73  Resp:  [15-18] 16  BP: ()/(46-91) 119/71  SpO2:  [90 %-98 %] 94 %    Physical Exam  Vitals and nursing note reviewed.   Constitutional:       Appearance: She is ill-appearing. She is not toxic-appearing or diaphoretic.   HENT:      Head: Normocephalic and atraumatic.      Nose: Nose normal.   Eyes:      Extraocular Movements: Extraocular movements intact.      Pupils: Pupils are equal, round, and reactive to light.   Neck:      Thyroid: No thyromegaly.      Vascular: No JVD.   Cardiovascular:      Rate and Rhythm: Normal rate.      Heart sounds: No murmur heard.      Pulmonary:      Effort: No respiratory distress.      Breath sounds: Rhonchi present.   Abdominal:      General: Bowel sounds are normal. There is no distension.      Palpations: Abdomen is soft.   Musculoskeletal:         General: No swelling or tenderness.      Cervical back: Neck supple.   Skin:     General: Skin is warm and dry.      Coloration: Skin is not pale.   Neurological:      Mental Status: She is alert and oriented to person, place, and time.      Cranial Nerves: No cranial nerve deficit.   Psychiatric:         Behavior: Behavior normal.         Thought Content: Thought content normal.         Fluids    Intake/Output Summary (Last 24 hours) at 1/28/2022 1144  Last data filed at 1/28/2022 1010  Gross per 24 hour   Intake 380 ml   Output 1100 ml   Net -720 ml       Laboratory  Recent Labs     01/25/22 2050 01/27/22  0229 01/28/22  0145   WBC 12.4* 13.0* 11.0*   RBC 4.74 4.42 4.53   HEMOGLOBIN 12.7 11.6* 12.2   HEMATOCRIT 41.8 39.4 41.0   MCV 88.2 89.1 90.5   MCH 26.8* 26.2* 26.9*   MCHC 30.4* 29.4* 29.8*   RDW 59.3* 59.1* 58.9*   PLATELETCT 185 185 191   MPV 9.5  10.7 10.5     Recent Labs     01/25/22  2050 01/27/22  0229 01/28/22  0430   SODIUM 141 138 134*   POTASSIUM 4.4 5.1 5.9*   CHLORIDE 102 97 96   CO2 26 31 25   GLUCOSE 163* 157* 272*   BUN 20 26* 31*   CREATININE 0.96 1.25 1.14   CALCIUM 9.5 8.6 8.7     Recent Labs     01/26/22  0417   APTT 28.2   INR 1.12         Recent Labs     01/27/22  0229   TRIGLYCERIDE 84   HDL 85   LDL 29       Imaging  US-EXTREMITY VENOUS LOWER BILAT   Final Result      DX-CHEST-PORTABLE (1 VIEW)   Final Result      No significant interval change.      NM-LUNG VENT/PERF IMAGING   Final Result      Low probability for pulmonary embolus      EC-ECHOCARDIOGRAM COMPLETE W/ CONT   Final Result      DX-CHEST-PORTABLE (1 VIEW)   Final Result      Cardiac silhouette enlargement with some increased reticular opacity suspicious for interstitial edema      Hyperinflation compatible with chronic obstructive pulmonary disease           Assessment/Plan  * Acute on chronic respiratory failure (HCC)- (present on admission)  Assessment & Plan  Patient on 5 L of home oxygen at baseline  1/26 now Requiring 10L oxygen  RT  duonebs  Oxygen per protocol  2nd to pulm edema with copd component  - cont steroids    1/27 improving, cont steroids  procal neg,   covid neg    1/28 cont prednisone, dc on steroid taper  Dc abx    Pulmonary embolism (HCC)  Assessment & Plan  Patient with severe allergy to contrast dye unable to perform CT scan  D-dimer 1.97  Recent exposure to Covid, however negative Covid test in ED    1/26 f/u VQ scan  High pretest probability of PE, heparin drip ordered    1/27 f/u VQ, cont hep     1/28 VQ low prob,dopplers neg  Off hep gtt    Elevated troponin- (present on admission)  Assessment & Plan  Echo ef 65%  Repeat troponin and EKGs  Continue to monitor    Acute exacerbation of CHF (congestive heart failure) (HCC)- (present on admission)  Assessment & Plan  BNP 8799 on arrival  1/26 Lasix twice daily  F/u with family or pharmacy regarding home  rx  Pt forgetful    1/27 change to po lasix  CR increasing to 1.2, monitor  Improving sob, 10L to 6L    1/28 improving, baseline home O2 at 5.5L  Order home health, pt/ot eval  Cont lasix  - started asa, cont statin, bb, lasix  If renal fxn, and bp tolerates consider adding acei  outpt cardiology f/u  Echo ef 65%    Hypothyroidism- (present on admission)  Assessment & Plan  Continue home meds  TSH/T4 ordered to assess    COPD (chronic obstructive pulmonary disease) ([facility])  Assessment & Plan  Patient states that she quit smoking 15 years prior  RT  DuoNebs  Continue home inhalers  Oxygen per protocol    1/27 Cont prednisone     DM2 (diabetes mellitus, type 2) ([facility])- (present on admission)  Assessment & Plan  ISS  accuchecks  A1C    HTN (hypertension)- (present on admission)  Assessment & Plan  Continue home meds  Admitted with telemetry  Continue to monitor    1/26 uncontrolled, add prn  1/27 controlled    1/28 borderline hyptoension, place holding parameters  Improving rnal   Fxn, f/u am labx      CKD (chronic kidney disease), stage III ([facility])- (present on admission)  Assessment & Plan  1/27 monitor, decrease lasix dose    Sepsis ([facility])  Assessment & Plan  This is Sepsis Present on admission  SIRS criteria identified on my evaluation include: Leukocytosis, with WBC greater than 12,000  Source is unk.  Sepsis protocol initiated  Fluid resuscitation ordered per protocol  IV antibiotics as appropriate for source of sepsis  While organ dysfunction may be noted elsewhere in this problem list or in the chart, degree of organ dysfunction does not meet CMS criteria for severe sepsis    1/26 + UA on abx, f/u cx    1/27 +UTI, on unasyn, cx neg  - deescalate abx as appropriate    1/28 completed abx x 3 days  Improving wbc           VTE prophylaxis: therapeutic anticoagulation with heparin gtt    I have performed a physical exam and reviewed and updated ROS and Plan today (1/28/2022). In review of yesterday's note (1/27/2022),  there are no changes except as documented above.

## 2022-01-28 NOTE — DISCHARGE PLANNING
ATTN: Case Management  RE: Referral for Home Health    As of 01/28/2022, we have accepted the Home Health referral for the patient listed above.    A [facility] clinician will be out to see the patient within 48 hours. If you have any questions or concerns regarding the patient's transition to Home Health, please do not hesitate to contact us at [phone].      We look forward to collaborating with you,  [facility] Health Team

## 2022-01-28 NOTE — DISCHARGE PLANNING
Anticipated Discharge Disposition: HH    Action: pt pending medical clearance, HH order received this a.m., choice obtained by TCN for [facility] 1/26. Pt at baseline O2.    Barriers to Discharge: HH acceptance, medical clearance    Plan: f/u with [facility] for HH acceptance and medical team for clearance.

## 2022-01-28 NOTE — DISCHARGE PLANNING
Received Choice form at 1/26 1000  Agency/Facility Name: [facility]   Referral sent per Choice form @ 0810     0853-  Agency/Facility Name: [facility]  Outcome: Pt accepted per Epic response.     RN CM notified      Acute respiratory failure with hypercapnia Cardiac arrest Cardiac arrest Cardiac arrest Cardiac arrest

## 2022-01-29 NOTE — PROGRESS NOTES
Monitor Summary     Rhythm: SR 68-85 with BBB  Interval: .15/.14/.44  Ectopy: (R) PVC, (R) PAC

## 2022-01-29 NOTE — CARE PLAN
Problem: Knowledge Deficit - Standard  Goal: Patient and family/care givers will demonstrate understanding of plan of care, disease process/condition, diagnostic tests and medications  Outcome: Progressing     Problem: Respiratory  Goal: Patient will achieve/maintain optimum respiratory ventilation and gas exchange  Outcome: Progressing     Problem: Fall Risk  Goal: Patient will remain free from falls  Outcome: Progressing   The patient is Stable - Low risk of patient condition declining or worsening    Shift Goals  Clinical Goals: d/c  Patient Goals: d/c  Family Goals: d/c    Progress made toward(s) clinical / shift goals:      Patient is not progressing towards the following goals:

## 2022-01-29 NOTE — DISCHARGE PLANNING
TCN following. HTH/SCP chart review completed. Accepted to  and noted order for 02 by MD in chart as well.    Previously completed:  - Appreciate recommendations for PT consult  - Choice forms obtained; [facility] (Preferred)  -[facility] introduced (Y) Referral sent ( Y) * currently with [facility] services

## 2022-01-29 NOTE — FACE TO FACE
"Face to Face Note  -  Durable Medical Equipment    Luli Muñoz D.O. - NPI: [id number]  I certify that this patient is under my care and that they had a durable medical equipment(DME)face to face encounter by myself that meets the physician DME face-to-face encounter requirements with this patient on:    Date of encounter:   Patient:                    MRN:                       YOB: 2021  Camille Rodriguez  [id number]  1940     The encounter with the patient was in whole, or in part, for the following medical condition, which is the primary reason for durable medical equipment:  COPD    I certify that, based on my findings, the following durable medical equipment is medically necessary:  Oxygen.    HOME O2 Saturation Measurements:(Values must be present for Home Oxygen orders)        With liters of O2: 5, O2 sat at rest with O2: 94  With Liters of O2: 6, O2 sat with amb with O2 : 84  Is the patient mobile?: Yes    My Clinical findings support the need for the above equipment due to:  Hypoxia    Supporting Symptoms: The patient requires supplemental oxygen, as the following interventions have been tried with limited or no improvement: \"Oral and/or IV steroids    If patient feels more short of breath, they can go up to 6 liters per minute and contact healthcare provider.  "

## 2022-01-29 NOTE — CARE PLAN
Problem: Knowledge Deficit - Standard  Goal: Patient and family/care givers will demonstrate understanding of plan of care, disease process/condition, diagnostic tests and medications  Outcome: Progressing     Problem: Hemodynamics  Goal: Patient's hemodynamics, fluid balance and neurologic status will be stable or improve  Outcome: Progressing     Problem: Fluid Volume  Goal: Fluid volume balance will be maintained  Outcome: Progressing     Problem: Urinary - Renal Perfusion  Goal: Ability to achieve and maintain adequate renal perfusion and functioning will improve  Outcome: Progressing     Problem: Respiratory  Goal: Patient will achieve/maintain optimum respiratory ventilation and gas exchange  Outcome: Progressing     Problem: Mechanical Ventilation  Goal: Safe management of artificial airway and ventilation  Outcome: Progressing  Goal: Successful weaning off mechanical ventilator, spontaneously maintains adequate gas exchange  Outcome: Progressing  Goal: Patient will be able to express needs and understand communication  Outcome: Progressing     Problem: Physical Regulation  Goal: Diagnostic test results will improve  Outcome: Progressing  Goal: Signs and symptoms of infection will decrease  Outcome: Progressing     Problem: Fall Risk  Goal: Patient will remain free from falls  Outcome: Progressing     Problem: Pain - Standard  Goal: Alleviation of pain or a reduction in pain to the patient’s comfort goal  Outcome: Progressing     Problem: Skin Integrity  Goal: Skin integrity is maintained or improved  Outcome: Progressing   The patient is Stable - Low risk of patient condition declining or worsening    Shift Goals  Clinical Goals: Monitor o2  Patient Goals: rest  Family Goals: na    Progress made toward(s) clinical / shift goals:  This nurse assumed care at  0700. Patient has been up to chair with minimal discomfort noted. Patient remains a x1 assist with ambulation to chair. Patient had home  o2 eval with  patient requiring 5L at base and 6L while ambulating. Patient has no complaints of pain and is calm and cooperative with treatment.     Patient is not progressing towards the following goals:

## 2022-01-29 NOTE — DISCHARGE SUMMARY
Discharge Summary    CHIEF COMPLAINT ON ADMISSION  Chief Complaint   Patient presents with   • Shortness of Breath     x2 hours        Reason for Admission  SOB     Admission Date  1/25/2022    CODE STATUS  Prior    HPI & HOSPITAL COURSE  Camille Rodriguez is a 81 y.o. female with oxygen dependent COPD, baseline at home on 5L oxygen, HTN, hypothyroidism, DM2, who presented 1/25/2022 with worsening  shortness of breath.  Patient reports associated fevers, chills cough sore throat and body aches.  Her granddaughter was recently diagnosed with COVID-19.  Patient reports she has had symptoms for approximately 24 hours.  Patient states that she was vaccinated for Covid with 2 vaccines approximately 8 to 10 months ago.  Patient is requiring 10 L oxygen upon arrival to the ED.       Chest xray showing: Cardiac silhouette enlargement with some increased reticular opacity suspicious for interstitial edema Hyperinflation compatible with chronic obstructive pulmonary disease     The patient was admitted for acute on chronic respiratory failure with pulmonary edema requiring lasix diuresis complicated with underlying h/o copd on baseline oxygen needs on 5.5L.  Respiratory status has improved.  She has diureses well.  Renal function is stable.  She will discharge home on steroid taper and low dose lasix diuresis.    Procalcitonin and Covid negative.  Of note, ddimer was elevated and V/Q scan is low probability.  Therefore patient is not discharge on anticoagulation.    She has returned to baseline function and oxygen needs.  She will discharge to home with family.      Therefore, she is discharged in good and stable condition to home with organized home healthcare and close outpatient follow-up.    The patient met 2-midnight criteria for an inpatient stay at the time of discharge.    Discharge Date  1/29/22    FOLLOW UP ITEMS POST DISCHARGE  pcp in 1 week      DISCHARGE DIAGNOSES  Principal Problem:    Acute on chronic  respiratory failure (HCC) POA: Yes  Active Problems:    Sepsis (HCC) POA: Unknown    CKD (chronic kidney disease), stage III (HCC) POA: Yes    HTN (hypertension) POA: Yes    DM2 (diabetes mellitus, type 2) (HCC) POA: Yes    COPD (chronic obstructive pulmonary disease) (HCC) POA: Unknown    Hypothyroidism POA: Yes    Acute exacerbation of CHF (congestive heart failure) ([facility]) POA: Yes    Elevated troponin POA: Yes    Pulmonary embolism (HCC) POA: Clinically Undetermined  Resolved Problems:    * No resolved hospital problems. *      FOLLOW UP  Future Appointments   Date Time Provider Department Center   2/2/2022  1:30 PM ULI Greene [facility]   3/17/2022 11:30 AM PFT-[id number] [facility] None   3/17/2022  1:00 PM RODDY Edmonds [facility] None     [facility]  [id number]  [facility] [address]  [phone]          MEDICATIONS ON DISCHARGE     Medication List      START taking these medications      Instructions   Aspirin Low Dose 81 MG Chew chewable tablet  Start taking on: January 30, 2022  Generic drug: aspirin   Chew and swallow 1 Tablet by mouth every day.  Dose: 81 mg     furosemide 20 MG Tabs  Start taking on: January 30, 2022  Commonly known as: LASIX   Take 1 Tablet by mouth every day.  Dose: 20 mg     methylPREDNISolone 4 MG Tbpk  Commonly known as: MEDROL DOSEPAK   Follow schedule on package instructions.     SILTUSSIN DM ALCOHOL FREE  MG/5ML Syrp  Generic drug: Dextromethorphan-guaiFENesin   Take 10 mL by mouth every 6 hours as needed for Cough.  Dose: 10 mL        CONTINUE taking these medications      Instructions   acetaminophen 500 MG Tabs  Commonly known as: TYLENOL   Take 1,000 mg by mouth every morning.  Dose: 1,000 mg     albuterol 108 (90 Base) MCG/ACT Aers inhalation aerosol   Doctor's comments: Give albuterol that is patient or insurance preference please  Inhale 2 Puffs every four hours as needed for Shortness of Breath.  Dose: 2 Puff     AZO-CRANBERRY PO   Take  2 Tablets by mouth every morning.  Dose: 2 Tablet     Calcium 600 1500 (600 Ca) MG Tabs  Generic drug: Calcium Carbonate   Take 1 Tab by mouth 2 (two) times a day.  Dose: 1 Tablet     Cholecalciferol 2000 UNIT Caps   Take 1 Capsule by mouth every day.  Dose: 2,000 Units     D-Mannose 500 MG Caps   Take 2,000 mg by mouth.  Dose: 2,000 mg     ferrous sulfate 325 (65 Fe) MG tablet   Take 1 Tablet by mouth every day.  Dose: 325 mg     fish oil 1000 MG Caps capsule   Take 1,000 mg by mouth every day.  Dose: 1,000 mg     Janumet  MG per tablet  Generic drug: sitagliptan-metformin   TAKE ONE TABLET BY MOUTH TWICE A DAY     levothyroxine 125 MCG Tabs  Commonly known as: SYNTHROID   TAKE ONE TABLET BY MOUTH EVERY MORNING ON AN EMPTY STOMACH     metoprolol tartrate 25 MG Tabs  Commonly known as: LOPRESSOR   Take 1 tablet by mouth 2 times a day.  Dose: 25 mg     MULTIVITAMIN PO   Take 1 Tab by mouth every day.  Dose: 1 Tablet     Myrbetriq 50 MG Tb24  Generic drug: Mirabegron ER   Take 50 mg by mouth.  Dose: 50 mg     simvastatin 10 MG Tabs  Commonly known as: ZOCOR   Doctor's comments: Insurance will pay for 100 day supply  Take 1 Tab by mouth every evening.  Dose: 10 mg     traZODone 50 MG Tabs  Commonly known as: DESYREL   Take 1 Tablet by mouth at bedtime.  Dose: 50 mg     Trelegy Ellipta 100-62.5-25 MCG/INH Aepb inhalation  Generic drug: Fluticasone-Umeclidin-Vilant   Inhale 1 Inhalation every day.  Dose: 1 Inhalation     TruBiotics Caps   Take 1 Capsule by mouth 2 Times a Day.  Dose: 1 Capsule      Tylenol PM Extra Strength  MG Tabs  Generic drug: diphenhydrAMINE-APAP (sleep)   Take 1 Tablet by mouth at bedtime.  Dose: 1 Tablet     Vitamin C 1000 MG Tabs   Take 1 tablet by mouth 2 times a day.  Dose: 1 Tablet            Allergies  Allergies   Allergen Reactions   • Azithromycin Diarrhea     Pt states severe diarrhea       DIET  No orders of the defined types were placed in this encounter.      ACTIVITY  As  tolerated.  Weight bearing as tolerated    CONSULTATIONS  none    PROCEDURES  none    LABORATORY  Lab Results   Component Value Date    SODIUM 136 01/29/2022    POTASSIUM 4.9 01/29/2022    CHLORIDE 95 (L) 01/29/2022    CO2 30 01/29/2022    GLUCOSE 253 (H) 01/29/2022    BUN 28 (H) 01/29/2022    CREATININE 1.02 01/29/2022        Lab Results   Component Value Date    WBC 10.2 01/29/2022    HEMOGLOBIN 12.2 01/29/2022    HEMATOCRIT 40.9 01/29/2022    PLATELETCT 191 01/29/2022        Total time of the discharge process exceeds 45 minutes.

## 2022-01-29 NOTE — DISCHARGE PLANNING
Meds-to-Beds: Discharge prescription orders listed below delivered to patient's bedside. Patient declined counseling.    Medications  aspirin (ASA) 81 MG Chew Tab chewable tablet  Chew and swallow 1 Tablet by mouth every day.  Disp-100 Tablet, R-0    furosemide (LASIX) 20 MG Tab  Take 1 Tablet by mouth every day.  Disp-30 Tablet, R-0    guaiFENesin dextromethorphan (ROBITUSSIN DM) 100-10 MG/5ML Syrup syrup  Take 10 mL by mouth every 6 hours as needed for Cough  Disp-840 mL, R-0    methylPREDNISolone (MEDROL DOSEPAK) 4 MG Tablet Therapy Pack  Follow schedule on package instructions.  Disp-21 Tablet, R-0      Gisselle Escobedo, Pharmacy Intern

## 2022-01-29 NOTE — PROGRESS NOTES
Bedside report received from day shift RN. Assumed care at 1900. Pt is A&Ox1. Patient aware she is at the hospital but not sure which one.  Unable to tell this RN , why patient is here, and date.  MD Nicholson notified.  Pt is in bed. Patient is on 5.5LNC. Pt was updated on plan of care. Pt has call light, personal belongings, and bedside table within reach. Bed locked and in lowest position.

## 2022-01-29 NOTE — PROGRESS NOTES
Pt d/c'd home with daughter via private car. All questions and concerns addressed. Pt sent home with all personal belongings.

## 2022-01-29 NOTE — DISCHARGE INSTRUCTIONS
Discharge Instructions    Discharged to home by car with relative. Discharged via wheelchair, hospital escort: Yes.  Special equipment needed: Oxygen    Be sure to schedule a follow-up appointment with your primary care doctor or any specialists as instructed.     Discharge Plan:   Diet Plan: Discussed  Activity Level: Discussed  Confirmed Follow up Appointment: Appointment Scheduled  Confirmed Symptoms Management: Discussed  Influenza Vaccine Indication: Indicated: 9 to 64 years of age    I understand that a diet low in cholesterol, fat, and sodium is recommended for good health. Unless I have been given specific instructions below for another diet, I accept this instruction as my diet prescription.   Other diet: Diabetic      Special Instructions: None    · Is patient discharged on Warfarin / Coumadin?   No     Depression / Suicide Risk    As you are discharged from this [facility] facility, it is important to learn how to keep safe from harming yourself.    Recognize the warning signs:  · Abrupt changes in personality, positive or negative- including increase in energy   · Giving away possessions  · Change in eating patterns- significant weight changes-  positive or negative  · Change in sleeping patterns- unable to sleep or sleeping all the time   · Unwillingness or inability to communicate  · Depression  · Unusual sadness, discouragement and loneliness  · Talk of wanting to die  · Neglect of personal appearance   · Rebelliousness- reckless behavior  · Withdrawal from people/activities they love  · Confusion- inability to concentrate     If you or a loved one observes any of these behaviors or has concerns about self-harm, here's what you can do:  · Talk about it- your feelings and reasons for harming yourself  · Remove any means that you might use to hurt yourself (examples: pills, rope, extension cords, firearm)  · Get professional help from the community (Mental Health, Substance Abuse, psychological  counseling)  · Do not be alone:Call your Safe Contact- someone whom you trust who will be there for you.  · Call your local CRISIS HOTLINE 111-0894 or 397-638-9542  · Call your local Children's Mobile Crisis Response Team [place] (530) 092-1931 or www.M[employer]otaNube  · Call the toll free National Suicide Prevention Hotlines   · National Suicide Prevention Lifeline 790-635-GIPZ (4978)  · National Hope Line Network 800-SUICIDE (365-0328)

## 2022-01-29 NOTE — CARE PLAN
The patient is Stable - Low risk of patient condition declining or worsening    Shift Goals  Clinical Goals: Decrease o2 demands  Patient Goals: Sleep   Family Goals: na    Progress made toward(s) clinical / shift goals:  progressing       Problem: Respiratory  Goal: Patient will achieve/maintain optimum respiratory ventilation and gas exchange  Outcome: Progressing  Note: Patient on 5.5L NC sating above 93%.        Patient is not progressing towards the following goals:      Problem: Knowledge Deficit - Standard  Goal: Patient and family/care givers will demonstrate understanding of plan of care, disease process/condition, diagnostic tests and medications  Outcome: Not Progressing  Note: Patient oriented to self only.  Unable to discuss POC with patient.

## 2022-01-31 NOTE — PROGRESS NOTES
Medication chart review for [facility] services    PCP:  ULI Greene  [address] [facility] [address]  Fax: [phone]    Current medication list     Current Outpatient Medications:   •  Home Care Oxygen, 5.5 L/min, Inhalation, Continuous  •  loratadine, 10 mg, Oral, DAILY  •  benzonatate, 100 mg, Oral, BID PRN  •  aspirin, 81 mg, Oral, DAILY  •  furosemide, 20 mg, Oral, DAILY  •  guaiFENesin dextromethorphan, 10 mL, Oral, Q6HRS PRN  •  methylPREDNISolone, Follow schedule on package instructions.  •  Tylenol PM Extra Strength, 1 Tablet, Oral, QHS  •  Janumet, TAKE ONE TABLET BY MOUTH TWICE A DAY  •  Trelegy Ellipta, 1 Inhalation, Inhalation, DAILY  •  ferrous sulfate, 325 mg, Oral, DAILY  •  Cholecalciferol, 2,000 Units, Oral, DAILY  •  D-Mannose, Take 2,000 mg by mouth.  •  Myrbetriq, Take 50 mg by mouth.  •  albuterol, 2 Puff, Inhalation, Q4HRS PRN  •  metoprolol tartrate, 25 mg, Oral, BID  •  acetaminophen, 650 mg, Oral, Q6HRS PRN  •  Calcium Carbonate, 1 Tablet, Oral, BID  •  Vitamin C, 1 Tablet, Oral, BID  •  AZO-CRANBERRY PO, 2 Tablet, Oral, QAM  •  simvastatin, 10 mg, Oral, Q EVENING  •  levothyroxine, TAKE ONE TABLET BY MOUTH EVERY MORNING ON AN EMPTY STOMACH  •  TruBiotics, 1 Capsule , Oral, BID  •  fish oil, 1,000 mg, Oral, DAILY  •  Multiple Vitamin (MULTIVITAMIN PO), 1 Tablet, Oral, DAILY    Allergies   Allergen Reactions   • Azithromycin Diarrhea     Pt states severe diarrhea       Labs     Lab Results   Component Value Date/Time    SODIUM 136 01/29/2022 02:03 AM    POTASSIUM 4.9 01/29/2022 02:03 AM    CHLORIDE 95 (L) 01/29/2022 02:03 AM    CO2 30 01/29/2022 02:03 AM    GLUCOSE 253 (H) 01/29/2022 02:03 AM    BUN 28 (H) 01/29/2022 02:03 AM    CREATININE 1.02 01/29/2022 02:03 AM     Lab Results   Component Value Date/Time    ALKPHOSPHAT 52 01/27/2022 02:29 AM    ASTSGOT 19 01/27/2022 02:29 AM    ALTSGPT 11 01/27/2022 02:29 AM    TBILIRUBIN 0.3 01/27/2022 02:29 AM    INR 1.12 01/26/2022  04:17 AM    ALBUMIN 3.7 01/27/2022 02:29 AM        Assessment and Plan:   • Received referral from [facility]. Medications reviewed.       Kit Hernandez, PharmD, MS, BCACP, [facility]  Phone [phone] fax [phone]    This note was created using voice recognition software (Dragon). The accuracy of the dictation is limited by the abilities of the software. I have reviewed the note prior to signing, however some errors in grammar and context are still possible. If you have any questions related to this note please do not hesitate to contact our office.

## 2022-02-02 PROBLEM — I70.0 ATHEROSCLEROSIS OF AORTA (HCC): Status: ACTIVE | Noted: 2022-01-01

## 2022-02-03 NOTE — Clinical Note
Thank you, I left this message with her daughter, Alexandra  ----- Message -----  From: GRANT EdmondsP.RRebecaN.  Sent: 2/3/2022  11:49 AM PST  To: Eva Truong PT      Continue Trelegy 1 puff daily and  Ipratropium Bromide and albuterol sulfate inhalation solution: .5 /3mg per 3mL as needed every 4-6hrs.   If she would like to improve sputum clearance or morning congestion, she can use it first thing in AM followed by her trelegy inhaler.  She does not have to use it the rest of the day if she is not having increased shortness of breath, congestion or wheezing.  ----- Message -----  From: Eva Truong PT  Sent: 2/3/2022  10:11 AM PST  To: NONA EdmondsRMALIKA    Pt didn't get instruction to continue with her nebulizer treatments at [place] from the hospital(1/25/22-1/29/22)  SHe hasn't used since home and not on her medication list.  Ipratropium Bromide and albuterol sulfate inhalation solution: .5 /3mg per 3mL  Before hospitalization she was using 3-4 times per day, rx says 3 times per day.    Pt wondering if she should continue it or not?

## 2022-02-03 NOTE — Clinical Note
Recommend skilled HHPT to address deficits and improve function-report sent to MD     ·       Frequency:   1w3,  Effective 2/3/22

## 2022-02-03 NOTE — Clinical Note
Pt didn't get instruction to continue with her nebulizer treatments at [place] from the hospital(1/25/22-1/29/22)  SHe hasn't used since home and not on her medication list.  Ipratropium Bromide and albuterol sulfate inhalation solution: .5 /3mg per 3mL  Before hospitalization she was using 3-4 times per day, rx says 3 times per day.    Pt wondering if she should continue it or not?

## 2022-02-03 NOTE — CASE COMMUNICATION
Response rec'd from MD office below, will need to add to med list on next visit: re NEBULIZER PRN medication.  Left message with daughter Alexandra justin response.     ----- Message -----  From: GRANT EdmondsP.RRebecaN.  Sent: 2/3/2022  11:49 AM PST  To: Eva Truong PT      Continue Trelegy 1 puff daily and  Ipratropium Bromide and albuterol sulfate inhalation solution: .5 /3mg per 3mL as needed every 4-6hrs.   If she would like to imp rove sputum clearance or morning congestion, she can use it first thing in AM followed by her trelegy inhaler.  She does not have to use it the rest of the day if she is not having increased shortness of breath, congestion or wheezing.  ----- Message -----  From: Eva Truong PT  Sent: 2/3/2022  10:11 AM PST  To: GRANT EdmondsP.RZACH.    Pt didn't get instruction to continue with her nebulizer treatments at [place] from the hospital(1/ 25/22-1/29/22)  SHe hasn't used since home and not on her medication list.  Ipratropium Bromide and albuterol sulfate inhalation solution: .5 /3mg per 3mL  Before hospitalization she was using 3-4 times per day, rx says 3 times per day.    Pt wondering if she should continue it or not?

## 2022-02-07 NOTE — CASE COMMUNICATION
Quality Review for 1.31.22 SOC OASIS performed on by ANALISA Pulido RN on 2.7.2022:    Edits completed by ANALISA Pulido RN:  1. Added 02 template to the MAR  2. Labeled chart for HF  3. Changed  to 1.29.22, date of valid referral   4. Added #6 to  per dx of obesity and BMI  5. Changed  to 6  6. Added incontinence and ambulation to functional limitations  7. Added fall risk, high risk medications, pressure ulcer prevention to safet y measures  8. Updated F2F data  9. Depression was resolved from the care plan as the PHQ-2 is negative, pt is not on any medications and there is no active dx.  10. Clicked on care plan that high risk medication was performed per  response of yes

## 2022-02-08 NOTE — CASE COMMUNICATION
OCCUPATIONAL THERAPY EVALUATION AND PLAN OF CARE     ·       Patient:  Nalini Rodriguez     ·       Home Health Admission due to:  recent exacerbation of chronic respiratory failure     ·       Living Situation/PLOF:  Nalini lives on the first floor of a 2 story home with her daughter, son in law, and 2 grandchildren. There are 2 dogs in the home.  She has 2 steps into home, otherwise does not have to negotiate stairs. She has a raise d toilet seat, shower chair, grab bars and a handheld shower.  Nalini prepares light meals for herself, but family does the majority of the cooking, housekeeping and laundry.  She reports being on 5L O2 for at least a few years.       ·       Past Medical History:  aortic atherosclerosis, PE, CHF, COVID-19, pneumonia, chronic resp failure, COPD, DM2, HTN, CKD     ·       Skilled Therapeutic Need:  Decreased activity tolerance and UE weakn ess     Recommend skilled HHOT to address deficits and improve function-report sent to MD     ·       Frequency:   1W1, effective 2/7/22     ·       Goals: Not applicable, evaluation only.  OT educated client and her daughter re UE HEP using orange resistance band.  Client to do 10 reps x 7 exs 1-2 x a day.  OT asked Nalini to slowly increase reps as she gets stronger.  Both Nalini and her daughter feel there is no further need for OT at th is time as she is at her baseline level functionally for ADLs and IADLs.       Does the patient get SOB with Minimum exertion?  constantly with any activity on her feet  How often (if at all) does pain interfere with patient's movements?  none reported

## 2022-02-08 NOTE — CASE COMMUNICATION
Agree with changes.     ----- Message -----  From: Corie Pulido R.N.  Sent: 2/7/2022   1:34 PM PST  To: Adama Montes R.N.      Quality Review for 1.31.22 SOC OASIS performed on by ANALISA Pulido RN on 2.7.2022:    Edits completed by ANALISA Pulido RN:  1. Added 02 template to the MAR  2. Labeled chart for HF  3. Changed  to 1.29.22, date of valid referral   4. Added #6 to  per dx of obesity and BMI  5. Changed  to 6  6. Added i ncontinence and ambulation to functional limitations  7. Added fall risk, high risk medications, pressure ulcer prevention to safety measures  8. Updated F2F data  9. Depression was resolved from the care plan as the PHQ-2 is negative, pt is not on any medications and there is no active dx.  10. Clicked on care plan that high risk medication was performed per  response of yes

## 2022-02-09 NOTE — ED TRIAGE NOTES
Patient biba from home with c/o of fever since yesterday with SOB and cough; per ems patient's oxygen saturation was down to 88% patient is normally on 5l nc. OA, patient appears to be in moderate respiratory distress, tachycardic 100s and denies any chest pain.

## 2022-02-09 NOTE — ED NOTES
Discharge instructions provided, pt verbalizes understanding and has no questions. Vital signs stable, pt wheeled out to lobby with oxygen. Pt was informed by MD of the risks of leaving AMA.

## 2022-02-09 NOTE — ED NOTES
PIV placed, blood work and 1x set of blood cultures sent to lab.  Phlebotomist contacted for 2nd set.

## 2022-02-09 NOTE — ED PROVIDER NOTES
ED Provider Note    ED Provider Note    Primary care provider: [facility] Greene  Means of arrival: EMS  History obtained from: Patient    CHIEF COMPLAINT  Chief Complaint   Patient presents with   • Fever   • Shortness of Breath     Seen at 7:16 PM.   HPI  Camille Rodriguez is a 81 y.o. female who presents to the Emergency Department for shortness of breath.  The patient was in her usual state of health for the past few days since being discharged.  Today she developed gradual onset of severe shortness of breath.  She denies any other associated symptoms.  She denies any fevers, though EMS reports that patient had temperature of 101 upon their evaluation.  She denies any headache, sore throat, chest pain, cough over baseline, vomiting, abdominal pain, dysuria, rash, diarrhea.  She has been compliant with her steroid taper, Lasix and inhalers.  She denies any leg pain, leg swelling or orthopnea.  She does not lay flat secondary to discomfort at baseline.    She received Tylenol in route and no other treatments, she is comfortable at this point and is on a nonrebreather satting in the high 90s.    REVIEW OF SYSTEMS  See HPI,   Remainder of ROS negative.     PAST MEDICAL HISTORY   has a past medical history of Cardiomegaly, Chickenpox, Chronic airway obstruction, not elsewhere classified, Clotting disorder (HCC), Diabetes, Diastolic dysfunction, Thai measles, Mumps, On home oxygen therapy, and Respiratory failure (HCC).    SURGICAL HISTORY   has a past surgical history that includes remv 2nd cataract,corn-scler sectn.    SOCIAL HISTORY  Social History     Tobacco Use   • Smoking status: Former Smoker     Packs/day: 1.50     Years: 46.00     Pack years: 69.00     Types: Cigarettes     Quit date: 2/5/2007     Years since quitting: 15.0   • Smokeless tobacco: Never Used   • Tobacco comment: Quit 2007   Vaping Use   • Vaping Use: Never used   Substance Use Topics   • Alcohol use: No   • Drug use: No      Social  "History     Substance and Sexual Activity   Drug Use No       FAMILY HISTORY  Family History   Problem Relation Age of Onset   • Heart Attack Father        CURRENT MEDICATIONS  Reviewed.  See Encounter Summary.     ALLERGIES  Allergies   Allergen Reactions   • Azithromycin Diarrhea     Pt states severe diarrhea       PHYSICAL EXAM  VITAL SIGNS: /58   Pulse 87   Temp 37.7 °C (99.9 °F) (Temporal) Comment: received tylenol 1g PO  Resp (!) 25   Ht 1.626 m (5' 4\")   Wt 104 kg (229 lb)   SpO2 94%   BMI 39.31 kg/m²   Constitutional: Awake, alert in no apparent distress.  Occasional cough.  HENT: Normocephalic, Bilateral external ears normal. Nose normal.   Eyes: Conjunctiva normal, non-icteric, EOMI.    Thorax & Lungs: Easy unlabored respirations, diminished breath sounds throughout, no obvious wheezes, rhonchi or rails.  Cardiovascular: Borderline tachycardic, No murmurs, rubs or gallops. Bilateral pulses symmetrical.   Abdomen:  Soft, nontender, nondistended, normal active bowel sounds.   :    Skin: Visualized skin is  Dry, No erythema, No rash.   Musculoskeletal:   No cyanosis, clubbing or edema. No leg asymmetry.   Neurologic: Alert, Grossly non-focal.   Psychiatric: Normal affect, Normal mood  Lymphatic:  No cervical LAD    EKG   12 lead Interpreted by me  Rhythm: Sinus tach  Rate: 108  Axis: normal  Ectopy: none  Conduction: LBBB, unchanged   ST Segments: no acute change  T Waves: no acute change  Clinical Impression: Sinus tachycardia, left bundle branch block, no Sgarbossa criteria, no significant changes compared to prior EKG.    RADIOLOGY  DX-CHEST-PORTABLE (1 VIEW)   Final Result      1.  No acute cardiopulmonary disease.   2.  Stable mild cardiomegaly.            COURSE & MEDICAL DECISION MAKING  Pertinent Labs & Imaging studies reviewed. (See chart for details)    Differential diagnoses include but are not limited to: COVID-19, CAP, sepsis, COPD exacerbation, CHF, ACS    7:16 PM - Medical record " reviewed, hospitalized 1/25/2022 through 1/29/2022 for acute on chronic respiratory failure with pulmonary edema.  Bilateral lower extremity ultrasounds were negative though limited as the patient refused compression of the calves.  She underwent VQ scan that was low probability.  She had a 2D echo that showed ejection fraction of 65%.  Procalcitonin and Covid-19 were negative.  The patient was treated with diuresis and eventually weaned down to her normal oxygen saturation on 5 L nasal cannula.  She was discharged with a steroid taper and Lasix.    8:15 PM: Patient with an impressive leukocytosis of 24.5 with neutrophil predominance.  She has been on steroids recently, therefore this could be related to recent steroid use but with the reported fever prior to arrival I will initiate antibiotic therapy with Unasyn.    8:45 PM patient now on Ventimask at 5 L.  She is much more comfortable.  She would like to be discharged if possible.  Awaiting repeat troponin.    10:31 PM: Repeat troponin increased from 52->61, the patient does not have any chest pain, EKG did not show acute ischemic changes, she does have a persistent left bundle branch block.  proBNP is elevated but better than when she was hospitalized last month.  Case discussed with Dr. Tavares, recommends stress test.    10:55 PM: Lengthy discussion with the patient, explained that her cardiac markers are elevated today compared to when she was here before and the repeat troponin has increased as well. I explained that this could be a sign of a early heart attack. The patient states that she is very nervous about staying, I offered to give her some medications to help her relax or sleep, she declined this. I explained that if she does have a heart attack, this could present as severe shortness of breath, similar to how she showed up today. She feels that she can take care of this at home, that she is breathing as she always is and that she was mostly here  "just to \"get checked out \"and is not interested in staying in the hospital tonight.        Decision Making:  This is a pleasant 81 y.o. year old female who presents with shortness of breath. The patient initially came in on a nonrebreather, she was weaned down to her baseline 5 L nasal cannula. I did not appreciate any work of breathing, she did appear quite well. Apparently she did have a fever prior to arrival which was not appreciated here in the emergency department. She has a white count of 24.5 which suggests infectious process. She has been on steroids though this is higher than our expect from a short course of steroids. Procalcitonin is right at cut off levels at 0.25. Chest x-ray does not show any infiltrate. proBNP significantly elevated at 3280, though better than it was when she was hospitalized last month.    I suspect the patient has either a COPD exacerbation or community-acquired pneumonia that is not appreciated on chest x-ray. She was given a dose of Unasyn in the ER. Her first troponin is elevated at 52, repeat troponin increased to 61. EKG does not show acute ischemic changes and she has not had chest pain. I discussed the case with cardiology who recommended an inpatient stress test.    The patient is adamant about going home today, I explained the situation, please see documentation above. I will have her sign out AGAINST MEDICAL ADVICE, I explained that we will be happy to reevaluate her at any time. If she has any shortness of breath or chest pain she should return. If she does not have any improvement with the antibiotic she should return for reevaluation. I will also place a referral for cardiology.    Discharge Medications:  New Prescriptions    DOXYCYCLINE (MONODOX) 100 MG CAPSULE    Take 1 Capsule by mouth 2 times a day for 7 days.       The patient was discharged home (see d/c instructions) was told to return immediately for any signs or symptoms listed, or any worsening at all.  The " patient verbally agreed to the discharge precautions and follow-up plan which is documented in EPIC.        FINAL IMPRESSION  1. Dyspnea, unspecified type    2. Elevated troponin    3. History of COPD    4. Leukocytosis, unspecified type

## 2022-02-11 NOTE — CASE COMMUNICATION
Updated 2.11.22    Quality Review for 1.31.22 SOC OASIS performed on by ANALISA Pulido RN on 2.7.2022:    Edits completed by ANALISA Pulido RN:  1. Added 02 template to the MAR  2. Labeled chart for HF  3. Changed  to 1.29.22, date of valid referral   4. Added #6 to  per dx of obesity and BMI  5. Changed  to 6  6. Added incontinence and ambulation to functional limitations  7. Added fall risk, high risk medications, pressure ulcer  prevention to safety measures  8. Updated F2F data  9. Depression was resolved from the care plan as the PHQ-2 is negative, pt is not on any medications and there is no active dx.  10. Clicked on care plan that high risk medication was performed per  response of yes  Updated 2.11.22  11. Changed  to 2 per narrative pt needs min assist with transfers

## 2022-02-13 NOTE — CASE COMMUNICATION
Agree with changes.    ----- Message -----  From: Corie Pulido R.N.  Sent: 2/11/2022   6:37 AM PST  To: Adama Montes R.N.      Updated 2.11.22    Quality Review for 1.31.22 SOC OASIS performed on by ANALISA Pulido RN on 2.7.2022:    Edits completed by ANALISA Pulido RN:  1. Added 02 template to the MAR  2. Labeled chart for HF  3. Changed  to 1.29.22, date of valid referral   4. Added #6 to  per dx of obesity and BMI  5. Changed M22 00 to 6  6. Added incontinence and ambulation to functional limitations  7. Added fall risk, high risk medications, pressure ulcer prevention to safety measures  8. Updated F2F data  9. Depression was resolved from the care plan as the PHQ-2 is negative, pt is not on any medications and there is no active dx.  10. Clicked on care plan that high risk medication was performed per  response of yes  Updated 2.11.22  11. Changed  t o 2 per narrative pt needs min assist with transfers

## 2022-03-09 PROBLEM — Z71.89 ADVANCE CARE PLANNING: Status: ACTIVE | Noted: 2022-01-01

## 2022-03-11 PROBLEM — R60.9 PERIPHERAL EDEMA: Status: RESOLVED | Noted: 2020-12-10 | Resolved: 2022-01-01

## 2022-03-11 PROBLEM — R60.0 PERIPHERAL EDEMA: Status: RESOLVED | Noted: 2020-12-10 | Resolved: 2022-01-01

## 2022-03-11 NOTE — PROGRESS NOTES
Cardiology Note    Chief Complaint   Patient presents with   • Establish Care     F/V Dx: Left bundle branch block   • Aortic Atherosclerosis       History of Present Illness: Camille Rodriguez is a 81 y.o. female PMH COPD oxygen dependent 5.5L since covid infection, HTN, CKD, DM2, hx covid-19 12/2020 who presents for initial visit.    Describes at her baseline level of health. Breathing unchanged. Oxygen requirements unchanged since covid. Denies cardiac complaints such as chest pain/pressure. Leg swelling controlled on lasix. Denies orthopnea or other symptoms of left sided heart failure. No other cardiac complaints. Denies toxic social habits. She is well versed on her pulmonary condition. Given multiple hospitalizations and current level of disability she plans to pursue hospice care.     Review of Systems   Constitutional: Negative for decreased appetite, fever, malaise/fatigue, weight gain and weight loss.   HENT: Negative for congestion and nosebleeds.    Eyes: Negative for blurred vision.   Cardiovascular: Negative for chest pain, claudication, dyspnea on exertion, irregular heartbeat, leg swelling, near-syncope, orthopnea, palpitations, paroxysmal nocturnal dyspnea and syncope.   Respiratory: Positive for shortness of breath. Negative for cough.    Endocrine: Negative for cold intolerance and heat intolerance.   Skin: Negative for rash.   Musculoskeletal: Negative for back pain.   Gastrointestinal: Negative for abdominal pain, constipation, diarrhea, heartburn, melena, nausea and vomiting.   Genitourinary: Negative for dysuria, flank pain and hematuria.   Neurological: Negative for dizziness.   Psychiatric/Behavioral: Negative for altered mental status and depression.         Past Medical History:   Diagnosis Date   • Cardiomegaly    • Chickenpox    • Chronic airway obstruction, not elsewhere classified    • Clotting disorder (HCC)    • Diabetes    • Diastolic dysfunction    • French measles    • Mumps     • On home oxygen therapy    • Respiratory failure (HCC)          Past Surgical History:   Procedure Laterality Date   • TN REMV 2ND CATARACT,CORN-SCLER SECTN           Current Outpatient Medications   Medication Sig Dispense Refill   • simvastatin (ZOCOR) 10 MG Tab TAKE ONE TABLET BY MOUTH EVERY EVENING 100 Tablet 0   • furosemide (LASIX) 20 MG Tab Take one tab po daily 90 Tablet 3   • IPRATROPIUM BROMIDE INH Inhale 0.5 mg 4 times a day as needed (pulmonary congestion). Take Ipratropium Bromide and Albuterol Sulfate 0.5mg/3mg per 3 ml up to 4 times daily with pulmonary congestion     • FREESTYLE LITE strip TEST TWO TIMES A  Strip 11   • Home Care Oxygen Inhale 5.5 L/min continuous. Oxygen dose range: 5.5 to 5.5 L/min  Respiratory route via: Nasal Cannula   Oxygen supplier: Preferred         • loratadine (CLARITIN) 10 MG Tab Take 10 mg by mouth every day.     • benzonatate (TESSALON) 100 MG Cap Take 100 mg by mouth 2 times a day as needed for Cough.     • aspirin (ASA) 81 MG Chew Tab chewable tablet Chew and swallow 1 Tablet by mouth every day. (Patient taking differently: Chew 375 mg every day.) 100 Tablet 0   • guaiFENesin dextromethorphan (ROBITUSSIN DM) 100-10 MG/5ML Syrup syrup Take 10 mL by mouth every 6 hours as needed for Cough. 840 mL 0   • diphenhydrAMINE-APAP, sleep, (TYLENOL PM EXTRA STRENGTH)  MG Tab Take 1 Tablet by mouth at bedtime.     • JANUMET  MG per tablet TAKE ONE TABLET BY MOUTH TWICE A  Tablet 0   • Fluticasone-Umeclidin-Vilant (TRELEGY ELLIPTA) 100-62.5-25 MCG/INH AEROSOL POWDER, BREATH ACTIVATED inhalation Inhale 1 Inhalation every day. (Patient taking differently: Inhale 2 Inhalation every day.) 3 Each 3   • ferrous sulfate 325 (65 Fe) MG tablet Take 1 Tablet by mouth every day. 90 Tablet 3   • Cholecalciferol 2000 UNIT Cap Take 1 Capsule by mouth every day. 90 Capsule 3   • D-Mannose 500 MG Cap Take 2,000 mg by mouth.     • Mirabegron ER (MYRBETRIQ) 50 MG TABLET  SR 24 HR Take 50 mg by mouth.     • albuterol 108 (90 Base) MCG/ACT Aero Soln inhalation aerosol Inhale 2 Puffs every four hours as needed for Shortness of Breath. 1 Each 6   • metoprolol tartrate (LOPRESSOR) 25 MG Tab Take 1 tablet by mouth 2 times a day. 180 tablet 3   • acetaminophen (TYLENOL) 500 MG Tab Take 650 mg by mouth every 6 hours as needed for Mild Pain or Moderate Pain.     • Calcium Carbonate 1500 (600 Ca) MG Tab Take 1 Tab by mouth 2 (two) times a day.     • Ascorbic Acid (VITAMIN C) 1000 MG Tab Take 1 tablet by mouth 2 times a day.     • AZO-CRANBERRY PO Take 2 Tablets by mouth every morning.     • levothyroxine (SYNTHROID) 125 MCG Tab TAKE ONE TABLET BY MOUTH EVERY MORNING ON AN EMPTY STOMACH 90 Tab 0   • Probiotic Product (TRUBIOTICS) Cap Take 1 Capsule by mouth 2 Times a Day.     • Omega-3 Fatty Acids (FISH OIL) 1000 MG Cap capsule Take 1,000 mg by mouth every day.     • Multiple Vitamin (MULTIVITAMIN PO) Take 1 Tab by mouth every day.     • guaifenesin dextromethorphan sugar free (DIABETIC TUSSIN DM)  MG/5ML Liquid soln Siltussin-DM 10 mg-100 mg/5 mL oral syrup       No current facility-administered medications for this visit.         Allergies   Allergen Reactions   • Azithromycin Diarrhea     Pt states severe diarrhea         Family History   Problem Relation Age of Onset   • Heart Attack Father          Social History     Socioeconomic History   • Marital status:      Spouse name: Not on file   • Number of children: Not on file   • Years of education: Not on file   • Highest education level: Not on file   Occupational History   • Not on file   Tobacco Use   • Smoking status: Former Smoker     Packs/day: 1.50     Years: 46.00     Pack years: 69.00     Types: Cigarettes     Quit date: 2/5/2007     Years since quitting: 15.1   • Smokeless tobacco: Never Used   • Tobacco comment: Quit 2007   Vaping Use   • Vaping Use: Never used   Substance and Sexual Activity   • Alcohol use: No   •  "Drug use: No   • Sexual activity: Never   Other Topics Concern   • Not on file   Social History Narrative   • Not on file     Social Determinants of Health     Financial Resource Strain: Not on file   Food Insecurity: Not on file   Transportation Needs: Not on file   Physical Activity: Not on file   Stress: Not on file   Social Connections: Not on file   Intimate Partner Violence: Not on file   Housing Stability: Not on file         Physical Exam:  Ambulatory Vitals  /68 (BP Location: Left arm, Patient Position: Sitting, BP Cuff Size: Adult)   Pulse 76   Resp 20   Ht 1.626 m (5' 4\")   Wt 104 kg (229 lb)   SpO2 91%    BP Readings from Last 4 Encounters:   03/11/22 128/68   03/09/22 108/56   03/08/22 124/82   03/01/22 110/68     Weight/BMI:   Vitals:    03/11/22 1046   BP: 128/68   Weight: 104 kg (229 lb)   Height: 1.626 m (5' 4\")    Body mass index is 39.31 kg/m².  Wt Readings from Last 4 Encounters:   03/11/22 104 kg (229 lb)   03/08/22 104 kg (228 lb 3.2 oz)   03/01/22 104 kg (229 lb)   02/24/22 104 kg (229 lb)       Physical Exam  Constitutional:       General: She is not in acute distress.  HENT:      Head: Normocephalic and atraumatic.   Eyes:      Conjunctiva/sclera: Conjunctivae normal.      Pupils: Pupils are equal, round, and reactive to light.   Neck:      Vascular: No JVD.   Cardiovascular:      Rate and Rhythm: Normal rate and regular rhythm.      Heart sounds: Normal heart sounds. No murmur heard.    No friction rub. No gallop.   Pulmonary:      Effort: Pulmonary effort is normal. No respiratory distress.      Breath sounds: Normal breath sounds. No wheezing or rales.   Chest:      Chest wall: No tenderness.   Abdominal:      General: Bowel sounds are normal. There is no distension.      Palpations: Abdomen is soft.   Musculoskeletal:      Cervical back: Normal range of motion and neck supple.   Skin:     General: Skin is warm and dry.   Neurological:      Mental Status: She is alert and " "oriented to person, place, and time.   Psychiatric:         Mood and Affect: Affect normal.         Judgment: Judgment normal.         Lab Data Review:  Lab Results   Component Value Date/Time    CHOLSTRLTOT 131 01/27/2022 02:29 AM    LDL 29 01/27/2022 02:29 AM    HDL 85 01/27/2022 02:29 AM    TRIGLYCERIDE 84 01/27/2022 02:29 AM       Lab Results   Component Value Date/Time    SODIUM 134 (L) 02/08/2022 07:31 PM    POTASSIUM 4.4 02/08/2022 07:31 PM    CHLORIDE 96 02/08/2022 07:31 PM    CO2 21 02/08/2022 07:31 PM    GLUCOSE 135 (H) 02/08/2022 07:31 PM    BUN 22 02/08/2022 07:31 PM    CREATININE 1.04 02/08/2022 07:31 PM     CrCl cannot be calculated (Patient's most recent lab result is older than the maximum 7 days allowed.).  Lab Results   Component Value Date/Time    ALKPHOSPHAT 68 02/08/2022 07:31 PM    ASTSGOT 25 02/08/2022 07:31 PM    ALTSGPT 14 02/08/2022 07:31 PM    TBILIRUBIN 0.6 02/08/2022 07:31 PM      Lab Results   Component Value Date/Time    WBC 24.5 (H) 02/08/2022 07:31 PM     Lab Results   Component Value Date/Time    HBA1C 6.7 (H) 01/25/2022 08:50 PM     No components found for: TROP      Cardiac Imaging and Procedures Review:      EKG 3/11/22 interpreted by me sinus, right axis, nonspecific st wave change, PVC    TTE 1/2022  CONCLUSIONS  Prior echo on 1/28/21, no significant changes  The left ventricular ejection fraction is visually estimated to be 65%.  The right ventricle is mildly dilated.  Estimated right ventricular systolic pressure is 40 mmHg.  Left Ventricle  Normal left ventricular chamber size. Mild concentric left ventricular   hypertrophy. Normal left ventricular systolic function. The left   ventricular ejection fraction is visually estimated to be 65%.   Flattened septum in systole and diastole (\"D\"-shaped left ventricle)   consistent with RV pressure and volume overload. Diastolic function is   abnormal, but grade cannot be determined. Contrast was used to enhance   visualization of the " endocardial border. 1 mL of contrast was   administered. Existing IV was used at the right antecubital.    V/q scan 1/2022  IMPRESSION:  Low probability for pulmonary embolus    Medical Decision Making:  Problem List Items Addressed This Visit     CKD (chronic kidney disease), stage III ([facility])    DM2 (diabetes mellitus, type 2) ([facility])    Acute exacerbation of CHF (congestive heart failure) ([facility])    Left bundle branch block - Primary    Relevant Orders    EKG (Completed)    HTN (hypertension)        Troponin elevation is likely demand nstemi setting of primary pulmonary exacerbation. No active cardiac complaints not explained by pulmonary disease and likely resultant pulmonary hypertension leading to right heart failure. Continue current medical management. Goals of care discussions with primary care and pulmonary medicine.     It was my pleasure to meet with Ms. Rodriguez.

## 2022-03-16 NOTE — CASE COMMUNICATION
Quality Review for 3.15.22 DC OASIS performed on by ANALISA Pulido RN on 3.17.2022:    Edits for Erendira to complete:   1. Hi Erendira, can patient transfer from bed to chair the majority of the time with supervision/min assistance OR does she need both assistance and a device? If pt can transfer with assistance please change the  to 1. If patient needs both assistance and DME the answer remains a 2. Please let me know if you are unable to shawn nge it. Thank you.     Edits completed by ANALISA Pulido RN:    1. Changed  to 3 pt wears 02 24/7 and has dyspnea with exertion per CV tab  2. Sent [facility] referral for COPD with note that pt may go to hospice in the next few weeks and CC to PCP to inform of referral.

## 2022-03-16 NOTE — CASE COMMUNICATION
FYI: Ms Rodriguez has been referred to the [facility] Outreach program for continued care and follow up for COPD after discharge from [facility]. Thank you for allowing [facility] to serve your patients health care needs. I have added a note to the referral that patient may be transitioning to Hospice after the patient and family discuss with pulmonology.

## 2022-03-16 NOTE — CASE COMMUNICATION
Just GARRETT:  Pt has not yet started on Hospice care. She is still on the fence with this. She sees a pulmonologist in April, and they will probably tell her what we've been telling her all along. She is currently at maximum potential. Oxygen is at 5.5 L/Min and Saturation was 89-90% at rest. You might consider still seeing her until she goes on hospice care.  Thank you, Erendira

## 2022-03-16 NOTE — CASE COMMUNICATION
Quality Review for 3.15.22 DC OASIS performed on by ANALISA Pulido RN on 3.17.2022:            Edits completed by ANALISA Pulido RN:              1. Changed  to 3 pt wears 02 24/7 and has dyspnea with exertion per CV tab            2. Sent [facility] referral for COPD with note that pt may go to hospice in the next few weeks and CC to PCP to inform of referral.

## 2022-03-17 NOTE — CASE COMMUNICATION
I agree with these changes.  Thank you, Erendira Orozco RN, CM  ----- Message -----  From: Corie Pulido R.N.  Sent: 3/16/2022   2:56 PM PDT  To: Erendira Orozco R.N.      Quality Review for 3.15.22 DC OASIS performed on by ANALISA Pulido RN on 3.17.2022:            Edits completed by ANALISA Pulido RN:              1. Changed  to 3 pt wears 02 24/7 and has dyspnea with exertion per CV tab            2. Sent [facility] referral for COPD with note morris t pt may go to hospice in the next few weeks and CC to PCP to inform of referral.

## 2022-03-25 NOTE — PROGRESS NOTES
New orders for MSIR and Lorazepam ordered for this new patient who has pain/SOB and anxiety. Discussed with Sherrill Quiñones RN.

## 2022-04-28 ENCOUNTER — HOME CARE VISIT (OUTPATIENT)
Dept: HOSPICE | Facility: HOSPICE | Age: 82
End: 2022-04-28
Payer: MEDICARE

## 2022-11-29 NOTE — PROCEDURES
Patient unable to do 6 min walk. she did 2 laps around clinic on 3 lpm 02. She started at 95% and at end of 2nd lap her 02 was 91 %.   3

## 2023-01-25 NOTE — RESPIRATORY CARE
COPD EDUCATION by COPD CLINICAL EDUCATOR  1/26/2022  at  2:09 PM by Dary Bailey, RRT     Patient interviewed by COPD education team.  Patient unable to participate in full program. A comprehensive packet including information about COPD, types of treatments to manage their disease and safe home Oxygen usage was provided and reviewed with patient at the bedside.      COPD Screen  COPD Risk Screening  Do you have a history of COPD?: Yes  Do you have a Pulmonologist?: Yes ([facility])  COPD Population Screener  During the past 4 weeks, how much did you feel short of breath?: Some of the time  Do you ever cough up any mucus or phlegm?: No/only with occasional colds or infections  In the past 12 months, you do less than you used to because of your breathing problems: Disagree/unsure  Have you smoked at least 100 cigarettes in your entire life?: Yes  How old are you?: 60+  COPD Screening Score: 5  COPD Coordinator Not Recommended: Yes    COPD Assessment  COPD Clinical Specialists ONLY  COPD Education Initiated: Yes--Short Intervention  DME Company: [employer]  DME Equipment Type: Oxygen3-4 lpm; Nebulizer  Physician Name: [facility]  Pulmonary Follow Up Appointment: 03/17/22  Pulmonologist Name: ANTONIO Lara has PFT scheduled on this day  Is this a COPD exacerbation patient?: No  $ Demo/Eval of SVN's, MDI's and Aerosols: Yes (review of care/cleaning of equipment)  PFT Results Pulmonary Function Testing: Yes (7/2019: FEV1 69 Ratio 70 at [facility])    Meds to Beds  Would the patient like to opt in for Bedside Medication Delivery at Discharge?: Yes, interested     MY COPD ACTION PLAN     It is recommended that patients and physicians /healthcare providers complete this action plan together. This plan should be discussed at each physician visit and updated as needed.    The green, yellow and red zones show groups of symptoms of COPD. This list of symptoms is not comprehensive, and you may  "experience other symptoms. In the \"Actions\" column, your healthcare provider has recommended actions for you to take based on your symptoms.    Patient Name: Camille Rodriguez   YOB: 1940   Last Updated on:     Green Zone:  I am doing well today Actions   •  Usual activitiy and exercise level •  Take daily medications   •  Usual amounts of cough and phlegm/mucus •  Use oxygen as prescribed   •  Sleep well at night •  Continue regular exercise/diet plan   •  Appetite is good •  At all times avoid cigarette smoke, inhaled irritants     Daily Medications (these medications are taken every day):   Fluticasone and Umeclidinium and Vilanterol (Trelogy) 1 Puff Once daily     Additional Information:  Remember to Rinse Mouth After Using    Yellow Zone:  I am having a bad day or a COPD flare Actions   •  More breathless than usual •  Continue daily medications   •  I have less energy for my daily activities •  Use quick relief inhaler as ordered   •  Increased or thicker phlegm/mucus •  Use oxygen as prescribed   •  Using quick relief inhaler/nebulizer more often •  Get plenty of rest   •  Swelling of ankles more than usual •  Use pursed lip breathing   •  More coughing than usual •  At all times avoid cigarette smoke, inhaled irritants   •  I feel like I have a \"chest cold\"   •  Poor sleep and my symptoms woke me up   •  My appetite is not good   •  My medicine is not helping    •  Call provider immediately if symptoms don’t improve     Continue daily medications, add rescue medications:   Albuterol/Ipratropium (Combivent, Duoneb) 3mL via nebulizer         Medications to be used during a flare up, (as Discussed with Provider):           Additional Information:  Use as directed by your Doctor    Red Zone:  I need urgent medical care Actions   •  Severe shortness of breath even at rest •  Call 911 or seek medical care immediately   •  Not able to do any activity because of breathing    •  Fever or shaking " chills    •  Feeling confused or very drowsy     •  Chest pains    •  Coughing up blood               Oral Minoxidil Pregnancy And Lactation Text: This medication should only be used when clearly needed if you are pregnant, attempting to become pregnant or breast feeding.